# Patient Record
Sex: FEMALE | Race: WHITE | NOT HISPANIC OR LATINO | Employment: FULL TIME | URBAN - METROPOLITAN AREA
[De-identification: names, ages, dates, MRNs, and addresses within clinical notes are randomized per-mention and may not be internally consistent; named-entity substitution may affect disease eponyms.]

---

## 2017-05-11 ENCOUNTER — HISTORICAL (OUTPATIENT)
Dept: INTERNAL MEDICINE | Facility: CLINIC | Age: 52
End: 2017-05-11

## 2017-05-11 LAB
BUN SERPL-MCNC: 19 MG/DL (ref 7–25)
CALCIUM SERPL-MCNC: 9.2 MG/DL (ref 8.4–10.3)
CHLORIDE SERPL-SCNC: 102 MMOL/L (ref 96–110)
CHOLEST SERPL-MCNC: 99 MG/DL
CHOLEST/HDLC SERPL: 3.2 {RATIO} (ref 0–4.4)
CO2 SERPL-SCNC: 26 MMOL/L (ref 24–32)
CREAT SERPL-MCNC: 0.47 MG/DL (ref 0.7–1.1)
EST. AVERAGE GLUCOSE BLD GHB EST-MCNC: 140 MG/DL
FERRITIN SERPL-MCNC: 219.2 NG/ML (ref 10–150)
GLUCOSE SERPL-MCNC: 132 MG/DL (ref 65–99)
HBA1C MFR BLD: 6.5 % (ref 4.7–5.6)
HDLC SERPL-MCNC: 31 MG/DL
LDLC SERPL CALC-MCNC: 35 MG/DL (ref 0–130)
POTASSIUM SERPL-SCNC: 4.5 MMOL/L (ref 3.6–5.2)
SODIUM SERPL-SCNC: 137 MMOL/L (ref 135–146)
TRIGL SERPL-MCNC: 166 MG/DL
VLDLC SERPL CALC-MCNC: 33 MG/DL

## 2017-08-02 ENCOUNTER — HISTORICAL (OUTPATIENT)
Dept: FAMILY MEDICINE | Facility: CLINIC | Age: 52
End: 2017-08-02

## 2018-01-15 ENCOUNTER — HISTORICAL (OUTPATIENT)
Dept: ADMINISTRATIVE | Facility: HOSPITAL | Age: 53
End: 2018-01-15

## 2018-01-15 LAB
EST. AVERAGE GLUCOSE BLD GHB EST-MCNC: 226 MG/DL
HBA1C MFR BLD: 9.5 % (ref 4.2–6.3)
HCV AB SERPL QL IA: NONREACTIVE
HIV 1+2 AB+HIV1 P24 AG SERPL QL IA: NONREACTIVE

## 2018-01-18 LAB
COLOR STL: NORMAL
CONSISTENCY STL: NORMAL
HEMOCCULT SP1 STL QL: NEGATIVE

## 2018-01-19 LAB
COLOR STL: NORMAL
CONSISTENCY STL: NORMAL
HEMOCCULT SP2 STL QL: NEGATIVE

## 2018-01-21 ENCOUNTER — HISTORICAL (OUTPATIENT)
Dept: INTERNAL MEDICINE | Facility: CLINIC | Age: 53
End: 2018-01-21

## 2018-01-21 LAB
COLOR STL: NORMAL
CONSISTENCY STL: NORMAL

## 2018-08-13 ENCOUNTER — HISTORICAL (OUTPATIENT)
Dept: WOUND CARE | Facility: HOSPITAL | Age: 53
End: 2018-08-13

## 2018-08-21 ENCOUNTER — HISTORICAL (OUTPATIENT)
Dept: ADMINISTRATIVE | Facility: HOSPITAL | Age: 53
End: 2018-08-21

## 2018-08-21 LAB — CK SERPL-CCNC: 122 UNIT/L (ref 26–192)

## 2018-11-15 ENCOUNTER — HISTORICAL (OUTPATIENT)
Dept: ADMINISTRATIVE | Facility: HOSPITAL | Age: 53
End: 2018-11-15

## 2018-11-15 LAB
ABS NEUT (OLG): 10.27 X10(3)/MCL (ref 2.1–9.2)
ALBUMIN SERPL-MCNC: 3.5 GM/DL (ref 3.4–5)
ALBUMIN/GLOB SERPL: 1 RATIO (ref 1–2)
ALP SERPL-CCNC: 77 UNIT/L (ref 45–117)
ALT SERPL-CCNC: 20 UNIT/L (ref 12–78)
AST SERPL-CCNC: 8 UNIT/L (ref 15–37)
BASOPHILS # BLD AUTO: 0.06 X10(3)/MCL
BASOPHILS NFR BLD AUTO: 0 %
BILIRUB SERPL-MCNC: 0.3 MG/DL (ref 0.2–1)
BILIRUBIN DIRECT+TOT PNL SERPL-MCNC: <0.1 MG/DL
BILIRUBIN DIRECT+TOT PNL SERPL-MCNC: ABNORMAL MG/DL
BUN SERPL-MCNC: 16 MG/DL (ref 7–18)
CALCIUM SERPL-MCNC: 8.4 MG/DL (ref 8.5–10.1)
CHLORIDE SERPL-SCNC: 101 MMOL/L (ref 98–107)
CO2 SERPL-SCNC: 27 MMOL/L (ref 21–32)
CREAT SERPL-MCNC: 0.4 MG/DL (ref 0.6–1.3)
CREAT UR-MCNC: 110 MG/DL
DEPRECATED CALCIDIOL+CALCIFEROL SERPL-MC: 15.36 NG/ML (ref 30–80)
EOSINOPHIL # BLD AUTO: 0.44 10*3/UL
EOSINOPHIL NFR BLD AUTO: 3 %
ERYTHROCYTE [DISTWIDTH] IN BLOOD BY AUTOMATED COUNT: 12.3 % (ref 11.5–14.5)
EST. AVERAGE GLUCOSE BLD GHB EST-MCNC: 189 MG/DL
GLOBULIN SER-MCNC: 3.3 GM/ML (ref 2.3–3.5)
GLUCOSE SERPL-MCNC: 144 MG/DL (ref 74–106)
HBA1C MFR BLD: 8.2 % (ref 4.2–6.3)
HCT VFR BLD AUTO: 36.3 % (ref 35–46)
HGB BLD-MCNC: 11.8 GM/DL (ref 12–16)
IMM GRANULOCYTES # BLD AUTO: 0.06 10*3/UL
IMM GRANULOCYTES NFR BLD AUTO: 0 %
LYMPHOCYTES # BLD AUTO: 2.95 X10(3)/MCL
LYMPHOCYTES NFR BLD AUTO: 20 % (ref 13–40)
MCH RBC QN AUTO: 29 PG (ref 26–34)
MCHC RBC AUTO-ENTMCNC: 32.5 GM/DL (ref 31–37)
MCV RBC AUTO: 89.2 FL (ref 80–100)
MICROALBUMIN UR-MCNC: 56.8 MG/L (ref 0–19)
MICROALBUMIN/CREAT RATIO PNL UR: 51.6 MCG/MG CR (ref 0–29)
MONOCYTES # BLD AUTO: 0.86 X10(3)/MCL
MONOCYTES NFR BLD AUTO: 6 % (ref 4–12)
NEUTROPHILS # BLD AUTO: 10.27 X10(3)/MCL
NEUTROPHILS NFR BLD AUTO: 70 X10(3)/MCL
PLATELET # BLD AUTO: 278 X10(3)/MCL (ref 130–400)
PMV BLD AUTO: 11.1 FL (ref 7.4–10.4)
POTASSIUM SERPL-SCNC: 3.9 MMOL/L (ref 3.5–5.1)
PROT SERPL-MCNC: 6.8 GM/DL (ref 6.4–8.2)
RBC # BLD AUTO: 4.07 X10(6)/MCL (ref 4–5.2)
SODIUM SERPL-SCNC: 139 MMOL/L (ref 136–145)
TSH SERPL-ACNC: 1.69 MIU/L (ref 0.36–3.74)
WBC # SPEC AUTO: 14.6 X10(3)/MCL (ref 4.5–11)

## 2019-07-22 ENCOUNTER — HISTORICAL (OUTPATIENT)
Dept: ADMINISTRATIVE | Facility: HOSPITAL | Age: 54
End: 2019-07-22

## 2019-07-22 LAB
ABS NEUT (OLG): 9.04 X10(3)/MCL (ref 2.1–9.2)
ALBUMIN SERPL-MCNC: 3.4 GM/DL (ref 3.4–5)
ALBUMIN/GLOB SERPL: 0.9 RATIO (ref 1.1–2)
ALP SERPL-CCNC: 95 UNIT/L (ref 45–117)
ALT SERPL-CCNC: 48 UNIT/L (ref 12–78)
AST SERPL-CCNC: 32 UNIT/L (ref 15–37)
BASOPHILS # BLD AUTO: 0.07 X10(3)/MCL
BASOPHILS NFR BLD AUTO: 0 %
BILIRUB SERPL-MCNC: 0.5 MG/DL (ref 0.2–1)
BILIRUBIN DIRECT+TOT PNL SERPL-MCNC: <0.1 MG/DL
BILIRUBIN DIRECT+TOT PNL SERPL-MCNC: >0.4 MG/DL
BUN SERPL-MCNC: 13 MG/DL (ref 7–18)
CALCIUM SERPL-MCNC: 8.7 MG/DL (ref 8.5–10.1)
CHLORIDE SERPL-SCNC: 96 MMOL/L (ref 98–107)
CHOLEST SERPL-MCNC: 391 MG/DL
CHOLEST/HDLC SERPL: ABNORMAL {RATIO} (ref 0–4.4)
CO2 SERPL-SCNC: 23 MMOL/L (ref 21–32)
CREAT SERPL-MCNC: 0.7 MG/DL (ref 0.6–1.3)
CREAT UR-MCNC: 52 MG/DL
DEPRECATED CALCIDIOL+CALCIFEROL SERPL-MC: 8.34 NG/ML (ref 30–80)
EOSINOPHIL # BLD AUTO: 0.82 X10(3)/MCL
EOSINOPHIL NFR BLD AUTO: 6 %
ERYTHROCYTE [DISTWIDTH] IN BLOOD BY AUTOMATED COUNT: 12.5 % (ref 11.5–14.5)
EST. AVERAGE GLUCOSE BLD GHB EST-MCNC: 309 MG/DL
GLOBULIN SER-MCNC: 3.6 GM/ML (ref 2.3–3.5)
GLUCOSE SERPL-MCNC: 400 MG/DL (ref 74–106)
HBA1C MFR BLD: 12.4 % (ref 4.2–6.3)
HCT VFR BLD AUTO: 43.9 % (ref 35–46)
HDLC SERPL-MCNC: ABNORMAL MG/DL
HGB BLD-MCNC: 14.3 GM/DL (ref 12–16)
IMM GRANULOCYTES # BLD AUTO: 0.07 10*3/UL
IMM GRANULOCYTES NFR BLD AUTO: 0 %
IRON SATN MFR SERPL: 39.5 % (ref 15–50)
IRON SERPL-MCNC: 70 MCG/DL (ref 50–170)
LDLC SERPL CALC-MCNC: ABNORMAL MG/DL (ref 0–130)
LYMPHOCYTES # BLD AUTO: 3.65 X10(3)/MCL
LYMPHOCYTES NFR BLD AUTO: 26 % (ref 13–40)
MCH RBC QN AUTO: 28.8 PG (ref 26–34)
MCHC RBC AUTO-ENTMCNC: 32.6 GM/DL (ref 31–37)
MCV RBC AUTO: 88.3 FL (ref 80–100)
MICROALBUMIN UR-MCNC: 47 MG/L (ref 0–19)
MICROALBUMIN/CREAT RATIO PNL UR: 90.4 MCG/MG CR (ref 0–29)
MONOCYTES # BLD AUTO: 0.69 X10(3)/MCL
MONOCYTES NFR BLD AUTO: 5 % (ref 0–24)
NEUTROPHILS # BLD AUTO: 9.04 X10(3)/MCL
NEUTROPHILS NFR BLD AUTO: 63 X10(3)/MCL
PLATELET # BLD AUTO: 307 X10(3)/MCL (ref 130–400)
PMV BLD AUTO: 11.2 FL (ref 7.4–10.4)
POTASSIUM SERPL-SCNC: 4.4 MMOL/L (ref 3.5–5.1)
PROT SERPL-MCNC: 7 GM/DL (ref 6.4–8.2)
RBC # BLD AUTO: 4.97 X10(6)/MCL (ref 4–5.2)
SODIUM SERPL-SCNC: 130 MMOL/L (ref 136–145)
TIBC SERPL-MCNC: 177 MCG/DL (ref 250–450)
TRANSFERRIN SERPL-MCNC: 227 MG/DL (ref 200–360)
TRIGL SERPL-MCNC: 2880 MG/DL
VLDLC SERPL CALC-MCNC: ABNORMAL MG/DL
WBC # SPEC AUTO: 14.3 X10(3)/MCL (ref 4.5–11)

## 2019-09-03 ENCOUNTER — HISTORICAL (OUTPATIENT)
Dept: RADIOLOGY | Facility: HOSPITAL | Age: 54
End: 2019-09-03

## 2019-12-10 ENCOUNTER — HISTORICAL (OUTPATIENT)
Dept: ADMINISTRATIVE | Facility: HOSPITAL | Age: 54
End: 2019-12-10

## 2019-12-10 LAB
ALBUMIN SERPL-MCNC: 3.6 GM/DL (ref 3.4–5)
ALBUMIN/GLOB SERPL: 0.9 RATIO (ref 1.1–2)
ALP SERPL-CCNC: 104 UNIT/L (ref 45–117)
ALT SERPL-CCNC: 21 UNIT/L (ref 12–78)
AST SERPL-CCNC: 12 UNIT/L (ref 15–37)
BILIRUB SERPL-MCNC: 0.4 MG/DL (ref 0.2–1)
BILIRUBIN DIRECT+TOT PNL SERPL-MCNC: <0.1 MG/DL (ref 0–0.2)
BILIRUBIN DIRECT+TOT PNL SERPL-MCNC: ABNORMAL MG/DL
BUN SERPL-MCNC: 21 MG/DL (ref 7–18)
CALCIUM SERPL-MCNC: 9.2 MG/DL (ref 8.5–10.1)
CHLORIDE SERPL-SCNC: 101 MMOL/L (ref 98–107)
CHOLEST SERPL-MCNC: 202 MG/DL
CHOLEST/HDLC SERPL: 6.1 {RATIO} (ref 0–4.4)
CO2 SERPL-SCNC: 27 MMOL/L (ref 21–32)
CREAT SERPL-MCNC: 0.7 MG/DL (ref 0.6–1.3)
EST. AVERAGE GLUCOSE BLD GHB EST-MCNC: 286 MG/DL
GLOBULIN SER-MCNC: 4.1 GM/ML (ref 2.3–3.5)
GLUCOSE SERPL-MCNC: 243 MG/DL (ref 74–106)
HBA1C MFR BLD: 11.6 % (ref 4.2–6.3)
HDLC SERPL-MCNC: 33 MG/DL (ref 40–59)
LDLC SERPL CALC-MCNC: ABNORMAL MG/DL
POTASSIUM SERPL-SCNC: 4.9 MMOL/L (ref 3.5–5.1)
PROT SERPL-MCNC: 7.7 GM/DL (ref 6.4–8.2)
SODIUM SERPL-SCNC: 136 MMOL/L (ref 136–145)
TRIGL SERPL-MCNC: 617 MG/DL
VLDLC SERPL CALC-MCNC: ABNORMAL MG/DL

## 2020-01-06 ENCOUNTER — HISTORICAL (OUTPATIENT)
Dept: INTERNAL MEDICINE | Facility: CLINIC | Age: 55
End: 2020-01-06

## 2020-11-04 ENCOUNTER — HISTORICAL (OUTPATIENT)
Dept: LAB | Facility: HOSPITAL | Age: 55
End: 2020-11-04

## 2020-11-04 LAB
ALBUMIN SERPL-MCNC: 3.9 GM/DL (ref 3.5–5)
ALBUMIN/GLOB SERPL: 1.1 RATIO (ref 1.1–2)
ALP SERPL-CCNC: 98 UNIT/L (ref 40–150)
ALT SERPL-CCNC: 21 UNIT/L (ref 0–55)
AST SERPL-CCNC: 12 UNIT/L (ref 5–34)
BILIRUB SERPL-MCNC: 0.4 MG/DL
BILIRUBIN DIRECT+TOT PNL SERPL-MCNC: 0.1 MG/DL (ref 0–0.5)
BILIRUBIN DIRECT+TOT PNL SERPL-MCNC: 0.3 MG/DL (ref 0–0.8)
BUN SERPL-MCNC: 18 MG/DL (ref 9.8–20.1)
CALCIUM SERPL-MCNC: 9.1 MG/DL (ref 8.4–10.2)
CHLORIDE SERPL-SCNC: 100 MMOL/L (ref 98–107)
CO2 SERPL-SCNC: 20 MMOL/L (ref 22–29)
CREAT SERPL-MCNC: 0.72 MG/DL (ref 0.55–1.02)
DEPRECATED CALCIDIOL+CALCIFEROL SERPL-MC: 15.2 NG/ML (ref 30–80)
EST. AVERAGE GLUCOSE BLD GHB EST-MCNC: 306.3 MG/DL
GLOBULIN SER-MCNC: 3.6 GM/DL (ref 2.4–3.5)
GLUCOSE SERPL-MCNC: 365 MG/DL (ref 74–100)
HBA1C MFR BLD: 12.3 %
POTASSIUM SERPL-SCNC: 4 MMOL/L (ref 3.5–5.1)
PROT SERPL-MCNC: 7.5 GM/DL (ref 6.4–8.3)
SODIUM SERPL-SCNC: 134 MMOL/L (ref 136–145)
TSH SERPL-ACNC: 1.22 UIU/ML (ref 0.35–4.94)

## 2020-11-12 ENCOUNTER — HISTORICAL (OUTPATIENT)
Dept: INTERNAL MEDICINE | Facility: CLINIC | Age: 55
End: 2020-11-12

## 2020-11-12 LAB
ABS NEUT (OLG): 8.73 X10(3)/MCL (ref 2.1–9.2)
ALBUMIN SERPL-MCNC: 3.7 GM/DL (ref 3.5–5)
ALBUMIN/GLOB SERPL: 1.2 RATIO (ref 1.1–2)
ALP SERPL-CCNC: 91 UNIT/L (ref 40–150)
ALT SERPL-CCNC: 19 UNIT/L (ref 0–55)
APPEARANCE, UA: CLEAR
AST SERPL-CCNC: 11 UNIT/L (ref 5–34)
BACTERIA #/AREA URNS AUTO: ABNORMAL /HPF
BASOPHILS # BLD AUTO: 0.1 X10(3)/MCL (ref 0–0.2)
BASOPHILS NFR BLD AUTO: 0 %
BILIRUB SERPL-MCNC: 0.6 MG/DL
BILIRUB UR QL STRIP: NEGATIVE
BILIRUBIN DIRECT+TOT PNL SERPL-MCNC: 0.2 MG/DL (ref 0–0.5)
BILIRUBIN DIRECT+TOT PNL SERPL-MCNC: 0.4 MG/DL (ref 0–0.8)
BUN SERPL-MCNC: 20 MG/DL (ref 9.8–20.1)
CALCIUM SERPL-MCNC: 9 MG/DL (ref 8.4–10.2)
CHLORIDE SERPL-SCNC: 97 MMOL/L (ref 98–107)
CO2 SERPL-SCNC: 23 MMOL/L (ref 22–29)
COLOR UR: ABNORMAL
CREAT SERPL-MCNC: 1.1 MG/DL (ref 0.55–1.02)
EOSINOPHIL # BLD AUTO: 0.6 X10(3)/MCL (ref 0–0.9)
EOSINOPHIL NFR BLD AUTO: 4 %
ERYTHROCYTE [DISTWIDTH] IN BLOOD BY AUTOMATED COUNT: 12.7 % (ref 11.5–14.5)
GLOBULIN SER-MCNC: 3.1 GM/DL (ref 2.4–3.5)
GLUCOSE (UA): >1000 MG/DL
GLUCOSE SERPL-MCNC: 454 MG/DL (ref 74–100)
HCT VFR BLD AUTO: 39.5 % (ref 35–46)
HGB BLD-MCNC: 13.2 GM/DL (ref 12–16)
HGB UR QL STRIP: NEGATIVE
HYALINE CASTS #/AREA URNS LPF: ABNORMAL /LPF
IMM GRANULOCYTES # BLD AUTO: 0.1 10*3/UL
IMM GRANULOCYTES NFR BLD AUTO: 1 %
KETONES UR QL STRIP: ABNORMAL
LEUKOCYTE ESTERASE UR QL STRIP: 25 LEU/UL
LYMPHOCYTES # BLD AUTO: 3.9 X10(3)/MCL (ref 0.6–4.6)
LYMPHOCYTES NFR BLD AUTO: 28 %
MCH RBC QN AUTO: 28.7 PG (ref 26–34)
MCHC RBC AUTO-ENTMCNC: 33.4 GM/DL (ref 31–37)
MCV RBC AUTO: 85.9 FL (ref 80–100)
MONOCYTES # BLD AUTO: 0.8 X10(3)/MCL (ref 0.1–1.3)
MONOCYTES NFR BLD AUTO: 6 %
NEUTROPHILS # BLD AUTO: 8.73 X10(3)/MCL (ref 2.1–9.2)
NEUTROPHILS NFR BLD AUTO: 62 %
NITRITE UR QL STRIP: NEGATIVE
PH UR STRIP: 5.5 [PH] (ref 4.5–8)
PLATELET # BLD AUTO: 252 X10(3)/MCL (ref 130–400)
PMV BLD AUTO: 11.3 FL (ref 7.4–10.4)
POTASSIUM SERPL-SCNC: 4.3 MMOL/L (ref 3.5–5.1)
PROT SERPL-MCNC: 6.8 GM/DL (ref 6.4–8.3)
PROT UR QL STRIP: 20 MG/DL
RBC # BLD AUTO: 4.6 X10(6)/MCL (ref 4–5.2)
RBC #/AREA URNS AUTO: ABNORMAL /HPF
SODIUM SERPL-SCNC: 131 MMOL/L (ref 136–145)
SP GR UR STRIP: 1.03 (ref 1–1.03)
SQUAMOUS #/AREA URNS LPF: >100 /LPF
UROBILINOGEN UR STRIP-ACNC: NORMAL
WBC # SPEC AUTO: 14.1 X10(3)/MCL (ref 4.5–11)
WBC #/AREA URNS AUTO: ABNORMAL /HPF

## 2020-11-14 LAB — FINAL CULTURE: NORMAL

## 2020-12-11 ENCOUNTER — HISTORICAL (OUTPATIENT)
Dept: RADIOLOGY | Facility: HOSPITAL | Age: 55
End: 2020-12-11

## 2021-04-22 LAB
HUMAN PAPILLOMAVIRUS (HPV): NORMAL
PAP RECOMMENDATION EXT: NORMAL
PAP SMEAR: NORMAL

## 2021-05-27 ENCOUNTER — HISTORICAL (OUTPATIENT)
Dept: ADMINISTRATIVE | Facility: HOSPITAL | Age: 56
End: 2021-05-27

## 2021-05-27 LAB
ABS NEUT (OLG): 8.91 X10(3)/MCL (ref 2.1–9.2)
ALBUMIN SERPL-MCNC: 3.9 GM/DL (ref 3.5–5)
ALBUMIN/GLOB SERPL: 1 RATIO (ref 1.1–2)
ALP SERPL-CCNC: 129 UNIT/L (ref 40–150)
ALT SERPL-CCNC: 15 UNIT/L (ref 0–55)
APPEARANCE, UA: CLEAR
AST SERPL-CCNC: 17 UNIT/L (ref 5–34)
BACTERIA #/AREA URNS AUTO: ABNORMAL /HPF
BASOPHILS # BLD AUTO: 0.1 X10(3)/MCL (ref 0–0.2)
BASOPHILS NFR BLD AUTO: 1 %
BILIRUB SERPL-MCNC: 0.5 MG/DL
BILIRUB UR QL STRIP: NEGATIVE
BILIRUBIN DIRECT+TOT PNL SERPL-MCNC: <0.1 MG/DL (ref 0–0.5)
BILIRUBIN DIRECT+TOT PNL SERPL-MCNC: >0.4 MG/DL (ref 0–0.8)
BUN SERPL-MCNC: 14 MG/DL (ref 9.8–20.1)
CALCIUM SERPL-MCNC: 10.3 MG/DL (ref 8.4–10.2)
CHLORIDE SERPL-SCNC: 96 MMOL/L (ref 98–107)
CHOLEST SERPL-MCNC: 376 MG/DL
CHOLEST/HDLC SERPL: ABNORMAL {RATIO} (ref 0–5)
CO2 SERPL-SCNC: 23 MMOL/L (ref 22–29)
COLOR UR: YELLOW
CREAT SERPL-MCNC: 0.73 MG/DL (ref 0.55–1.02)
CREAT UR-MCNC: 146.8 MG/DL (ref 45–106)
CREAT UR-MCNC: 146.8 MG/DL (ref 45–106)
DEPRECATED CALCIDIOL+CALCIFEROL SERPL-MC: 20.9 NG/ML (ref 30–80)
EOSINOPHIL # BLD AUTO: 0.5 X10(3)/MCL (ref 0–0.9)
EOSINOPHIL NFR BLD AUTO: 4 %
ERYTHROCYTE [DISTWIDTH] IN BLOOD BY AUTOMATED COUNT: 12.2 % (ref 11.5–14.5)
EST. AVERAGE GLUCOSE BLD GHB EST-MCNC: 309.2 MG/DL
GLOBULIN SER-MCNC: 3.9 GM/DL (ref 2.4–3.5)
GLUCOSE (UA): >1000 MG/DL
GLUCOSE SERPL-MCNC: 294 MG/DL (ref 74–100)
HBA1C MFR BLD: 12.4 %
HCT VFR BLD AUTO: 43.9 % (ref 35–46)
HDLC SERPL-MCNC: ABNORMAL MG/DL (ref 35–60)
HGB BLD-MCNC: 14.6 GM/DL (ref 12–16)
HGB UR QL STRIP: NEGATIVE
HYALINE CASTS #/AREA URNS LPF: ABNORMAL /LPF
IMM GRANULOCYTES # BLD AUTO: 0.06 10*3/UL
IMM GRANULOCYTES NFR BLD AUTO: 0 %
KETONES UR QL STRIP: ABNORMAL
LDLC SERPL CALC-MCNC: ABNORMAL MG/DL (ref 50–140)
LEUKOCYTE ESTERASE UR QL STRIP: 25 LEU/UL
LYMPHOCYTES # BLD AUTO: 3.8 X10(3)/MCL (ref 0.6–4.6)
LYMPHOCYTES NFR BLD AUTO: 27 %
MCH RBC QN AUTO: 29 PG (ref 26–34)
MCHC RBC AUTO-ENTMCNC: 33.3 GM/DL (ref 31–37)
MCV RBC AUTO: 87.1 FL (ref 80–100)
MICROALBUMIN UR-MCNC: 231 MG/L
MICROALBUMIN/CREAT RATIO PNL UR: 157.4 MG/GM CR (ref 0–30)
MONOCYTES # BLD AUTO: 0.7 X10(3)/MCL (ref 0.1–1.3)
MONOCYTES NFR BLD AUTO: 5 %
NEUTROPHILS # BLD AUTO: 8.91 X10(3)/MCL (ref 2.1–9.2)
NEUTROPHILS NFR BLD AUTO: 63 %
NITRITE UR QL STRIP: NEGATIVE
NRBC BLD AUTO-RTO: 0 % (ref 0–0.2)
PH UR STRIP: 5.5 [PH] (ref 4.5–8)
PLATELET # BLD AUTO: 275 X10(3)/MCL (ref 130–400)
PMV BLD AUTO: 11.4 FL (ref 7.4–10.4)
POTASSIUM SERPL-SCNC: 4.9 MMOL/L (ref 3.5–5.1)
PROT SERPL-MCNC: 7.8 GM/DL (ref 6.4–8.3)
PROT UR QL STRIP: 50 MG/DL
PROT UR STRIP-MCNC: 55.7 MG/DL
PROT/CREAT UR-RTO: 379.4 MG/GM CR
RBC # BLD AUTO: 5.04 X10(6)/MCL (ref 4–5.2)
RBC #/AREA URNS AUTO: ABNORMAL /HPF
SODIUM SERPL-SCNC: 136 MMOL/L (ref 136–145)
SP GR UR STRIP: 1.03 (ref 1–1.03)
SQUAMOUS #/AREA URNS LPF: >100 /LPF
TRIGL SERPL-MCNC: 2171 MG/DL (ref 37–140)
TSH SERPL-ACNC: 1.21 UIU/ML (ref 0.35–4.94)
UROBILINOGEN UR STRIP-ACNC: NORMAL
VLDLC SERPL CALC-MCNC: 434 MG/DL
WBC # SPEC AUTO: 14.1 X10(3)/MCL (ref 4.5–11)
WBC #/AREA URNS AUTO: ABNORMAL /HPF

## 2021-05-29 LAB — FINAL CULTURE: NORMAL

## 2021-06-22 ENCOUNTER — HISTORICAL (OUTPATIENT)
Dept: RADIOLOGY | Facility: HOSPITAL | Age: 56
End: 2021-06-22

## 2021-10-05 ENCOUNTER — HISTORICAL (OUTPATIENT)
Dept: ADMINISTRATIVE | Facility: HOSPITAL | Age: 56
End: 2021-10-05

## 2021-10-05 LAB
BUN SERPL-MCNC: 14.3 MG/DL (ref 9.8–20.1)
CALCIUM SERPL-MCNC: 9.7 MG/DL (ref 8.4–10.2)
CHLORIDE SERPL-SCNC: 98 MMOL/L (ref 98–107)
CO2 SERPL-SCNC: 22 MMOL/L (ref 22–29)
CREAT SERPL-MCNC: 0.71 MG/DL (ref 0.55–1.02)
CREAT UR-MCNC: 181.2 MG/DL (ref 45–106)
CREAT/UREA NIT SERPL: 20
DEPRECATED CALCIDIOL+CALCIFEROL SERPL-MC: 18.6 NG/ML (ref 30–80)
EST. AVERAGE GLUCOSE BLD GHB EST-MCNC: 274.7 MG/DL
GLUCOSE SERPL-MCNC: 320 MG/DL (ref 74–100)
HBA1C MFR BLD: 11.2 %
MICROALBUMIN UR-MCNC: 475.4 MG/L
MICROALBUMIN/CREAT RATIO PNL UR: 262.4 MG/GM CR (ref 0–30)
POTASSIUM SERPL-SCNC: 4.3 MMOL/L (ref 3.5–5.1)
SODIUM SERPL-SCNC: 135 MMOL/L (ref 136–145)
TSH SERPL-ACNC: 0.95 UIU/ML (ref 0.35–4.94)
VIT B12 SERPL-MCNC: 477 PG/ML (ref 213–816)

## 2022-04-07 ENCOUNTER — HISTORICAL (OUTPATIENT)
Dept: ADMINISTRATIVE | Facility: HOSPITAL | Age: 57
End: 2022-04-07
Payer: MEDICAID

## 2022-04-13 ENCOUNTER — HISTORICAL (OUTPATIENT)
Dept: ADMINISTRATIVE | Facility: HOSPITAL | Age: 57
End: 2022-04-13

## 2022-04-24 VITALS
SYSTOLIC BLOOD PRESSURE: 164 MMHG | DIASTOLIC BLOOD PRESSURE: 91 MMHG | WEIGHT: 218.25 LBS | OXYGEN SATURATION: 96 % | BODY MASS INDEX: 38.67 KG/M2 | HEIGHT: 63 IN

## 2022-04-30 NOTE — PROGRESS NOTES
MNT OUTPATIENT EDUCATION   Nutrition Diagnosis  Problem:   Food & Nutrition Related Knowledge Deficit       Related to:  Medical Diagnosis  As Evidenced by:  Limited prior knowledge / health professional referral    Nutrition Prescription / Intervention  MNT Education Completed on:    Diabetes:  Instructed patient on carbohydrate containing food groups (starches, fruits, milk); portion sizes / measuring food; reading food labels; low fat meat selections; choosing healthier fats; increasing activity; sick day management; low fat tips on dining out; good eating habits; cooking healthier meals.       Appropriate meal plan provided: 1411-8846 calories     Level of Understanding:   good  Expected Compliance:   good  Appropriate Handouts Given: (age specific / literacy): yes  Barriers to Learning: none    Nutrition Prescription:  Diet order prescribed per MD / RD    Goal:  Patient will adhere to prescribed MNT meal plan as instructed by RD    Monitoring / Evaluation    RKANNAN will monitor: DMITRY

## 2022-05-08 ENCOUNTER — HOSPITAL ENCOUNTER (EMERGENCY)
Facility: HOSPITAL | Age: 57
Discharge: HOME | End: 2022-05-08
Attending: STUDENT IN AN ORGANIZED HEALTH CARE EDUCATION/TRAINING PROGRAM

## 2022-05-08 VITALS
OXYGEN SATURATION: 97 % | HEART RATE: 102 BPM | DIASTOLIC BLOOD PRESSURE: 79 MMHG | SYSTOLIC BLOOD PRESSURE: 140 MMHG | HEIGHT: 63 IN | RESPIRATION RATE: 18 BRPM | WEIGHT: 224.63 LBS | TEMPERATURE: 98 F | BODY MASS INDEX: 39.8 KG/M2

## 2022-05-08 DIAGNOSIS — L50.9 URTICARIA: Primary | ICD-10-CM

## 2022-05-08 PROCEDURE — 96372 THER/PROPH/DIAG INJ SC/IM: CPT | Performed by: NURSE PRACTITIONER

## 2022-05-08 PROCEDURE — 63600175 PHARM REV CODE 636 W HCPCS: Performed by: NURSE PRACTITIONER

## 2022-05-08 PROCEDURE — 99284 EMERGENCY DEPT VISIT MOD MDM: CPT

## 2022-05-08 PROCEDURE — 25000003 PHARM REV CODE 250: Performed by: NURSE PRACTITIONER

## 2022-05-08 RX ORDER — LISINOPRIL 20 MG/1
20 TABLET ORAL DAILY
COMMUNITY
Start: 2022-01-21 | End: 2022-08-29 | Stop reason: SDUPTHER

## 2022-05-08 RX ORDER — METHYLPREDNISOLONE SOD SUCC 125 MG
125 VIAL (EA) INJECTION
Status: COMPLETED | OUTPATIENT
Start: 2022-05-08 | End: 2022-05-08

## 2022-05-08 RX ORDER — FAMOTIDINE 20 MG/1
20 TABLET, FILM COATED ORAL
Status: COMPLETED | OUTPATIENT
Start: 2022-05-08 | End: 2022-05-08

## 2022-05-08 RX ORDER — METHYLPREDNISOLONE 4 MG/1
TABLET ORAL
Qty: 21 EACH | Refills: 0 | Status: SHIPPED | OUTPATIENT
Start: 2022-05-08 | End: 2022-07-29

## 2022-05-08 RX ORDER — GLIPIZIDE 10 MG/1
10 TABLET ORAL 2 TIMES DAILY
COMMUNITY
Start: 2022-01-21 | End: 2022-05-31

## 2022-05-08 RX ORDER — GABAPENTIN 300 MG/1
300 CAPSULE ORAL 3 TIMES DAILY
COMMUNITY
Start: 2022-04-25 | End: 2022-08-29 | Stop reason: SDUPTHER

## 2022-05-08 RX ORDER — ROPINIROLE 0.5 MG/1
0.5 TABLET, FILM COATED ORAL 3 TIMES DAILY
COMMUNITY
Start: 2022-01-21 | End: 2022-08-29 | Stop reason: SDUPTHER

## 2022-05-08 RX ORDER — FAMOTIDINE 20 MG/1
20 TABLET, FILM COATED ORAL DAILY
Qty: 7 TABLET | Refills: 0 | Status: SHIPPED | OUTPATIENT
Start: 2022-05-08 | End: 2022-08-29 | Stop reason: SDUPTHER

## 2022-05-08 RX ADMIN — FAMOTIDINE 20 MG: 20 TABLET, FILM COATED ORAL at 04:05

## 2022-05-08 RX ADMIN — METHYLPREDNISOLONE SODIUM SUCCINATE 125 MG: 125 INJECTION, POWDER, FOR SOLUTION INTRAMUSCULAR; INTRAVENOUS at 04:05

## 2022-05-08 NOTE — DISCHARGE INSTRUCTIONS
Follow up with primary care physician in next 5-7 days for evaluation.   Increase oral hydration.  Continue OTC Benadryl up to three times daily for allergy symptoms.  Return to ED immediately for worsening swelling, SOB, or inability to swallow.

## 2022-05-08 NOTE — ED PROVIDER NOTES
Encounter Date: 5/8/2022       History     Chief Complaint   Patient presents with    Urticaria     Urticaria to trunk and extremities with mild shortness of breath; no wheezing. Ate crawfish last night. Ate lunch around 13:30. Symptom onset at 14:00. Denies stings/bites. No known allergies. Took Benadryl prior to arrival.     Pt is a 56 yr female who presents to the [facility] ED reporting a rash to chest and arms which began this Am. Reports eating crawfish last night for the first time since she was a child. Admits to having an allergic reaction at that time as well. Denies swelling to lips, tongue, or throat. Denies chest pain, SOB, weakness, dizziness, fever, abdominal pain, or loss of bowel or bladder control. Pt took a dose of Benadryl approx 1 hour ago and says her symptoms have improved since taking medication.        Review of patient's allergies indicates:  No Known Allergies  Past Medical History:   Diagnosis Date    Diabetes mellitus     Hypertension      No past surgical history on file.  No family history on file.     Review of Systems   Constitutional: Negative for chills and fever.   HENT: Negative for facial swelling and trouble swallowing.    Respiratory: Negative for chest tightness and shortness of breath.    Cardiovascular: Negative for chest pain and palpitations.   Gastrointestinal: Negative for abdominal pain and nausea.   Genitourinary: Negative for dysuria and urgency.   Musculoskeletal: Negative for back pain and myalgias.   Skin: Positive for rash.   Neurological: Negative for weakness.   Hematological: Does not bruise/bleed easily.   All other systems reviewed and are negative.      Physical Exam     Initial Vitals [05/08/22 1617]   BP Pulse Resp Temp SpO2   117/70 (!) 111 16 97.5 °F (36.4 °C) 97 %      MAP       --         Physical Exam    Nursing note and vitals reviewed.  Constitutional: She appears well-developed.   HENT:   Head: Normocephalic and atraumatic.   Eyes: Pupils are equal,  round, and reactive to light.   Neck: Neck supple.   Normal range of motion.  Cardiovascular: Normal rate and regular rhythm.   Pulmonary/Chest: Breath sounds normal.   Abdominal: Abdomen is soft. Bowel sounds are normal. There is no rebound, no guarding, no tenderness at McBurney's point and negative Hoskins's sign. negative psoas sign  Musculoskeletal:         General: Normal range of motion.      Cervical back: Normal range of motion and neck supple.     Neurological: She is alert and oriented to person, place, and time. She has normal strength and normal reflexes.   Skin: Skin is warm and dry. Capillary refill takes less than 2 seconds. Rash noted. Rash is urticarial.        Urticarial rash to anterior chest, bilateral upper arms, and Lt forearm. No erythema or purulent drainage noted. Nontender to touch.   Psychiatric: She has a normal mood and affect. Her speech is normal and behavior is normal. Judgment and thought content normal.         ED Course   Procedures  Labs Reviewed - No data to display       Imaging Results    None          Medications   methylPREDNISolone sodium succinate injection 125 mg (125 mg Intramuscular Given 5/8/22 1643)   famotidine tablet 20 mg (20 mg Oral Given 5/8/22 1642)     Medical Decision Making:   Differential Diagnosis:   Allergic reaction  ED Management:  17:35 On re-examination, pt's urticaria has greatly improved. Pt continues to deny swelling to lips, tongue, or throat. Pt will follow up with her PCP in the next 3-4 days as discussed in the ED. I stressed the need for her to return to the [facility] ED immediately for worsening swelling, SOB, or onset of swelling to lips/throat. Pt verbalized agreement and understanding of instructions. Denies further needs at this time.                       Clinical Impression:   Final diagnoses:  [L50.9] Urticaria (Primary)          ED Disposition Condition    Discharge Stable        ED Prescriptions     Medication Sig Dispense Start Date End  Date Auth. Provider    famotidine (PEPCID) 20 MG tablet Take 1 tablet (20 mg total) by mouth once daily. for 7 days 7 tablet 5/8/2022 5/15/2022 Tan Oglesby Jr., JANE    methylPREDNISolone (MEDROL DOSEPACK) 4 mg tablet use as directed 21 each 5/8/2022  JANE Vazquez Jr.        Follow-up Information     Follow up With Specialties Details Why Contact Info    Jaycob Pineda MD Internal Medicine Call in 1 week  [address]  [place] [id number] [phone]      [facility] Emergency Medicine In 3 days As needed, If symptoms worsen [address] [facility] [address] [phone]           JANE Vazquez Jr.  05/08/22 3340

## 2022-05-11 ENCOUNTER — HOSPITAL ENCOUNTER (OUTPATIENT)
Dept: RADIOLOGY | Facility: HOSPITAL | Age: 57
Discharge: HOME | End: 2022-05-11
Attending: STUDENT IN AN ORGANIZED HEALTH CARE EDUCATION/TRAINING PROGRAM
Payer: MEDICAID

## 2022-05-11 ENCOUNTER — OFFICE VISIT (OUTPATIENT)
Dept: ORTHOPEDICS | Facility: CLINIC | Age: 57
End: 2022-05-11
Payer: COMMERCIAL

## 2022-05-11 VITALS
SYSTOLIC BLOOD PRESSURE: 150 MMHG | DIASTOLIC BLOOD PRESSURE: 83 MMHG | WEIGHT: 222.88 LBS | HEIGHT: 64 IN | BODY MASS INDEX: 38.05 KG/M2

## 2022-05-11 DIAGNOSIS — M25.511 RIGHT SHOULDER PAIN, UNSPECIFIED CHRONICITY: Primary | ICD-10-CM

## 2022-05-11 DIAGNOSIS — M25.511 RIGHT SHOULDER PAIN, UNSPECIFIED CHRONICITY: ICD-10-CM

## 2022-05-11 PROCEDURE — 99213 OFFICE O/P EST LOW 20 MIN: CPT | Mod: PBBFAC

## 2022-05-11 PROCEDURE — 73030 X-RAY EXAM OF SHOULDER: CPT | Mod: TC,RT

## 2022-05-11 PROCEDURE — 99204 OFFICE O/P NEW MOD 45 MIN: CPT | Mod: S$PBB,,, | Performed by: ORTHOPAEDIC SURGERY

## 2022-05-11 PROCEDURE — 99204 PR OFFICE/OUTPT VISIT, NEW, LEVL IV, 45-59 MIN: ICD-10-PCS | Mod: S$PBB,,, | Performed by: ORTHOPAEDIC SURGERY

## 2022-05-11 RX ORDER — GABAPENTIN 300 MG/1
300 CAPSULE ORAL
COMMUNITY
Start: 2022-01-21 | End: 2022-05-21

## 2022-05-11 RX ORDER — LISINOPRIL 20 MG/1
20 TABLET ORAL
COMMUNITY
Start: 2022-01-21 | End: 2022-08-29 | Stop reason: SDUPTHER

## 2022-05-11 RX ORDER — GLIPIZIDE 10 MG/1
10 TABLET ORAL
COMMUNITY
Start: 2022-01-21 | End: 2022-05-31

## 2022-05-11 RX ORDER — METFORMIN HYDROCHLORIDE 1000 MG/1
1000 TABLET ORAL
COMMUNITY
Start: 2021-10-05 | End: 2022-05-31

## 2022-05-11 RX ORDER — ROPINIROLE 0.5 MG/1
0.5 TABLET, FILM COATED ORAL
COMMUNITY
Start: 2022-01-21 | End: 2022-08-29 | Stop reason: SDUPTHER

## 2022-05-11 NOTE — PATIENT INSTRUCTIONS
Keep in sling. No shoulder range of motion but ok to move elbow, wrist and fingers.   Look for physical therapy location.   For pain: can alternate  - tylenol 1,000 mg three times a day as needed  - Ibuprofen 600 mg four times a day as needed  - Volteren gel as needed (follow directions on bottle)    Vitamins:  - Multi-vitamin for calcium  - Vitamin D around 5,000 units daily

## 2022-05-11 NOTE — PROGRESS NOTES
[facility] Orthopaedic Surgery Clinic Note    In brief, Suki Anton is a 56 y.o. female Right-hand-dominant new patient here for an injury to her right shoulder.  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  This injury happened at work and his workmen's comp.  About 4 weeks ago she fell at work had a right proximal humerus fracture she has been in a  Sling since the injury.  She has not done any range of motion of her shoulder.  Has been compliant with nonweightbearing.  She feels like the pain is improving however she does get numbness and tingling in her hand from being in the sling.  She takes her arm out of the sling to straighten her elbow this result her symptoms.      PMH:   Past Medical History:   Diagnosis Date    Diabetes mellitus     Hypertension        PSH: History reviewed. No pertinent surgical history.    SH:   Social History     Socioeconomic History    Marital status: Single   Tobacco Use    Smoking status: Never Smoker    Smokeless tobacco: Never Used       FH: No family history on file.    Allergies: Review of patient's allergies indicates:  No Known Allergies    ROS:  Constitutional- no fever, fatigue, weakness  Eye- no vision loss, eye redness, drainage, or pain  ENMT- no sore throat, ear pain, sinus pain/congestion, nasal congestion/drainage  Respiratory- no cough, wheezing, or shortness of breath  CV- no chest pain, no palpitations, no edema  GI- no N/V/D; no abdominal pain    Physical Exam:  Vitals:    05/11/22 1045   BP: (!) 150/83       General: NAD  Cardio: RRR by peripheral pulse  Pulm: Normal WOB on room air, symmetric chest rise  Abd: Soft, NT/ND    MSK:    Right upper extremity   deferred range of motion of the shoulder due to known injury no ecchymosis or bruising on the arm minimal tenderness to palpation at this point  Full elbow range of motion  Full wrist and finger range of motion   Sensation to light touch intact axillary median radial and ulnar nerve distributions  Motor intact axillary  median radial and ulnar nerve distributions   2+ radial pulse    Imaging:    x-ray right shoulder:  X-rays include AP oblique axillary views of right proximal humerus.  Patient has nondisplaced  Proximal humerus fracture at the greater tuberosity as well as the neck.  Interval callus formation able to be seen for injury films.    Assessment:    56-year-old right-hand-dominant female right proximal humerus fracture nondisplaced.  Worker's comp    -Continue sling for 2-3 more weeks.  Follow-up in 2-3 weeks for repeat x-rays and exam at that time.  Would like to start physical therapy to begin working on gentle range of motion of the time.  It demonstrated shoulder pendulum exercises she can now.  Also okay for our finger range of motion.  Given prescription for physical therapy that she can go ahead and set this up to start her in 3 weeks after she sees us at next visit. No weight to right arm.     For pain: can alternate  - tylenol 1,000 mg three times a day as needed  - Ibuprofen 600 mg four times a day as needed  - Volteren gel as needed (follow directions on bottle)    Vitamins:  - Multi-vitamin for calcium  - Vitamin D around 5,000 units daily    Corinne E Cloud, MD  [facility]  5/11/2022 12:06 PM

## 2022-05-13 NOTE — PROGRESS NOTES
ATTENDING ADDENDUM    Suki Anton  was evaluated at the time of the encounter with Dr Chaudhry PGY5.  HPI, PE and treatment plan was reviewed. Treatment plan was reasonable and necessary. Imaging was reviewed at the time of visit.

## 2022-05-31 ENCOUNTER — OFFICE VISIT (OUTPATIENT)
Dept: ENDOCRINOLOGY | Facility: CLINIC | Age: 57
End: 2022-05-31

## 2022-05-31 ENCOUNTER — PATIENT OUTREACH (OUTPATIENT)
Dept: DIABETES | Facility: CLINIC | Age: 57
End: 2022-05-31

## 2022-05-31 VITALS — HEART RATE: 85 BPM | SYSTOLIC BLOOD PRESSURE: 174 MMHG | DIASTOLIC BLOOD PRESSURE: 95 MMHG | TEMPERATURE: 98 F

## 2022-05-31 DIAGNOSIS — E11.9 TYPE 2 DIABETES MELLITUS WITHOUT COMPLICATION, UNSPECIFIED WHETHER LONG TERM INSULIN USE: ICD-10-CM

## 2022-05-31 DIAGNOSIS — G62.9 NEUROPATHY: ICD-10-CM

## 2022-05-31 DIAGNOSIS — N28.9 NEPHROPATHY: ICD-10-CM

## 2022-05-31 DIAGNOSIS — E11.65 UNCONTROLLED TYPE 2 DIABETES MELLITUS WITH HYPERGLYCEMIA: Primary | ICD-10-CM

## 2022-05-31 PROBLEM — E78.5 HYPERLIPIDEMIA: Status: ACTIVE | Noted: 2022-05-31

## 2022-05-31 PROBLEM — E66.9 OBESITY: Status: ACTIVE | Noted: 2022-05-31

## 2022-05-31 LAB — HBA1C MFR BLD: 11.8 %

## 2022-05-31 PROCEDURE — 99214 OFFICE O/P EST MOD 30 MIN: CPT | Mod: S$PBB,,, | Performed by: NURSE PRACTITIONER

## 2022-05-31 PROCEDURE — 99214 PR OFFICE/OUTPT VISIT, EST, LEVL IV, 30-39 MIN: ICD-10-PCS | Mod: S$PBB,,, | Performed by: NURSE PRACTITIONER

## 2022-05-31 PROCEDURE — 83036 HEMOGLOBIN GLYCOSYLATED A1C: CPT | Mod: PBBFAC | Performed by: NURSE PRACTITIONER

## 2022-05-31 PROCEDURE — 99214 OFFICE O/P EST MOD 30 MIN: CPT | Mod: PBBFAC | Performed by: NURSE PRACTITIONER

## 2022-05-31 RX ORDER — INSULIN DETEMIR 100 [IU]/ML
30 INJECTION, SOLUTION SUBCUTANEOUS NIGHTLY
Qty: 15 ML | Refills: 5 | Status: SHIPPED | OUTPATIENT
Start: 2022-05-31 | End: 2022-08-29 | Stop reason: SDUPTHER

## 2022-05-31 RX ORDER — OMEPRAZOLE 20 MG/1
40 CAPSULE, DELAYED RELEASE ORAL DAILY
COMMUNITY
Start: 2022-01-21 | End: 2022-08-29 | Stop reason: SDUPTHER

## 2022-05-31 RX ORDER — PEN NEEDLE, DIABETIC 30 GX3/16"
NEEDLE, DISPOSABLE MISCELLANEOUS
Qty: 90 EACH | Refills: 3 | Status: SHIPPED | OUTPATIENT
Start: 2022-05-31 | End: 2022-08-29 | Stop reason: SDUPTHER

## 2022-05-31 RX ORDER — GLIPIZIDE 10 MG/1
10 TABLET ORAL 2 TIMES DAILY WITH MEALS
Qty: 60 TABLET | Refills: 5 | Status: SHIPPED | OUTPATIENT
Start: 2022-05-31 | End: 2022-08-29 | Stop reason: SDUPTHER

## 2022-05-31 RX ORDER — INSULIN PUMP SYRINGE, 3 ML
EACH MISCELLANEOUS
Qty: 1 EACH | Refills: 0 | Status: SHIPPED | OUTPATIENT
Start: 2022-05-31 | End: 2023-01-19 | Stop reason: SDUPTHER

## 2022-05-31 NOTE — PROGRESS NOTES
- briefly met with pt in endo clinic today to answer questions re: nutrition, discouraged sugary beverages, pt reports that she recently switched from regular soda to fruit juice, explained that fruit juice is still high in sugar/carbohydrates, encouraged instead flavored water packets as well as plenty of water, reviewed appropriate fruit portions, encouraged plenty of nonstarchy vegetables, discussed yogurt & appropriate portions, gave healthy snack ideas  - Plate method handouts as well as handouts on nonstarchy vegetables & fruit/appropriate fruit portions were provided by endocrinology NP  - no plans for follow up at this time, provided pt with contact information & encouraged to call with questions prn

## 2022-05-31 NOTE — PROGRESS NOTES
Subjective:       Patient ID: Suki Anton is a 56 y.o. female.    Chief Complaint: Diabetes (Type 2 follow up)      Previous Endocrine Clinic Notes     10/05/2021 Endocrine Clinic Note: 55-year-old female scheduled today as new patient referral endocrine clinic for uncontrolled type 2 diabetes.  History of uncontrolled type 2 diabetes with nephropathy, neuropathy, hyperlipidemia, hypertension and vitamin D deficiency.  Uncontrolled type 2 diabetes current A1c 12.4 previous 12.3 and 12.6.  Patient does not check CBGs and denies any symptoms of hypoglycemia.  Patient has an erratic refill pattern on medications and does not feel consistently patient states this is due to cost she is unable to afford her medications and does not take regularly.  Patient states she previously had Medicaid and lost her insurance and is now free care and has difficulty affording her medications.  She states with her Metformin she only takes 1000 mg in the morning every few days average of 3-4 times per week and reports that she still has diarrhea with her Metformin.  She states with her glipizide she only takes when she takes her Metformin but only takes once a day.  Patient had not filled Victoza since May due to cost costing over $50 at the [facility] pharmacy.  And she has not been filling her Levemir due to cost. Nephropathy elevated urine micro 5/27/2021 patient is currently on ACE, has not been taking her ACE discussed compliance..  Neuropathy patient is currently on gabapentin and had diabetic shoes prescribed by PCP but patient was unable to obtain due to losing insurance..  Hyperlipidemia Total 376, HDL not valid due to triglycerides > 1700, triglycerides 2171, LDL unable to calculate.  Vitamin D deficiency Vitamin D level 20.9 On vitamin D3 1000 IUs/day increase to 2000 IUs/day. (1)    01/21/2022 Endocrine Clinic Note: 55-year-old female scheduled today as endocrine clinic follow-up.  History of uncontrolled type 2 diabetes with  nephropathy, neuropathy, hyperlipidemia, hypertension and vitamin D deficiency.  Uncontrolled type 2 diabetes current A1c 13.3 Previous A1c 12.4, 12.3 and 12.6. On patient's previous visit patient was only taking her medications occasionally patient's medications was restarted and patient was to report to DM education for follow-up with a log.  Patient declined to diabetes education visits and has not been seen in diabetes education.  Patient returns today [facility] pharmacy called patient had not filled her metformin, glipizide or Levemir since 08/06/2021 no diabetes medications have been filled since this date.  Patient states she lost her Medicaid and free care and is unable to afford her medications.  Discussed with patient that she is prescribed medications and on the $4 list at [facility] or [facility] and that there are no other cheaper medications.  Patient also reports that she is drinking High C constantly at work she works at [employer] discussed with patient stopping drinking soda and either going to water or diet sodas.    Current Endocrine Clinic Note 05/31/2022     55-year-old female scheduled today for endocrine clinic follow-up.  History of uncontrolled type 2 diabetes with nephropathy, neuropathy, hyperlipidemia hypertension.  Uncontrolled type 2 diabetes current A1C 11.8  Previous A1C 13.3, 12.4, 12.3.  Patient has not filled any diabetes medications only medication previously filled with glipizide in January of 2022 (90 day) prescription patient has not filled metformin or Lantus and almost 1 year.  Previously patient was unable to afford she was given medications on 4.00 dollar list at [facility] [employer] she is applying for Medicaid but returns today and has not been taking any medications. Pt has not been checking CBG's she cannot afford strips. Pt states Glipizide she has been on and had 26 pills left but was filled in January.  Patient states currently she is on [employer] comp panel received  report her salary and was unable to get Medicaid.  When discussing patient that A1c has not improved instructed patient that she needs to take glipizide twice a day she reports she stop soda but instructed patient she will need a 2nd possible 3rd medication to improve diabetes.  Patient states she does not want metformin and would rather start Levemir again.  Instructed patient Levemir was sent to her pharmacy at [facility] for 11.00 patient is to start at 10 units and increase 5 units every 5 days until she is at 30 units.  Patient is to call [facility] to reestablish with PCP and PCP will resume care.     Review of Systems   Constitutional: Negative for activity change, appetite change and fatigue.   HENT: Negative for dental problem, hearing loss, tinnitus, trouble swallowing and goiter.    Eyes: Negative for photophobia, pain and visual disturbance.   Respiratory: Negative for cough, chest tightness and wheezing.    Cardiovascular: Negative for chest pain, palpitations and leg swelling.   Gastrointestinal: Negative for abdominal pain, constipation, diarrhea, nausea and reflux.   Endocrine: Negative for cold intolerance, heat intolerance, polydipsia and polyphagia.   Genitourinary: Negative for difficulty urinating, flank pain, hematuria, hot flashes, menstrual irregularity, menstrual problem, nocturia and urgency.   Musculoskeletal: Negative for back pain, gait problem, joint swelling, leg pain and joint deformity.   Integumentary:  Negative for color change, pallor, rash and breast discharge.   Allergic/Immunologic: Negative for environmental allergies, food allergies and immunocompromised state.   Neurological: Negative for tremors, seizures, headaches, disturbances in coordination, memory loss and coordination difficulties.   Psychiatric/Behavioral: Negative for agitation, behavioral problems and sleep disturbance. The patient is not nervous/anxious.          Objective:      Physical  Exam  Constitutional:       General: She is not in acute distress.     Appearance: Normal appearance. She is not ill-appearing.   HENT:      Head: Normocephalic and atraumatic.      Right Ear: External ear normal.      Left Ear: External ear normal.      Nose: Nose normal. No congestion or rhinorrhea.      Mouth/Throat:      Mouth: Mucous membranes are moist.      Pharynx: Oropharynx is clear. No oropharyngeal exudate.   Eyes:      General:         Right eye: No discharge.         Left eye: No discharge.      Conjunctiva/sclera: Conjunctivae normal.      Pupils: Pupils are equal, round, and reactive to light.   Neck:      Thyroid: No thyroid mass, thyromegaly or thyroid tenderness.   Cardiovascular:      Rate and Rhythm: Normal rate and regular rhythm.      Pulses: Normal pulses.      Heart sounds: Normal heart sounds. No murmur heard.  Pulmonary:      Effort: Pulmonary effort is normal. No respiratory distress.      Breath sounds: Normal breath sounds.   Abdominal:      General: Abdomen is flat. Bowel sounds are normal. There is no distension.      Palpations: Abdomen is soft.      Tenderness: There is no abdominal tenderness.   Musculoskeletal:         General: No swelling or tenderness. Normal range of motion.      Cervical back: Normal range of motion and neck supple. No tenderness.      Right lower leg: No edema.      Left lower leg: No edema.   Feet:      Right foot:      Skin integrity: Skin integrity normal.      Left foot:      Skin integrity: Skin integrity normal.   Lymphadenopathy:      Cervical: No cervical adenopathy.   Skin:     General: Skin is warm and dry.      Coloration: Skin is not jaundiced or pale.   Neurological:      General: No focal deficit present.      Mental Status: She is alert and oriented to person, place, and time. Mental status is at baseline.      Coordination: Coordination normal.      Gait: Gait normal.   Psychiatric:         Mood and Affect: Mood normal.         Behavior:  "Behavior normal.         Thought Content: Thought content normal.         Assessment:       Problem List Items Addressed This Visit        Endocrine    Diabetes mellitus    Relevant Medications    glipiZIDE (GLUCOTROL) 10 MG tablet    insulin detemir U-100 (LEVEMIR FLEXTOUCH U-100 INSULN) 100 unit/mL (3 mL) InPn pen      Other Visit Diagnoses     Uncontrolled type 2 diabetes mellitus with hyperglycemia    -  Primary    Relevant Medications    blood sugar diagnostic Strp    blood-glucose meter (TRUE METRIX GLUCOSE METER) kit    glipiZIDE (GLUCOTROL) 10 MG tablet    insulin detemir U-100 (LEVEMIR FLEXTOUCH U-100 INSULN) 100 unit/mL (3 mL) InPn pen    pen needle, diabetic 32 gauge x 5/32" Ndle    Other Relevant Orders    POCT HEMOGLOBIN A1C    Nephropathy        Neuropathy              Plan:       Uncontrolled type 2 diabetes mellitus with hyperglycemia  Current A1C 11.8, 13.3 previous A1C 12.4, 12.3 and 12.6  Urine Micro Creatine/ratio: 05/27/2021 Elevated  Medications: Metformin 1000mg BID (not filled since 08/06/2021), Glipizide 10mg (not filled since Jan 2022), Levemir 50 units not taking (not taking, not filled since 08/06/2021) Changes: Pt meds sent [facility] 4.00 and [facility] 11.00 insulin pt unable to afford changes glipizide 10 mg twice a day instructed patient on compliance, Levemir sent to [facility] pharmacy patient to start 10 units and increase 5 units every 5 days until at 30 units.  Eye Exam: Fundus 06/27/2021  Home Glucometer Use: None  Last Hypoglycemic episode: None  Follow ADA diet, avoid soda, simple sweets, and limit rice, breads and starches  Maintain healthy weight, exercise 4-5 times a week for 30 minutes  Diet and medication compliance discussed on visit  PCP to resume care   -     POCT HEMOGLOBIN A1C  -     blood sugar diagnostic Strp; Test strips to check CBG's  Dispense: 50 each; Refill: 11  -     blood-glucose meter (TRUE METRIX GLUCOSE METER) kit; Use as instructed  Dispense: 1 each; Refill: 0  -     " "glipiZIDE (GLUCOTROL) 10 MG tablet; Take 1 tablet (10 mg total) by mouth 2 (two) times daily with meals.  Dispense: 60 tablet; Refill: 5  -     insulin detemir U-100 (LEVEMIR FLEXTOUCH U-100 INSULN) 100 unit/mL (3 mL) InPn pen; Inject 30 Units into the skin every evening.  Dispense: 15 mL; Refill: 5  -     pen needle, diabetic 32 gauge x 5/32" Ndle; Use BD fany needle one times a day injections  Dispense: 90 each; Refill: 3    Nephropathy  Urine Micro Creatine/ratio: 05/27/2021 Elevated  prescribed low dose ACE     Neuropathy  Continue Gabapentin               "

## 2022-06-01 ENCOUNTER — OFFICE VISIT (OUTPATIENT)
Dept: ORTHOPEDICS | Facility: CLINIC | Age: 57
End: 2022-06-01

## 2022-06-01 ENCOUNTER — HOSPITAL ENCOUNTER (OUTPATIENT)
Dept: RADIOLOGY | Facility: HOSPITAL | Age: 57
Discharge: HOME | End: 2022-06-01
Attending: ORTHOPAEDIC SURGERY

## 2022-06-01 VITALS — HEIGHT: 63 IN | BODY MASS INDEX: 40.93 KG/M2 | WEIGHT: 231 LBS

## 2022-06-01 DIAGNOSIS — M25.511 ACUTE PAIN OF RIGHT SHOULDER: ICD-10-CM

## 2022-06-01 DIAGNOSIS — S42.201D CLOSED FRACTURE OF PROXIMAL END OF RIGHT HUMERUS WITH ROUTINE HEALING, UNSPECIFIED FRACTURE MORPHOLOGY, SUBSEQUENT ENCOUNTER: ICD-10-CM

## 2022-06-01 DIAGNOSIS — M25.511 ACUTE PAIN OF RIGHT SHOULDER: Primary | ICD-10-CM

## 2022-06-01 PROCEDURE — 73030 X-RAY EXAM OF SHOULDER: CPT | Mod: TC,RT

## 2022-06-01 PROCEDURE — 99214 PR OFFICE/OUTPT VISIT, EST, LEVL IV, 30-39 MIN: ICD-10-PCS | Mod: S$PBB,,, | Performed by: ORTHOPAEDIC SURGERY

## 2022-06-01 PROCEDURE — 99213 OFFICE O/P EST LOW 20 MIN: CPT | Mod: PBBFAC

## 2022-06-01 PROCEDURE — 99214 OFFICE O/P EST MOD 30 MIN: CPT | Mod: S$PBB,,, | Performed by: ORTHOPAEDIC SURGERY

## 2022-06-01 NOTE — PROGRESS NOTES
[facility]  Established Patient Office Visit  06/01/2022       Patient ID: Suki Anton  YOB: 1965  MRN: [id number]    Diagnosis:    The primary encounter diagnosis was Acute pain of right shoulder. A diagnosis of Closed fracture of proximal end of right humerus with routine healing, unspecified fracture morphology, subsequent encounter was also pertinent to this visit.     Procedure:     No surgery found on No surgery found      Chief Complaint: Pain of the Right Shoulder and Follow-up      Suki Anton is a 56 y.o. female who presents for follow up treatment of the above mentioned diagnosis. The patient's last visit with me was on 5/11/2022.  Patient is now approximately 6 weeks status post right proximal humeral fracture.  She has been compliant with sling wear.  She has been working on gentle range of motion exercises at home.  Reports her pain has significantly improved since initial presentation.      Past Medical History:    Past Medical History:   Diagnosis Date    Diabetes mellitus     Hypertension      History reviewed. No pertinent surgical history.  History reviewed. No pertinent family history.  Social History     Socioeconomic History    Marital status: Single   Tobacco Use    Smoking status: Never Smoker    Smokeless tobacco: Never Used   Substance and Sexual Activity    Alcohol use: Never    Drug use: Never     Medication List with Changes/Refills   Current Medications    BLOOD SUGAR DIAGNOSTIC STRP    Test strips to check CBG's    BLOOD-GLUCOSE METER (TRUE METRIX GLUCOSE METER) KIT    Use as instructed    FAMOTIDINE (PEPCID) 20 MG TABLET    Take 1 tablet (20 mg total) by mouth once daily. for 7 days    GABAPENTIN (NEURONTIN) 300 MG CAPSULE    Take 300 mg by mouth 3 (three) times daily.    GLIPIZIDE (GLUCOTROL) 10 MG TABLET    Take 1 tablet (10 mg total) by mouth 2 (two) times daily with meals.    INSULIN DETEMIR U-100 (LEVEMIR FLEXTOUCH U-100  "INSULN) 100 UNIT/ML (3 ML) INPN PEN    Inject 30 Units into the skin every evening.    LISINOPRIL (PRINIVIL,ZESTRIL) 20 MG TABLET    Take 20 mg by mouth once daily.    LISINOPRIL (PRINIVIL,ZESTRIL) 20 MG TABLET    Take 20 mg by mouth.    METHYLPREDNISOLONE (MEDROL DOSEPACK) 4 MG TABLET    use as directed    OMEPRAZOLE (PRILOSEC) 20 MG CAPSULE    Take 40 mg by mouth once daily.    PEN NEEDLE, DIABETIC 32 GAUGE X 5/32" NDLE    Use BD fany needle one times a day injections    ROPINIROLE (REQUIP) 0.5 MG TABLET    Take 0.5 mg by mouth 3 (three) times daily.    ROPINIROLE (REQUIP) 0.5 MG TABLET    Take 0.5 mg by mouth.     Review of patient's allergies indicates:  No Known Allergies    ROS:    Body mass index is 40.92 kg/m².  GENERAL: Well appearing, appropriate for stated age, no acute distress.  CARDIOVASCULAR: Pulses regular by peripheral palpation.  PULMONARY: Respirations are even and non-labored.  NEURO: Awake, alert, and oriented x 3.  PSYCH: Mood & affect are appropriate.  HEENT: Head is normocephalic and atraumatic.    Physical Exam:    Right arm.  Right arm:  The patient is neurovascularly intact distally in the right hand.  Range of motion deferred secondary to pain.  Minimal tenderness to palpation at the fracture site.     Imaging:    Relevant imaging results reviewed and interpreted by me, discussed with the patient and / or family today.  Stable alignment proximally or fracture    Assessment and Plan:    Suki Anton is a 56 y.o. female seen in the office today for The primary encounter diagnosis was Acute pain of right shoulder. A diagnosis of Closed fracture of proximal end of right humerus with routine healing, unspecified fracture morphology, subsequent encounter was also pertinent to this visit..  Overall the patient is recovering as expected following her right proximal humeral fracture.  She may return to light duty at work with limited weight-bearing of the right upper extremity (less than 5 lb) as " well as wear her sling for comfort.  I will see her back in 4-6 weeks for repeat clinical examination and imaging.  Hopefully will clear her to advance weight-bearing at that time.  In the meantime she will get started on physical therapy to maintain range of motion of the shoulder.      Orders Placed This Encounter    X-ray Shoulder 2 or More Views Right

## 2022-06-01 NOTE — LETTER
June 1, 2022      [facility]  [address] [address]  Phone: [phone]       Patient: Suki Anton   YOB: 1965  Date of Visit: 06/01/2022    To Whom It May Concern:    Mars Anton  was at [facility] on 06/01/2022. The patient may return to work/school on 06/06/2022 with restrictions.Patient is not to lift more then 5 pounds with right wrist for 4-6 weeks. If you have any questions or concerns, or if I can be of further assistance, please do not hesitate to contact me.    Sincerely,    DR Jose Angel Irby MD

## 2022-07-06 ENCOUNTER — HOSPITAL ENCOUNTER (OUTPATIENT)
Dept: RADIOLOGY | Facility: HOSPITAL | Age: 57
Discharge: HOME | End: 2022-07-06
Attending: ORTHOPAEDIC SURGERY

## 2022-07-06 ENCOUNTER — OFFICE VISIT (OUTPATIENT)
Dept: ORTHOPEDICS | Facility: CLINIC | Age: 57
End: 2022-07-06

## 2022-07-06 VITALS — HEIGHT: 63 IN | BODY MASS INDEX: 40.75 KG/M2 | WEIGHT: 230 LBS

## 2022-07-06 DIAGNOSIS — M25.511 RIGHT SHOULDER PAIN, UNSPECIFIED CHRONICITY: ICD-10-CM

## 2022-07-06 DIAGNOSIS — S42.201D CLOSED FRACTURE OF PROXIMAL END OF RIGHT HUMERUS WITH ROUTINE HEALING, UNSPECIFIED FRACTURE MORPHOLOGY, SUBSEQUENT ENCOUNTER: Primary | ICD-10-CM

## 2022-07-06 PROCEDURE — 99214 OFFICE O/P EST MOD 30 MIN: CPT | Mod: PBBFAC

## 2022-07-06 PROCEDURE — 99213 OFFICE O/P EST LOW 20 MIN: CPT | Mod: S$PBB,,, | Performed by: ORTHOPAEDIC SURGERY

## 2022-07-06 PROCEDURE — 73030 X-RAY EXAM OF SHOULDER: CPT | Mod: TC,RT

## 2022-07-06 PROCEDURE — 99213 PR OFFICE/OUTPT VISIT, EST, LEVL III, 20-29 MIN: ICD-10-PCS | Mod: S$PBB,,, | Performed by: ORTHOPAEDIC SURGERY

## 2022-07-06 NOTE — LETTER
July 6, 2022    Suki Anton  [address] [place] [address]         [facility]  [address] [address]  Phone: [phone] July 6, 2022     Patient: Suki Anton   YOB: 1965   Date of Visit: 7/6/2022       To Whom It May Concern:    It is my medical opinion that Suki Anton is released to work with no restrictions and  work as tolerated.     If you have any questions or concerns, please don't hesitate to call.    Sincerely,        Liam Dawson RN

## 2022-07-06 NOTE — PROGRESS NOTES
ATTENDING ADDENDUM    Suki Anton  was evaluated at the time of the encounter with Dr Dominguez PGY5.  HPI, PE and treatment plan was reviewed. Treatment plan was reasonable and necessary. Imaging was reviewed at the time of visit.

## 2022-07-06 NOTE — PROGRESS NOTES
[facility]  Established Patient Office Visit  07/06/2022       Patient ID: Suki Anton  YOB: 1965  MRN: [id number]    Diagnosis: R proximal humerus fracture, Injury date 4/13/2022.    The primary encounter diagnosis was Closed fracture of proximal end of right humerus with routine healing, unspecified fracture morphology, subsequent encounter. A diagnosis of Right shoulder pain, unspecified chronicity was also pertinent to this visit.     Procedure:     No surgery found on No surgery found      Chief Complaint: Follow-up of the Right Shoulder      Suki Anton is a 56 y.o. female who presents for follow up treatment of the above mentioned diagnosis. The patient's last visit with me was on 6/1/2022.  At that time she said her pain was improving greatly and she was doing her ROM exercises.  At last visit we progressed her to to PWB (<5 lbs) and return to light duty at work (works at a [employer]).  She has done this and is doing fine.  We also ordered her PT, though she says they haven't called her yet.  She just emailed them our prescription over the long weekend.  Has been doing her own exercises and pendulums.    Today she says she still has some stiffness in the shoulder but most of her pain is gone.  She takes Ibuprofen PM and tylenol extra strength as needed.  Also takes gabapentin since before her injury.  Denies numbness or tingling in the hand.    Nonsmoker, diabetic.      Past Medical History:    Past Medical History:   Diagnosis Date    Diabetes mellitus     Hypertension      History reviewed. No pertinent surgical history.  History reviewed. No pertinent family history.  Social History     Socioeconomic History    Marital status: Single   Tobacco Use    Smoking status: Never Smoker    Smokeless tobacco: Never Used   Substance and Sexual Activity    Alcohol use: Never    Drug use: Never     Medication List with Changes/Refills   Current Medications    BLOOD  "SUGAR DIAGNOSTIC STRP    Test strips to check CBG's    BLOOD-GLUCOSE METER (TRUE METRIX GLUCOSE METER) KIT    Use as instructed    FAMOTIDINE (PEPCID) 20 MG TABLET    Take 1 tablet (20 mg total) by mouth once daily. for 7 days    GABAPENTIN (NEURONTIN) 300 MG CAPSULE    Take 300 mg by mouth 3 (three) times daily.    GLIPIZIDE (GLUCOTROL) 10 MG TABLET    Take 1 tablet (10 mg total) by mouth 2 (two) times daily with meals.    INSULIN DETEMIR U-100 (LEVEMIR FLEXTOUCH U-100 INSULN) 100 UNIT/ML (3 ML) INPN PEN    Inject 30 Units into the skin every evening.    LISINOPRIL (PRINIVIL,ZESTRIL) 20 MG TABLET    Take 20 mg by mouth once daily.    LISINOPRIL (PRINIVIL,ZESTRIL) 20 MG TABLET    Take 20 mg by mouth.    METHYLPREDNISOLONE (MEDROL DOSEPACK) 4 MG TABLET    use as directed    OMEPRAZOLE (PRILOSEC) 20 MG CAPSULE    Take 40 mg by mouth once daily.    PEN NEEDLE, DIABETIC 32 GAUGE X 5/32" NDLE    Use BD fany needle one times a day injections    ROPINIROLE (REQUIP) 0.5 MG TABLET    Take 0.5 mg by mouth 3 (three) times daily.    ROPINIROLE (REQUIP) 0.5 MG TABLET    Take 0.5 mg by mouth.     Review of patient's allergies indicates:  No Known Allergies    ROS:    Body mass index is 40.74 kg/m².  GENERAL: Well appearing, appropriate for stated age, no acute distress.  CARDIOVASCULAR: Pulses regular by peripheral palpation.  PULMONARY: Respirations are even and non-labored.  NEURO: Awake, alert, and oriented x 3.  PSYCH: Mood & affect are appropriate.  HEENT: Head is normocephalic and atraumatic.    Physical Exam:  Right upper extremity  Abd 140 active, 160 passive without pain   active, 160 passive without pain  No ecchymosis or bruising on the arm minimal tenderness to palpation at this point  Full elbow range of motion  Full wrist and finger range of motion   Sensation to light touch intact axillary median radial and ulnar nerve distributions  Motor intact axillary median radial and ulnar nerve distributions   2+ " radial pulse    Imaging:  Relevant imaging results reviewed and interpreted by me, discussed with the patient and / or family today. Demonstrates stable appearance of proximal humerus fracture without displacement and with further callus    Assessment and Plan:    Suki Anton is a 56 y.o. female seen in the office today for The primary encounter diagnosis was Closed fracture of proximal end of right humerus with routine healing, unspecified fracture morphology, subsequent encounter. A diagnosis of Right shoulder pain, unspecified chronicity was also pertinent to this visit..  12 weeks out from R proximal humerus fx (4/13/2022), x-rays demonstrate appropriate alignment and healing, doing well.    - Can return to work with no restrictions, lifting as tolerated  - Let us know if PT still hasn't called you in the next week or so  - Continue OTC pain control, cautioned regular ibuprofen use with food or milk  - Continue calcium and vitamin d    Follow up PRN      Orders Placed This Encounter    X-ray Shoulder 2 or More Views Right      Suzy Dominguez MD, PGY-4  [facility]

## 2022-07-06 NOTE — PATIENT INSTRUCTIONS
- Can return to work with no restrictions, lifting as tolerated  - Let us know if PT still hasn't called you in the next week or so  - Continue OTC pain control, cautioned regular ibuprofen use with food or milk  - Continue calcium and vitamin d    Follow up PRN

## 2022-07-26 ENCOUNTER — OFFICE VISIT (OUTPATIENT)
Dept: URGENT CARE | Facility: CLINIC | Age: 57
End: 2022-07-26

## 2022-07-26 ENCOUNTER — TELEPHONE (OUTPATIENT)
Dept: URGENT CARE | Facility: CLINIC | Age: 57
End: 2022-07-26

## 2022-07-26 VITALS
HEART RATE: 88 BPM | WEIGHT: 230.38 LBS | BODY MASS INDEX: 40.82 KG/M2 | OXYGEN SATURATION: 97 % | RESPIRATION RATE: 18 BRPM | HEIGHT: 63 IN | SYSTOLIC BLOOD PRESSURE: 144 MMHG | DIASTOLIC BLOOD PRESSURE: 81 MMHG | TEMPERATURE: 98 F

## 2022-07-26 DIAGNOSIS — U07.1 COVID: ICD-10-CM

## 2022-07-26 DIAGNOSIS — Z11.52 ENCOUNTER FOR SCREENING FOR COVID-19: Primary | ICD-10-CM

## 2022-07-26 DIAGNOSIS — U07.1 COVID-19 VIRUS DETECTED: ICD-10-CM

## 2022-07-26 DIAGNOSIS — L08.9 SKIN INFECTION: ICD-10-CM

## 2022-07-26 LAB
CTP QC/QA: YES
SARS-COV-2 RDRP RESP QL NAA+PROBE: POSITIVE

## 2022-07-26 PROCEDURE — U0002 COVID-19 LAB TEST NON-CDC: HCPCS | Mod: PBBFAC

## 2022-07-26 PROCEDURE — 99213 PR OFFICE/OUTPT VISIT, EST, LEVL III, 20-29 MIN: ICD-10-PCS | Mod: S$PBB,,,

## 2022-07-26 PROCEDURE — 99215 OFFICE O/P EST HI 40 MIN: CPT | Mod: PBBFAC

## 2022-07-26 PROCEDURE — 99213 OFFICE O/P EST LOW 20 MIN: CPT | Mod: S$PBB,,,

## 2022-07-26 RX ORDER — FLUTICASONE PROPIONATE 50 MCG
1 SPRAY, SUSPENSION (ML) NASAL DAILY
Qty: 9.9 ML | Refills: 0 | Status: SHIPPED | OUTPATIENT
Start: 2022-07-26 | End: 2022-08-29 | Stop reason: SDUPTHER

## 2022-07-26 RX ORDER — CETIRIZINE HYDROCHLORIDE 10 MG/1
10 TABLET ORAL DAILY
Qty: 30 TABLET | Refills: 0 | Status: SHIPPED | OUTPATIENT
Start: 2022-07-26 | End: 2022-08-29 | Stop reason: SDUPTHER

## 2022-07-26 RX ORDER — CEPHALEXIN 500 MG/1
500 CAPSULE ORAL EVERY 6 HOURS
Qty: 40 CAPSULE | Refills: 0 | Status: SHIPPED | OUTPATIENT
Start: 2022-07-26 | End: 2022-07-29

## 2022-07-26 RX ORDER — GUAIFENESIN/DEXTROMETHORPHAN 100-10MG/5
5 SYRUP ORAL EVERY 6 HOURS PRN
Qty: 118 ML | Refills: 0 | Status: SHIPPED | OUTPATIENT
Start: 2022-07-26 | End: 2022-08-05

## 2022-07-26 NOTE — LETTER
July 26, 2022      [facility]  [address] [facility] [address]  Phone: [phone]       Patient: Suki Anton   YOB: 1965  Date of Visit: 07/26/2022    To Whom It May Concern:    Mars Anton  was at [facility] on 07/26/2022. The patient may return to work/school on 7/31/22, additional 5 days of proper masking is advised. If you have any questions or concerns, or if I can be of further assistance, please do not hesitate to contact me.    Sincerely,    ANIBAL Le NP

## 2022-07-26 NOTE — PROGRESS NOTES
"Subjective:       Patient ID: Suki Anton is a 56 y.o. female.    Vitals:  height is 5' 3" (1.6 m) and weight is 104.5 kg (230 lb 6.1 oz). Her temperature is 98.2 °F (36.8 °C). Her blood pressure is 144/81 (abnormal) and her pulse is 88. Her respiration is 18 and oxygen saturation is 97%.     Chief Complaint: Hand Pain (Redness, edema and drainage around nail to 2nd digit x 2 days. States attempted to drain with a clean needle Sunday night.) and COVID exposure (Congestion, sinus drip x 2 days.)    56 year old patient states started feeling bad yesterday. Headache, congestion, sinus drip. States coworker tested positive for COVID. Denies SOB.   Pt also has redness and swelling around R index finger. States draining past purulence, but no current drainage. Denies fever, denies trauma.      Constitution: Positive for fatigue.   HENT: Positive for congestion and sore throat.    Neck: neck negative.   Cardiovascular: Negative.    Eyes: Negative.    Respiratory: Negative for cough and shortness of breath.    Gastrointestinal: Negative.    Endocrine: negative.   Genitourinary: Negative.    Musculoskeletal: Positive for pain.   Skin: Positive for color change and erythema.        Redness around fingernail     Allergic/Immunologic: Negative.    Neurological: Negative.    Hematologic/Lymphatic: Negative.    Psychiatric/Behavioral: Negative.        Objective:      Physical Exam   Constitutional: obesity  HENT:   Ears:   Right Ear: Tympanic membrane and ear canal normal.   Left Ear: Tympanic membrane and ear canal normal.   Nose: Nose normal.   Mouth/Throat: Uvula is midline. Mucous membranes are moist. Oropharynx is clear.   Cardiovascular: Normal rate, regular rhythm and normal pulses.   Pulmonary/Chest: Effort normal.   Abdominal: Normal appearance. Soft.   Musculoskeletal: Normal range of motion.         General: Normal range of motion.      Right hand: Right index finger: Exhibits swelling and tenderness.        " Hands:    Neurological: She is alert.   Skin: Skin is warm. erythema         Results for orders placed or performed in visit on 07/26/22   POCT COVID-19 Rapid Screening   Result Value Ref Range    POC Rapid COVID Positive (A) Negative     Acceptable Yes        Assessment:       1. Encounter for screening for COVID-19    2. COVID    3. Skin infection          Plan:         Encounter for screening for COVID-19  -     POCT COVID-19 Rapid Screening  -     cetirizine (ZYRTEC) 10 MG tablet; Take 1 tablet (10 mg total) by mouth once daily.  Dispense: 30 tablet; Refill: 0  -     fluticasone propionate (FLONASE) 50 mcg/actuation nasal spray; 1 spray (50 mcg total) by Each Nostril route once daily.  Dispense: 9.9 mL; Refill: 0    COVID  -     nirmatrelvir-ritonavir 300 mg (150 mg x 2)-100 mg copackaged tablets (EUA); Take 3 tablets by mouth 2 (two) times daily for 5 days. Each dose contains 2 nirmatrelvir (pink tablets) and 1 ritonavir (white tablet). Take all 3 tablets together  Dispense: 30 tablet; Refill: 0    Skin infection    Other orders  -     cephALEXin (KEFLEX) 500 MG capsule; Take 1 capsule (500 mg total) by mouth every 6 (six) hours. for 10 days  Dispense: 40 capsule; Refill: 0         Please see provided patient education for guidance.  I recommend a 7 day quarantine from day of symptom onset. The CDC permits 5 days. If you choose to quarantine for 5 days, wear a mask when around other people and in public through day 8.    Go to the ER if you experience chest pain with SOB, SOBOE, high fevers 103+, excessive vomiting/diarrhea, or general distress.

## 2022-07-29 ENCOUNTER — OFFICE VISIT (OUTPATIENT)
Dept: URGENT CARE | Facility: CLINIC | Age: 57
End: 2022-07-29

## 2022-07-29 VITALS
TEMPERATURE: 99 F | BODY MASS INDEX: 38.79 KG/M2 | WEIGHT: 218.94 LBS | SYSTOLIC BLOOD PRESSURE: 139 MMHG | HEIGHT: 63 IN | OXYGEN SATURATION: 98 % | RESPIRATION RATE: 20 BRPM | HEART RATE: 99 BPM | DIASTOLIC BLOOD PRESSURE: 84 MMHG

## 2022-07-29 DIAGNOSIS — M54.30 SCIATICA, UNSPECIFIED LATERALITY: Primary | ICD-10-CM

## 2022-07-29 PROCEDURE — 99215 OFFICE O/P EST HI 40 MIN: CPT | Mod: PBBFAC | Performed by: NURSE PRACTITIONER

## 2022-07-29 PROCEDURE — 99213 PR OFFICE/OUTPT VISIT, EST, LEVL III, 20-29 MIN: ICD-10-PCS | Mod: S$PBB,,, | Performed by: NURSE PRACTITIONER

## 2022-07-29 PROCEDURE — 99213 OFFICE O/P EST LOW 20 MIN: CPT | Mod: S$PBB,,, | Performed by: NURSE PRACTITIONER

## 2022-07-29 RX ORDER — CEPHALEXIN 500 MG/1
500 CAPSULE ORAL EVERY 6 HOURS
Qty: 40 CAPSULE | Refills: 0 | Status: SHIPPED | OUTPATIENT
Start: 2022-07-29 | End: 2022-08-08

## 2022-07-29 RX ORDER — KETOROLAC TROMETHAMINE 10 MG/1
10 TABLET, FILM COATED ORAL EVERY 6 HOURS
Qty: 20 TABLET | Refills: 0 | Status: SHIPPED | OUTPATIENT
Start: 2022-07-29 | End: 2022-08-03

## 2022-07-29 RX ORDER — METHOCARBAMOL 500 MG/1
1000 TABLET, FILM COATED ORAL 4 TIMES DAILY
Qty: 80 TABLET | Refills: 0 | Status: SHIPPED | OUTPATIENT
Start: 2022-07-29 | End: 2022-08-08

## 2022-07-29 NOTE — PROGRESS NOTES
"Subjective:       Patient ID: Suki Anton is a 56 y.o. female.    Vitals:  height is 5' 2.99" (1.6 m) and weight is 99.3 kg (218 lb 14.7 oz). Her temperature is 99.1 °F (37.3 °C). Her blood pressure is 139/84 and her pulse is 99. Her respiration is 20 and oxygen saturation is 98%.     Chief Complaint: Hip Pain (X sun   rt side  ( COVID +)) and Knee Pain (X sun)    HPI this patient is here with c/o sciatic nerve pain. Took ibuprofen at 11 or 11:30 AM today. Did not start cephalexin or paxlovid due to no money. Instructed her to not take Paxlovid at all due to symptom on set last Saturday and COVID+ Monday - it is now too late for the medication. She is seeking pain relief today. States she is supposed to go back to work on Sunday.  ROS    Objective:      Physical Exam   Constitutional: She is oriented to person, place, and time.  Non-toxic appearance. She appears ill. No distress. obesity  Pulmonary/Chest: Effort normal.   Musculoskeletal:         General: Tenderness present.      Comments: Walks with a cane.   Neurological: She is alert, oriented to person, place, and time and at baseline. Gait abnormal.   Skin: Skin is not diaphoretic.   Psychiatric: Her behavior is normal. Mood normal.   Vitals reviewed.        Assessment:       1. Sciatica, unspecified laterality          Plan:         Sciatica, unspecified laterality    Other orders  -     cephALEXin (KEFLEX) 500 MG capsule; Take 1 capsule (500 mg total) by mouth every 6 (six) hours. for 10 days  Dispense: 40 capsule; Refill: 0  -     ketorolac (TORADOL) 10 mg tablet; Take 1 tablet (10 mg total) by mouth every 6 (six) hours. With food and plenty of water. Do not take with ibuprofen or naproxen. for 5 days  Dispense: 20 tablet; Refill: 0  -     methocarbamoL (ROBAXIN) 500 MG Tab; Take 2 tablets (1,000 mg total) by mouth 4 (four) times daily. May cause drowsiness; Do not take before driving, working, drinking alcohol. for 10 days  Dispense: 80 tablet; Refill: " 0                 Please read the attached information about NSAIDs.    We could not give you a Toradol injection in clinic today because you had Ibuprofen at 11am.   We could not give you a steroid injection because you are COVID+ and Diabetic.     Please use the prescriptions that were sent for you.     If you choose to come get a Toradol shot this weekend, for EIGHT HOURS prior to your shot you cannot take:  Ibuprofen  Naproxen  Advil  Aleve  Motrin  Ketorolac

## 2022-07-29 NOTE — PATIENT INSTRUCTIONS
Please read the attached information about NSAIDs.    We could not give you a Toradol injection in clinic today because you had Ibuprofen at 11am.   We could not give you a steroid injection because you are COVID+ and Diabetic.     Please use the prescriptions that were sent for you.     If you choose to come get a Toradol shot this weekend, for EIGHT HOURS prior to your shot you cannot take:  Ibuprofen  Naproxen  Advil  Aleve  Motrin  Ketorolac

## 2022-08-13 ENCOUNTER — OFFICE VISIT (OUTPATIENT)
Dept: URGENT CARE | Facility: CLINIC | Age: 57
End: 2022-08-13

## 2022-08-13 VITALS
DIASTOLIC BLOOD PRESSURE: 71 MMHG | RESPIRATION RATE: 16 BRPM | OXYGEN SATURATION: 99 % | TEMPERATURE: 99 F | HEIGHT: 62 IN | BODY MASS INDEX: 40.33 KG/M2 | WEIGHT: 219.13 LBS | SYSTOLIC BLOOD PRESSURE: 147 MMHG | HEART RATE: 89 BPM

## 2022-08-13 DIAGNOSIS — M25.561 ACUTE PAIN OF RIGHT KNEE: Primary | ICD-10-CM

## 2022-08-13 PROCEDURE — 99214 PR OFFICE/OUTPT VISIT, EST, LEVL IV, 30-39 MIN: ICD-10-PCS | Mod: S$PBB,,, | Performed by: FAMILY MEDICINE

## 2022-08-13 PROCEDURE — 63600175 PHARM REV CODE 636 W HCPCS: Performed by: FAMILY MEDICINE

## 2022-08-13 PROCEDURE — 99215 OFFICE O/P EST HI 40 MIN: CPT | Mod: PBBFAC | Performed by: FAMILY MEDICINE

## 2022-08-13 PROCEDURE — 99214 OFFICE O/P EST MOD 30 MIN: CPT | Mod: S$PBB,,, | Performed by: FAMILY MEDICINE

## 2022-08-13 RX ORDER — METHOCARBAMOL 500 MG/1
500 TABLET, FILM COATED ORAL 4 TIMES DAILY
Qty: 40 TABLET | Refills: 0 | Status: SHIPPED | OUTPATIENT
Start: 2022-08-13 | End: 2022-08-23

## 2022-08-13 RX ORDER — MELOXICAM 15 MG/1
15 TABLET ORAL DAILY
Qty: 30 TABLET | Refills: 1 | Status: SHIPPED | OUTPATIENT
Start: 2022-08-13 | End: 2022-08-29 | Stop reason: SDUPTHER

## 2022-08-13 RX ORDER — DICLOFENAC SODIUM 10 MG/G
2 GEL TOPICAL 4 TIMES DAILY
Qty: 100 G | Refills: 2 | Status: SHIPPED | OUTPATIENT
Start: 2022-08-13 | End: 2022-08-29 | Stop reason: SDUPTHER

## 2022-08-13 RX ORDER — KETOROLAC TROMETHAMINE 30 MG/ML
60 INJECTION, SOLUTION INTRAMUSCULAR; INTRAVENOUS
Status: COMPLETED | OUTPATIENT
Start: 2022-08-13 | End: 2022-08-13

## 2022-08-13 RX ADMIN — KETOROLAC TROMETHAMINE 60 MG: 60 INJECTION, SOLUTION INTRAMUSCULAR at 01:08

## 2022-08-13 NOTE — LETTER
August 13, 2022      [facility]  [address] [address]  Phone: [phone]       Patient: Suki Anton   YOB: 1965  Date of Visit: 08/13/2022    To Whom It May Concern:    Mars Anton  was at [facility] on 08/13/2022. The patient may return to work/school on 8/14/22. If you have any questions or concerns, or if I can be of further assistance, please do not hesitate to contact me.    Sincerely,    KATINA Hanson MD

## 2022-08-13 NOTE — PROGRESS NOTES
"Subjective:       Patient ID: Suki Anton is a 56 y.o. female.    Chief Complaint: Rt leg pain x 2 weeks.      HPI   55yo female with right knee pain for 2 weeks.  Pt fell and symptoms began.  Pain is inna medial with TTP.  Weakness secondary to pain.  Has tried OTC meds with little improvement.   Review of Systems   Musculoskeletal:        As above   Neurological:        As above         Objective:       Vital Signs  Temp: 98.6 °F (37 °C)  Pulse: 89  Resp: 16  SpO2: 99 %  BP: (!) 147/71  Pain Score:   8  Pain Loc: Knee  Height and Weight  Height: 5' 2" (157.5 cm)  Weight: 99.4 kg (219 lb 1.6 oz)  BSA (Calculated - sq m): 2.08 sq meters  BMI (Calculated): 40.1  Weight in (lb) to have BMI = 25: 136.4]  Physical Exam  Vitals reviewed.   Constitutional:       Appearance: Normal appearance.   HENT:      Head: Normocephalic and atraumatic.   Eyes:      Extraocular Movements: Extraocular movements intact.      Conjunctiva/sclera: Conjunctivae normal.   Cardiovascular:      Rate and Rhythm: Normal rate and regular rhythm.      Heart sounds: Normal heart sounds.   Pulmonary:      Breath sounds: Normal breath sounds.   Musculoskeletal:      Comments: Anteromedial joint line TTP.  Pain on active and passive motion.    Skin:     General: Skin is warm and dry.   Neurological:      General: No focal deficit present.      Mental Status: She is alert and oriented to person, place, and time.   Psychiatric:         Mood and Affect: Mood and affect normal.         Speech: Speech normal.         Behavior: Behavior normal. Behavior is cooperative.         Thought Content: Thought content does not include homicidal or suicidal ideation.         Assessment:       Problem List Items Addressed This Visit    None     Visit Diagnoses     Acute pain of right knee    -  Primary    Relevant Medications    meloxicam (MOBIC) 15 MG tablet    methocarbamoL (ROBAXIN) 500 MG Tab    ketorolac injection 60 mg (Start on 8/13/2022  1:00 PM)    " diclofenac sodium (VOLTAREN) 1 % Gel          Plan:           Encouraged rest and hot/cold compresses  ER precautions  FU with PCP

## 2022-08-29 ENCOUNTER — OFFICE VISIT (OUTPATIENT)
Dept: INTERNAL MEDICINE | Facility: CLINIC | Age: 57
End: 2022-08-29

## 2022-08-29 VITALS
RESPIRATION RATE: 18 BRPM | TEMPERATURE: 98 F | DIASTOLIC BLOOD PRESSURE: 86 MMHG | BODY MASS INDEX: 40.78 KG/M2 | HEIGHT: 62 IN | SYSTOLIC BLOOD PRESSURE: 138 MMHG | HEART RATE: 84 BPM | WEIGHT: 221.63 LBS

## 2022-08-29 DIAGNOSIS — E66.9 DIABETES MELLITUS TYPE 2 IN OBESE: Primary | ICD-10-CM

## 2022-08-29 DIAGNOSIS — Z00.00 HEALTHCARE MAINTENANCE: ICD-10-CM

## 2022-08-29 DIAGNOSIS — Z11.52 ENCOUNTER FOR SCREENING FOR COVID-19: ICD-10-CM

## 2022-08-29 DIAGNOSIS — G25.81 RESTLESS LEG SYNDROME: ICD-10-CM

## 2022-08-29 DIAGNOSIS — K21.9 GASTROESOPHAGEAL REFLUX DISEASE, UNSPECIFIED WHETHER ESOPHAGITIS PRESENT: ICD-10-CM

## 2022-08-29 DIAGNOSIS — I10 HYPERTENSION, UNSPECIFIED TYPE: ICD-10-CM

## 2022-08-29 DIAGNOSIS — E11.69 DIABETES MELLITUS TYPE 2 IN OBESE: Primary | ICD-10-CM

## 2022-08-29 DIAGNOSIS — M25.561 ACUTE PAIN OF RIGHT KNEE: ICD-10-CM

## 2022-08-29 PROCEDURE — 99215 OFFICE O/P EST HI 40 MIN: CPT | Mod: PBBFAC

## 2022-08-29 RX ORDER — ATORVASTATIN CALCIUM 20 MG/1
20 TABLET, FILM COATED ORAL DAILY
Qty: 90 TABLET | Refills: 3 | Status: SHIPPED | OUTPATIENT
Start: 2022-08-29 | End: 2023-01-19 | Stop reason: SDUPTHER

## 2022-08-29 RX ORDER — OMEPRAZOLE 20 MG/1
40 CAPSULE, DELAYED RELEASE ORAL DAILY
Qty: 60 CAPSULE | Refills: 2 | Status: SHIPPED | OUTPATIENT
Start: 2022-08-29 | End: 2023-01-19 | Stop reason: SDUPTHER

## 2022-08-29 RX ORDER — CETIRIZINE HYDROCHLORIDE 10 MG/1
10 TABLET ORAL DAILY
Qty: 90 TABLET | Refills: 0 | Status: SHIPPED | OUTPATIENT
Start: 2022-08-29 | End: 2023-01-19 | Stop reason: SDUPTHER

## 2022-08-29 RX ORDER — PEN NEEDLE, DIABETIC 30 GX3/16"
NEEDLE, DISPOSABLE MISCELLANEOUS
Qty: 90 EACH | Refills: 3 | Status: SHIPPED | OUTPATIENT
Start: 2022-08-29 | End: 2023-01-19 | Stop reason: SDUPTHER

## 2022-08-29 RX ORDER — FLUTICASONE PROPIONATE 50 MCG
1 SPRAY, SUSPENSION (ML) NASAL DAILY
Qty: 9.9 ML | Refills: 0 | Status: SHIPPED | OUTPATIENT
Start: 2022-08-29 | End: 2023-01-19

## 2022-08-29 RX ORDER — LISINOPRIL 20 MG/1
20 TABLET ORAL DAILY
Qty: 90 TABLET | Refills: 0 | Status: SHIPPED | OUTPATIENT
Start: 2022-08-29 | End: 2022-12-01 | Stop reason: SDUPTHER

## 2022-08-29 RX ORDER — DICLOFENAC SODIUM 10 MG/G
2 GEL TOPICAL 4 TIMES DAILY
Qty: 100 G | Refills: 2 | Status: SHIPPED | OUTPATIENT
Start: 2022-08-29 | End: 2023-01-19 | Stop reason: SDUPTHER

## 2022-08-29 RX ORDER — GABAPENTIN 300 MG/1
300 CAPSULE ORAL 3 TIMES DAILY
Qty: 90 CAPSULE | Refills: 2 | Status: SHIPPED | OUTPATIENT
Start: 2022-08-29 | End: 2022-12-01 | Stop reason: SDUPTHER

## 2022-08-29 RX ORDER — ROPINIROLE 0.5 MG/1
0.5 TABLET, FILM COATED ORAL 3 TIMES DAILY
Qty: 90 TABLET | Refills: 2 | Status: SHIPPED | OUTPATIENT
Start: 2022-08-29 | End: 2022-12-01 | Stop reason: SDUPTHER

## 2022-08-29 RX ORDER — MELOXICAM 15 MG/1
15 TABLET ORAL DAILY
Qty: 30 TABLET | Refills: 1 | Status: SHIPPED | OUTPATIENT
Start: 2022-08-29 | End: 2023-01-19 | Stop reason: SDUPTHER

## 2022-08-29 RX ORDER — FAMOTIDINE 20 MG/1
20 TABLET, FILM COATED ORAL DAILY
Qty: 7 TABLET | Refills: 0 | Status: SHIPPED | OUTPATIENT
Start: 2022-08-29 | End: 2023-01-19

## 2022-08-29 RX ORDER — INSULIN DETEMIR 100 [IU]/ML
30 INJECTION, SOLUTION SUBCUTANEOUS NIGHTLY
Qty: 15 ML | Refills: 5 | Status: SHIPPED | OUTPATIENT
Start: 2022-08-29 | End: 2023-01-19 | Stop reason: SDUPTHER

## 2022-08-29 RX ORDER — GLIPIZIDE 10 MG/1
10 TABLET ORAL 2 TIMES DAILY WITH MEALS
Qty: 60 TABLET | Refills: 5 | Status: SHIPPED | OUTPATIENT
Start: 2022-08-29 | End: 2023-01-19 | Stop reason: SDUPTHER

## 2022-08-29 NOTE — PROGRESS NOTES
"  [facility] INTERNAL MEDICINE  OUTPATIENT OFFICE VISIT NOTE    SUBJECTIVE:      Chief Complaint: Establish Care (Establish Care with new PCP. Patient states that she has been having sciatica pain on right side of body, 8/10 pain at the moment. Patient states she went to urgent care about a week ago and received a shot that did not help her. Patient also has complaints of not being able to sleep since having COVID last month along with restless leg worsening.States she gets about 3hrs of sleep a night.  )       HPI: Suki Anton is a 56 y.o. yo female w/ PMH of  has a past medical history of Diabetes mellitus, Diabetes mellitus, type 2, GERD (gastroesophageal reflux disease), and Hypertension., who presents for  sciatica and medication refill.    Patient complains of a 3 week history of right sciatica. Patient was seen in urgent care 2 weeks ago for the same complaint. Patient was given 60 mg toradol which did not alleviate pain. Patient reported 15 mg mobic qd did "take the edge off" of her pain. Patient denies urinary and fecal incontinence. Additionally, patient reports being out of all her medications. Patient is self pay so it was difficult for her to obtain her medications. Patient reports she is getting raise and she would try her best to get the medications. Patient denies fevers, chills, chest pain, shortness of breath, abdominal pain, nausea, vomiting, diarrhea.    Past Medical History:   has a past medical history of Diabetes mellitus, Diabetes mellitus, type 2, GERD (gastroesophageal reflux disease), and Hypertension.     Past Surgical History:   has no past surgical history on file.     Family History:  family history includes Cancer in her father; Cataracts in her mother.     Social History:   reports that she has never smoked. She has never used smokeless tobacco. She reports that she does not drink alcohol and does not use drugs.     Allergies:  has No Known Allergies.     Home Medications:  Prior to " Admission medications    Medication Sig Start Date End Date Taking? Authorizing Provider   diclofenac sodium (VOLTAREN) 1 % Gel Apply 2 g topically 4 (four) times daily. 8/13/22 8/29/22 Yes Rene Hanson MD   lisinopriL (PRINIVIL,ZESTRIL) 20 MG tablet Take 20 mg by mouth once daily. 1/21/22 8/29/22 Yes Historical Provider   atorvastatin (LIPITOR) 20 MG tablet Take 1 tablet (20 mg total) by mouth once daily. 8/29/22 8/29/23  Dick Gallagher, DO   blood sugar diagnostic Strp Test strips to check CBG's 8/29/22   Dick Gallagher DO   blood-glucose meter (TRUE METRIX GLUCOSE METER) kit Use as instructed  Patient not taking: Reported on 8/29/2022 5/31/22 5/31/23  Batsheva Ballesteros NP   cetirizine (ZYRTEC) 10 MG tablet Take 1 tablet (10 mg total) by mouth once daily. 8/29/22 11/27/22  Dick Gallagher, DO   diclofenac sodium (VOLTAREN) 1 % Gel Apply 2 g topically 4 (four) times daily. 8/29/22   Dick Gallagher, DO   famotidine (PEPCID) 20 MG tablet Take 1 tablet (20 mg total) by mouth once daily. for 7 days 8/29/22 9/5/22  Dick Gallagher, DO   fluticasone propionate (FLONASE) 50 mcg/actuation nasal spray 1 spray (50 mcg total) by Each Nostril route once daily. 8/29/22   Dick Gallagher, DO   gabapentin (NEURONTIN) 300 MG capsule Take 1 capsule (300 mg total) by mouth 3 (three) times daily. 8/29/22 11/27/22  Dick Gallagher, DO   glipiZIDE (GLUCOTROL) 10 MG tablet Take 1 tablet (10 mg total) by mouth 2 (two) times daily with meals. 8/29/22   Dick Gallagher, DO   insulin detemir U-100 (LEVEMIR FLEXTOUCH U-100 INSULN) 100 unit/mL (3 mL) InPn pen Inject 30 Units into the skin every evening. 8/29/22 8/29/23  Dick Gallagher, DO   lisinopriL (PRINIVIL,ZESTRIL) 20 MG tablet Take 1 tablet (20 mg total) by mouth once daily. 8/29/22 11/27/22  Dick Gallagher DO   meloxicam (MOBIC) 15 MG tablet Take 1 tablet (15 mg total) by mouth once daily. 8/29/22   Dick Gallagher DO   omeprazole (PRILOSEC) 20 MG capsule Take 2 capsules (40 mg total) by mouth once daily. 8/29/22  "11/27/22  Dick Gallagher DO   pen needle, diabetic 32 gauge x 5/32" Ndle Use BD fany needle one times a day injections 8/29/22   Dick Gallagher DO   rOPINIRole (REQUIP) 0.5 MG tablet Take 1 tablet (0.5 mg total) by mouth 3 (three) times daily. 8/29/22 11/27/22  Dick Gallagher DO   blood sugar diagnostic Strp Test strips to check CBG's  Patient not taking: Reported on 8/29/2022 5/31/22 8/29/22  Batsheva Ballesteros NP   cetirizine (ZYRTEC) 10 MG tablet Take 1 tablet (10 mg total) by mouth once daily. 7/26/22 8/29/22  Ashley Le NP   famotidine (PEPCID) 20 MG tablet Take 1 tablet (20 mg total) by mouth once daily. for 7 days 5/8/22 8/29/22  Tan Oglesby Jr., FNP   fluticasone propionate (FLONASE) 50 mcg/actuation nasal spray 1 spray (50 mcg total) by Each Nostril route once daily.  Patient not taking: Reported on 8/29/2022 7/26/22 8/29/22  Ashley Le NP   gabapentin (NEURONTIN) 300 MG capsule Take 300 mg by mouth 3 (three) times daily. 4/25/22 8/29/22  Historical Provider   glipiZIDE (GLUCOTROL) 10 MG tablet Take 1 tablet (10 mg total) by mouth 2 (two) times daily with meals.  Patient not taking: No sig reported 5/31/22 8/29/22  Batsheva Ballesteros NP   insulin detemir U-100 (LEVEMIR FLEXTOUCH U-100 INSULN) 100 unit/mL (3 mL) InPn pen Inject 30 Units into the skin every evening.  Patient not taking: Reported on 8/29/2022 5/31/22 8/29/22  Batsheva Ballesteros NP   lisinopriL (PRINIVIL,ZESTRIL) 20 MG tablet Take 20 mg by mouth. 1/21/22 8/29/22  Historical Provider   meloxicam (MOBIC) 15 MG tablet Take 1 tablet (15 mg total) by mouth once daily.  Patient not taking: Reported on 8/29/2022 8/13/22 8/29/22  Rene Hanson MD   omeprazole (PRILOSEC) 20 MG capsule Take 40 mg by mouth once daily. 1/21/22 8/29/22  Historical Provider   pen needle, diabetic 32 gauge x 5/32" Ndle Use BD fany needle one times a day injections  Patient not taking: Reported on 8/29/2022 5/31/22 8/29/22  Batsheva Ballesteros NP   rOPINIRole (REQUIP) " "0.5 MG tablet Take 0.5 mg by mouth 3 (three) times daily. 1/21/22 8/29/22  Historical Provider   rOPINIRole (REQUIP) 0.5 MG tablet Take 0.5 mg by mouth. 1/21/22 8/29/22  Historical Provider       ROS:  Review of Systems   All other systems reviewed and are negative.        OBJECTIVE:     Vital signs:   /86 (BP Location: Left arm, Patient Position: Sitting, BP Method: Medium (Manual))   Pulse 84   Temp 98.2 °F (36.8 °C) (Oral)   Resp 18   Ht 5' 2" (1.575 m)   Wt 100.5 kg (221 lb 9.6 oz)   BMI 40.53 kg/m²      Physical Examination:  /86 (BP Location: Left arm, Patient Position: Sitting, BP Method: Medium (Manual))   Pulse 84   Temp 98.2 °F (36.8 °C) (Oral)   Resp 18   Ht 5' 2" (1.575 m)   Wt 100.5 kg (221 lb 9.6 oz)   BMI 40.53 kg/m²   General appearance: alert, appears stated age, and cooperative  Back: symmetric, no curvature. ROM normal. No CVA tenderness., tender to palpation in right lumbar area  Lungs: clear to auscultation bilaterally  Heart: regular rate and rhythm, S1, S2 normal, no murmur, click, rub or gallop  Extremities: extremities normal, atraumatic, no cyanosis or edema, no ulcers, gangrene or trophic changes, and fungal nail on left great toe  Skin: Skin color, texture, turgor normal. No rashes or lesions  Diabetic foot exam showed diffuse neuropathy on plantar aspect of bilateral feet. No ulcers present, pedal pulses strong.     Labs:  CMP:   Lab Results   Component Value Date    GLUCOSE 320 (H) 10/05/2021    CALCIUM 9.7 10/05/2021    ALBUMIN 3.9 05/27/2021     (L) 10/05/2021    K 4.3 10/05/2021    CO2 22 10/05/2021    BUN 14.3 10/05/2021    CREATININE 0.71 10/05/2021    ALKPHOS 129 05/27/2021    ALT 15 05/27/2021    AST 17 05/27/2021    BILITOT 0.5 05/27/2021      CBC:   Lab Results   Component Value Date    WBC 20.9 (H) 07/02/2020    HGB 14.6 05/27/2021    HCT 43.9 05/27/2021    MCV 87.1 05/27/2021    RDW 12.7 07/02/2020         ASSESSMENT & PLAN:     Sciatica, Right " sided  -Continue meloxicam 15 mg qd  -Recommended patient to lose weight, stretch, and yoga to improve her sciatica pain  -Patient declined lumbar Xray due to being self pay  -Cautioned continuous usage of NSAIDs given PMHx Diabetes    Diabetes mellitus, Type 2  -Last A1C in 5/31/22 was 11.8, repeat A1C today  -Patient has been out of diabetes medications, expecting higher A1C  -Diabetic foot exam performed today, eye exam ordered  -Referred to podiatry for diabetic shoes  -Refilled glipizide, detemir  -Consider GLP-1 following labs today  -Refilled gabapentin for diabetic neuropathy    Hypertension  -Patient's initial blood pressure was 158/91, recheck was 138/86.  -Refilled Lisinopril 20 mg qd    Hyperlipidemia  -5/27/21 showed  and TGL 2171  -Repeat labs today  -Started on Atorvastatin 20mg, will adjust once labs come back    GERD  -No reported issues today  -Continue Omeprazole and Pepcid    Restless Leg Syndrome  -Has been out of ropinirole, unable to sleep well  -Refilled ropinirole 0.5 mg take every evening    Healthcare Maintenance  -Referred to OBGYN for cervical cancer screening  -Mammogram order placed  -FIT test ordered    Immunizations  -Will receive Pneumococcal vaccine at [facility]  -TDAP received 2017  -Shingrix vaccine received 11/12/2020    Return to clinic in 3 month(s).    Dick Gallagher DO  [facility] Internal Medicine, PGY-1

## 2022-08-30 NOTE — PROGRESS NOTES
Attending Physician Addendum  Management and plan discussed with resident at time of clinic visit. Care was reasonable and  necessary. Routine follow up.  Recommend to check labs, kidney function.  Needs better control for diabetes.  Would benefit from GLP1 due to hx of obesity as well.

## 2022-09-23 ENCOUNTER — TELEPHONE (OUTPATIENT)
Dept: INTERNAL MEDICINE | Facility: CLINIC | Age: 57
End: 2022-09-23

## 2022-09-23 ENCOUNTER — OFFICE VISIT (OUTPATIENT)
Dept: URGENT CARE | Facility: CLINIC | Age: 57
End: 2022-09-23

## 2022-09-23 VITALS
WEIGHT: 221.69 LBS | HEIGHT: 63 IN | BODY MASS INDEX: 39.28 KG/M2 | DIASTOLIC BLOOD PRESSURE: 101 MMHG | OXYGEN SATURATION: 97 % | SYSTOLIC BLOOD PRESSURE: 180 MMHG | RESPIRATION RATE: 18 BRPM | HEART RATE: 104 BPM | TEMPERATURE: 98 F

## 2022-09-23 DIAGNOSIS — M79.622 AXILLARY PAIN, LEFT: ICD-10-CM

## 2022-09-23 DIAGNOSIS — R21 RASH: Primary | ICD-10-CM

## 2022-09-23 DIAGNOSIS — Z12.31 ENCOUNTER FOR MAMMOGRAM TO ESTABLISH BASELINE MAMMOGRAM: Primary | ICD-10-CM

## 2022-09-23 PROCEDURE — 99213 OFFICE O/P EST LOW 20 MIN: CPT | Mod: S$PBB,,,

## 2022-09-23 PROCEDURE — 99213 PR OFFICE/OUTPT VISIT, EST, LEVL III, 20-29 MIN: ICD-10-PCS | Mod: S$PBB,,,

## 2022-09-23 PROCEDURE — 99215 OFFICE O/P EST HI 40 MIN: CPT | Mod: PBBFAC

## 2022-09-23 RX ORDER — CLOTRIMAZOLE 1 %
CREAM (GRAM) TOPICAL 2 TIMES DAILY
Qty: 24 G | Refills: 0 | Status: SHIPPED | OUTPATIENT
Start: 2022-09-23 | End: 2023-01-19 | Stop reason: SDUPTHER

## 2022-09-23 RX ORDER — TRAMADOL HYDROCHLORIDE 50 MG/1
50 TABLET ORAL EVERY 6 HOURS
Qty: 12 TABLET | Refills: 0 | Status: SHIPPED | OUTPATIENT
Start: 2022-09-23 | End: 2022-09-26

## 2022-09-23 RX ORDER — CEPHALEXIN 500 MG/1
500 CAPSULE ORAL EVERY 6 HOURS
Qty: 28 CAPSULE | Refills: 0 | Status: SHIPPED | OUTPATIENT
Start: 2022-09-23 | End: 2022-09-30

## 2022-09-23 NOTE — LETTER
September 23, 2022      [facility]  [facility] [facility] [address]  Phone: [phone]       Patient: Suki Anton   YOB: 1965  Date of Visit: 09/23/2022    To Whom It May Concern:    Mars Anton  was at [facility] on 09/23/2022. The patient may return to work/school on 9/25/22. If you have any questions or concerns, or if I can be of further assistance, please do not hesitate to contact me.    Sincerely,    ANIBAL Le NP

## 2022-09-23 NOTE — PROGRESS NOTES
"Subjective:       Patient ID: Suki Anton is a 56 y.o. female.    Vitals:  height is 5' 2.6" (1.59 m) and weight is 100.6 kg (221 lb 11.2 oz). Her temperature is 98.4 °F (36.9 °C). Her blood pressure is 180/101 (abnormal) and her pulse is 104. Her respiration is 18 and oxygen saturation is 97%.     Chief Complaint: Rash (Rash, Lt axillary~ 1 month. Raw, redness, drainage. Hydrocortisone cream, worsening.)    Pt states rash under L axilla for the last month, states started as small area of redness that has since worsened. States putting antiseptic and hydrocortisone cream but worsening. Denies any systematic symptoms.    Rash      Constitution: Negative.   HENT: Negative.     Neck: neck negative.   Cardiovascular: Negative.    Eyes: Negative.    Respiratory: Negative.     Gastrointestinal: Negative.    Genitourinary: Negative.    Musculoskeletal: Negative.    Skin:  Positive for rash and erythema.   Allergic/Immunologic: Negative.    Neurological: Negative.      Objective:      Physical Exam   Constitutional: She is oriented to person, place, and time. normal  HENT:   Head: Normocephalic.   Nose: Nose normal.   Mouth/Throat: Uvula is midline. Mucous membranes are moist. Oropharynx is clear.   Eyes: Pupils are equal, round, and reactive to light.   Neck: Neck supple.   Cardiovascular: Normal rate and normal pulses.   Pulmonary/Chest: Effort normal.   Abdominal: Normal appearance.   Musculoskeletal: Normal range of motion.         General: Normal range of motion.        Arms:    Neurological: She is alert and oriented to person, place, and time.   Skin: Skin is warm and rash. erythema   Vitals reviewed.      Assessment:       1. Rash    2. Axillary pain, left            Plan:         Rash  -     clotrimazole (LOTRIMIN) 1 % cream; Apply topically 2 (two) times daily. for 14 days  Dispense: 24 g; Refill: 0  -     cephALEXin (KEFLEX) 500 MG capsule; Take 1 capsule (500 mg total) by mouth every 6 (six) hours. for 7 days  " Dispense: 28 capsule; Refill: 0    Axillary pain, left  -     traMADoL (ULTRAM) 50 mg tablet; Take 1 tablet (50 mg total) by mouth every 6 (six) hours. for 3 days  Dispense: 12 tablet; Refill: 0    See patient education for further guidance.  Do not scratch your hands with your fingernails, they can become infected.  Start Rx meds today.  RTC for any worsening.  Advised no hydrogen peroxide, alcohol, or other items on the rash.

## 2022-09-23 NOTE — TELEPHONE ENCOUNTER
Received a message regarding incorrect diagnosis code for screening mammogram. New order placed with correct diagnosis code.    ----- Message from Carmita Baxter LPN sent at 9/23/2022  3:12 PM CDT -----  Regarding: FW: Mammogram    ----- Message -----  From: Lala Cohn  Sent: 9/23/2022  11:33 AM CDT  To: , #  Subject: Mammogram                                        09/23/22 We are unable to use the diagnosis code of E11.69 abd E66.9 for a mammogram screening. Please send an order with a diagnosis code pertaining to a mammogram screening for this test. A more common diagnosis of Z12.31 can be used.    Thank you,

## 2022-10-06 ENCOUNTER — OFFICE VISIT (OUTPATIENT)
Dept: URGENT CARE | Facility: CLINIC | Age: 57
End: 2022-10-06

## 2022-10-06 VITALS
HEART RATE: 94 BPM | SYSTOLIC BLOOD PRESSURE: 136 MMHG | RESPIRATION RATE: 20 BRPM | HEIGHT: 63 IN | BODY MASS INDEX: 39.72 KG/M2 | DIASTOLIC BLOOD PRESSURE: 76 MMHG | WEIGHT: 224.19 LBS | TEMPERATURE: 99 F | OXYGEN SATURATION: 99 %

## 2022-10-06 DIAGNOSIS — L30.4 INTERTRIGO: Primary | ICD-10-CM

## 2022-10-06 PROCEDURE — 99213 OFFICE O/P EST LOW 20 MIN: CPT | Mod: S$PBB,,, | Performed by: NURSE PRACTITIONER

## 2022-10-06 PROCEDURE — 99213 PR OFFICE/OUTPT VISIT, EST, LEVL III, 20-29 MIN: ICD-10-PCS | Mod: S$PBB,,, | Performed by: NURSE PRACTITIONER

## 2022-10-06 PROCEDURE — 99215 OFFICE O/P EST HI 40 MIN: CPT | Mod: PBBFAC | Performed by: NURSE PRACTITIONER

## 2022-10-06 RX ORDER — FLUCONAZOLE 150 MG/1
150 TABLET ORAL
Qty: 4 TABLET | Refills: 0 | Status: SHIPPED | OUTPATIENT
Start: 2022-10-06 | End: 2022-10-28

## 2022-10-06 RX ORDER — NYSTATIN 100000 U/G
OINTMENT TOPICAL 3 TIMES DAILY
Qty: 30 G | Refills: 1 | Status: SHIPPED | OUTPATIENT
Start: 2022-10-06 | End: 2023-01-19

## 2022-10-06 NOTE — PATIENT INSTRUCTIONS
Bring change of underwear/shirt (preferable athletic material that jania moisture) to work and put dry underwear/shirt on during lunch break.  Change out of sweaty clothes as soon as you are home.     Nystatin powder can be used as well (Rx).  Start oral medication today.  RTC this weekend if worse/no better.    Tylenol/Motrin for pain.    Dove bar soap and warm water for bathing. No deodorant. No shaving.

## 2022-10-06 NOTE — PROGRESS NOTES
"Subjective:       Patient ID: Suki Anton is a 56 y.o. female.    Vitals:  height is 5' 2.6" (1.59 m) and weight is 101.7 kg (224 lb 3.2 oz). Her oral temperature is 98.6 °F (37 °C). Her blood pressure is 136/76 and her pulse is 94. Her respiration is 20 and oxygen saturation is 99%.     Chief Complaint: Medication Refill (Pt sts seen last week for rash to left arm. Was given prescription for clotrimazole 1% cream, told to come back get refill if rash was still there.)    HPI left underarm intertrigo. Has been using topical clotrimazole x 2 weeks. States no improvement. States rash is itchy/irritating. Has not been shaving or using deodorant. Diabetic. Gets hot/overheated, sometimes sweats at work.       Skin:  Positive for erythema.     Objective:      Physical Exam   Constitutional: She is oriented to person, place, and time.  Non-toxic appearance. She does not appear ill. No distress. obesity  Pulmonary/Chest: Effort normal.   Abdominal: Normal appearance.   Neurological: She is alert and oriented to person, place, and time.   Skin: Skin is rash. erythema        Psychiatric: Her behavior is normal. Mood, judgment and thought content normal.   Vitals reviewed.      Assessment:       1. Intertrigo            Plan:         Intertrigo    Other orders  -     fluconazole (DIFLUCAN) 150 MG Tab; Take 1 tablet (150 mg total) by mouth every 7 days. for 4 doses  Dispense: 4 tablet; Refill: 0  -     nystatin (MYCOSTATIN) ointment; Apply topically 3 (three) times daily. for 14 days  Dispense: 30 g; Refill: 1           Bring change of underwear/shirt (preferable athletic material that jania moisture) to work and put dry underwear/shirt on during lunch break.  Change out of sweaty clothes as soon as you are home.     Nystatin powder can be used as well (Rx).  Start oral medication today.  RTC this weekend if worse/no better.    Tylenol/Motrin for pain.    Dove bar soap and warm water for bathing. No deodorant. No shaving.      "

## 2022-12-01 DIAGNOSIS — I10 HYPERTENSION, UNSPECIFIED TYPE: ICD-10-CM

## 2022-12-01 DIAGNOSIS — E11.69 DIABETES MELLITUS TYPE 2 IN OBESE: ICD-10-CM

## 2022-12-01 DIAGNOSIS — E66.9 DIABETES MELLITUS TYPE 2 IN OBESE: ICD-10-CM

## 2022-12-01 DIAGNOSIS — G25.81 RESTLESS LEG SYNDROME: ICD-10-CM

## 2022-12-01 RX ORDER — GABAPENTIN 300 MG/1
300 CAPSULE ORAL 3 TIMES DAILY
Qty: 90 CAPSULE | Refills: 2 | Status: SHIPPED | OUTPATIENT
Start: 2022-12-01 | End: 2023-01-19 | Stop reason: SDUPTHER

## 2022-12-01 RX ORDER — LISINOPRIL 20 MG/1
20 TABLET ORAL DAILY
Qty: 90 TABLET | Refills: 0 | Status: SHIPPED | OUTPATIENT
Start: 2022-12-01 | End: 2023-01-19 | Stop reason: SDUPTHER

## 2022-12-01 RX ORDER — ROPINIROLE 0.5 MG/1
0.5 TABLET, FILM COATED ORAL 3 TIMES DAILY
Qty: 90 TABLET | Refills: 2 | Status: SHIPPED | OUTPATIENT
Start: 2022-12-01 | End: 2023-01-19 | Stop reason: SDUPTHER

## 2022-12-11 ENCOUNTER — OFFICE VISIT (OUTPATIENT)
Dept: URGENT CARE | Facility: CLINIC | Age: 57
End: 2022-12-11

## 2022-12-11 VITALS
BODY MASS INDEX: 38.75 KG/M2 | RESPIRATION RATE: 20 BRPM | WEIGHT: 218.69 LBS | TEMPERATURE: 98 F | DIASTOLIC BLOOD PRESSURE: 94 MMHG | SYSTOLIC BLOOD PRESSURE: 161 MMHG | HEIGHT: 63 IN | OXYGEN SATURATION: 96 % | HEART RATE: 95 BPM

## 2022-12-11 DIAGNOSIS — M54.30 BACK PAIN WITH SCIATICA: Primary | ICD-10-CM

## 2022-12-11 DIAGNOSIS — M54.9 BACK PAIN WITH SCIATICA: Primary | ICD-10-CM

## 2022-12-11 PROCEDURE — 99213 OFFICE O/P EST LOW 20 MIN: CPT | Mod: S$PBB,,, | Performed by: NURSE PRACTITIONER

## 2022-12-11 PROCEDURE — 99215 OFFICE O/P EST HI 40 MIN: CPT | Mod: PBBFAC | Performed by: NURSE PRACTITIONER

## 2022-12-11 PROCEDURE — 99213 PR OFFICE/OUTPT VISIT, EST, LEVL III, 20-29 MIN: ICD-10-PCS | Mod: S$PBB,,, | Performed by: NURSE PRACTITIONER

## 2022-12-11 RX ORDER — KETOROLAC TROMETHAMINE 30 MG/ML
30 INJECTION, SOLUTION INTRAMUSCULAR; INTRAVENOUS
Status: COMPLETED | OUTPATIENT
Start: 2022-12-11 | End: 2022-12-11

## 2022-12-11 RX ORDER — TIZANIDINE 4 MG/1
4 TABLET ORAL EVERY 6 HOURS PRN
Qty: 20 TABLET | Refills: 0 | Status: SHIPPED | OUTPATIENT
Start: 2022-12-11 | End: 2022-12-21

## 2022-12-11 RX ORDER — DICLOFENAC SODIUM 75 MG/1
75 TABLET, DELAYED RELEASE ORAL 2 TIMES DAILY
Qty: 60 TABLET | Refills: 0 | Status: SHIPPED | OUTPATIENT
Start: 2022-12-11 | End: 2023-01-10

## 2022-12-11 RX ORDER — METHYLPREDNISOLONE 4 MG/1
TABLET ORAL
Qty: 21 TABLET | Refills: 0 | Status: SHIPPED | OUTPATIENT
Start: 2022-12-11 | End: 2022-12-17

## 2022-12-11 RX ADMIN — KETOROLAC TROMETHAMINE 30 MG: 30 INJECTION, SOLUTION INTRAMUSCULAR; INTRAVENOUS at 03:12

## 2022-12-11 NOTE — PROGRESS NOTES
"Subjective:       Patient ID: Suki Anton is a 56 y.o. female.    Vitals:  height is 5' 2.6" (1.59 m) and weight is 99.2 kg (218 lb 11.2 oz). Her oral temperature is 98.1 °F (36.7 °C). Her blood pressure is 161/94 (abnormal) and her pulse is 95. Her respiration is 20 and oxygen saturation is 96%.     Chief Complaint: Leg Pain (Right leg x 1 week. )    Pt is a 56-year-old female, here today for right low back pain that goes down right leg x1 week.  Denies any fall or trauma.  States she does have a history of sciatica, is on gabapentin and previously had taken physical therapy for it.  Patient states she tried the physical therapy exercises without relief.  Rates pain 8/10, taking Tylenol at home.  States she is diabetic, states she checked her sugar few days ago and it was in the 100s, states she does not check her blood sugars every day but does have supplies at home to check them.  Review of chart shows last A1c was 11.8.      Cardiovascular: Negative.    Respiratory: Negative.     Musculoskeletal:  Positive for back pain.     Objective:      Physical Exam   Constitutional: She is oriented to person, place, and time. She appears well-developed.   HENT:   Head: Normocephalic.   Eyes: Conjunctivae and EOM are normal. Pupils are equal, round, and reactive to light.   Neck: Neck supple.   Cardiovascular: Normal rate, regular rhythm and normal heart sounds.   Pulmonary/Chest: Effort normal and breath sounds normal.   Musculoskeletal: Normal range of motion.         General: Normal range of motion.        Arms:    Neurological: She is alert and oriented to person, place, and time.   Skin: Skin is warm and dry.   Psychiatric: Her behavior is normal.   Vitals reviewed.      Assessment:       1. Back pain with sciatica                 No results found.   Plan:           Toradol 30mg IM in office.  Medication as ordered, do not combine NSAIDs.  Discussed with pt, check CBGs twice daily, if any CBG > 250 then stop steroid " pack. If any CBG >350 then go to ER. Hot or cold therapy.  Active range of motion exercises. F/u with pcp. ER precautions.     Back pain with sciatica  -     ketorolac injection 30 mg  -     diclofenac (VOLTAREN) 75 MG EC tablet; Take 1 tablet (75 mg total) by mouth 2 (two) times daily.  Dispense: 60 tablet; Refill: 0  -     tiZANidine (ZANAFLEX) 4 MG tablet; Take 1 tablet (4 mg total) by mouth every 6 (six) hours as needed (pain).  Dispense: 20 tablet; Refill: 0  -     methylPREDNISolone (MEDROL DOSEPACK) 4 mg tablet; Take 6 tablets (24 mg total) by mouth once daily for 1 day, THEN 5 tablets (20 mg total) once daily for 1 day, THEN 4 tablets (16 mg total) once daily for 1 day, THEN 3 tablets (12 mg total) once daily for 1 day, THEN 2 tablets (8 mg total) once daily for 1 day, THEN 1 tablet (4 mg total) once daily for 1 day. use as directed.  Dispense: 21 tablet; Refill: 0

## 2022-12-11 NOTE — LETTER
December 11, 2022      [facility]  [address] [facility] [address]  Phone: [phone]       Patient: Suki Anton   YOB: 1965  Date of Visit: 12/11/2022    To Whom It May Concern:    Mars Anton  was at [facility] on 12/11/2022. The patient may return to work/school on 12/13/2022 with no restrictions. If you have any questions or concerns, or if I can be of further assistance, please do not hesitate to contact me.    Sincerely,    JANE Becerra

## 2023-01-19 ENCOUNTER — CLINICAL SUPPORT (OUTPATIENT)
Dept: INTERNAL MEDICINE | Facility: CLINIC | Age: 58
End: 2023-01-19

## 2023-01-19 ENCOUNTER — OFFICE VISIT (OUTPATIENT)
Dept: INTERNAL MEDICINE | Facility: CLINIC | Age: 58
End: 2023-01-19

## 2023-01-19 VITALS
RESPIRATION RATE: 16 BRPM | HEIGHT: 63 IN | SYSTOLIC BLOOD PRESSURE: 160 MMHG | OXYGEN SATURATION: 98 % | TEMPERATURE: 98 F | HEART RATE: 94 BPM | BODY MASS INDEX: 38.21 KG/M2 | WEIGHT: 215.63 LBS | DIASTOLIC BLOOD PRESSURE: 103 MMHG

## 2023-01-19 DIAGNOSIS — K21.9 GASTROESOPHAGEAL REFLUX DISEASE, UNSPECIFIED WHETHER ESOPHAGITIS PRESENT: ICD-10-CM

## 2023-01-19 DIAGNOSIS — M25.561 ACUTE PAIN OF RIGHT KNEE: ICD-10-CM

## 2023-01-19 DIAGNOSIS — Z79.4 TYPE 2 DIABETES MELLITUS WITHOUT COMPLICATION, WITH LONG-TERM CURRENT USE OF INSULIN: Primary | ICD-10-CM

## 2023-01-19 DIAGNOSIS — E11.9 TYPE 2 DIABETES MELLITUS WITHOUT COMPLICATION, WITH LONG-TERM CURRENT USE OF INSULIN: Primary | ICD-10-CM

## 2023-01-19 DIAGNOSIS — E11.69 DIABETES MELLITUS TYPE 2 IN OBESE: ICD-10-CM

## 2023-01-19 DIAGNOSIS — I10 HYPERTENSION, UNSPECIFIED TYPE: ICD-10-CM

## 2023-01-19 DIAGNOSIS — Z11.52 ENCOUNTER FOR SCREENING FOR COVID-19: ICD-10-CM

## 2023-01-19 DIAGNOSIS — G25.81 RESTLESS LEG SYNDROME: ICD-10-CM

## 2023-01-19 DIAGNOSIS — E66.9 DIABETES MELLITUS TYPE 2 IN OBESE: ICD-10-CM

## 2023-01-19 DIAGNOSIS — R21 RASH: ICD-10-CM

## 2023-01-19 DIAGNOSIS — Z00.00 HEALTHCARE MAINTENANCE: ICD-10-CM

## 2023-01-19 DIAGNOSIS — E11.65 UNCONTROLLED TYPE 2 DIABETES MELLITUS WITH HYPERGLYCEMIA: ICD-10-CM

## 2023-01-19 DIAGNOSIS — Z12.11 COLON CANCER SCREENING: ICD-10-CM

## 2023-01-19 LAB — HBA1C MFR BLD: 14.3 %

## 2023-01-19 PROCEDURE — 92228 IMG RTA DETC/MNTR DS PHY/QHP: CPT | Mod: PBBFAC

## 2023-01-19 PROCEDURE — 90686 IIV4 VACC NO PRSV 0.5 ML IM: CPT | Mod: PBBFAC

## 2023-01-19 PROCEDURE — 90472 IMMUNIZATION ADMIN EACH ADD: CPT | Mod: PBBFAC

## 2023-01-19 PROCEDURE — 83036 HEMOGLOBIN GLYCOSYLATED A1C: CPT | Mod: PBBFAC

## 2023-01-19 PROCEDURE — 99214 OFFICE O/P EST MOD 30 MIN: CPT | Mod: PBBFAC

## 2023-01-19 PROCEDURE — 90677 PCV20 VACCINE IM: CPT | Mod: PBBFAC

## 2023-01-19 RX ORDER — MELOXICAM 15 MG/1
15 TABLET ORAL DAILY
Qty: 30 TABLET | Refills: 1 | Status: SHIPPED | OUTPATIENT
Start: 2023-01-19 | End: 2023-04-12

## 2023-01-19 RX ORDER — GLIPIZIDE 10 MG/1
10 TABLET ORAL 2 TIMES DAILY WITH MEALS
Qty: 60 TABLET | Refills: 5 | Status: SHIPPED | OUTPATIENT
Start: 2023-01-19 | End: 2023-04-12 | Stop reason: SDUPTHER

## 2023-01-19 RX ORDER — GABAPENTIN 300 MG/1
300 CAPSULE ORAL 3 TIMES DAILY
Qty: 90 CAPSULE | Refills: 2 | Status: SHIPPED | OUTPATIENT
Start: 2023-01-19 | End: 2023-04-12 | Stop reason: SDUPTHER

## 2023-01-19 RX ORDER — CETIRIZINE HYDROCHLORIDE 10 MG/1
10 TABLET ORAL DAILY
Qty: 90 TABLET | Refills: 0 | Status: SHIPPED | OUTPATIENT
Start: 2023-01-19 | End: 2023-04-12 | Stop reason: SDUPTHER

## 2023-01-19 RX ORDER — LISINOPRIL 20 MG/1
20 TABLET ORAL DAILY
Qty: 90 TABLET | Refills: 0 | Status: SHIPPED | OUTPATIENT
Start: 2023-01-19 | End: 2023-04-12 | Stop reason: SDUPTHER

## 2023-01-19 RX ORDER — INSULIN DETEMIR 100 [IU]/ML
30 INJECTION, SOLUTION SUBCUTANEOUS NIGHTLY
Qty: 15 ML | Refills: 5 | Status: SHIPPED | OUTPATIENT
Start: 2023-01-19 | End: 2023-04-12

## 2023-01-19 RX ORDER — INSULIN PUMP SYRINGE, 3 ML
1 EACH MISCELLANEOUS 2 TIMES DAILY
Qty: 1 EACH | Refills: 0 | Status: SHIPPED | OUTPATIENT
Start: 2023-01-19

## 2023-01-19 RX ORDER — INSULIN PUMP SYRINGE, 3 ML
1 EACH MISCELLANEOUS 2 TIMES DAILY
COMMUNITY
End: 2023-01-19 | Stop reason: SDUPTHER

## 2023-01-19 RX ORDER — CLOTRIMAZOLE 1 %
CREAM (GRAM) TOPICAL 2 TIMES DAILY
Qty: 24 G | Refills: 0 | Status: SHIPPED | OUTPATIENT
Start: 2023-01-19 | End: 2023-04-12 | Stop reason: ALTCHOICE

## 2023-01-19 RX ORDER — INSULIN PUMP SYRINGE, 3 ML
EACH MISCELLANEOUS
Qty: 1 EACH | Refills: 0 | Status: SHIPPED | OUTPATIENT
Start: 2023-01-19 | End: 2024-01-19

## 2023-01-19 RX ORDER — ROPINIROLE 0.5 MG/1
0.5 TABLET, FILM COATED ORAL 3 TIMES DAILY
Qty: 90 TABLET | Refills: 2 | Status: SHIPPED | OUTPATIENT
Start: 2023-01-19 | End: 2023-04-12 | Stop reason: SDUPTHER

## 2023-01-19 RX ORDER — ATORVASTATIN CALCIUM 20 MG/1
20 TABLET, FILM COATED ORAL DAILY
Qty: 90 TABLET | Refills: 3 | Status: SHIPPED | OUTPATIENT
Start: 2023-01-19 | End: 2023-04-12

## 2023-01-19 RX ORDER — OMEPRAZOLE 20 MG/1
40 CAPSULE, DELAYED RELEASE ORAL DAILY
Qty: 60 CAPSULE | Refills: 2 | Status: SHIPPED | OUTPATIENT
Start: 2023-01-19 | End: 2023-04-12 | Stop reason: SDUPTHER

## 2023-01-19 RX ORDER — PEN NEEDLE, DIABETIC 30 GX3/16"
NEEDLE, DISPOSABLE MISCELLANEOUS
Qty: 90 EACH | Refills: 3 | Status: SHIPPED | OUTPATIENT
Start: 2023-01-19 | End: 2023-04-12

## 2023-01-19 RX ORDER — DICLOFENAC SODIUM 10 MG/G
2 GEL TOPICAL 4 TIMES DAILY
Qty: 100 G | Refills: 2 | Status: SHIPPED | OUTPATIENT
Start: 2023-01-19 | End: 2023-06-08 | Stop reason: SDUPTHER

## 2023-01-19 NOTE — PROGRESS NOTES
Suki Anton is a 57 y.o. female here for a diabetic eye screening with non-dilated fundus photos per Dr. Dick Gallagher.    Patient cooperative?: Yes  Small pupils?: No  Last eye exam: unknown    For exam results, see Encounter Report.

## 2023-01-20 NOTE — PROGRESS NOTES
[facility] INTERNAL MEDICINE  OUTPATIENT OFFICE VISIT NOTE    SUBJECTIVE:      Chief Complaint: Follow-up and Diabetes       HPI: Suki Anton is a 57 y.o. yo female w/ PMH of  has a past medical history of Diabetes mellitus, Diabetes mellitus, type 2, GERD (gastroesophageal reflux disease), and Hypertension., who presents for medication refills    Patient's only complaint today was her right knee sometimes gives when she moves a little quickly. It used to occur previously when she was a teenager and played tennis, and that it resolved, however it has returned for the past 3-4 months. Only quick movements cause her to feel unstable. Patient has been out of medications due to inability to pay. Additionally, she has been unable to get any labs done. Case management consulted to become a part of [facility] so she can receive free medications and labs from [facility], and once improved, can give proper care and control her diabetes, blood pressure, and lipids.     Denies fevers chills, chest pain, shortness of breath, abdominal pain, nausea, vomiting, diarrhea.     Past Medical History:   has a past medical history of Diabetes mellitus, Diabetes mellitus, type 2, GERD (gastroesophageal reflux disease), and Hypertension.     Past Surgical History:   has no past surgical history on file.     Family History:  family history includes Cancer in her father; Cataracts in her mother.     Social History:   reports that she has never smoked. She has never used smokeless tobacco. She reports that she does not drink alcohol and does not use drugs.     Allergies:  has No Known Allergies.     Home Medications:  Prior to Admission medications    Medication Sig Start Date End Date Taking? Authorizing Provider   atorvastatin (LIPITOR) 20 MG tablet Take 1 tablet (20 mg total) by mouth once daily. 8/29/22 1/19/23 Yes Dick Gallagher, DO   blood sugar diagnostic Strp Test strips to check CBG's 8/29/22 1/19/23 Yes Dick Gallagher, DO   blood-glucose meter  "(TRUE METRIX GLUCOSE METER) kit Use as instructed 5/31/22 1/19/23 Yes Batsheva Ballesteros, NP   diclofenac sodium (VOLTAREN) 1 % Gel Apply 2 g topically 4 (four) times daily. 8/29/22 1/19/23 Yes Dick Gallagher DO   gabapentin (NEURONTIN) 300 MG capsule Take 1 capsule (300 mg total) by mouth 3 (three) times daily. 12/1/22 1/19/23 Yes Dick Gallagher DO   glipiZIDE (GLUCOTROL) 10 MG tablet Take 1 tablet (10 mg total) by mouth 2 (two) times daily with meals. 8/29/22 1/19/23 Yes Dick Gallagher DO   insulin detemir U-100 (LEVEMIR FLEXTOUCH U-100 INSULN) 100 unit/mL (3 mL) InPn pen Inject 30 Units into the skin every evening. 8/29/22 1/19/23 Yes Dick Gallagher DO   lisinopriL (PRINIVIL,ZESTRIL) 20 MG tablet Take 1 tablet (20 mg total) by mouth once daily. 12/1/22 1/19/23 Yes Dick Gallagher DO   meloxicam (MOBIC) 15 MG tablet Take 1 tablet (15 mg total) by mouth once daily. 8/29/22 1/19/23 Yes Dick Gallagher DO   pen needle, diabetic 32 gauge x 5/32" Ndle Use BD fany needle one times a day injections 8/29/22 1/19/23 Yes Dick Gallagher DO   rOPINIRole (REQUIP) 0.5 MG tablet Take 1 tablet (0.5 mg total) by mouth 3 (three) times daily. 12/1/22 1/19/23 Yes Dick Gallagher DO   atorvastatin (LIPITOR) 20 MG tablet Take 1 tablet (20 mg total) by mouth once daily. 1/19/23 1/19/24  Dick Gallagher DO   blood sugar diagnostic Strp Test strips to check CBG's 1/19/23   Dick Gallgaher DO   blood-glucose meter (TRUE METRIX GLUCOSE METER) kit Use as instructed 1/19/23 1/19/24  Dick Gallagher DO   blood-glucose meter kit 1 each by Other route 2 (two) times daily. Use as instructed 1/19/23   Dick Gallagher, DO   cetirizine (ZYRTEC) 10 MG tablet Take 1 tablet (10 mg total) by mouth once daily. 1/19/23 4/19/23  Dick Gallagher, DO   clotrimazole (LOTRIMIN) 1 % cream Apply topically 2 (two) times daily. for 14 days 1/19/23 2/2/23  Dick Gallagher, DO   diclofenac sodium (VOLTAREN) 1 % Gel Apply 2 g topically 4 (four) times daily. 1/19/23   Dick Gallagher, DO   gabapentin (NEURONTIN) " "300 MG capsule Take 1 capsule (300 mg total) by mouth 3 (three) times daily. 1/19/23 4/19/23  Dick Gallagher DO   glipiZIDE (GLUCOTROL) 10 MG tablet Take 1 tablet (10 mg total) by mouth 2 (two) times daily with meals. 1/19/23   Dick Gallagher DO   insulin detemir U-100 (LEVEMIR FLEXTOUCH U-100 INSULN) 100 unit/mL (3 mL) InPn pen Inject 30 Units into the skin every evening. 1/19/23 1/19/24  Dick Gallagher DO   lisinopriL (PRINIVIL,ZESTRIL) 20 MG tablet Take 1 tablet (20 mg total) by mouth once daily. 1/19/23 4/19/23  Dick Gallagher DO   meloxicam (MOBIC) 15 MG tablet Take 1 tablet (15 mg total) by mouth once daily. 1/19/23   Dick Gallagher DO   omeprazole (PRILOSEC) 20 MG capsule Take 2 capsules (40 mg total) by mouth once daily. 1/19/23 4/19/23  Dick Gallagher DO   pen needle, diabetic 32 gauge x 5/32" Ndle Use BD fany needle one times a day injections 1/19/23   Dick Gallagher DO   rOPINIRole (REQUIP) 0.5 MG tablet Take 1 tablet (0.5 mg total) by mouth 3 (three) times daily. 1/19/23 4/19/23  Dick Gallagher DO   blood-glucose meter kit 1 each by Other route 2 (two) times daily. Use as instructed  1/19/23  Historical Provider   cetirizine (ZYRTEC) 10 MG tablet Take 1 tablet (10 mg total) by mouth once daily. 8/29/22 1/19/23  Dick Gallagher DO   clotrimazole (LOTRIMIN) 1 % cream Apply topically 2 (two) times daily. for 14 days 9/23/22 1/19/23  Ashley Le NP   famotidine (PEPCID) 20 MG tablet Take 1 tablet (20 mg total) by mouth once daily. for 7 days 8/29/22 1/19/23  Dick Gallagher DO   fluticasone propionate (FLONASE) 50 mcg/actuation nasal spray 1 spray (50 mcg total) by Each Nostril route once daily.  Patient not taking: Reported on 1/19/2023 8/29/22 1/19/23  Dick Gallagher DO   nystatin (MYCOSTATIN) ointment Apply topically 3 (three) times daily. for 14 days 10/6/22 1/19/23  JANE Henry   omeprazole (PRILOSEC) 20 MG capsule Take 2 capsules (40 mg total) by mouth once daily. 8/29/22 1/19/23  Dick Gallagher DO " "      ROS:  Review of Systems   All other systems reviewed and are negative.        OBJECTIVE:     Vital signs:   BP (!) 160/103 (BP Location: Right arm, Patient Position: Sitting, BP Method: Large (Automatic))   Pulse 94   Temp 97.8 °F (36.6 °C) (Oral)   Resp 16   Ht 5' 2.6" (1.59 m)   Wt 97.8 kg (215 lb 9.6 oz)   SpO2 98%   BMI 38.68 kg/m²      Physical Examination:  General: Patient resting comfortably in bed, in no acute distress   Eye: PERRLA, EOMI, clear conjunctiva, eyelids normal  HENT: Head-normocephalic and atraumatic  Neck: full range of motion, no thyromegaly or lymphadenopathy, trachea midline, supple, no palpable thyroid nodules  Respiratory: clear to auscultation bilaterally without wheezes, rales, rhonchi  Cardiovascular: regular rate and rhythm without murmurs.  No gallops or rubs no JVD.  Capillary refill within normal limits.  Gastrointestinal: soft, non-tender, non-distended with normal bowel sounds, without masses to palpation  Genitourinary: no CVA tenderness to palpation  Musculoskeletal: full range of motion of all extremities/spine without limitation or discomfort. No laxity in right knee when compared to left. No crepitus noted.  Integumentary: no rashes or skin lesions present  Neurologic: no signs of peripheral neurological deficit, motor/sensory function intact  Psychiatric:  alert and oriented, cognitive function intact, cooperative with exam, good eye contact, judgement and insight intact, mood and affect full range.     Labs:  CMP:   Lab Results   Component Value Date    GLUCOSE 320 (H) 10/05/2021    CALCIUM 9.7 10/05/2021    ALBUMIN 3.9 05/27/2021     (L) 10/05/2021    K 4.3 10/05/2021    CO2 22 10/05/2021    BUN 14.3 10/05/2021    CREATININE 0.71 10/05/2021    ALKPHOS 129 05/27/2021    ALT 15 05/27/2021    AST 17 05/27/2021    BILITOT 0.5 05/27/2021          ASSESSMENT & PLAN:       Diabetes mellitus, Type 2  -Last A1C in 5/31/22 was 11.8, repeat A1C not drawn last " visit, draw today  -Patient has been out of diabetes medications, expecting higher A1C  -Diabetic foot exam performed last visit, eye exam will be performed today  -re-Referred to podiatry for diabetic shoes  -Refilled all diabetic supplies and medications  -Consider GLP-1 following labs today  -Current regimen is detemir 30U nightly, glipizide 10 mg qd   -Advised patient to keep CBG log and bring in to clinic, and will adjust regimen accordingly.   -Refilled gabapentin for diabetic neuropathy     Hypertension  -Patient's initial blood pressure was 160/103, has been out of medications  -Refilled Lisinopril 20 mg qd, will re-evaluate on next visit and titrate medications as necessary     Hyperlipidemia  -5/27/21 showed  and TGL 2171  -Repeat labs ordered today  -Started on Atorvastatin 20mg, will adjust once labs come back     GERD  -No reported issues today  -Continue Omeprazole and Pepcid     Restless Leg Syndrome  -Ropinirole has been helpful  -Refilled ropinirole 0.5 mg take every evening    Self Pay  -Patient is now a part of [facility] program at [facility], can get all medications and tests done for free    Sciatica, Right sided- Improved, no complaints today  -Continue meloxicam 15 mg qd  -Recommended patient to lose weight, stretch, and yoga to improve her sciatica pain  -Patient declined lumbar Xray due to being self pay  -Cautioned continuous usage of NSAIDs given PMHx Diabetes     Health Maintenance  Vaccinations  Immunization History   Administered Date(s) Administered    Influenza - Quadrivalent 12/10/2019, 11/12/2020    Influenza - Quadrivalent - PF (6-35 months) 01/15/2018, 11/15/2018    Influenza - Quadrivalent - PF *Preferred* (6 months and older) 01/19/2023    Pneumococcal Conjugate - 20 Valent 01/19/2023    Pneumococcal Polysaccharide - 23 Valent 02/23/2017    Tdap 02/23/2017    Zoster Recombinant 11/12/2020, 06/19/2021       -Flu: Done today 01/19/2023    -Pneumonia: Done today 01/19/2023      -Shingles: done on 6/19/2021, two doses   -Tdap: done on 2/23/2017   -COVID:  address next visit    Screening   -Pap Smear:  Currently scheduled for 8/24/23, however will try to get appt pushed up     -Mammogram: Ordered, will try to get scheduled soon   -Osteoporosis: will address at next visit    -Lung Ca. Screening: will address at next visit   -Colon Ca. Screening:  attempting  to get FIT scheduled   -Hep C, HIV: will address at next visit  Social   -EtOH:  reports no history of alcohol use.   -Tobacco:  reports that she has never smoked. She has never used smokeless tobacco.   -Drugs:  reports no history of drug use.    -Depression:   Depression: Low Risk     Last PHQ Score: 0        Return to clinic in 2 month(s).    Dick Gallagher,   [facility] Internal Medicine, PGY-1

## 2023-01-23 ENCOUNTER — PATIENT OUTREACH (OUTPATIENT)
Dept: ADMINISTRATIVE | Facility: OTHER | Age: 58
End: 2023-01-23

## 2023-01-23 ENCOUNTER — TELEPHONE (OUTPATIENT)
Dept: INTERNAL MEDICINE | Facility: CLINIC | Age: 58
End: 2023-01-23

## 2023-01-23 DIAGNOSIS — Z79.4 TYPE 2 DIABETES MELLITUS WITHOUT COMPLICATION, WITH LONG-TERM CURRENT USE OF INSULIN: Primary | ICD-10-CM

## 2023-01-23 DIAGNOSIS — E11.9 TYPE 2 DIABETES MELLITUS WITHOUT COMPLICATION, WITH LONG-TERM CURRENT USE OF INSULIN: Primary | ICD-10-CM

## 2023-01-23 RX ORDER — LANCING DEVICE
1 EACH MISCELLANEOUS 2 TIMES DAILY
Qty: 200 EACH | Refills: 5 | Status: SHIPPED | OUTPATIENT
Start: 2023-01-23 | End: 2023-12-21 | Stop reason: SDUPTHER

## 2023-01-23 NOTE — TELEPHONE ENCOUNTER
Patient did not receive her lancets for her True Matrix Glucose meter-please send Rx to [facility] pharmacy. Thanks.

## 2023-01-23 NOTE — PROGRESS NOTES
CHW - Initial Contact    This Community Health Worker updated the Social Determinant of Health questionnaire with patient via telephone today.      Pt identified barriers of most importance are: Pt stated she lives in a trailer and struggles to pay LOT rent. Also she has a food insecurity but     applied for food stamps last year 2022 and was denied because her income outweighed her expenses and she wasn't able to put her medical     expenses on the application due to her age (57) DCFS requirement is (59). Pt stated she is interested in local food pantries and food samuel CHW     encouraged Pt to apply again for food stamps.  Also, due to her low income she is interested in utility assistance and [facility] and would like a financial assistance program application.        Referrals to community agencies completed with patient/caregiver consent outside of [facility] include: YES (SEE BELOW)    Referrals were put through [facility] - Yes: [facility], [facility] [employer] [facility], AND [facility]    Support and Services: CHW mailed [facility] Financial Assistance Pt has past due bills with [facility]     Other information discussed the patient needs / wants help with: None    Follow up required: Yes    Follow-up Outreach - Due: 1/27/2023

## 2023-01-31 ENCOUNTER — PATIENT OUTREACH (OUTPATIENT)
Dept: ADMINISTRATIVE | Facility: OTHER | Age: 58
End: 2023-01-31

## 2023-01-31 NOTE — PROGRESS NOTES
CHW - Follow Up    This Community Health Worker completed a follow up visit with patient via telephone today.    Pt/Caregiver reported: Pt stated she hasn't got a chance to contact the resources CHW provided   CHW will call back 2/3/23    Community Health Worker provided:None    Follow up required: Yes    Follow-up Outreach - Due: 2/2/2023

## 2023-02-07 ENCOUNTER — PATIENT OUTREACH (OUTPATIENT)
Dept: ADMINISTRATIVE | Facility: OTHER | Age: 58
End: 2023-02-07

## 2023-02-07 NOTE — PROGRESS NOTES
CHW - Outreach Attempt    Community Health Worker left a voicemail message for 1st attempt to contact patient regarding: community resources     Community Health Worker to attempt to contact patient on: 2/7/23

## 2023-02-10 NOTE — PROGRESS NOTES
"CHW - Follow Up    This Community Health Worker completed a follow up visit with patient via telephone today.    Pt/Caregiver reported: Pt stated she was denied for [employer] [facility] financial assistance program and Medicaid   Due to making "100" over the threshold for income requirements   Pt is interested in Food samuel/pantrys because she doesn't know any in her area    Community Health Worker provided: Chw will provide food samuel on Monday  Follow up required: yes  Follow-up Outreach - Due: 2/13/2023     "

## 2023-02-14 ENCOUNTER — PATIENT OUTREACH (OUTPATIENT)
Dept: ADMINISTRATIVE | Facility: OTHER | Age: 58
End: 2023-02-14

## 2023-02-14 NOTE — PROGRESS NOTES
CHW - Outreach Attempt    Community Health Worker left a voicemail message for 1st attempt to contact patient regarding:to give patient two food bank resources    [facility]  [address] [address]  Phone: [phone]    2. [address] [address]  Phone: [phone]        Community Health Worker to attempt to contact patient on: 2/14/23

## 2023-02-16 ENCOUNTER — OFFICE VISIT (OUTPATIENT)
Dept: INTERNAL MEDICINE | Facility: CLINIC | Age: 58
End: 2023-02-16

## 2023-02-16 VITALS
HEIGHT: 62 IN | OXYGEN SATURATION: 98 % | TEMPERATURE: 98 F | WEIGHT: 224 LBS | RESPIRATION RATE: 16 BRPM | HEART RATE: 88 BPM | BODY MASS INDEX: 41.22 KG/M2 | DIASTOLIC BLOOD PRESSURE: 81 MMHG | SYSTOLIC BLOOD PRESSURE: 130 MMHG

## 2023-02-16 DIAGNOSIS — Z79.4 TYPE 2 DIABETES MELLITUS WITHOUT COMPLICATION, WITH LONG-TERM CURRENT USE OF INSULIN: Primary | ICD-10-CM

## 2023-02-16 DIAGNOSIS — E11.9 TYPE 2 DIABETES MELLITUS WITHOUT COMPLICATION, WITH LONG-TERM CURRENT USE OF INSULIN: Primary | ICD-10-CM

## 2023-02-16 PROCEDURE — 99215 OFFICE O/P EST HI 40 MIN: CPT | Mod: PBBFAC

## 2023-02-16 RX ORDER — SYRING-NEEDL,DISP,INSUL,0.3 ML 30 GX5/16"
SYRINGE, EMPTY DISPOSABLE MISCELLANEOUS
COMMUNITY
Start: 2023-01-20

## 2023-02-16 RX ORDER — METFORMIN HYDROCHLORIDE 500 MG/1
500 TABLET ORAL
Qty: 90 TABLET | Refills: 0 | Status: SHIPPED | OUTPATIENT
Start: 2023-02-16 | End: 2023-04-12 | Stop reason: SINTOL

## 2023-02-16 RX ORDER — LANCETS 33 GAUGE
EACH MISCELLANEOUS
COMMUNITY
Start: 2023-01-23 | End: 2023-06-08 | Stop reason: SDUPTHER

## 2023-02-16 NOTE — PROGRESS NOTES
[facility] INTERNAL MEDICINE  OUTPATIENT OFFICE VISIT NOTE    SUBJECTIVE:      Chief Complaint: No chief complaint on file.       HPI: Suki Anton is a 57 y.o. yo female w/ PMH of  has a past medical history of Diabetes mellitus, Diabetes mellitus, type 2, GERD (gastroesophageal reflux disease), and Hypertension., who presents for medication refills    Feeling well, was supposed to bring sugar logs, however, did not bring them. Glucometer was not working. She has been following her regimen of detemir 30U nightly. Reported to be feeling better.    Denies fevers chills, chest pain, shortness of breath, abdominal pain, nausea, vomiting, diarrhea.     Past Medical History:   has a past medical history of Diabetes mellitus, Diabetes mellitus, type 2, GERD (gastroesophageal reflux disease), and Hypertension.     Past Surgical History:   has no past surgical history on file.     Family History:  family history includes Cancer in her father; Cataracts in her mother.     Social History:   reports that she has never smoked. She has never used smokeless tobacco. She reports that she does not drink alcohol and does not use drugs.     Allergies:  has No Known Allergies.     Home Medications:  Prior to Admission medications    Medication Sig Start Date End Date Taking? Authorizing Provider   atorvastatin (LIPITOR) 20 MG tablet Take 1 tablet (20 mg total) by mouth once daily. 8/29/22 1/19/23 Yes Dick Gallagher, DO   blood sugar diagnostic Strp Test strips to check CBG's 8/29/22 1/19/23 Yes Dick Gallagher, DO   blood-glucose meter (TRUE METRIX GLUCOSE METER) kit Use as instructed 5/31/22 1/19/23 Yes Batsheva Ballesteros, SERENA   diclofenac sodium (VOLTAREN) 1 % Gel Apply 2 g topically 4 (four) times daily. 8/29/22 1/19/23 Yes Dick Gallagher, DO   gabapentin (NEURONTIN) 300 MG capsule Take 1 capsule (300 mg total) by mouth 3 (three) times daily. 12/1/22 1/19/23 Yes Dick Gallagher, DO   glipiZIDE (GLUCOTROL) 10 MG tablet Take 1 tablet (10 mg total) by  "mouth 2 (two) times daily with meals. 8/29/22 1/19/23 Yes Dick Gallagher DO   insulin detemir U-100 (LEVEMIR FLEXTOUCH U-100 INSULN) 100 unit/mL (3 mL) InPn pen Inject 30 Units into the skin every evening. 8/29/22 1/19/23 Yes Dick Gallagher DO   lisinopriL (PRINIVIL,ZESTRIL) 20 MG tablet Take 1 tablet (20 mg total) by mouth once daily. 12/1/22 1/19/23 Yes Dick Gallagher DO   meloxicam (MOBIC) 15 MG tablet Take 1 tablet (15 mg total) by mouth once daily. 8/29/22 1/19/23 Yes Dick Gallagher DO   pen needle, diabetic 32 gauge x 5/32" Ndle Use BD fany needle one times a day injections 8/29/22 1/19/23 Yes Dick Gallagher DO   rOPINIRole (REQUIP) 0.5 MG tablet Take 1 tablet (0.5 mg total) by mouth 3 (three) times daily. 12/1/22 1/19/23 Yes Dick Gallagher DO   atorvastatin (LIPITOR) 20 MG tablet Take 1 tablet (20 mg total) by mouth once daily. 1/19/23 1/19/24  Dick Gallagher DO   blood sugar diagnostic Strp Test strips to check CBG's 1/19/23   Dick Gallagher DO   blood-glucose meter (TRUE METRIX GLUCOSE METER) kit Use as instructed 1/19/23 1/19/24  Dick Gallagher DO   blood-glucose meter kit 1 each by Other route 2 (two) times daily. Use as instructed 1/19/23   Dick Gallagher DO   cetirizine (ZYRTEC) 10 MG tablet Take 1 tablet (10 mg total) by mouth once daily. 1/19/23 4/19/23  Dick Gallagher DO   clotrimazole (LOTRIMIN) 1 % cream Apply topically 2 (two) times daily. for 14 days 1/19/23 2/2/23  Dick Gallagher DO   diclofenac sodium (VOLTAREN) 1 % Gel Apply 2 g topically 4 (four) times daily. 1/19/23   Dick Gallagher DO   gabapentin (NEURONTIN) 300 MG capsule Take 1 capsule (300 mg total) by mouth 3 (three) times daily. 1/19/23 4/19/23  Dick Gallagher DO   glipiZIDE (GLUCOTROL) 10 MG tablet Take 1 tablet (10 mg total) by mouth 2 (two) times daily with meals. 1/19/23   Dick Gallagher DO   insulin detemir U-100 (LEVEMIR FLEXTOUCH U-100 INSULN) 100 unit/mL (3 mL) InPn pen Inject 30 Units into the skin every evening. 1/19/23 1/19/24  Dick Gallagher DO " "  lisinopriL (PRINIVIL,ZESTRIL) 20 MG tablet Take 1 tablet (20 mg total) by mouth once daily. 1/19/23 4/19/23  Dick Gallagher DO   meloxicam (MOBIC) 15 MG tablet Take 1 tablet (15 mg total) by mouth once daily. 1/19/23   Dick Gallagher DO   omeprazole (PRILOSEC) 20 MG capsule Take 2 capsules (40 mg total) by mouth once daily. 1/19/23 4/19/23  Dick Gallagher DO   pen needle, diabetic 32 gauge x 5/32" Ndle Use BD fany needle one times a day injections 1/19/23   Dick Gallagher DO   rOPINIRole (REQUIP) 0.5 MG tablet Take 1 tablet (0.5 mg total) by mouth 3 (three) times daily. 1/19/23 4/19/23  Dick Gallagher DO   blood-glucose meter kit 1 each by Other route 2 (two) times daily. Use as instructed  1/19/23  Historical Provider   cetirizine (ZYRTEC) 10 MG tablet Take 1 tablet (10 mg total) by mouth once daily. 8/29/22 1/19/23  Dick Gallagher DO   clotrimazole (LOTRIMIN) 1 % cream Apply topically 2 (two) times daily. for 14 days 9/23/22 1/19/23  Ashley Le, NP   famotidine (PEPCID) 20 MG tablet Take 1 tablet (20 mg total) by mouth once daily. for 7 days 8/29/22 1/19/23  Dick Gallagher DO   fluticasone propionate (FLONASE) 50 mcg/actuation nasal spray 1 spray (50 mcg total) by Each Nostril route once daily.  Patient not taking: Reported on 1/19/2023 8/29/22 1/19/23  Dick Gallagher DO   nystatin (MYCOSTATIN) ointment Apply topically 3 (three) times daily. for 14 days 10/6/22 1/19/23  Devi Alegre, JAYDEP   omeprazole (PRILOSEC) 20 MG capsule Take 2 capsules (40 mg total) by mouth once daily. 8/29/22 1/19/23  Dick Gallagher DO       ROS:  Review of Systems   All other systems reviewed and are negative.        OBJECTIVE:     Vital signs:   /81 (BP Location: Left arm, Patient Position: Sitting, BP Method: X-Large (Automatic))   Pulse 88   Temp 98.1 °F (36.7 °C) (Oral)   Resp 16   Ht 5' 2" (1.575 m)   Wt 101.6 kg (224 lb)   SpO2 98%   BMI 40.97 kg/m²      Physical Examination:  General: Patient resting comfortably in chair, in " no acute distress   Eye: PERRLA, EOMI, clear conjunctiva, eyelids normal  HENT: Head-normocephalic and atraumatic  Neck: full range of motion, no thyromegaly or lymphadenopathy, trachea midline, supple, no palpable thyroid nodules  Respiratory: clear to auscultation bilaterally without wheezes, rales, rhonchi  Cardiovascular: regular rate and rhythm without murmurs.  No gallops or rubs no JVD.  Capillary refill within normal limits.  Gastrointestinal: soft, non-tender, non-distended with normal bowel sounds, without masses to palpation  Genitourinary: no CVA tenderness to palpation  Musculoskeletal: full range of motion of all extremities/spine without limitation or discomfort. No laxity in right knee when compared to left. No crepitus noted.  Integumentary: no rashes or skin lesions present  Neurologic: no signs of peripheral neurological deficit, motor/sensory function intact  Psychiatric:  alert and oriented, cognitive function intact, cooperative with exam, good eye contact, judgement and insight intact, mood and affect full range.     Labs:  CMP:   Lab Results   Component Value Date    GLUCOSE 320 (H) 10/05/2021    CALCIUM 9.7 10/05/2021    ALBUMIN 3.9 05/27/2021     (L) 10/05/2021    K 4.3 10/05/2021    CO2 22 10/05/2021    BUN 14.3 10/05/2021    CREATININE 0.71 10/05/2021    ALKPHOS 129 05/27/2021    ALT 15 05/27/2021    AST 17 05/27/2021    BILITOT 0.5 05/27/2021          ASSESSMENT & PLAN:       Diabetes mellitus, Type 2  -1/19/23 A1C 14.3, had been out of all medications for a year  -Diabetic foot exam performed last visit, Eye exam last visit showed mild diabetic eye disease  -re-Referred to podiatry for diabetic shoes  -Lost glucometer, received a new one today  -Current regimen is detemir 30U nightly, glipizide 10 mg qd   -Restart metformin 500 mg, assess for tolerance  -Consider GLP-1 following labs today  -Advised patient to keep CBG log and bring in to clinic, and will adjust regimen  accordingly. Will follow up in Monday postwards clinic slot      Self Pay  -Patient met with case management, does not qualify for FreeCare    Health Maintenance  Vaccinations  Immunization History   Administered Date(s) Administered    Influenza - Quadrivalent 12/10/2019, 11/12/2020    Influenza - Quadrivalent - PF (6-35 months) 01/15/2018, 11/15/2018    Influenza - Quadrivalent - PF *Preferred* (6 months and older) 01/19/2023    Pneumococcal Conjugate - 20 Valent 01/19/2023    Pneumococcal Polysaccharide - 23 Valent 02/23/2017    Tdap 02/23/2017    Zoster Recombinant 11/12/2020, 06/19/2021       -Flu: Done today 02/16/2023    -Pneumonia: Done today 02/16/2023     -Shingles: done on 6/19/2021, two doses   -Tdap: done on 2/23/2017   -COVID:  address next visit    Screening   -Pap Smear:  Currently scheduled for 8/24/23, however will try to get appt pushed up     -Mammogram: Ordered, will try to get scheduled soon   -Osteoporosis: will address at next visit    -Lung Ca. Screening: will address at next visit   -Colon Ca. Screening:  attempting  to get FIT scheduled, last screening 12/3/2020   -Hep C, HIV: will address at next visit  Social   -EtOH:  reports no history of alcohol use.   -Tobacco:  reports that she has never smoked. She has never used smokeless tobacco.   -Drugs:  reports no history of drug use.    -Depression:   Depression: Low Risk     Last PHQ Score: 0        Return to clinic in 2 month(s).    Dick Gallagher DO  [facility] Internal Medicine, PGY-1

## 2023-02-16 NOTE — PROGRESS NOTES
CHW - Outreach Attempt    Community Health Worker left a voicemail message for 2nd attempt to contact patient regarding: to give patient two food bank resources     [facility]  [address] [address]  Phone: [phone]     2. [address] [address]  Phone: [phone]     Community Health Worker to attempt to contact patient on:

## 2023-02-23 NOTE — PROGRESS NOTES
CHW - Outreach Attempt    Community Health Worker left a voicemail message for 3rd attempt to contact patient regarding: to give patient two food bank resources     [facility]  [address] [address]  Phone: [phone]     2. [address] [address]  Phone: [phone]    Community Health Worker to attempt to contact patient on: 2/23/23

## 2023-03-31 ENCOUNTER — DOCUMENTATION ONLY (OUTPATIENT)
Dept: ADMINISTRATIVE | Facility: HOSPITAL | Age: 58
End: 2023-03-31

## 2023-04-12 ENCOUNTER — OFFICE VISIT (OUTPATIENT)
Dept: INTERNAL MEDICINE | Facility: CLINIC | Age: 58
End: 2023-04-12

## 2023-04-12 VITALS
TEMPERATURE: 98 F | BODY MASS INDEX: 41.99 KG/M2 | DIASTOLIC BLOOD PRESSURE: 77 MMHG | WEIGHT: 228.19 LBS | SYSTOLIC BLOOD PRESSURE: 144 MMHG | HEIGHT: 62 IN | HEART RATE: 82 BPM

## 2023-04-12 DIAGNOSIS — E11.9 TYPE 2 DIABETES MELLITUS WITHOUT COMPLICATION, WITH LONG-TERM CURRENT USE OF INSULIN: Primary | ICD-10-CM

## 2023-04-12 DIAGNOSIS — Z11.52 ENCOUNTER FOR SCREENING FOR COVID-19: ICD-10-CM

## 2023-04-12 DIAGNOSIS — G25.81 RESTLESS LEG SYNDROME: ICD-10-CM

## 2023-04-12 DIAGNOSIS — I10 HYPERTENSION, UNSPECIFIED TYPE: ICD-10-CM

## 2023-04-12 DIAGNOSIS — Z79.4 TYPE 2 DIABETES MELLITUS WITHOUT COMPLICATION, WITH LONG-TERM CURRENT USE OF INSULIN: Primary | ICD-10-CM

## 2023-04-12 DIAGNOSIS — K21.9 GASTROESOPHAGEAL REFLUX DISEASE, UNSPECIFIED WHETHER ESOPHAGITIS PRESENT: ICD-10-CM

## 2023-04-12 DIAGNOSIS — E11.69 DIABETES MELLITUS TYPE 2 IN OBESE: ICD-10-CM

## 2023-04-12 DIAGNOSIS — M25.472 SWELLING OF LEFT ANKLE JOINT: ICD-10-CM

## 2023-04-12 DIAGNOSIS — E66.9 DIABETES MELLITUS TYPE 2 IN OBESE: ICD-10-CM

## 2023-04-12 LAB — HBA1C MFR BLD: 8.4 %

## 2023-04-12 PROCEDURE — 99214 OFFICE O/P EST MOD 30 MIN: CPT | Mod: PBBFAC

## 2023-04-12 PROCEDURE — 83036 HEMOGLOBIN GLYCOSYLATED A1C: CPT | Mod: PBBFAC

## 2023-04-12 RX ORDER — SEMAGLUTIDE 1.34 MG/ML
0.25 INJECTION, SOLUTION SUBCUTANEOUS
Qty: 0.75 ML | Refills: 2 | Status: SHIPPED | OUTPATIENT
Start: 2023-04-12 | End: 2023-06-08

## 2023-04-12 RX ORDER — LISINOPRIL 20 MG/1
20 TABLET ORAL DAILY
Qty: 90 TABLET | Refills: 0 | Status: SHIPPED | OUTPATIENT
Start: 2023-04-12 | End: 2023-06-08 | Stop reason: SDUPTHER

## 2023-04-12 RX ORDER — ATORVASTATIN CALCIUM 20 MG/1
40 TABLET, FILM COATED ORAL DAILY
Qty: 180 TABLET | Refills: 0 | Status: SHIPPED | OUTPATIENT
Start: 2023-04-12 | End: 2023-06-08

## 2023-04-12 RX ORDER — INSULIN DETEMIR 100 [IU]/ML
40 INJECTION, SOLUTION SUBCUTANEOUS NIGHTLY
Qty: 12 ML | Refills: 11 | Status: SHIPPED | OUTPATIENT
Start: 2023-04-12 | End: 2023-06-08 | Stop reason: SDUPTHER

## 2023-04-12 RX ORDER — INSULIN DETEMIR 100 [IU]/ML
40 INJECTION, SOLUTION SUBCUTANEOUS NIGHTLY
Qty: 12 ML | Refills: 11 | Status: SHIPPED | OUTPATIENT
Start: 2023-04-12 | End: 2023-04-12 | Stop reason: SDUPTHER

## 2023-04-12 RX ORDER — GLIPIZIDE 10 MG/1
10 TABLET ORAL 2 TIMES DAILY WITH MEALS
Qty: 60 TABLET | Refills: 5 | Status: SHIPPED | OUTPATIENT
Start: 2023-04-12 | End: 2023-06-08 | Stop reason: SDUPTHER

## 2023-04-12 RX ORDER — CETIRIZINE HYDROCHLORIDE 10 MG/1
10 TABLET ORAL DAILY
Qty: 90 TABLET | Refills: 0 | Status: ON HOLD | OUTPATIENT
Start: 2023-04-12 | End: 2023-06-15 | Stop reason: HOSPADM

## 2023-04-12 RX ORDER — IBUPROFEN 800 MG/1
800 TABLET ORAL EVERY 6 HOURS PRN
Qty: 60 TABLET | Refills: 0 | Status: SHIPPED | OUTPATIENT
Start: 2023-04-12 | End: 2024-03-11

## 2023-04-12 RX ORDER — GABAPENTIN 300 MG/1
300 CAPSULE ORAL 3 TIMES DAILY
Qty: 90 CAPSULE | Refills: 2 | Status: SHIPPED | OUTPATIENT
Start: 2023-04-12 | End: 2023-06-08 | Stop reason: SDUPTHER

## 2023-04-12 RX ORDER — ROPINIROLE 0.5 MG/1
0.5 TABLET, FILM COATED ORAL 3 TIMES DAILY
Qty: 90 TABLET | Refills: 2 | Status: SHIPPED | OUTPATIENT
Start: 2023-04-12 | End: 2023-06-08 | Stop reason: SDUPTHER

## 2023-04-12 RX ORDER — OMEPRAZOLE 20 MG/1
40 CAPSULE, DELAYED RELEASE ORAL DAILY
Qty: 60 CAPSULE | Refills: 2 | Status: SHIPPED | OUTPATIENT
Start: 2023-04-12 | End: 2023-06-08 | Stop reason: SDUPTHER

## 2023-04-12 NOTE — PROGRESS NOTES
"  [facility] INTERNAL MEDICINE  OUTPATIENT OFFICE VISIT NOTE    SUBJECTIVE:      Chief Complaint: Follow-up (Swelling in left leg , using otc Ibuprofen , takes gabapentin sometimes "extra sometimes" ran out  , has sinus drainage yellow-madhu , taking coricidin and nyquil  denies fever )       HPI: Suki Anton is a 57 y.o. yo female w/ PMH of  has a past medical history of Diabetes mellitus, Diabetes mellitus, type 2, GERD (gastroesophageal reflux disease), and Hypertension., who presents for medication refills    Only complaint today is her left ankle, continues to be swollen. Works at [employer], is on her feet 8-10 hours/day. Unable to get off her feet. No pain with walking, feels uncomfortable. No redness. Also returning for further management of her diabetes, last started on levemir 30U nightly and glipizide 10 mg bid. Was not able to afford medications last visit, A1C was 14.3. Did not bring blood sugar logs. Unable to get labs performed due to monetary restraint, however, is aware proper care cannot be provided unless labs are drawn. Patient verbalized understanding.    Denies fevers chills, chest pain, shortness of breath, abdominal pain, nausea, vomiting, diarrhea. Does report recent sinus drainage due to pollen season, denies any fevers/cough.     Past Medical History:   has a past medical history of Diabetes mellitus, Diabetes mellitus, type 2, GERD (gastroesophageal reflux disease), and Hypertension.     Past Surgical History:   has no past surgical history on file.     Family History:  family history includes Cancer in her father; Cataracts in her mother.     Social History:   reports that she has never smoked. She has never used smokeless tobacco. She reports that she does not drink alcohol and does not use drugs.     Allergies:  has No Known Allergies.     Home Medications:  Prior to Admission medications    Medication Sig Start Date End Date Taking? Authorizing Provider   atorvastatin (LIPITOR) 20 MG " "tablet Take 1 tablet (20 mg total) by mouth once daily. 8/29/22 1/19/23 Yes Dick Gallagher DO   blood sugar diagnostic Strp Test strips to check CBG's 8/29/22 1/19/23 Yes Dick Gallagher DO   blood-glucose meter (TRUE METRIX GLUCOSE METER) kit Use as instructed 5/31/22 1/19/23 Yes Batsheva Ballesteros, NP   diclofenac sodium (VOLTAREN) 1 % Gel Apply 2 g topically 4 (four) times daily. 8/29/22 1/19/23 Yes Dick Gallagher DO   gabapentin (NEURONTIN) 300 MG capsule Take 1 capsule (300 mg total) by mouth 3 (three) times daily. 12/1/22 1/19/23 Yes Dick Gallagher DO   glipiZIDE (GLUCOTROL) 10 MG tablet Take 1 tablet (10 mg total) by mouth 2 (two) times daily with meals. 8/29/22 1/19/23 Yes Dick Gallagher DO   insulin detemir U-100 (LEVEMIR FLEXTOUCH U-100 INSULN) 100 unit/mL (3 mL) InPn pen Inject 30 Units into the skin every evening. 8/29/22 1/19/23 Yes Dick Gallagher DO   lisinopriL (PRINIVIL,ZESTRIL) 20 MG tablet Take 1 tablet (20 mg total) by mouth once daily. 12/1/22 1/19/23 Yes Dick Gallagher DO   meloxicam (MOBIC) 15 MG tablet Take 1 tablet (15 mg total) by mouth once daily. 8/29/22 1/19/23 Yes Dick Gallagher DO   pen needle, diabetic 32 gauge x 5/32" Ndle Use BD fany needle one times a day injections 8/29/22 1/19/23 Yes Dick Gallagher DO   rOPINIRole (REQUIP) 0.5 MG tablet Take 1 tablet (0.5 mg total) by mouth 3 (three) times daily. 12/1/22 1/19/23 Yes Dick Gallagher DO   atorvastatin (LIPITOR) 20 MG tablet Take 1 tablet (20 mg total) by mouth once daily. 1/19/23 1/19/24  Dick Gallagher DO   blood sugar diagnostic Strp Test strips to check CBG's 1/19/23   Dick Gallagher, DO   blood-glucose meter (TRUE METRIX GLUCOSE METER) kit Use as instructed 1/19/23 1/19/24  Dick Gallagher, DO   blood-glucose meter kit 1 each by Other route 2 (two) times daily. Use as instructed 1/19/23   Dick Gallagher, DO   cetirizine (ZYRTEC) 10 MG tablet Take 1 tablet (10 mg total) by mouth once daily. 1/19/23 4/19/23  Dick Gallagher, DO   clotrimazole (LOTRIMIN) 1 % cream Apply " "topically 2 (two) times daily. for 14 days 1/19/23 2/2/23  Dick Gallagher DO   diclofenac sodium (VOLTAREN) 1 % Gel Apply 2 g topically 4 (four) times daily. 1/19/23   Dick Gallagher DO   gabapentin (NEURONTIN) 300 MG capsule Take 1 capsule (300 mg total) by mouth 3 (three) times daily. 1/19/23 4/19/23  Dick Gallagher DO   glipiZIDE (GLUCOTROL) 10 MG tablet Take 1 tablet (10 mg total) by mouth 2 (two) times daily with meals. 1/19/23   Dick Gallagher DO   insulin detemir U-100 (LEVEMIR FLEXTOUCH U-100 INSULN) 100 unit/mL (3 mL) InPn pen Inject 30 Units into the skin every evening. 1/19/23 1/19/24  Dick Gallagher DO   lisinopriL (PRINIVIL,ZESTRIL) 20 MG tablet Take 1 tablet (20 mg total) by mouth once daily. 1/19/23 4/19/23  Dick Gallagher DO   meloxicam (MOBIC) 15 MG tablet Take 1 tablet (15 mg total) by mouth once daily. 1/19/23   Dick Gallagher DO   omeprazole (PRILOSEC) 20 MG capsule Take 2 capsules (40 mg total) by mouth once daily. 1/19/23 4/19/23  Dick Gallagher DO   pen needle, diabetic 32 gauge x 5/32" Ndle Use BD fany needle one times a day injections 1/19/23   Dick Gallagher DO   rOPINIRole (REQUIP) 0.5 MG tablet Take 1 tablet (0.5 mg total) by mouth 3 (three) times daily. 1/19/23 4/19/23  Dick Gallagher DO   blood-glucose meter kit 1 each by Other route 2 (two) times daily. Use as instructed  1/19/23  Historical Provider   cetirizine (ZYRTEC) 10 MG tablet Take 1 tablet (10 mg total) by mouth once daily. 8/29/22 1/19/23  Dick Gallagher DO   clotrimazole (LOTRIMIN) 1 % cream Apply topically 2 (two) times daily. for 14 days 9/23/22 1/19/23  Ashley Le NP   famotidine (PEPCID) 20 MG tablet Take 1 tablet (20 mg total) by mouth once daily. for 7 days 8/29/22 1/19/23  Dick Gallagher DO   fluticasone propionate (FLONASE) 50 mcg/actuation nasal spray 1 spray (50 mcg total) by Each Nostril route once daily.  Patient not taking: Reported on 1/19/2023 8/29/22 1/19/23  Dick Gallagher,    nystatin (MYCOSTATIN) ointment Apply topically " "3 (three) times daily. for 14 days 10/6/22 1/19/23  Devi TOMAS Codey, JANE   omeprazole (PRILOSEC) 20 MG capsule Take 2 capsules (40 mg total) by mouth once daily. 8/29/22 1/19/23  Dick Gallagher DO       ROS:  Review of Systems   All other systems reviewed and are negative.        OBJECTIVE:     Vital signs:   BP (!) 144/77 (BP Location: Left arm, Patient Position: Sitting, BP Method: X-Large (Automatic))   Pulse 82   Temp 98.2 °F (36.8 °C)   Ht 5' 2" (1.575 m)   Wt 103.5 kg (228 lb 3.2 oz)   BMI 41.74 kg/m²      Physical Examination:  General: Patient resting comfortably in chair, in no acute distress   Eye: PERRLA, EOMI, clear conjunctiva, eyelids normal  HENT: Head-normocephalic and atraumatic  Neck: full range of motion, no thyromegaly or lymphadenopathy, trachea midline, supple, no palpable thyroid nodules  Respiratory: clear to auscultation bilaterally without wheezes, rales, rhonchi  Cardiovascular: regular rate and rhythm without murmurs.  No gallops or rubs no JVD.  Capillary refill within normal limits.  Gastrointestinal: soft, non-tender, non-distended with normal bowel sounds, without masses to palpation  Genitourinary: no CVA tenderness to palpation  Musculoskeletal: full range of motion of all extremities/spine without limitation or discomfort. Left ankle has swelling posterior to the lateral and medial malleoli, no pain with palpation, no pain with movement. No clicks appreciated.  Integumentary: no rashes or skin lesions present  Neurologic: no signs of peripheral neurological deficit, motor/sensory function intact  Psychiatric:  alert and oriented, cognitive function intact, cooperative with exam, good eye contact, judgement and insight intact, mood and affect full range.     Labs:  CMP:   Lab Results   Component Value Date    GLUCOSE 320 (H) 10/05/2021    CALCIUM 9.7 10/05/2021    ALBUMIN 3.9 05/27/2021     (L) 10/05/2021    K 4.3 10/05/2021    CO2 22 10/05/2021    BUN 14.3 10/05/2021    " CREATININE 0.71 10/05/2021    ALKPHOS 129 05/27/2021    ALT 15 05/27/2021    AST 17 05/27/2021    BILITOT 0.5 05/27/2021          ASSESSMENT & PLAN:     Left ankle swelling  -Previously had trauma 2 years ago, xray did not show any fractures/broken bones  -Continues to be swollen today, no pain with walking, but feels uncomfortable  -Physical exam did not reveal any snapping/clicking, no pain with palpation or manipulation  -Will obtain repeat left xray, concern for venous issue, will need further work up    Diabetes mellitus, Type 2  -1/19/23 A1C 14.3, had been out of all medications for a year  -4/12/23 A1C 8.4, currently on Levemir 30-35 nightly  -Diabetic foot exam performed previous visit, Eye exam previous visit showed mild diabetic eye disease  -re-Referred to podiatry for diabetic shoes  -Unable to tolerate metformin due to constant diarrhea, will discontinue  -Consider GLP-1 after next labs, labs from previous visit still not drawn  -Will continue levemir 35-40U nightly, glipizide 10 bid  -Will add on ozempic 0.25 if patient can afford     Self Pay  -Patient met with case management, does not qualify for FreeCare or Medicaid    Hypertension  -Patient's initial blood pressure was 163/85, repeat was 144/77  -Refilled Lisinopril 20 mg qd, will likely uptitrate next visit     Hyperlipidemia  -5/27/21 showed  and TGL 2171  -Repeat labs ordered today  -Has not obtained labs since last ordered. Emphasized importance of getting her labs done to provide proper level of care  -Increased dosage of atorvastatin to 40 mg. Patient will get labs done tomorrow AM.     GERD  -No reported issues today  -Continue Omeprazole and Pepcid     Restless Leg Syndrome  -Ropinirole has been helpful, however ran out before visit today  -Refilled ropinirole 0.5 mg take every evening     Sciatica, Right sided- Improved, no complaints today  -Continue meloxicam 15 mg qd  -Recommended patient to lose weight, stretch, and yoga to  improve her sciatica pain  -Patient declined lumbar Xray due to being self pay  -Cautioned continuous usage of NSAIDs given PMHx Diabetes    Health Maintenance  Vaccinations  Immunization History   Administered Date(s) Administered    Influenza - Quadrivalent 12/10/2019, 11/12/2020    Influenza - Quadrivalent - PF (6-35 months) 01/15/2018, 11/15/2018    Influenza - Quadrivalent - PF *Preferred* (6 months and older) 01/19/2023    Pneumococcal Conjugate - 20 Valent 01/19/2023    Pneumococcal Polysaccharide - 23 Valent 02/23/2017    Tdap 02/23/2017    Zoster Recombinant 11/12/2020, 06/19/2021       -Flu: Done 1/19/2023   -Pneumonia: Done 1/19/2023   -Shingles: done on 6/19/2021, two doses   -Tdap: done on 2/23/2017   -COVID:  address next visit    Screening   -Pap Smear:  Currently scheduled for 8/24/23, however will try to get appt pushed up     -Mammogram: Ordered, unable to complete due to monetary issues   -Osteoporosis: will address at next visit    -Lung Ca. Screening: will address at next visit   -Colon Ca. Screening:  attempting  to get FIT scheduled, last screening 12/3/2020   -Hep C, HIV: will address at next visit  Social   -EtOH:  reports no history of alcohol use.   -Tobacco:  reports that she has never smoked. She has never used smokeless tobacco.   -Drugs:  reports no history of drug use.    -Depression:   Depression: Low Risk     Last PHQ Score: 0        Return to clinic in 2 month(s).    Dick Gallagher DO  [facility] Internal Medicine, PGY-1

## 2023-04-13 ENCOUNTER — LAB VISIT (OUTPATIENT)
Dept: LAB | Facility: HOSPITAL | Age: 58
End: 2023-04-13

## 2023-04-13 DIAGNOSIS — Z00.00 HEALTHCARE MAINTENANCE: ICD-10-CM

## 2023-04-13 DIAGNOSIS — E11.9 TYPE 2 DIABETES MELLITUS WITHOUT COMPLICATION, WITH LONG-TERM CURRENT USE OF INSULIN: ICD-10-CM

## 2023-04-13 DIAGNOSIS — E11.69 DIABETES MELLITUS TYPE 2 IN OBESE: ICD-10-CM

## 2023-04-13 DIAGNOSIS — E66.9 DIABETES MELLITUS TYPE 2 IN OBESE: ICD-10-CM

## 2023-04-13 DIAGNOSIS — Z79.4 TYPE 2 DIABETES MELLITUS WITHOUT COMPLICATION, WITH LONG-TERM CURRENT USE OF INSULIN: ICD-10-CM

## 2023-04-13 LAB
ALBUMIN SERPL-MCNC: 3.5 G/DL (ref 3.5–5)
ALBUMIN/GLOB SERPL: 1.1 RATIO (ref 1.1–2)
ALP SERPL-CCNC: 100 UNIT/L (ref 40–150)
ALT SERPL-CCNC: 16 UNIT/L (ref 0–55)
AST SERPL-CCNC: 13 UNIT/L (ref 5–34)
BASOPHILS # BLD AUTO: 0.06 X10(3)/MCL (ref 0–0.2)
BASOPHILS NFR BLD AUTO: 0.4 %
BILIRUBIN DIRECT+TOT PNL SERPL-MCNC: 0.5 MG/DL
BUN SERPL-MCNC: 20.2 MG/DL (ref 9.8–20.1)
CALCIUM SERPL-MCNC: 9.5 MG/DL (ref 8.4–10.2)
CHLORIDE SERPL-SCNC: 99 MMOL/L (ref 98–107)
CHOLEST SERPL-MCNC: 271 MG/DL
CHOLEST/HDLC SERPL: 9 {RATIO} (ref 0–5)
CO2 SERPL-SCNC: 22 MMOL/L (ref 22–29)
CREAT SERPL-MCNC: 0.7 MG/DL (ref 0.55–1.02)
EOSINOPHIL # BLD AUTO: 0.48 X10(3)/MCL (ref 0–0.9)
EOSINOPHIL NFR BLD AUTO: 3.5 %
ERYTHROCYTE [DISTWIDTH] IN BLOOD BY AUTOMATED COUNT: 11.7 % (ref 11.5–17)
EST. AVERAGE GLUCOSE BLD GHB EST-MCNC: 280.5 MG/DL
GFR SERPLBLD CREATININE-BSD FMLA CKD-EPI: >60 MLS/MIN/1.73/M2
GLOBULIN SER-MCNC: 3.3 GM/DL (ref 2.4–3.5)
GLUCOSE SERPL-MCNC: 341 MG/DL (ref 74–100)
HBA1C MFR BLD: 11.4 %
HCT VFR BLD AUTO: 39.5 % (ref 37–47)
HDLC SERPL-MCNC: 30 MG/DL (ref 35–60)
HGB BLD-MCNC: 13.7 G/DL (ref 12–16)
IMM GRANULOCYTES # BLD AUTO: 0.09 X10(3)/MCL (ref 0–0.04)
IMM GRANULOCYTES NFR BLD AUTO: 0.7 %
LDLC SERPL CALC-MCNC: 74 MG/DL (ref 50–140)
LYMPHOCYTES # BLD AUTO: 3.93 X10(3)/MCL (ref 0.6–4.6)
LYMPHOCYTES NFR BLD AUTO: 28.7 %
MCH RBC QN AUTO: 28.5 PG (ref 27–31)
MCHC RBC AUTO-ENTMCNC: 34.7 G/DL (ref 33–36)
MCV RBC AUTO: 82.1 FL (ref 80–94)
MONOCYTES # BLD AUTO: 0.63 X10(3)/MCL (ref 0.1–1.3)
MONOCYTES NFR BLD AUTO: 4.6 %
NEUTROPHILS # BLD AUTO: 8.51 X10(3)/MCL (ref 2.1–9.2)
NEUTROPHILS NFR BLD AUTO: 62.1 %
NRBC BLD AUTO-RTO: 0 %
PLATELET # BLD AUTO: 313 X10(3)/MCL (ref 130–400)
PMV BLD AUTO: 11.1 FL (ref 7.4–10.4)
POTASSIUM SERPL-SCNC: 3.9 MMOL/L (ref 3.5–5.1)
PROT SERPL-MCNC: 6.8 GM/DL (ref 6.4–8.3)
RBC # BLD AUTO: 4.81 X10(6)/MCL (ref 4.2–5.4)
SODIUM SERPL-SCNC: 132 MMOL/L (ref 136–145)
TRIGL SERPL-MCNC: 836 MG/DL (ref 37–140)
TSH SERPL-ACNC: 1.12 UIU/ML (ref 0.35–4.94)
VLDLC SERPL CALC-MCNC: 167 MG/DL
WBC # SPEC AUTO: 13.7 X10(3)/MCL (ref 4.5–11.5)

## 2023-04-13 PROCEDURE — 85025 COMPLETE CBC W/AUTO DIFF WBC: CPT

## 2023-04-13 PROCEDURE — 83036 HEMOGLOBIN GLYCOSYLATED A1C: CPT

## 2023-04-13 PROCEDURE — 84443 ASSAY THYROID STIM HORMONE: CPT

## 2023-04-13 PROCEDURE — 80053 COMPREHEN METABOLIC PANEL: CPT

## 2023-04-13 PROCEDURE — 36415 COLL VENOUS BLD VENIPUNCTURE: CPT

## 2023-04-13 PROCEDURE — 80061 LIPID PANEL: CPT

## 2023-05-10 ENCOUNTER — TELEPHONE (OUTPATIENT)
Dept: INTERNAL MEDICINE | Facility: CLINIC | Age: 58
End: 2023-05-10

## 2023-06-08 ENCOUNTER — OFFICE VISIT (OUTPATIENT)
Dept: INTERNAL MEDICINE | Facility: CLINIC | Age: 58
End: 2023-06-08

## 2023-06-08 ENCOUNTER — TELEPHONE (OUTPATIENT)
Dept: INTERNAL MEDICINE | Facility: CLINIC | Age: 58
End: 2023-06-08

## 2023-06-08 ENCOUNTER — HOSPITAL ENCOUNTER (OUTPATIENT)
Dept: RADIOLOGY | Facility: HOSPITAL | Age: 58
Discharge: HOME | End: 2023-06-08

## 2023-06-08 VITALS
HEART RATE: 80 BPM | TEMPERATURE: 98 F | DIASTOLIC BLOOD PRESSURE: 82 MMHG | OXYGEN SATURATION: 97 % | WEIGHT: 227.19 LBS | SYSTOLIC BLOOD PRESSURE: 138 MMHG | RESPIRATION RATE: 20 BRPM | BODY MASS INDEX: 41.81 KG/M2 | HEIGHT: 62 IN

## 2023-06-08 DIAGNOSIS — E66.9 DIABETES MELLITUS TYPE 2 IN OBESE: ICD-10-CM

## 2023-06-08 DIAGNOSIS — G25.81 RESTLESS LEG SYNDROME: ICD-10-CM

## 2023-06-08 DIAGNOSIS — Z12.11 COLON CANCER SCREENING: ICD-10-CM

## 2023-06-08 DIAGNOSIS — E11.69 DIABETES MELLITUS TYPE 2 IN OBESE: ICD-10-CM

## 2023-06-08 DIAGNOSIS — K21.9 GASTROESOPHAGEAL REFLUX DISEASE, UNSPECIFIED WHETHER ESOPHAGITIS PRESENT: ICD-10-CM

## 2023-06-08 DIAGNOSIS — R93.89 ABNORMAL X-RAY: Primary | ICD-10-CM

## 2023-06-08 DIAGNOSIS — M25.561 ACUTE PAIN OF RIGHT KNEE: ICD-10-CM

## 2023-06-08 DIAGNOSIS — I10 HYPERTENSION, UNSPECIFIED TYPE: ICD-10-CM

## 2023-06-08 DIAGNOSIS — M25.472 SWELLING OF LEFT ANKLE JOINT: ICD-10-CM

## 2023-06-08 DIAGNOSIS — M95.9 BONE DEFORMITY: ICD-10-CM

## 2023-06-08 PROCEDURE — 99215 OFFICE O/P EST HI 40 MIN: CPT | Mod: PBBFAC

## 2023-06-08 PROCEDURE — 73610 X-RAY EXAM OF ANKLE: CPT | Mod: TC,LT

## 2023-06-08 RX ORDER — TRAMADOL HYDROCHLORIDE 50 MG/1
50 TABLET ORAL EVERY 6 HOURS PRN
Qty: 45 TABLET | Refills: 0 | Status: ON HOLD | OUTPATIENT
Start: 2023-06-08 | End: 2023-06-15 | Stop reason: HOSPADM

## 2023-06-08 RX ORDER — BLOOD-GLUCOSE METER
EACH MISCELLANEOUS
COMMUNITY
Start: 2023-02-16

## 2023-06-08 RX ORDER — ATORVASTATIN CALCIUM 20 MG/1
40 TABLET, FILM COATED ORAL DAILY
Qty: 180 TABLET | Refills: 0 | Status: CANCELLED | OUTPATIENT
Start: 2023-06-08 | End: 2023-09-06

## 2023-06-08 RX ORDER — DICLOFENAC SODIUM 10 MG/G
2 GEL TOPICAL 4 TIMES DAILY
Qty: 100 G | Refills: 2 | Status: SHIPPED | OUTPATIENT
Start: 2023-06-08

## 2023-06-08 RX ORDER — GLIPIZIDE 10 MG/1
10 TABLET ORAL 2 TIMES DAILY WITH MEALS
Qty: 60 TABLET | Refills: 5 | Status: SHIPPED | OUTPATIENT
Start: 2023-06-08 | End: 2024-03-11 | Stop reason: SDUPTHER

## 2023-06-08 RX ORDER — GABAPENTIN 300 MG/1
300 CAPSULE ORAL 3 TIMES DAILY
Qty: 90 CAPSULE | Refills: 2 | Status: ON HOLD | OUTPATIENT
Start: 2023-06-08 | End: 2023-06-15 | Stop reason: SDUPTHER

## 2023-06-08 RX ORDER — INSULIN DETEMIR 100 [IU]/ML
40 INJECTION, SOLUTION SUBCUTANEOUS NIGHTLY
Qty: 12 ML | Refills: 11 | Status: ON HOLD | OUTPATIENT
Start: 2023-06-08 | End: 2023-06-15 | Stop reason: SDUPTHER

## 2023-06-08 RX ORDER — ROPINIROLE 0.5 MG/1
0.5 TABLET, FILM COATED ORAL 3 TIMES DAILY
Qty: 90 TABLET | Refills: 2 | Status: SHIPPED | OUTPATIENT
Start: 2023-06-08 | End: 2023-10-03 | Stop reason: SDUPTHER

## 2023-06-08 RX ORDER — LANCETS 33 GAUGE
EACH MISCELLANEOUS
Qty: 100 EACH | Refills: 3 | Status: SHIPPED | OUTPATIENT
Start: 2023-06-08

## 2023-06-08 RX ORDER — LISINOPRIL 20 MG/1
20 TABLET ORAL DAILY
Qty: 90 TABLET | Refills: 0 | Status: ON HOLD | OUTPATIENT
Start: 2023-06-08 | End: 2023-06-15 | Stop reason: SDUPTHER

## 2023-06-08 RX ORDER — OMEPRAZOLE 20 MG/1
40 CAPSULE, DELAYED RELEASE ORAL DAILY
Qty: 60 CAPSULE | Refills: 2 | Status: SHIPPED | OUTPATIENT
Start: 2023-06-08 | End: 2024-03-11 | Stop reason: SDUPTHER

## 2023-06-08 RX ORDER — ATORVASTATIN CALCIUM 40 MG/1
40 TABLET, FILM COATED ORAL DAILY
Qty: 90 TABLET | Refills: 0 | Status: SHIPPED | OUTPATIENT
Start: 2023-06-08 | End: 2024-03-11 | Stop reason: SDUPTHER

## 2023-06-08 NOTE — PROGRESS NOTES
[facility] INTERNAL MEDICINE  OUTPATIENT OFFICE VISIT NOTE    SUBJECTIVE:      Chief Complaint: No chief complaint on file.       HPI: Suki Anton is a 57 y.o. yo female w/ PMH of  has a past medical history of Diabetes mellitus, Diabetes mellitus, type 2, GERD (gastroesophageal reflux disease), and Hypertension., who presents for medication refills    Continues to have 8/10 left heel pain since last visit.  Unable to get x-ray performed until today.  No read available at this time, however appears to have some posterior displacement of her left calcaneus, with no preserved joint space between the calcaneus and talus bone.  Also a gap of space between the calcaneus and navicular bone.  Reports becoming depressed recently and has not been properly taking her diabetic medications.  Has been out of her Levemir for the past 2.5 weeks.  Also having trouble with falling asleep, secondary to lack of sleep hygiene and pain in her foot.  Mammogram still pending.  Unable to get labs/imaging performed due to monetary restraint, however, is aware proper care cannot be provided unless labs are drawn. Patient verbalized understanding.    Denies fevers chills, chest pain, shortness of breath, abdominal pain, nausea, vomiting, diarrhea. Does report recent sinus drainage due to pollen season, denies any fevers/cough.     Past Medical History:   has a past medical history of Diabetes mellitus, Diabetes mellitus, type 2, GERD (gastroesophageal reflux disease), and Hypertension.     Past Surgical History:   has no past surgical history on file.     Family History:  family history includes Cancer in her father; Cataracts in her mother.     Social History:   reports that she has never smoked. She has never used smokeless tobacco. She reports that she does not drink alcohol and does not use drugs.     Allergies:  has No Known Allergies.     Home Medications:  Prior to Admission medications    Medication Sig Start Date End Date Taking?  "Authorizing Provider   atorvastatin (LIPITOR) 20 MG tablet Take 1 tablet (20 mg total) by mouth once daily. 8/29/22 1/19/23 Yes Dick Gallagher, DO   blood sugar diagnostic Strp Test strips to check CBG's 8/29/22 1/19/23 Yes Dick Gallagher DO   blood-glucose meter (TRUE METRIX GLUCOSE METER) kit Use as instructed 5/31/22 1/19/23 Yes Batsheva Ballesteros, NP   diclofenac sodium (VOLTAREN) 1 % Gel Apply 2 g topically 4 (four) times daily. 8/29/22 1/19/23 Yes Dick Gallagher DO   gabapentin (NEURONTIN) 300 MG capsule Take 1 capsule (300 mg total) by mouth 3 (three) times daily. 12/1/22 1/19/23 Yes Dick Gallagher DO   glipiZIDE (GLUCOTROL) 10 MG tablet Take 1 tablet (10 mg total) by mouth 2 (two) times daily with meals. 8/29/22 1/19/23 Yes Dick Gallagher DO   insulin detemir U-100 (LEVEMIR FLEXTOUCH U-100 INSULN) 100 unit/mL (3 mL) InPn pen Inject 30 Units into the skin every evening. 8/29/22 1/19/23 Yes Dick Gallagher DO   lisinopriL (PRINIVIL,ZESTRIL) 20 MG tablet Take 1 tablet (20 mg total) by mouth once daily. 12/1/22 1/19/23 Yes Dick Gallagher DO   meloxicam (MOBIC) 15 MG tablet Take 1 tablet (15 mg total) by mouth once daily. 8/29/22 1/19/23 Yes Dick Gallagher DO   pen needle, diabetic 32 gauge x 5/32" Ndle Use BD fany needle one times a day injections 8/29/22 1/19/23 Yes Dick Gallagher DO   rOPINIRole (REQUIP) 0.5 MG tablet Take 1 tablet (0.5 mg total) by mouth 3 (three) times daily. 12/1/22 1/19/23 Yes Dick Gallagher DO   atorvastatin (LIPITOR) 20 MG tablet Take 1 tablet (20 mg total) by mouth once daily. 1/19/23 1/19/24  Dick Yeh, DO   blood sugar diagnostic Strp Test strips to check CBG's 1/19/23   Dick aGllagher DO   blood-glucose meter (TRUE METRIX GLUCOSE METER) kit Use as instructed 1/19/23 1/19/24  Dick Gallagher DO   blood-glucose meter kit 1 each by Other route 2 (two) times daily. Use as instructed 1/19/23   Dick Gallagher,    cetirizine (ZYRTEC) 10 MG tablet Take 1 tablet (10 mg total) by mouth once daily. 1/19/23 4/19/23  Dick" "Aileen,    clotrimazole (LOTRIMIN) 1 % cream Apply topically 2 (two) times daily. for 14 days 1/19/23 2/2/23  Dick Gallagher DO   diclofenac sodium (VOLTAREN) 1 % Gel Apply 2 g topically 4 (four) times daily. 1/19/23   Dick Gallagher DO   gabapentin (NEURONTIN) 300 MG capsule Take 1 capsule (300 mg total) by mouth 3 (three) times daily. 1/19/23 4/19/23  Dick Gallagher DO   glipiZIDE (GLUCOTROL) 10 MG tablet Take 1 tablet (10 mg total) by mouth 2 (two) times daily with meals. 1/19/23   Dick Gallagher DO   insulin detemir U-100 (LEVEMIR FLEXTOUCH U-100 INSULN) 100 unit/mL (3 mL) InPn pen Inject 30 Units into the skin every evening. 1/19/23 1/19/24  Dick Gallagher DO   lisinopriL (PRINIVIL,ZESTRIL) 20 MG tablet Take 1 tablet (20 mg total) by mouth once daily. 1/19/23 4/19/23  Dick Gallagher DO   meloxicam (MOBIC) 15 MG tablet Take 1 tablet (15 mg total) by mouth once daily. 1/19/23   Dick Gallagher DO   omeprazole (PRILOSEC) 20 MG capsule Take 2 capsules (40 mg total) by mouth once daily. 1/19/23 4/19/23  Dick Gallagher DO   pen needle, diabetic 32 gauge x 5/32" Ndle Use BD fany needle one times a day injections 1/19/23   Dick Gallagher DO   rOPINIRole (REQUIP) 0.5 MG tablet Take 1 tablet (0.5 mg total) by mouth 3 (three) times daily. 1/19/23 4/19/23  Dick Gallagher DO   blood-glucose meter kit 1 each by Other route 2 (two) times daily. Use as instructed  1/19/23  Historical Provider   cetirizine (ZYRTEC) 10 MG tablet Take 1 tablet (10 mg total) by mouth once daily. 8/29/22 1/19/23  Dick Gallagher DO   clotrimazole (LOTRIMIN) 1 % cream Apply topically 2 (two) times daily. for 14 days 9/23/22 1/19/23  Ashley Le NP   famotidine (PEPCID) 20 MG tablet Take 1 tablet (20 mg total) by mouth once daily. for 7 days 8/29/22 1/19/23  Dick Gallagher DO   fluticasone propionate (FLONASE) 50 mcg/actuation nasal spray 1 spray (50 mcg total) by Each Nostril route once daily.  Patient not taking: Reported on 1/19/2023 8/29/22 1/19/23  Dick Gallagher DO "   nystatin (MYCOSTATIN) ointment Apply topically 3 (three) times daily. for 14 days 10/6/22 1/19/23  JANE Henry   omeprazole (PRILOSEC) 20 MG capsule Take 2 capsules (40 mg total) by mouth once daily. 8/29/22 1/19/23  Dick Gallagher DO       ROS:  Review of Systems   All other systems reviewed and are negative.        OBJECTIVE:     Vital signs:   There were no vitals taken for this visit.     Physical Examination:  General: Patient resting comfortably in chair, in no acute distress   Eye: PERRLA, EOMI, clear conjunctiva, eyelids normal  HENT: Head-normocephalic and atraumatic  Neck: full range of motion, no thyromegaly or lymphadenopathy, trachea midline, supple, no palpable thyroid nodules  Respiratory: clear to auscultation bilaterally without wheezes, rales, rhonchi  Cardiovascular: regular rate and rhythm without murmurs.  No gallops or rubs no JVD.  Capillary refill within normal limits.  Gastrointestinal: soft, non-tender, non-distended with normal bowel sounds, without masses to palpation  Genitourinary: no CVA tenderness to palpation  Musculoskeletal: full range of motion of all extremities/spine without limitation or discomfort. Left ankle has nonpitting circumferential edema, very limited dorsiflexion and plantarflexion. No clicks appreciated. Pulses palpable  Integumentary: no rashes or skin lesions present  Neurologic: no signs of peripheral neurological deficit, motor/sensory function intact  Psychiatric:  alert and oriented, cognitive function intact, cooperative with exam, good eye contact, judgement and insight intact, mood and affect full range.     Labs:  CMP:   Lab Results   Component Value Date    GLUCOSE 341 (H) 04/13/2023    CALCIUM 9.5 04/13/2023    ALBUMIN 3.5 04/13/2023     (L) 04/13/2023    K 3.9 04/13/2023    CO2 22 04/13/2023    BUN 20.2 (H) 04/13/2023    CREATININE 0.70 04/13/2023    ALKPHOS 100 04/13/2023    ALT 16 04/13/2023    AST 13 04/13/2023    BILITOT 0.5 04/13/2023           ASSESSMENT & PLAN:     Left ankle swelling, with very abnormal xray, severe arthritis with some calcaneal displacement  -Previously had trauma 2 years ago, xray did not show any fractures/broken bones  -Continues to be swollen today, painful with standing, unable to stand for long periods of time.  Pain regimen currently not covering her pain, Voltaren gel and ibuprofen 800 mg q.6 hours p.r.n.  -x-ray performed today revealed gross abnormality, particularly in the calcaneus.  Pending official read from Radiology  -will try to manage pain with tramadol 50 mg q.6 hours, given 45 tablets  -urgent referral placed to [facility], will obtain MRI if recommended by Radiology.    Diabetes mellitus, Type 2  -4/12/23 A1C 8.4, currently on Levemir 30-35 nightly  -Diabetic foot exam performed previous visit, Eye exam previous visit showed mild diabetic eye disease  -re-Referred to podiatry for diabetic shoes  -Unable to tolerate metformin due to constant diarrhea, will discontinue  -Consider GLP-1 after next labs, labs from previous visit still not drawn  -Will continue levemir 35-40U nightly, glipizide 10 bid, unable to determine if current regimen is working since she has been unable take medications, will continue current regimen and re-evaluate regimen once she is able to take her medications consistently  -unable to afford Ozempic     Self Pay  -Patient met with case management, does not qualify for FreeCare or Medicaid  -unfortunately[facility], only able to afford some medications    Hypertension  -Patient's blood pressure is well controlled today, 138/82  -Refilled Lisinopril 20 mg qd     Hyperlipidemia  -04/13/2023 cholesterol 271, LDL 74,   -elevated triglycerides likely secondary to uncontrolled diabetes, will need better control of diabetes  -up titrate atorvastatin to 40 mg, will recheck again in 3 months     GERD  -No reported issues today  -Continue  Omeprazole and Pepcid     Restless Leg Syndrome  -Ropinirole has been helpful, however ran out before visit today  -Refilled ropinirole 0.5 mg take every evening     Sciatica, Right sided- Improved, no complaints today  -Continue meloxicam 15 mg qd  -Recommended patient to lose weight, stretch, and yoga to improve her sciatica pain  -Patient declined lumbar Xray due to being self pay  -Cautioned continuous usage of NSAIDs given PMHx Diabetes    Health Maintenance  Vaccinations  Immunization History   Administered Date(s) Administered    Influenza - Quadrivalent 12/10/2019, 11/12/2020    Influenza - Quadrivalent - PF (6-35 months) 01/15/2018, 11/15/2018    Influenza - Quadrivalent - PF *Preferred* (6 months and older) 01/19/2023    Pneumococcal Conjugate - 20 Valent 01/19/2023    Pneumococcal Polysaccharide - 23 Valent 02/23/2017    Tdap 02/23/2017    Zoster Recombinant 11/12/2020, 06/19/2021       -Flu: Done 1/19/2023   -Pneumonia: Done 1/19/2023   -Shingles: done on 6/19/2021, two doses   -Tdap: done on 2/23/2017   -COVID:  address next visit    Screening   -Pap Smear:  Currently scheduled for 8/24/23     -Mammogram: Ordered, unable to complete due to monetary issues   -Osteoporosis: will address at next visit    -Lung Ca. Screening: will address at next visit   -Colon Ca. Screening:  attempting  to get FIT scheduled, last screening 12/3/2020   -Hep C, HIV: will address at next visit  Social   -EtOH:  reports no history of alcohol use.   -Tobacco:  reports that she has never smoked. She has never used smokeless tobacco.   -Drugs:  reports no history of drug use.    -Depression:   Depression: Low Risk     Last PHQ Score: 0        Return to clinic in 2 month(s).    Dick Gallagher,   [facility], PGY-1

## 2023-06-08 NOTE — TELEPHONE ENCOUNTER
Xray results for left heel revealed chronic dislocation of talocalcaneal junction, with destructive changes noted along the superior posterior calcaneus, with suspicion for osteomyelitis. Advised patient to go to [facility] ED in the morning when MRI services are available. Patient currently denies any fevers/chills, chest pain/palpitations, shortness of breath, n/v/d.

## 2023-06-08 NOTE — LETTER
June 8, 2023    Suki Anton  [address] [place] [address]         [facility]  [facility] [address]  Phone: [phone] June 8, 2023     Patient: Suki Anton   YOB: 1965   Date of Visit: 6/8/2023       To Whom It May Concern:    It is my medical opinion that Suki Anton may return to light duty immediately with the following restrictions: Must remain off her feet for at like 15 minutes per hour due to severe heel injury . Not allowed to carry more than 10 lbs of items at one time.    If you have any questions or concerns, please don't hesitate to call.    Sincerely,        Dick Gallagher, DO

## 2023-06-09 ENCOUNTER — HOSPITAL ENCOUNTER (EMERGENCY)
Facility: HOSPITAL | Age: 58
Discharge: LEFT BEFORE DISCHARGE | End: 2023-06-09
Attending: EMERGENCY MEDICINE

## 2023-06-09 VITALS
HEIGHT: 63 IN | DIASTOLIC BLOOD PRESSURE: 88 MMHG | HEART RATE: 76 BPM | BODY MASS INDEX: 40.22 KG/M2 | TEMPERATURE: 97 F | RESPIRATION RATE: 16 BRPM | OXYGEN SATURATION: 97 % | WEIGHT: 227 LBS | SYSTOLIC BLOOD PRESSURE: 167 MMHG

## 2023-06-09 DIAGNOSIS — M19.90 INFLAMMATORY ARTHROPATHY: Primary | ICD-10-CM

## 2023-06-09 LAB
ALBUMIN SERPL-MCNC: 3.6 G/DL (ref 3.5–5)
ALBUMIN/GLOB SERPL: 1.1 RATIO (ref 1.1–2)
ALP SERPL-CCNC: 109 UNIT/L (ref 40–150)
ALT SERPL-CCNC: 14 UNIT/L (ref 0–55)
AST SERPL-CCNC: 9 UNIT/L (ref 5–34)
BASOPHILS # BLD AUTO: 0.08 X10(3)/MCL
BASOPHILS NFR BLD AUTO: 0.6 %
BILIRUBIN DIRECT+TOT PNL SERPL-MCNC: 0.4 MG/DL
BUN SERPL-MCNC: 17 MG/DL (ref 9.8–20.1)
CALCIUM SERPL-MCNC: 9.1 MG/DL (ref 8.4–10.2)
CHLORIDE SERPL-SCNC: 103 MMOL/L (ref 98–107)
CO2 SERPL-SCNC: 25 MMOL/L (ref 22–29)
CREAT SERPL-MCNC: 0.66 MG/DL (ref 0.55–1.02)
CRP SERPL-MCNC: 7 MG/L
EOSINOPHIL # BLD AUTO: 0.87 X10(3)/MCL (ref 0–0.9)
EOSINOPHIL NFR BLD AUTO: 6.8 %
ERYTHROCYTE [DISTWIDTH] IN BLOOD BY AUTOMATED COUNT: 12.2 % (ref 11.5–17)
GFR SERPLBLD CREATININE-BSD FMLA CKD-EPI: >60 MLS/MIN/1.73/M2
GLOBULIN SER-MCNC: 3.2 GM/DL (ref 2.4–3.5)
GLUCOSE SERPL-MCNC: 327 MG/DL (ref 74–100)
HCT VFR BLD AUTO: 38.3 % (ref 37–47)
HGB BLD-MCNC: 12.8 G/DL (ref 12–16)
IMM GRANULOCYTES # BLD AUTO: 0.07 X10(3)/MCL (ref 0–0.04)
IMM GRANULOCYTES NFR BLD AUTO: 0.5 %
LYMPHOCYTES # BLD AUTO: 4.41 X10(3)/MCL (ref 0.6–4.6)
LYMPHOCYTES NFR BLD AUTO: 34.4 %
MCH RBC QN AUTO: 28.3 PG (ref 27–31)
MCHC RBC AUTO-ENTMCNC: 33.4 G/DL (ref 33–36)
MCV RBC AUTO: 84.7 FL (ref 80–94)
MONOCYTES # BLD AUTO: 0.7 X10(3)/MCL (ref 0.1–1.3)
MONOCYTES NFR BLD AUTO: 5.5 %
NEUTROPHILS # BLD AUTO: 6.69 X10(3)/MCL (ref 2.1–9.2)
NEUTROPHILS NFR BLD AUTO: 52.2 %
NRBC BLD AUTO-RTO: 0 %
PLATELET # BLD AUTO: 287 X10(3)/MCL (ref 130–400)
PMV BLD AUTO: 11.1 FL (ref 7.4–10.4)
POTASSIUM SERPL-SCNC: 3.7 MMOL/L (ref 3.5–5.1)
PROT SERPL-MCNC: 6.8 GM/DL (ref 6.4–8.3)
RBC # BLD AUTO: 4.52 X10(6)/MCL (ref 4.2–5.4)
SODIUM SERPL-SCNC: 138 MMOL/L (ref 136–145)
WBC # SPEC AUTO: 12.82 X10(3)/MCL (ref 4.5–11.5)

## 2023-06-09 PROCEDURE — 99284 EMERGENCY DEPT VISIT MOD MDM: CPT | Mod: 25

## 2023-06-09 PROCEDURE — 86140 C-REACTIVE PROTEIN: CPT | Performed by: EMERGENCY MEDICINE

## 2023-06-09 PROCEDURE — 85025 COMPLETE CBC W/AUTO DIFF WBC: CPT | Performed by: EMERGENCY MEDICINE

## 2023-06-09 PROCEDURE — 25000003 PHARM REV CODE 250: Performed by: EMERGENCY MEDICINE

## 2023-06-09 PROCEDURE — 80053 COMPREHEN METABOLIC PANEL: CPT | Performed by: EMERGENCY MEDICINE

## 2023-06-09 PROCEDURE — 87040 BLOOD CULTURE FOR BACTERIA: CPT | Performed by: EMERGENCY MEDICINE

## 2023-06-09 RX ORDER — ONDANSETRON 4 MG/1
4 TABLET, ORALLY DISINTEGRATING ORAL
Status: COMPLETED | OUTPATIENT
Start: 2023-06-09 | End: 2023-06-09

## 2023-06-09 RX ADMIN — ONDANSETRON 4 MG: 4 TABLET, ORALLY DISINTEGRATING ORAL at 08:06

## 2023-06-09 NOTE — ED PROVIDER NOTES
Encounter Date: 6/9/2023       History     Chief Complaint   Patient presents with    Foot Pain     PT SEEN IN IM CLINIC YESTERDAY AND INSTRUCTED TO RETURN TO ER THIS AM FOR LT FOOT PAIN AND SWELLING FOR MONTHS.  STATES HERE FOR MRI, RO OSTEOMYELITIS.      Lefte heel pain for 5 months; seen in clinic yesterday and had XR raising concern for osteomyelitis; patient walks with cane, denies any systemic symptoms. Is endorsing DM, well controlled though with neuropathic symptoms; non toxic appearing here. Patient has been complaining of the left ankle pain since an out patient visit on 12 April 2023, though per her own report the symptoms appear to have been ongoing for longer      Foot Injury   Incident location: patient relates worsened symptoms in setting of long hours standing at work. There was no injury mechanism. The incident occurred several weeks ago. The pain is present in the left heel. The quality of the pain is described as aching. The pain is at a severity of 3/10. Pain course: waxing and waning. The symptoms are aggravated by bearing weight. She has tried nothing for the symptoms.   Review of patient's allergies indicates:  No Known Allergies  Past Medical History:   Diagnosis Date    Diabetes mellitus     Diabetes mellitus, type 2     GERD (gastroesophageal reflux disease)     Hypertension      History reviewed. No pertinent surgical history.  Family History   Problem Relation Age of Onset    Cataracts Mother     Cancer Father      Social History     Tobacco Use    Smoking status: Never    Smokeless tobacco: Never   Substance Use Topics    Alcohol use: Never    Drug use: Never     Review of Systems   Musculoskeletal:         Mild swelling left ankle; no erythema, no cutaneous discontinuity ;   All other systems reviewed and are negative.    Physical Exam     Initial Vitals [06/09/23 0812]   BP Pulse Resp Temp SpO2   (!) 167/88 76 16 96.6 °F (35.9 °C) 97 %      MAP       --         Physical Exam    Nursing  note and vitals reviewed.  Constitutional: She appears well-developed and well-nourished. She is not diaphoretic. No distress.   HENT:   Head: Normocephalic and atraumatic.   Right Ear: External ear normal.   Left Ear: External ear normal.   Eyes: EOM are normal. Pupils are equal, round, and reactive to light. Right eye exhibits no discharge. Left eye exhibits no discharge.   Neck: Neck supple. No thyromegaly present. No tracheal deviation present. No JVD present.   Normal range of motion.  Cardiovascular:  Normal rate, regular rhythm, normal heart sounds and intact distal pulses.     Exam reveals no gallop and no friction rub.       No murmur heard.  Pulmonary/Chest: Breath sounds normal. No stridor. No respiratory distress. She has no wheezes. She has no rhonchi. She has no rales.   Abdominal: Abdomen is soft. Bowel sounds are normal. She exhibits no distension. There is no abdominal tenderness. There is no rebound.   Musculoskeletal:         General: Normal range of motion.      Cervical back: Normal range of motion and neck supple.      Comments: Mild swelling to left ankle; no erythema, no induration, no tenderness to range of motion ;     Neurological: She is alert and oriented to person, place, and time. She has normal strength. No cranial nerve deficit or sensory deficit. GCS score is 15. GCS eye subscore is 4. GCS verbal subscore is 5. GCS motor subscore is 6.   Skin: Skin is warm and dry. Capillary refill takes less than 2 seconds. No rash and no abscess noted. No erythema. No pallor.   Psychiatric: She has a normal mood and affect. Her behavior is normal. Judgment and thought content normal.       ED Course   Procedures  Labs Reviewed   COMPREHENSIVE METABOLIC PANEL - Abnormal; Notable for the following components:       Result Value    Glucose Level 327 (*)     All other components within normal limits   C-REACTIVE PROTEIN - Abnormal; Notable for the following components:    C-Reactive Protein 7.00 (*)      All other components within normal limits   CBC WITH DIFFERENTIAL - Abnormal; Notable for the following components:    WBC 12.82 (*)     MPV 11.1 (*)     IG# 0.07 (*)     All other components within normal limits   BLOOD CULTURE [facility]   BLOOD CULTURE [facility]   CBC W/ AUTO DIFFERENTIAL    Narrative:     The following orders were created for panel order CBC Auto Differential.  Procedure                               Abnormality         Status                     ---------                               -----------         ------                     CBC with Differential[[id number]]        Abnormal            Final result                 Please view results for these tests on the individual orders.   EXTRA TUBES    Narrative:     The following orders were created for panel order EXTRA TUBES.  Procedure                               Abnormality         Status                     ---------                               -----------         ------                     Light Blue Top Hold[[id number]]                              In process                 Gold Top Hold[[id number]]                                    In process                 Pink Top Hold[[id number]]                                    In process                   Please view results for these tests on the individual orders.   LIGHT BLUE TOP HOLD   GOLD TOP HOLD   PINK TOP HOLD          Imaging Results              MRI Foot (Hindfoot) Left Without Contrast (Final result)  Result time 06/09/23 10:56:26      Final result by Rayshawn Cunningham MD (06/09/23 10:56:26)                   Impression:      Marrow and soft tissue changes within the hindfoot.  This could relate to some degree of chronic osteomyelitis, although appearance is atypical.  Favor neuropathic arthropathy or other inflammatory arthropathy based on overall appearance.      Electronically signed by: Rayshawn Cunningham  Date:    06/09/2023  Time:    10:56               Narrative:    EXAMINATION:  MRI FOOT  (HINDFOOT) LEFT WITHOUT CONTRAST    CLINICAL HISTORY:  plain film found suggestive of osteo, mri recommended;    TECHNIQUE:  Multisequence multiplanar MR imaging of the left hindfoot without IV contrast.    COMPARISON:  Radiographs 06/08/2023    FINDINGS:  Abnormal marrow signal in the hindfoot, predominantly involving the talus and calcaneus, but also the distal tibia/fibula, cuboid and navicular.  Mild inflammatory changes in the adjacent soft tissues with small subtalar effusion.                                    X-Rays:   Independently Interpreted Readings:   Other Readings:  MRI demonstrates probable chronic osteo  Medications   ondansetron disintegrating tablet 4 mg (4 mg Oral Given 6/9/23 0852)     Medical Decision Making:   History:   Old Medical Records: I decided to obtain old medical records.  Old Records Summarized: other records.       <> Summary of Records: Previous records demonstrate patient complaining of this symptoms since visit in early April. Yesterday XR seems to have identified chronic dislocation but also raised concern for possible osteomyelitis, given destructive bony process seen on imaging.  Initial Assessment:   Patient with chronically swollen left ankle and recent XR concerning for possible osteomyelitis  Differential Diagnosis:   Reactive arthropathy, traumatic arthropathy, degenerative arthritis, chronic osteomyelitis, among others ...  Independently Interpreted Test(s):   I have ordered and independently interpreted X-rays - see prior notes.  Clinical Tests:   Lab Tests: Ordered and Reviewed  Radiological Study: Ordered and Reviewed  ED Management:  Stable in the ED. The MRI is obtained as per request of medicine team. Medicine subsequently consulted and recommending admission . Patient is unwilling to accept in patient course. She will fu in medicine clinic and will return here if condition worsening prior to fu.  Other:   I have discussed this case with another health care  provider.       <> Summary of the Discussion: Medicine team is aware of case, plans admission, but patient unwilling. [facility] is counseled [facility]           ED Course as of 06/09/23 1246   Fri Jun 09, 2023   0910 White count mildly elevated (though in sequence with recent and distant comparison values ) ; [CT]   0930 Chemistries generally reassuring (hyperglycemia is in sequence with several previous values - there is no anion gap today ) ; [CT]   0930 CRP modestly elevated ; [CT]      ED Course User Index  [CT] Darnell Parish MD                 Clinical Impression:   Final diagnoses:  [M19.90] Inflammatory arthropathy (Primary)        ED Disposition Condition    AMA Stable                Darnell Parish MD  06/09/23 1246

## 2023-06-09 NOTE — CONSULTS
[facility] Consult Note     Date of Admit: 6/9/2023  Current Hospital Day: 1     Reason for Consult:      Left ankle swelling    Subjective:     History of Present Illness:  Suki Anton is a 57 y.o. female who  has a past medical history of Diabetes mellitus, Diabetes mellitus, type 2, GERD (gastroesophageal reflux disease), and Hypertension.. Internal Medicine is being consulted for left ankle swelling.    Patient states that she began noticing left ankle swelling around 2 months ago.  She states that during this time her work hours had been increased as well as trying on ill-fitting shoes and noticed that the swelling in her left leg would worsen after a long day on her feet and would be alleviated when she raises her legs.  There was no noted fever, chills, weight loss, erythema on her left ankle.  Of note she was having difficulty obtaining diabetic supplies due to financial constraints.  She was seen by PCP at that time who had ordered a ankle x-ray however she did not undergo imaging until day prior.  X-ray findings at the time showed concern for osteomyelitis thus MRI was obtained after being sent to the ED with a read of possible chronic osteomyelitis though was atypical for this.  More pointing towards inflammatory/neuropathic arthropathy.       Review of Systems:    Review of Systems   Constitutional:  Negative for chills, fever, malaise/fatigue and weight loss.   HENT:  Negative for congestion, hearing loss and sinus pain.    Eyes:  Negative for blurred vision.   Respiratory:  Negative for cough and shortness of breath.    Cardiovascular:  Positive for leg swelling. Negative for chest pain.   Gastrointestinal:  Negative for abdominal pain, heartburn and nausea.   Genitourinary:  Negative for dysuria and urgency.   Musculoskeletal:  Positive for joint pain and myalgias.   Skin:  Negative for rash.   Neurological:  Positive for tingling and sensory change. Negative for dizziness and focal  weakness.   Endo/Heme/Allergies:  Does not bruise/bleed easily.   Psychiatric/Behavioral:  Negative for depression.        Past Medical History: See above    Past Surgical History:  History reviewed. No pertinent surgical history.    Allergies:  Review of patient's allergies indicates:  No Known Allergies    Home Medications:  Prior to Admission medications    Medication Sig Start Date End Date Taking? Authorizing Provider   atorvastatin (LIPITOR) 40 MG tablet Take 1 tablet (40 mg total) by mouth once daily. 6/8/23 9/6/23  Dick Gallagher DO   blood sugar diagnostic Strp Test strips to check CBG's 6/8/23   Dick Gallagher DO   blood-glucose meter (TRUE METRIX GLUCOSE METER) kit Use as instructed 1/19/23 1/19/24  Dick Gallagher DO   blood-glucose meter kit 1 each by Other route 2 (two) times daily. Use as instructed 1/19/23   Dick Gallagher DO   cetirizine (ZYRTEC) 10 MG tablet Take 1 tablet (10 mg total) by mouth once daily.  Patient not taking: Reported on 6/8/2023 4/12/23 7/11/23  Dick Gallagher DO   diclofenac sodium (VOLTAREN) 1 % Gel Apply 2 g topically 4 (four) times daily. 6/8/23   Dick Gallagher, DO   EASY TOUCH TWIST LANCETS 33 gauge Misc USE TO CHECK BLOOD SUAGR LEVELS TWO TIMES A DAY 6/8/23   Dick Gallagher, DO   gabapentin (NEURONTIN) 300 MG capsule Take 1 capsule (300 mg total) by mouth 3 (three) times daily. 6/8/23 9/6/23  Dick Gallagher DO   glipiZIDE (GLUCOTROL) 10 MG tablet Take 1 tablet (10 mg total) by mouth 2 (two) times daily with meals. 6/8/23   Dick Gallagher, DO   ibuprofen (ADVIL,MOTRIN) 800 MG tablet Take 1 tablet (800 mg total) by mouth every 6 (six) hours as needed for Pain. 4/12/23   Dick Gallagher, DO   insulin detemir U-100, Levemir, (LEVEMIR FLEXTOUCH U100 INSULIN) 100 unit/mL (3 mL) InPn pen Inject 40 Units into the skin every evening. 6/8/23 6/7/24  Dick Gallagher DO   lancing device Misc 1 each by Misc.(Non-Drug; Combo Route) route 2 (two) times a day. 1/23/23 1/23/24  Dick Gallagher,    lisinopriL  "(PRINIVIL,ZESTRIL) 20 MG tablet Take 1 tablet (20 mg total) by mouth once daily. 6/8/23 9/6/23  Dick Gallagher DO   omeprazole (PRILOSEC) 20 MG capsule Take 2 capsules (40 mg total) by mouth once daily. 6/8/23 9/6/23  Dick Gallagher DO   rOPINIRole (REQUIP) 0.5 MG tablet Take 1 tablet (0.5 mg total) by mouth 3 (three) times daily. 6/8/23 9/6/23  Dick Gallagher DO   traMADoL (ULTRAM) 50 mg tablet Take 1 tablet (50 mg total) by mouth every 6 (six) hours as needed for Pain. 6/8/23 7/8/23  Dick Gallagher DO   TRUE METRIX GLUCOSE METER Misc use as directed 2/16/23   Historical Provider   TRUEPLUS INSULIN 0.5 mL 31 gauge x 5/16" Syrg USE 1 daily 1/20/23   Historical Provider       Family History:  Family History   Problem Relation Age of Onset    Cataracts Mother     Cancer Father        Social History:  Social History     Tobacco Use    Smoking status: Never    Smokeless tobacco: Never   Substance Use Topics    Alcohol use: Never    Drug use: Never          Objective:   Vitals  Vitals  BP: (!) 167/88  Temp: 96.6 °F (35.9 °C)  Temp Source: Tympanic  Pulse: 76  Resp: 16  SpO2: 97 %  Height: 5' 3" (160 cm)  Weight: 103 kg (227 lb)    Physical Examination:  General: Awake, alert, & oriented to person, place & time.  Obese.  No acute distress  Psychiatric: Mood and affect normal  HEENT: Normocephalic, atraumatic. Face symmetric.  Cardiovascular: Regular rate & rhythm. Normal S1 & S2 w/out murmurs, rubs or gallops.  No JVD appreciated.  2+ pulses throughout.  Pulmonary: Bilateral symmetric chest rise. Non-labored, no wheezes, rhonchi or crackles are appreciated.  Abdominal:  Soft, nontender, nondistended. Bowel sounds present  Extremities: No clubbing, cyanosis or edema.  Left lower extremity swelling with limited range of motion, tenderness at the heel as well as on dorsiflexion, no observed erythema or overlying cellulitis  Skin:  Exposed skin is warm & dry.  Neuro:   Patient moves all extremities equally. Sensation intact " bilateraly.    Laboratory:  CBC:   Lab Results   Component Value Date    WBC 12.82 (H) 06/09/2023    HGB 12.8 06/09/2023    HCT 38.3 06/09/2023     06/09/2023    MCV 84.7 06/09/2023    RDW 12.2 06/09/2023     BMP:   Lab Results   Component Value Date     06/09/2023    K 3.7 06/09/2023    CHLORIDE 103 06/09/2023    CO2 25 06/09/2023    BUN 17.0 06/09/2023    CREATININE 0.66 06/09/2023    GLUCOSE 327 (H) 06/09/2023    CALCIUM 9.1 06/09/2023     LFTs:   Lab Results   Component Value Date    ALBUMIN 3.6 06/09/2023    BILITOT 0.4 06/09/2023    BILIDIR <0.1 05/27/2021    IBILI >0.40 05/27/2021    AST 9 06/09/2023    ALKPHOS 109 06/09/2023    ALT 14 06/09/2023     Coags:   Lab Results   Component Value Date    INR 1.13 06/28/2020    PROTIME 14.2 06/28/2020    PTT 30.4 06/28/2020     FLP:   Lab Results   Component Value Date    CHOL 271 (H) 04/13/2023    HDL 30 (L) 04/13/2023    LDL 74.00 04/13/2023    TRIG 836 (H) 04/13/2023     DM:   Lab Results   Component Value Date    HGBA1C 11.4 (H) 04/13/2023    HGBA1C 11.2 (H) 10/05/2021    HGBA1C 12.4 (H) 05/27/2021    CREATININE 0.66 06/09/2023     Thyroid:   Lab Results   Component Value Date    TSH 1.124 04/13/2023     Anemia:   Lab Results   Component Value Date    IRON 70 07/22/2019    FERRITIN 596.6 (H) 07/02/2020    TRANS 227.0 07/22/2019    TIBC 177 (L) 07/22/2019    VWVFSSNR58 477 10/05/2021     Cardiac:   Lab Results   Component Value Date    TROPONINI <0.015 06/28/2020    CPK 70 06/28/2020    CPKMB <1.0 06/28/2020     Urinalysis:   Lab Results   Component Value Date    COLORU YELLOW 05/27/2021    NITRITE Negative 05/27/2021    KETONESU Trace (A) 05/27/2021    UROBILINOGEN Normal 05/27/2021    WBCUA 11-25 (A) 05/27/2021       Trended Cardiac Data:  No results for input(s): TROPONINI, CPK, CPKMB, BNP in the last 168 hours.        Radiology:  X-Ray Ankle Complete Left    Result Date: 6/8/2023  Findings concerning for osteomyelitis in the calcaneus with nonacute  appearing dislocation of the talocalcaneal junction Soft tissue swelling in the ankle Electronically signed by: Zunilda Valdes Date:    06/08/2023 Time:    18:18    MRI Foot (Hindfoot) Left Without Contrast    Result Date: 6/9/2023  Marrow and soft tissue changes within the hindfoot.  This could relate to some degree of chronic osteomyelitis, although appearance is atypical.  Favor neuropathic arthropathy or other inflammatory arthropathy based on overall appearance. 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 chronic osteomyelitis  Uncontrolled type 2 diabetes mellitus secondary to financial constraints    - left lower extremity, ankle swelling for 2 months with no associated fevers, chills  -does not meet SIRS criteria, white count seems to be chronically elevated  -given patient's uncontrolled diabetes can not rule out osteomyelitis with above imaging, and plan to admit for IV antibiotic treatment  -some other considerations include inflammatory versus neuropathic arthropathy  -patient states that she can not miss work and although we explained that it is in her best interest to receive treatment here she still decides that she will come back when she is able to set up leave from work  -explained that possibility of infection may progress to a more serious picture with sepsis, further joint destruction, fevers, endangering her life and patient understands this possibility  -we will set her up with an acute care appointment on Monday in the afternoon      Mani Hardy MD  [facility] PGY-1

## 2023-06-12 ENCOUNTER — TELEPHONE (OUTPATIENT)
Dept: INTERNAL MEDICINE | Facility: CLINIC | Age: 58
End: 2023-06-12

## 2023-06-12 ENCOUNTER — HOSPITAL ENCOUNTER (INPATIENT)
Facility: HOSPITAL | Age: 58
LOS: 2 days | Discharge: HOME | DRG: 074 | End: 2023-06-15
Attending: INTERNAL MEDICINE | Admitting: STUDENT IN AN ORGANIZED HEALTH CARE EDUCATION/TRAINING PROGRAM

## 2023-06-12 DIAGNOSIS — I10 HYPERTENSION, UNSPECIFIED TYPE: ICD-10-CM

## 2023-06-12 DIAGNOSIS — R07.9 CHEST PAIN: ICD-10-CM

## 2023-06-12 DIAGNOSIS — M79.672 CHRONIC HEEL PAIN, LEFT: ICD-10-CM

## 2023-06-12 DIAGNOSIS — M86.60 CHRONIC OSTEOMYELITIS: ICD-10-CM

## 2023-06-12 DIAGNOSIS — M25.473 ANKLE SWELLING, UNSPECIFIED LATERALITY: Primary | ICD-10-CM

## 2023-06-12 DIAGNOSIS — E66.9 DIABETES MELLITUS TYPE 2 IN OBESE: ICD-10-CM

## 2023-06-12 DIAGNOSIS — E11.69 DIABETES MELLITUS TYPE 2 IN OBESE: ICD-10-CM

## 2023-06-12 DIAGNOSIS — G89.29 CHRONIC HEEL PAIN, LEFT: ICD-10-CM

## 2023-06-12 LAB
ANION GAP SERPL CALC-SCNC: 11 MEQ/L
BASOPHILS # BLD AUTO: 0.08 X10(3)/MCL
BASOPHILS NFR BLD AUTO: 0.6 %
BUN SERPL-MCNC: 15.1 MG/DL (ref 9.8–20.1)
CALCIUM SERPL-MCNC: 8.9 MG/DL (ref 8.4–10.2)
CHLORIDE SERPL-SCNC: 103 MMOL/L (ref 98–107)
CO2 SERPL-SCNC: 24 MMOL/L (ref 22–29)
CREAT SERPL-MCNC: 0.78 MG/DL (ref 0.55–1.02)
CREAT/UREA NIT SERPL: 19
EOSINOPHIL # BLD AUTO: 0.68 X10(3)/MCL (ref 0–0.9)
EOSINOPHIL NFR BLD AUTO: 4.8 %
ERYTHROCYTE [DISTWIDTH] IN BLOOD BY AUTOMATED COUNT: 12.4 % (ref 11.5–17)
GFR SERPLBLD CREATININE-BSD FMLA CKD-EPI: >60 MLS/MIN/1.73/M2
GLUCOSE SERPL-MCNC: 344 MG/DL (ref 74–100)
HCT VFR BLD AUTO: 35.2 % (ref 37–47)
HGB BLD-MCNC: 12.3 G/DL (ref 12–16)
IMM GRANULOCYTES # BLD AUTO: 0.05 X10(3)/MCL (ref 0–0.04)
IMM GRANULOCYTES NFR BLD AUTO: 0.4 %
LYMPHOCYTES # BLD AUTO: 3.71 X10(3)/MCL (ref 0.6–4.6)
LYMPHOCYTES NFR BLD AUTO: 26.4 %
MCH RBC QN AUTO: 29.2 PG (ref 27–31)
MCHC RBC AUTO-ENTMCNC: 34.9 G/DL (ref 33–36)
MCV RBC AUTO: 83.6 FL (ref 80–94)
MONOCYTES # BLD AUTO: 0.7 X10(3)/MCL (ref 0.1–1.3)
MONOCYTES NFR BLD AUTO: 5 %
NEUTROPHILS # BLD AUTO: 8.83 X10(3)/MCL (ref 2.1–9.2)
NEUTROPHILS NFR BLD AUTO: 62.8 %
NRBC BLD AUTO-RTO: 0 %
PLATELET # BLD AUTO: 270 X10(3)/MCL (ref 130–400)
PMV BLD AUTO: 11.2 FL (ref 7.4–10.4)
POCT GLUCOSE: 256 MG/DL (ref 70–110)
POTASSIUM SERPL-SCNC: 4 MMOL/L (ref 3.5–5.1)
RBC # BLD AUTO: 4.21 X10(6)/MCL (ref 4.2–5.4)
SODIUM SERPL-SCNC: 138 MMOL/L (ref 136–145)
WBC # SPEC AUTO: 14.05 X10(3)/MCL (ref 4.5–11.5)

## 2023-06-12 PROCEDURE — 63600175 PHARM REV CODE 636 W HCPCS: Performed by: STUDENT IN AN ORGANIZED HEALTH CARE EDUCATION/TRAINING PROGRAM

## 2023-06-12 PROCEDURE — 96367 TX/PROPH/DG ADDL SEQ IV INF: CPT

## 2023-06-12 PROCEDURE — 96375 TX/PRO/DX INJ NEW DRUG ADDON: CPT

## 2023-06-12 PROCEDURE — 25000003 PHARM REV CODE 250: Performed by: PHYSICIAN ASSISTANT

## 2023-06-12 PROCEDURE — 96376 TX/PRO/DX INJ SAME DRUG ADON: CPT

## 2023-06-12 PROCEDURE — 63600175 PHARM REV CODE 636 W HCPCS: Performed by: PHYSICIAN ASSISTANT

## 2023-06-12 PROCEDURE — 96374 THER/PROPH/DIAG INJ IV PUSH: CPT

## 2023-06-12 PROCEDURE — 96365 THER/PROPH/DIAG IV INF INIT: CPT

## 2023-06-12 PROCEDURE — 96366 THER/PROPH/DIAG IV INF ADDON: CPT

## 2023-06-12 PROCEDURE — 63600175 PHARM REV CODE 636 W HCPCS

## 2023-06-12 PROCEDURE — 25000003 PHARM REV CODE 250

## 2023-06-12 PROCEDURE — G0378 HOSPITAL OBSERVATION PER HR: HCPCS

## 2023-06-12 PROCEDURE — 80048 BASIC METABOLIC PNL TOTAL CA: CPT | Performed by: PHYSICIAN ASSISTANT

## 2023-06-12 PROCEDURE — 96372 THER/PROPH/DIAG INJ SC/IM: CPT

## 2023-06-12 PROCEDURE — 85025 COMPLETE CBC W/AUTO DIFF WBC: CPT | Performed by: PHYSICIAN ASSISTANT

## 2023-06-12 PROCEDURE — 25000003 PHARM REV CODE 250: Performed by: STUDENT IN AN ORGANIZED HEALTH CARE EDUCATION/TRAINING PROGRAM

## 2023-06-12 PROCEDURE — 99285 EMERGENCY DEPT VISIT HI MDM: CPT | Mod: 25

## 2023-06-12 RX ORDER — HYDRALAZINE HYDROCHLORIDE 20 MG/ML
10 INJECTION INTRAMUSCULAR; INTRAVENOUS EVERY 6 HOURS PRN
Status: DISCONTINUED | OUTPATIENT
Start: 2023-06-12 | End: 2023-06-15 | Stop reason: HOSPADM

## 2023-06-12 RX ORDER — IBUPROFEN 200 MG
24 TABLET ORAL
Status: DISCONTINUED | OUTPATIENT
Start: 2023-06-12 | End: 2023-06-15 | Stop reason: HOSPADM

## 2023-06-12 RX ORDER — ATORVASTATIN CALCIUM 40 MG/1
40 TABLET, FILM COATED ORAL DAILY
Status: DISCONTINUED | OUTPATIENT
Start: 2023-06-13 | End: 2023-06-15 | Stop reason: HOSPADM

## 2023-06-12 RX ORDER — ENOXAPARIN SODIUM 100 MG/ML
40 INJECTION SUBCUTANEOUS EVERY 12 HOURS
Status: DISCONTINUED | OUTPATIENT
Start: 2023-06-12 | End: 2023-06-15 | Stop reason: HOSPADM

## 2023-06-12 RX ORDER — IBUPROFEN 200 MG
16 TABLET ORAL
Status: DISCONTINUED | OUTPATIENT
Start: 2023-06-12 | End: 2023-06-15 | Stop reason: HOSPADM

## 2023-06-12 RX ORDER — NALOXONE HCL 0.4 MG/ML
0.02 VIAL (ML) INJECTION
Status: DISCONTINUED | OUTPATIENT
Start: 2023-06-12 | End: 2023-06-15 | Stop reason: HOSPADM

## 2023-06-12 RX ORDER — PANTOPRAZOLE SODIUM 40 MG/1
40 TABLET, DELAYED RELEASE ORAL DAILY
Status: DISCONTINUED | OUTPATIENT
Start: 2023-06-13 | End: 2023-06-15 | Stop reason: HOSPADM

## 2023-06-12 RX ORDER — DEXTROSE MONOHYDRATE 100 MG/ML
25 INJECTION, SOLUTION INTRAVENOUS
Status: DISCONTINUED | OUTPATIENT
Start: 2023-06-12 | End: 2023-06-15 | Stop reason: HOSPADM

## 2023-06-12 RX ORDER — INSULIN ASPART 100 [IU]/ML
1-10 INJECTION, SOLUTION INTRAVENOUS; SUBCUTANEOUS
Status: DISCONTINUED | OUTPATIENT
Start: 2023-06-12 | End: 2023-06-15 | Stop reason: HOSPADM

## 2023-06-12 RX ORDER — HYDROCODONE BITARTRATE AND ACETAMINOPHEN 5; 325 MG/1; MG/1
1 TABLET ORAL
Status: COMPLETED | OUTPATIENT
Start: 2023-06-12 | End: 2023-06-12

## 2023-06-12 RX ORDER — SODIUM CHLORIDE 0.9 % (FLUSH) 0.9 %
10 SYRINGE (ML) INJECTION EVERY 12 HOURS PRN
Status: DISCONTINUED | OUTPATIENT
Start: 2023-06-12 | End: 2023-06-15 | Stop reason: HOSPADM

## 2023-06-12 RX ORDER — ONDANSETRON 2 MG/ML
4 INJECTION INTRAMUSCULAR; INTRAVENOUS EVERY 8 HOURS PRN
Status: DISCONTINUED | OUTPATIENT
Start: 2023-06-12 | End: 2023-06-15 | Stop reason: HOSPADM

## 2023-06-12 RX ORDER — LISINOPRIL 10 MG/1
20 TABLET ORAL DAILY
Status: DISCONTINUED | OUTPATIENT
Start: 2023-06-13 | End: 2023-06-15 | Stop reason: HOSPADM

## 2023-06-12 RX ORDER — LABETALOL HCL 20 MG/4 ML
10 SYRINGE (ML) INTRAVENOUS EVERY 4 HOURS PRN
Status: DISCONTINUED | OUTPATIENT
Start: 2023-06-12 | End: 2023-06-15 | Stop reason: HOSPADM

## 2023-06-12 RX ORDER — ONDANSETRON 2 MG/ML
4 INJECTION INTRAMUSCULAR; INTRAVENOUS
Status: COMPLETED | OUTPATIENT
Start: 2023-06-12 | End: 2023-06-12

## 2023-06-12 RX ORDER — GABAPENTIN 300 MG/1
300 CAPSULE ORAL 3 TIMES DAILY
Status: DISCONTINUED | OUTPATIENT
Start: 2023-06-12 | End: 2023-06-15 | Stop reason: HOSPADM

## 2023-06-12 RX ORDER — GLUCAGON 1 MG
1 KIT INJECTION
Status: DISCONTINUED | OUTPATIENT
Start: 2023-06-12 | End: 2023-06-15 | Stop reason: HOSPADM

## 2023-06-12 RX ORDER — ROPINIROLE 0.25 MG/1
0.5 TABLET, FILM COATED ORAL 3 TIMES DAILY
Status: DISCONTINUED | OUTPATIENT
Start: 2023-06-12 | End: 2023-06-15 | Stop reason: HOSPADM

## 2023-06-12 RX ORDER — INSULIN ASPART 100 [IU]/ML
0-5 INJECTION, SOLUTION INTRAVENOUS; SUBCUTANEOUS
Status: DISCONTINUED | OUTPATIENT
Start: 2023-06-12 | End: 2023-06-12

## 2023-06-12 RX ORDER — HYDROCODONE BITARTRATE AND ACETAMINOPHEN 5; 325 MG/1; MG/1
1 TABLET ORAL EVERY 6 HOURS PRN
Status: DISCONTINUED | OUTPATIENT
Start: 2023-06-12 | End: 2023-06-15 | Stop reason: HOSPADM

## 2023-06-12 RX ORDER — DEXTROSE MONOHYDRATE 100 MG/ML
12.5 INJECTION, SOLUTION INTRAVENOUS
Status: DISCONTINUED | OUTPATIENT
Start: 2023-06-12 | End: 2023-06-15 | Stop reason: HOSPADM

## 2023-06-12 RX ADMIN — ONDANSETRON 4 MG: 2 INJECTION INTRAMUSCULAR; INTRAVENOUS at 08:06

## 2023-06-12 RX ADMIN — ONDANSETRON 4 MG: 2 INJECTION INTRAMUSCULAR; INTRAVENOUS at 02:06

## 2023-06-12 RX ADMIN — INSULIN ASPART 3 UNITS: 100 INJECTION, SOLUTION INTRAVENOUS; SUBCUTANEOUS at 05:06

## 2023-06-12 RX ADMIN — HYDROCODONE BITARTRATE AND ACETAMINOPHEN 1 TABLET: 5; 325 TABLET ORAL at 08:06

## 2023-06-12 RX ADMIN — INSULIN DETEMIR 35 UNITS: 100 INJECTION, SOLUTION SUBCUTANEOUS at 09:06

## 2023-06-12 RX ADMIN — PIPERACILLIN AND TAZOBACTAM 4.5 G: 4; .5 INJECTION, POWDER, LYOPHILIZED, FOR SOLUTION INTRAVENOUS; PARENTERAL at 04:06

## 2023-06-12 RX ADMIN — ENOXAPARIN SODIUM 40 MG: 40 INJECTION SUBCUTANEOUS at 08:06

## 2023-06-12 RX ADMIN — ROPINIROLE 0.5 MG: 0.25 TABLET, FILM COATED ORAL at 08:06

## 2023-06-12 RX ADMIN — VANCOMYCIN HYDROCHLORIDE 2000 MG: 1 INJECTION, POWDER, LYOPHILIZED, FOR SOLUTION INTRAVENOUS at 04:06

## 2023-06-12 RX ADMIN — GABAPENTIN 300 MG: 300 CAPSULE ORAL at 08:06

## 2023-06-12 RX ADMIN — HYDROCODONE BITARTRATE AND ACETAMINOPHEN 1 TABLET: 5; 325 TABLET ORAL at 02:06

## 2023-06-12 NOTE — TELEPHONE ENCOUNTER
"Pt is requesting a prescription for nausea med. Pt states that "Tramadol is causing nausea". Pt is also asking for " a increase in dosage of Gabapentin".    Please advise.  "

## 2023-06-12 NOTE — ED PROVIDER NOTES
Encounter Date: 6/12/2023       History     Chief Complaint   Patient presents with    Foot Injury     Left foot pain; PCP sent here for possible IV abx and admission     Patient reports to the ER for possible admit for osteomyelitis; pt was seen a few days ago for possible osteomyelitis per xray that was performed for ankle pain./swelling; at the time the patient refused admit due to concern with work and having to get some affairs in order    The history is provided by the patient.   Foot Injury   The incident occurred several weeks ago. The pain is present in the left ankle. The quality of the pain is described as aching and throbbing. The pain is at a severity of 5/10. Pertinent negatives include no numbness, no loss of motion and no loss of sensation. She reports no foreign bodies present. The symptoms are aggravated by palpation.   Review of patient's allergies indicates:  No Known Allergies  Past Medical History:   Diagnosis Date    Diabetes mellitus     Diabetes mellitus, type 2     GERD (gastroesophageal reflux disease)     Hypertension      History reviewed. No pertinent surgical history.  Family History   Problem Relation Age of Onset    Cataracts Mother     Cancer Father      Social History     Tobacco Use    Smoking status: Never    Smokeless tobacco: Never   Substance Use Topics    Alcohol use: Never    Drug use: Never     Review of Systems   Constitutional:  Negative for fever.   HENT:  Negative for sore throat.    Eyes: Negative.    Respiratory:  Negative for shortness of breath.    Cardiovascular:  Negative for chest pain.   Gastrointestinal:  Negative for nausea.   Genitourinary:  Negative for dysuria.   Musculoskeletal:  Negative for back pain.   Skin:  Negative for rash.   Neurological:  Negative for weakness and numbness.   Hematological:  Does not bruise/bleed easily.   Psychiatric/Behavioral: Negative.       Physical Exam     Initial Vitals [06/12/23 1304]   BP Pulse Resp Temp SpO2   (!)  178/91 100 16 98.6 °F (37 °C) 97 %      MAP       --         Physical Exam    Vitals reviewed.  Constitutional: She appears well-developed and well-nourished.   HENT:   Head: Normocephalic and atraumatic.   Eyes: Conjunctivae and EOM are normal. Pupils are equal, round, and reactive to light.   Neck: Neck supple.   Normal range of motion.  Cardiovascular:  Normal rate, regular rhythm, normal heart sounds and intact distal pulses.           Pulmonary/Chest: Breath sounds normal. No respiratory distress. She has no wheezes. She exhibits no tenderness.   Abdominal: Abdomen is soft. Bowel sounds are normal. There is no abdominal tenderness.   Musculoskeletal:      Cervical back: Normal range of motion and neck supple.      Right ankle: Normal. Normal pulse.      Left ankle: Swelling present. Tenderness present. Decreased range of motion. Normal pulse.        Legs:      Neurological: She is alert and oriented to person, place, and time. She displays normal reflexes. No cranial nerve deficit or sensory deficit.   Skin: Skin is warm and dry.   Psychiatric: She has a normal mood and affect. Her behavior is normal. Judgment and thought content normal.               ED Course   Procedures  Labs Reviewed   CBC WITH DIFFERENTIAL - Abnormal; Notable for the following components:       Result Value    WBC 14.05 (*)     Hct 35.2 (*)     MPV 11.2 (*)     IG# 0.05 (*)     All other components within normal limits   CBC W/ AUTO DIFFERENTIAL    Narrative:     The following orders were created for panel order CBC auto differential.  Procedure                               Abnormality         Status                     ---------                               -----------         ------                     CBC with Differential[[id number]]        Abnormal            Final result                 Please view results for these tests on the individual orders.   BASIC METABOLIC PANEL          Imaging Results    None          Medications - No  data to display  Medical Decision Making:   Differential Diagnosis:   Fracture, septic joint, cellulitis, abscess, gout, RA, OA among others  Clinical Tests:   Lab Tests: Ordered  ED Management:      Other:   I have discussed this case with another health care provider.       <> Summary of the Discussion: 2:15 PM Consult: I discussed the case with Dr. Hardy ([facility]). Agrees with current management.   Recommends will see in ED            Attending Attestation:     Physician Attestation Statement for NP/PA:   I have directed and reviewed the workup performed by the PA/NP.  I performed the substantive portion of the medical decision making.     Other NP/PA Attestation Additions:    History of Present Illness: Presents for admission due to osteomyelitis of her left ankle/foot. Was evaluated last week but at that time refused admission.   Physical Exam: Mild swelling of left ankle, some erythema among toes; N-V intact   Medical Decision Making: Imaging consistent with chronic osteomyelitis, will consult for inpatient management                        Clinical Impression:   Final diagnoses:  [M25.473] Ankle swelling, unspecified laterality (Primary)  [M86.60] Chronic osteomyelitis        ED Disposition Condition    Admit Stable                Leland Foley MD  06/12/23 9491

## 2023-06-12 NOTE — H&P
[facility] Medicine Wards History & Physical Note     Date of Admit: 6/12/2023    Chief Complaint     Left foot pain, abnormal X-ray    Subjective:      History of Present Illness:  Suki Anton is a 57 y.o. female who with a history of poorly controlled T2DM A1C 11.4, GERD, HTN,  who presented to [facility] ED on 6/12/2023 with a primary complaint of Left foot swelling, and abnormal Xray reading.     Patient was evaluated in the ED last Friday for abnormal X-ray reading of concern for chronic osteomyelitis. MRI was performed which revealed marrow and soft tissue changes within hindfoot, could be chronic osteomyelitis but was atypical. Neuropathic arthropathy/inflammatory arthropathy is favored. Patient was to be admitted for IV antibiotics last Friday, but left AMA due to work/getting house affairs in order. Now returns today for admission for IV antibiotics. Foot pain has been present for the past 5 months, initially attributed to poorly fitting shoes and standing on feet all day. Edema slowly worsened over the next 3 months before next visit in April. Denies any trauma, but does report worsening pain and edema. Increasing difficulty with weight bearing. Denies fevers/chills, no history of reactive joint disease.     In the ED, vitals Afebrile 98.6, Pulse 100, /91, 97% on RA. CBC revealed leukocytosis of 14.05, otherwise normal. CMP significant for hyperglycemia of 344. Blood cultures drawn from 6/9 are no growth at 72 hours.       Past Medical History:  Past Medical History:   Diagnosis Date    Diabetes mellitus     Diabetes mellitus, type 2     GERD (gastroesophageal reflux disease)     Hypertension        Past Surgical History:  History reviewed. No pertinent surgical history.    Family History:  Family History   Problem Relation Age of Onset    Cataracts Mother     Cancer Father        Social History:  Social History     Tobacco Use    Smoking status: Never    Smokeless tobacco: Never   Substance Use Topics    Alcohol  use: Never    Drug use: Never       Allergies:  Review of patient's allergies indicates:  No Known Allergies    Home Medications:  Prior to Admission medications    Medication Sig Start Date End Date Taking? Authorizing Provider   atorvastatin (LIPITOR) 40 MG tablet Take 1 tablet (40 mg total) by mouth once daily. 6/8/23 9/6/23 Yes Dick Gallagher DO   gabapentin (NEURONTIN) 300 MG capsule Take 1 capsule (300 mg total) by mouth 3 (three) times daily. 6/8/23 9/6/23 Yes Dick Gallagher DO   glipiZIDE (GLUCOTROL) 10 MG tablet Take 1 tablet (10 mg total) by mouth 2 (two) times daily with meals. 6/8/23  Yes Dick Gallagher DO   insulin detemir U-100, Levemir, (LEVEMIR FLEXTOUCH U100 INSULIN) 100 unit/mL (3 mL) InPn pen Inject 40 Units into the skin every evening. 6/8/23 6/7/24 Yes Dick Gallagher DO   lisinopriL (PRINIVIL,ZESTRIL) 20 MG tablet Take 1 tablet (20 mg total) by mouth once daily. 6/8/23 9/6/23 Yes Dick Gallagher DO   omeprazole (PRILOSEC) 20 MG capsule Take 2 capsules (40 mg total) by mouth once daily. 6/8/23 9/6/23 Yes Dick Gallagher DO   rOPINIRole (REQUIP) 0.5 MG tablet Take 1 tablet (0.5 mg total) by mouth 3 (three) times daily. 6/8/23 9/6/23 Yes Dick Gallagher DO   blood sugar diagnostic Strp Test strips to check CBG's 6/8/23   Dick Gallagher DO   blood-glucose meter (TRUE METRIX GLUCOSE METER) kit Use as instructed 1/19/23 1/19/24  Dick Gallagher DO   blood-glucose meter kit 1 each by Other route 2 (two) times daily. Use as instructed 1/19/23   Dick Gallagher DO   cetirizine (ZYRTEC) 10 MG tablet Take 1 tablet (10 mg total) by mouth once daily.  Patient not taking: Reported on 6/8/2023 4/12/23 7/11/23  Dick Gallagher DO   diclofenac sodium (VOLTAREN) 1 % Gel Apply 2 g topically 4 (four) times daily. 6/8/23   Dick Gallagher, DO   EASY TOUCH TWIST LANCETS 33 gauge Misc USE TO CHECK BLOOD SUAGR LEVELS TWO TIMES A DAY 6/8/23   Dick Gallagher, DO   ibuprofen (ADVIL,MOTRIN) 800 MG tablet Take 1 tablet (800 mg total) by mouth every 6 (six)  "hours as needed for Pain. 4/12/23   Dick Gallagher DO   lancing device Misc 1 each by Misc.(Non-Drug; Combo Route) route 2 (two) times a day. 1/23/23 1/23/24  Dick Gallagher DO   traMADoL (ULTRAM) 50 mg tablet Take 1 tablet (50 mg total) by mouth every 6 (six) hours as needed for Pain. 6/8/23 7/8/23  Dick Gallagher DO   TRUE METRIX GLUCOSE METER Misc use as directed 2/16/23   Historical Provider   TRUEPLUS INSULIN 0.5 mL 31 gauge x 5/16" Syrg USE 1 daily 1/20/23   Historical Provider         Review of Systems:  Gen: No fever, chills, or weight changes  Eye: No blindness or vision changes  Heart: No chest pain, palpitations, or diaphoresis  Lungs: No shortness of breath, cough, or wheezing  GI: No abdominal pain, nausea, vomiting, or diarrhea  : No hematuria, No dysuria  Musk: Significant left heel swelling, difficult to bear weight  Integumentary: No rash or itching  Neuro: Normal speech, no focal weakness or headache       Objective:   Last 24 Hour Vital Signs:  Blood pressure (!) 186/93, pulse 78, temperature 98.4 °F (36.9 °C), temperature source Oral, resp. rate 16, height 5' 3" (1.6 m), weight 105 kg (231 lb 7.7 oz), SpO2 96 %.  Body mass index is 41.01 kg/m².    Physical Examination:    General: NAD, tolerating pain  Eye: PERRL, EOMI  HENT: Normocephalic, atraumatic, moist mucous membranes  Neck: Supple, nontender  Respiratory: CTAB  Cardiovascular: RRR, no murmurs, hearts, or gallops  Gastrointestinal: soft, nontender  Musculoskeletal: Left ankle swollen, non pitting edema. Limited dorsiflexion/plantar flexion. Extremely tender to palpation. No crepitus noted. Pulses PT and DP 2+.  Integumentary: warm, dry  Neurologic: grossly intact  Psychiatric: Appropriate mood and affect    Laboratory:  Most Recent Data:  Admission on 06/12/2023   Component Date Value    Sodium Level 06/12/2023 138     Potassium Level 06/12/2023 4.0     Chloride 06/12/2023 103     Carbon Dioxide 06/12/2023 24     Glucose Level 06/12/2023 344 " (H)     Blood Urea Nitrogen 06/12/2023 15.1     Creatinine 06/12/2023 0.78     BUN/Creatinine Ratio 06/12/2023 19     Calcium Level Total 06/12/2023 8.9     Anion Gap 06/12/2023 11.0     eGFR 06/12/2023 >60     WBC 06/12/2023 14.05 (H)     RBC 06/12/2023 4.21     Hgb 06/12/2023 12.3     Hct 06/12/2023 35.2 (L)     MCV 06/12/2023 83.6     MCH 06/12/2023 29.2     MCHC 06/12/2023 34.9     RDW 06/12/2023 12.4     Platelet 06/12/2023 270     MPV 06/12/2023 11.2 (H)     Neut % 06/12/2023 62.8     Lymph % 06/12/2023 26.4     Mono % 06/12/2023 5.0     Eos % 06/12/2023 4.8     Basophil % 06/12/2023 0.6     Lymph # 06/12/2023 3.71     Neut # 06/12/2023 8.83     Mono # 06/12/2023 0.70     Eos # 06/12/2023 0.68     Baso # 06/12/2023 0.08     IG# 06/12/2023 0.05 (H)     IG% 06/12/2023 0.4     NRBC% 06/12/2023 0.0     POCT Glucose 06/12/2023 256 (H)    Admission on 06/09/2023, Discharged on 06/09/2023   Component Date Value    Sodium Level 06/09/2023 138     Potassium Level 06/09/2023 3.7     Chloride 06/09/2023 103     Carbon Dioxide 06/09/2023 25     Glucose Level 06/09/2023 327 (H)     Blood Urea Nitrogen 06/09/2023 17.0     Creatinine 06/09/2023 0.66     Calcium Level Total 06/09/2023 9.1     Protein Total 06/09/2023 6.8     Albumin Level 06/09/2023 3.6     Globulin 06/09/2023 3.2     Albumin/Globulin Ratio 06/09/2023 1.1     Bilirubin Total 06/09/2023 0.4     Alkaline Phosphatase 06/09/2023 109     Alanine Aminotransferase 06/09/2023 14     Aspartate Aminotransfera* 06/09/2023 9     eGFR 06/09/2023 >60     C-Reactive Protein 06/09/2023 7.00 (H)     CULTURE, BLOOD ([facility]) 06/09/2023 No Growth At 72 Hours     CULTURE, BLOOD ([facility]) 06/09/2023 No Growth At 72 Hours     WBC 06/09/2023 12.82 (H)     RBC 06/09/2023 4.52     Hgb 06/09/2023 12.8     Hct 06/09/2023 38.3     MCV 06/09/2023 84.7     MCH 06/09/2023 28.3     MCHC 06/09/2023 33.4     RDW 06/09/2023 12.2     Platelet 06/09/2023 287     MPV 06/09/2023 11.1 (H)     Neut %  06/09/2023 52.2     Lymph % 06/09/2023 34.4     Mono % 06/09/2023 5.5     Eos % 06/09/2023 6.8     Basophil % 06/09/2023 0.6     Lymph # 06/09/2023 4.41     Neut # 06/09/2023 6.69     Mono # 06/09/2023 0.70     Eos # 06/09/2023 0.87     Baso # 06/09/2023 0.08     IG# 06/09/2023 0.07 (H)     IG% 06/09/2023 0.5     NRBC% 06/09/2023 0.0        Radiology:  Imaging Results    None            Assessment & Plan:     1) Arthropathy, chronic osteomyelitis vs neuropathic arthropathy  SIRS 2/4 on admission  -4-5 month hx of foot pain, with increased swelling, increasing difficulty with bearing weight.   -X-ray concerning for chronic osteomyelitis, MRI revealing neuropathic/inflammatory arthropathy  -SIRS 2/4, tachycardic 100, leukocytosis 14, increased from 12.82, no fevers and chills  -Pending MRSA PCR  -Will treat as chronic osteomyelitis, with vancomycin and zosyn    2) Diabetes mellitus, type 2, 4/13/23 A1C 11.4  -Currently on 35 U detemir, with moderate sliding scale insulin  -A1C improved with having medications, unable to afford medications at times  -Will monitor daily glucose levels    3) Hypertension  -Lisinopril 20 mg at home   -Does not measure blood pressure at home  -Continue home lisinopril  -PRN labetalol and hydralazine ordered    Antibiotics: Vancomycin  Diet: diabetic, cardiac   DVT Prophylaxis: lovenox 40 mg BID  GI Prophylaxis: Pantoprazole  Fluids: None      Disposition: Admitted to internal medicine for management of left heel arthropathy, concern for osteomyelitis.      Dick Gallagher DO  [facility] Internal Medicine HO-1

## 2023-06-12 NOTE — PROGRESS NOTES
Pharmacokinetic Initial Assessment: IV Vancomycin    Assessment/Plan:    Initiate intravenous vancomycin with loading dose of 2000 mg once followed by a maintenance dose of vancomycin 1250mg IV every 12 hours  Desired empiric serum trough concentration is 15 to 20 mcg/mL  Draw vancomycin trough level 60 min prior to third dose on 6/13 at approximately 1500  Pharmacy will continue to follow and monitor vancomycin.      Please contact pharmacy at extension [phone] with any questions regarding this assessment.     Thank you for the consult,   Noemiraymundo Disla       Patient brief summary:  Suki Anton is a 57 y.o. female initiated on antimicrobial therapy with IV Vancomycin for treatment of suspected bone/joint infection    Drug Allergies:   Review of patient's allergies indicates:  No Known Allergies    Actual Body Weight:   105kg    Renal Function:   Estimated Creatinine Clearance: 94.4 mL/min (based on SCr of 0.78 mg/dL).,     Dialysis Method (if applicable):  N/A    CBC (last 72 hours):  Recent Labs   Lab Result Units 06/12/23  1344   WBC x10(3)/mcL 14.05*   Hgb g/dL 12.3   Hct % 35.2*   Platelet x10(3)/mcL 270   Mono % % 5.0   Eos % % 4.8   Basophil % % 0.6       Metabolic Panel (last 72 hours):  Recent Labs   Lab Result Units 06/12/23  1344   Sodium Level mmol/L 138   Potassium Level mmol/L 4.0   Chloride mmol/L 103   Carbon Dioxide mmol/L 24   Glucose Level mg/dL 344*   Blood Urea Nitrogen mg/dL 15.1   Creatinine mg/dL 0.78       Drug levels (last 3 results):  No results for input(s): VANCOMYCINRA, VANCORANDOM, VANCOMYCINPE, VANCOPEAK, VANCOMYCINTR, VANCOTROUGH in the last 72 hours.    Microbiologic Results:  Microbiology Results (last 7 days)       ** No results found for the last 168 hours. **

## 2023-06-12 NOTE — Clinical Note
Diagnosis: Ankle swelling, unspecified laterality [[id number]]   Future Attending Provider: MARISABEL JAY [[id number]]   Admitting Provider:: MARISABEL JAY [[id number]]

## 2023-06-13 LAB
ABS NEUT CALC (OHS): 7.52 X10(3)/MCL (ref 2.1–9.2)
ALBUMIN SERPL-MCNC: 3.1 G/DL (ref 3.5–5)
ALBUMIN/GLOB SERPL: 1 RATIO (ref 1.1–2)
ALP SERPL-CCNC: 101 UNIT/L (ref 40–150)
ALT SERPL-CCNC: 11 UNIT/L (ref 0–55)
AST SERPL-CCNC: 9 UNIT/L (ref 5–34)
BILIRUBIN DIRECT+TOT PNL SERPL-MCNC: 0.3 MG/DL
BUN SERPL-MCNC: 16.1 MG/DL (ref 9.8–20.1)
CALCIUM SERPL-MCNC: 8.7 MG/DL (ref 8.4–10.2)
CHLORIDE SERPL-SCNC: 102 MMOL/L (ref 98–107)
CO2 SERPL-SCNC: 25 MMOL/L (ref 22–29)
CREAT SERPL-MCNC: 0.71 MG/DL (ref 0.55–1.02)
EOSINOPHIL NFR BLD MANUAL: 0.87 X10(3)/MCL (ref 0–0.9)
EOSINOPHIL NFR BLD MANUAL: 6 % (ref 0–8)
ERYTHROCYTE [DISTWIDTH] IN BLOOD BY AUTOMATED COUNT: 12.4 % (ref 11.5–17)
GFR SERPLBLD CREATININE-BSD FMLA CKD-EPI: >60 MLS/MIN/1.73/M2
GLOBULIN SER-MCNC: 3 GM/DL (ref 2.4–3.5)
GLUCOSE SERPL-MCNC: 213 MG/DL (ref 74–100)
HCT VFR BLD AUTO: 33.6 % (ref 37–47)
HGB BLD-MCNC: 11.5 G/DL (ref 12–16)
LYMPHOCYTES NFR BLD MANUAL: 35 % (ref 13–40)
LYMPHOCYTES NFR BLD MANUAL: 5.06 X10(3)/MCL
MCH RBC QN AUTO: 29.4 PG (ref 27–31)
MCHC RBC AUTO-ENTMCNC: 34.2 G/DL (ref 33–36)
MCV RBC AUTO: 85.9 FL (ref 80–94)
MONOCYTES NFR BLD MANUAL: 1.01 X10(3)/MCL (ref 0.1–1.3)
MONOCYTES NFR BLD MANUAL: 7 % (ref 2–11)
MRSA PCR SCRN (OHS): NOT DETECTED
NEUTROPHILS NFR BLD MANUAL: 52 % (ref 47–80)
NRBC BLD AUTO-RTO: 0 %
PLATELET # BLD AUTO: 249 X10(3)/MCL (ref 130–400)
PLATELET # BLD EST: ADEQUATE 10*3/UL
PMV BLD AUTO: 11.6 FL (ref 7.4–10.4)
POCT GLUCOSE: 218 MG/DL (ref 70–110)
POCT GLUCOSE: 236 MG/DL (ref 70–110)
POCT GLUCOSE: 245 MG/DL (ref 70–110)
POCT GLUCOSE: 256 MG/DL (ref 70–110)
POTASSIUM SERPL-SCNC: 3.6 MMOL/L (ref 3.5–5.1)
PROT SERPL-MCNC: 6.1 GM/DL (ref 6.4–8.3)
RBC # BLD AUTO: 3.91 X10(6)/MCL (ref 4.2–5.4)
RBC MORPH BLD: NORMAL
SODIUM SERPL-SCNC: 137 MMOL/L (ref 136–145)
VANCOMYCIN TROUGH SERPL-MCNC: 7.2 UG/ML (ref 15–20)
WBC # SPEC AUTO: 14.47 X10(3)/MCL (ref 4.5–11.5)

## 2023-06-13 PROCEDURE — 96366 THER/PROPH/DIAG IV INF ADDON: CPT

## 2023-06-13 PROCEDURE — A4216 STERILE WATER/SALINE, 10 ML: HCPCS

## 2023-06-13 PROCEDURE — 25000242 PHARM REV CODE 250 ALT 637 W/ HCPCS

## 2023-06-13 PROCEDURE — 85027 COMPLETE CBC AUTOMATED: CPT

## 2023-06-13 PROCEDURE — 25000003 PHARM REV CODE 250: Performed by: STUDENT IN AN ORGANIZED HEALTH CARE EDUCATION/TRAINING PROGRAM

## 2023-06-13 PROCEDURE — 63600175 PHARM REV CODE 636 W HCPCS

## 2023-06-13 PROCEDURE — 87641 MR-STAPH DNA AMP PROBE: CPT

## 2023-06-13 PROCEDURE — 97161 PT EVAL LOW COMPLEX 20 MIN: CPT

## 2023-06-13 PROCEDURE — C1751 CATH, INF, PER/CENT/MIDLINE: HCPCS

## 2023-06-13 PROCEDURE — 96372 THER/PROPH/DIAG INJ SC/IM: CPT

## 2023-06-13 PROCEDURE — 80053 COMPREHEN METABOLIC PANEL: CPT

## 2023-06-13 PROCEDURE — 25000003 PHARM REV CODE 250

## 2023-06-13 PROCEDURE — 11000001 HC ACUTE MED/SURG PRIVATE ROOM

## 2023-06-13 PROCEDURE — 63600175 PHARM REV CODE 636 W HCPCS: Performed by: STUDENT IN AN ORGANIZED HEALTH CARE EDUCATION/TRAINING PROGRAM

## 2023-06-13 PROCEDURE — 80202 ASSAY OF VANCOMYCIN: CPT | Performed by: STUDENT IN AN ORGANIZED HEALTH CARE EDUCATION/TRAINING PROGRAM

## 2023-06-13 PROCEDURE — 36569 INSJ PICC 5 YR+ W/O IMAGING: CPT

## 2023-06-13 RX ORDER — SODIUM CHLORIDE 0.9 % (FLUSH) 0.9 %
10 SYRINGE (ML) INJECTION
Status: DISCONTINUED | OUTPATIENT
Start: 2023-06-13 | End: 2023-06-15 | Stop reason: HOSPADM

## 2023-06-13 RX ORDER — SODIUM CHLORIDE 0.9 % (FLUSH) 0.9 %
10 SYRINGE (ML) INJECTION EVERY 6 HOURS
Status: DISCONTINUED | OUTPATIENT
Start: 2023-06-13 | End: 2023-06-15 | Stop reason: HOSPADM

## 2023-06-13 RX ORDER — TALC
6 POWDER (GRAM) TOPICAL NIGHTLY PRN
Status: DISCONTINUED | OUTPATIENT
Start: 2023-06-13 | End: 2023-06-15 | Stop reason: HOSPADM

## 2023-06-13 RX ORDER — MUPIROCIN 20 MG/G
OINTMENT TOPICAL 2 TIMES DAILY
Status: DISCONTINUED | OUTPATIENT
Start: 2023-06-13 | End: 2023-06-15 | Stop reason: HOSPADM

## 2023-06-13 RX ORDER — FLUTICASONE PROPIONATE 50 MCG
2 SPRAY, SUSPENSION (ML) NASAL DAILY
Status: DISCONTINUED | OUTPATIENT
Start: 2023-06-13 | End: 2023-06-15 | Stop reason: HOSPADM

## 2023-06-13 RX ORDER — INSULIN ASPART 100 [IU]/ML
4 INJECTION, SOLUTION INTRAVENOUS; SUBCUTANEOUS
Status: DISCONTINUED | OUTPATIENT
Start: 2023-06-13 | End: 2023-06-14

## 2023-06-13 RX ORDER — NIFEDIPINE 30 MG/1
30 TABLET, EXTENDED RELEASE ORAL DAILY
Status: DISCONTINUED | OUTPATIENT
Start: 2023-06-13 | End: 2023-06-15 | Stop reason: HOSPADM

## 2023-06-13 RX ADMIN — PIPERACILLIN AND TAZOBACTAM 4.5 G: 4; .5 INJECTION, POWDER, LYOPHILIZED, FOR SOLUTION INTRAVENOUS; PARENTERAL at 04:06

## 2023-06-13 RX ADMIN — ATORVASTATIN CALCIUM 40 MG: 40 TABLET, FILM COATED ORAL at 08:06

## 2023-06-13 RX ADMIN — FLUTICASONE PROPIONATE 100 MCG: 50 SPRAY, METERED NASAL at 02:06

## 2023-06-13 RX ADMIN — PIPERACILLIN AND TAZOBACTAM 4.5 G: 4; .5 INJECTION, POWDER, LYOPHILIZED, FOR SOLUTION INTRAVENOUS; PARENTERAL at 12:06

## 2023-06-13 RX ADMIN — INSULIN DETEMIR 20 UNITS: 100 INJECTION, SOLUTION SUBCUTANEOUS at 08:06

## 2023-06-13 RX ADMIN — ROPINIROLE 0.5 MG: 0.25 TABLET, FILM COATED ORAL at 08:06

## 2023-06-13 RX ADMIN — GABAPENTIN 300 MG: 300 CAPSULE ORAL at 02:06

## 2023-06-13 RX ADMIN — VANCOMYCIN HYDROCHLORIDE 1250 MG: 1 INJECTION, POWDER, LYOPHILIZED, FOR SOLUTION INTRAVENOUS at 05:06

## 2023-06-13 RX ADMIN — INSULIN ASPART 4 UNITS: 100 INJECTION, SOLUTION INTRAVENOUS; SUBCUTANEOUS at 04:06

## 2023-06-13 RX ADMIN — Medication 6 MG: at 09:06

## 2023-06-13 RX ADMIN — VANCOMYCIN HYDROCHLORIDE 1500 MG: 1 INJECTION, POWDER, LYOPHILIZED, FOR SOLUTION INTRAVENOUS at 04:06

## 2023-06-13 RX ADMIN — INSULIN ASPART 4 UNITS: 100 INJECTION, SOLUTION INTRAVENOUS; SUBCUTANEOUS at 12:06

## 2023-06-13 RX ADMIN — GABAPENTIN 300 MG: 300 CAPSULE ORAL at 08:06

## 2023-06-13 RX ADMIN — NIFEDIPINE 30 MG: 30 TABLET, FILM COATED, EXTENDED RELEASE ORAL at 02:06

## 2023-06-13 RX ADMIN — MUPIROCIN: 20 OINTMENT TOPICAL at 08:06

## 2023-06-13 RX ADMIN — HYDRALAZINE HYDROCHLORIDE 10 MG: 20 INJECTION INTRAMUSCULAR; INTRAVENOUS at 08:06

## 2023-06-13 RX ADMIN — PANTOPRAZOLE SODIUM 40 MG: 40 TABLET, DELAYED RELEASE ORAL at 08:06

## 2023-06-13 RX ADMIN — ONDANSETRON 4 MG: 2 INJECTION INTRAMUSCULAR; INTRAVENOUS at 08:06

## 2023-06-13 RX ADMIN — LISINOPRIL 20 MG: 10 TABLET ORAL at 08:06

## 2023-06-13 RX ADMIN — ENOXAPARIN SODIUM 40 MG: 40 INJECTION SUBCUTANEOUS at 08:06

## 2023-06-13 RX ADMIN — HYDROCODONE BITARTRATE AND ACETAMINOPHEN 1 TABLET: 5; 325 TABLET ORAL at 08:06

## 2023-06-13 RX ADMIN — PIPERACILLIN AND TAZOBACTAM 4.5 G: 4; .5 INJECTION, POWDER, LYOPHILIZED, FOR SOLUTION INTRAVENOUS; PARENTERAL at 08:06

## 2023-06-13 RX ADMIN — INSULIN ASPART 4 UNITS: 100 INJECTION, SOLUTION INTRAVENOUS; SUBCUTANEOUS at 08:06

## 2023-06-13 RX ADMIN — INSULIN ASPART 3 UNITS: 100 INJECTION, SOLUTION INTRAVENOUS; SUBCUTANEOUS at 08:06

## 2023-06-13 RX ADMIN — SODIUM CHLORIDE, PRESERVATIVE FREE 10 ML: 5 INJECTION INTRAVENOUS at 06:06

## 2023-06-13 RX ADMIN — HYDRALAZINE HYDROCHLORIDE 10 MG: 20 INJECTION INTRAMUSCULAR; INTRAVENOUS at 12:06

## 2023-06-13 RX ADMIN — INSULIN DETEMIR 20 UNITS: 100 INJECTION, SOLUTION SUBCUTANEOUS at 12:06

## 2023-06-13 RX ADMIN — ROPINIROLE 0.5 MG: 0.25 TABLET, FILM COATED ORAL at 02:06

## 2023-06-13 NOTE — PROGRESS NOTES
[facility] Medicine Wards   Progress Note     Resident Team: [facility] Medicine List 2  Attending Physician: Monico Miranda MD  Resident: Laurence Enamorado  Intern: Antony Gallagher Mathew   Hospital Length of Stay: 0 days    Subjective:      Brief HPI:  Suki Anton is a 57 y.o. female who with a history of poorly controlled T2DM A1C 11.4, GERD, HTN,  who presented to [facility] ED on 6/12/2023 with a primary complaint of Left foot swelling, and abnormal Xray reading.      Patient was evaluated in the ED last Friday for abnormal X-ray reading of concern for chronic osteomyelitis. MRI was performed which revealed marrow and soft tissue changes within hindfoot, could be chronic osteomyelitis but was atypical. Neuropathic arthropathy/inflammatory arthropathy is favored. Patient was to be admitted for IV antibiotics last Friday, but left AMA due to work/getting house affairs in order. Now returns today for admission for IV antibiotics. Foot pain has been present for the past 5 months, initially attributed to poorly fitting shoes and standing on feet all day. Edema slowly worsened over the next 3 months before next visit in April. Denies any trauma, but does report worsening pain and edema. Increasing difficulty with weight bearing. Denies fevers/chills, no history of reactive joint disease.      In the ED, vitals Afebrile 98.6, Pulse 100, /91, 97% on RA. CBC revealed leukocytosis of 14.05, otherwise normal. CMP significant for hyperglycemia of 344. Blood cultures drawn from 6/9 are no growth at 72 hours.        Interval History: NAEON. No complaints and is doing well. Tolerating antibiotic treatment. Will be evaluated by PT. Afebrile, still above goal for hyperglycemia.       Review of Systems:  Pertinent items are noted in HPI.     Objective:     Vital Signs (Most Recent):  Temp: 98.7 °F (37.1 °C) (06/13/23 1108)  Pulse: 79 (06/13/23 1108)  Resp: 18 (06/13/23 0823)  BP: (!) 173/96 (06/13/23 1108)  SpO2: 95 % (06/13/23 1108) Vital Signs  (24h Range):  Temp:  [98.2 °F (36.8 °C)-98.7 °F (37.1 °C)] 98.7 °F (37.1 °C)  Pulse:  [] 79  Resp:  [16-18] 18  SpO2:  [93 %-98 %] 95 %  BP: (152-186)/(80-96) 173/96       Physical Examination:  Lungs: clear to auscultation bilaterally  Heart: regular rate and rhythm, S1, S2 normal, no murmur, click, rub or gallop  Abdomen: soft, non-tender; bowel sounds normal; no masses,  no organomegaly  Extremities: edematous left ankle, tender to palpation, no erythema. Noted. Pulses present.   Pulses: 2+ and symmetric  Skin: Skin color, texture, turgor normal. No rashes or lesions    Laboratory:  Most Recent Data:  CBC:   Recent Labs   Lab 06/12/23  1344 06/13/23  0318   WBC 14.05* 14.47*   HGB 12.3 11.5*   HCT 35.2* 33.6*    249     CMP:   Recent Labs   Lab 06/13/23  0318   CALCIUM 8.7   ALBUMIN 3.1*      K 3.6   CO2 25   BUN 16.1   CREATININE 0.71   ALKPHOS 101   ALT 11   AST 9   BILITOT 0.3         Microbiology Data Reviewed: yes  Pertinent Findings:  6/9/2023 Blood cultures x 2 negative @ 96 hours    Other Results:      Radiology:  Imaging Results    None         Current Medications:     Infusions:       Scheduled:   atorvastatin  40 mg Oral Daily    enoxparin  40 mg Subcutaneous Q12H    gabapentin  300 mg Oral TID    insulin aspart U-100  4 Units Subcutaneous TIDWM    insulin detemir U-100  20 Units Subcutaneous BID    lisinopriL  20 mg Oral Daily    pantoprazole  40 mg Oral Daily    piperacillin-tazobactam (Zosyn) IV (PEDS and ADULTS) (extended infusion is not appropriate)  4.5 g Intravenous Q8H    rOPINIRole  0.5 mg Oral TID    sodium chloride 0.9%  10 mL Intravenous Q6H    vancomycin (VANCOCIN) IVPB  1,250 mg Intravenous Q12H        PRN:  dextrose 10 % in water (D10W), dextrose 10 % in water (D10W), glucagon (human recombinant), glucose, glucose, hydrALAZINE, HYDROcodone-acetaminophen, insulin aspart U-100, labetalol, naloxone, ondansetron, sodium chloride 0.9%, Flushing PICC Protocol **AND** sodium  chloride 0.9% **AND** sodium chloride 0.9%, Pharmacy to dose Vancomycin consult **AND** vancomycin - pharmacy to dose    Antibiotics and Day Number of Therapy:  Vancomycin, dosing per pharmacy, 6/12/2023-present  Zosyn, 4.5g q8h, 6/12/2023-present    No intake or output data in the 24 hours ending 06/13/23 1134    Lines/Drains/Airways       Peripherally Inserted Central Catheter Line  Duration             PICC Double Lumen 06/13/23 0742 left basilic <1 day              Peripheral Intravenous Line  Duration                  Peripheral IV - Single Lumen 06/12/23 1412 20 G Left Antecubital <1 day                      Assessment & Plan:     1) Arthropathy, chronic osteomyelitis vs neuropathic arthropathy  SIRS 2/4 on admission  -4-5 month hx of foot pain, with increased swelling, increasing difficulty with bearing weight.   -X-ray concerning for chronic osteomyelitis, MRI revealing neuropathic/inflammatory arthropathy  -SIRS 2/4, tachycardic 100, leukocytosis 14, increased from 12.82, no fevers and chills  -Pending MRSA PCR  -WBC 14k, CRP 7, lab work not completely consistent with osteomyelitis.  -Currently treating as chronic osteomyelitis, continue vancomycin and zosyn  -Consulted General surgery and IR for assistance in ruling out osteomyelitis, appreciate assistance.     2) Diabetes mellitus, type 2, 4/13/23 A1C 11.4  -Currently on 35 U detemir, with moderate sliding scale insulin  -A1C improved with having medications, unable to afford medications at times  -Glucose still 200s after 35U detemir, will switch to detemir 20 U BID and add on aspart 4U TIDWM.   -Will monitor daily glucose levels     3) Hypertension  -Lisinopril 20 mg at home   -Does not measure blood pressure at home  -Continue home lisinopril  -PRN labetalol and hydralazine ordered      CODE STATUS: Full Code  Access: PICC and Peripheral  Antibiotics: Vancomycin, Zosyn  Diet: Cardiac, diabetic  DVT Prophylaxis: Lovenox 40 mg BID  GI Prophylaxis:  Protonix  Fluids: None    Disposition: day 0 of admission for concern for osteomyelitis. Continuing IV antibiotics, consulted general surgery and IR to help rule out osteomyelitis. Dispo pending course.      Dick Gallagher DO  [facility], PGY-1

## 2023-06-13 NOTE — CONSULTS
"[facility]   Consult Note  General Surgery    Patient Name: Suki Anton  YOB: 1965  Date of Admission: 6/12/2023  Date: 06/13/2023 4:32 PM  Reason for Consult: rule out left foot osteomyelitis   HD#0    PRESENTING HISTORY     Chief Complaint/Reason for Admission: left foot pain     History of Present Illness:  58yo F w/ hx of poorly controlled T2DM (A1C 11.4, and blood glucose in 250s at home), GERD, HTN, who is currently admitted for left foot pain and rule out osteomyelitis. Has been having left lower leg / foot swelling with left heel pain for a few months. Denies any history of trauma to the leg or any wounds. Was wearing shoes a few months ago that "felt like I was walking on the side of my foot", but never developed wounds or other issues and has since stopped wearing those shoes. Denies any personal or family hx of any connective tissue disorders, bone disorders, or other medical problems aside from HTN and DM. Wraps left foot and leg with Ace wraps for compression during the day and wears "high top" shoes to help with ankle support. Does not take any blood thinners. Workup shows leukocytosis of 14 on Vancomycin and zosyn. XR and MRI with radiology reads showing likely inflammatory arthropathy of the talocalcaneal junction with separation of the two bones which is atypical for osteomyelitis. Surgery consulted to evaluate for left foot osteomyelitis.      Review of Systems:  12 point ROS negative except as stated in HPI    PAST HISTORY:   Past medical history:  Past Medical History:   Diagnosis Date    Diabetes mellitus     Diabetes mellitus, type 2     GERD (gastroesophageal reflux disease)     Hypertension        Past surgical history:  History reviewed. No pertinent surgical history.    Family history:  Family History   Problem Relation Age of Onset    Cataracts Mother     Cancer Father        Social history:  Social History     Socioeconomic History    Marital status:  "    Number of children: 0   Occupational History    Occupation: Manager     Comment: [employer]   Tobacco Use    Smoking status: Never    Smokeless tobacco: Never   Substance and Sexual Activity    Alcohol use: Never    Drug use: Never    Sexual activity: Yes     Partners: Male     Birth control/protection: None     Social Determinants of Health     Financial Resource Strain: High Risk    Difficulty of Paying Living Expenses: Very hard   Food Insecurity: Food Insecurity Present    Worried About Running Out of Food in the Last Year: Often true    Ran Out of Food in the Last Year: Often true   Transportation Needs: No Transportation Needs    Lack of Transportation (Medical): No    Lack of Transportation (Non-Medical): No   Physical Activity: Insufficiently Active    Days of Exercise per Week: 3 days    Minutes of Exercise per Session: 10 min   Stress: Stress Concern Present    Feeling of Stress : Rather much   Social Connections: Moderately Integrated    Frequency of Communication with Friends and Family: More than three times a week    Frequency of Social Gatherings with Friends and Family: More than three times a week    Attends Voodoo Services: More than 4 times per year    Active Member of Clubs or Organizations: Yes    Attends Club or Organization Meetings: Never    Marital Status: Never    Housing Stability: High Risk    Unable to Pay for Housing in the Last Year: Yes    Number of Places Lived in the Last Year: 1    Unstable Housing in the Last Year: No     Social History     Tobacco Use   Smoking Status Never   Smokeless Tobacco Never         MEDICATIONS & ALLERGIES:   Allergies: Review of patient's allergies indicates:  No Known Allergies    No current facility-administered medications on file prior to encounter.     Current Outpatient Medications on File Prior to Encounter   Medication Sig    atorvastatin (LIPITOR) 40 MG tablet Take 1 tablet (40 mg total) by mouth once daily.    gabapentin  "(NEURONTIN) 300 MG capsule Take 1 capsule (300 mg total) by mouth 3 (three) times daily.    glipiZIDE (GLUCOTROL) 10 MG tablet Take 1 tablet (10 mg total) by mouth 2 (two) times daily with meals.    insulin detemir U-100, Levemir, (LEVEMIR FLEXTOUCH U100 INSULIN) 100 unit/mL (3 mL) InPn pen Inject 40 Units into the skin every evening.    lisinopriL (PRINIVIL,ZESTRIL) 20 MG tablet Take 1 tablet (20 mg total) by mouth once daily.    omeprazole (PRILOSEC) 20 MG capsule Take 2 capsules (40 mg total) by mouth once daily.    rOPINIRole (REQUIP) 0.5 MG tablet Take 1 tablet (0.5 mg total) by mouth 3 (three) times daily.    blood sugar diagnostic Strp Test strips to check CBG's    blood-glucose meter (TRUE METRIX GLUCOSE METER) kit Use as instructed    blood-glucose meter kit 1 each by Other route 2 (two) times daily. Use as instructed    cetirizine (ZYRTEC) 10 MG tablet Take 1 tablet (10 mg total) by mouth once daily. (Patient not taking: Reported on 6/8/2023)    diclofenac sodium (VOLTAREN) 1 % Gel Apply 2 g topically 4 (four) times daily.    EASY TOUCH TWIST LANCETS 33 gauge Misc USE TO CHECK BLOOD SUAGR LEVELS TWO TIMES A DAY    ibuprofen (ADVIL,MOTRIN) 800 MG tablet Take 1 tablet (800 mg total) by mouth every 6 (six) hours as needed for Pain.    lancing device Misc 1 each by Misc.(Non-Drug; Combo Route) route 2 (two) times a day.    traMADoL (ULTRAM) 50 mg tablet Take 1 tablet (50 mg total) by mouth every 6 (six) hours as needed for Pain.    TRUE METRIX GLUCOSE METER Misc use as directed    TRUEPLUS INSULIN 0.5 mL 31 gauge x 5/16" Syrg USE 1 daily       Scheduled Meds:   atorvastatin  40 mg Oral Daily    enoxparin  40 mg Subcutaneous Q12H    fluticasone propionate  2 spray Each Nostril Daily    gabapentin  300 mg Oral TID    insulin aspart U-100  4 Units Subcutaneous TIDWM    insulin detemir U-100  20 Units Subcutaneous BID    lisinopriL  20 mg Oral Daily    mupirocin   Nasal BID    NIFEdipine  30 mg Oral Daily    " pantoprazole  40 mg Oral Daily    piperacillin-tazobactam (Zosyn) IV (PEDS and ADULTS) (extended infusion is not appropriate)  4.5 g Intravenous Q8H    rOPINIRole  0.5 mg Oral TID    sodium chloride 0.9%  10 mL Intravenous Q6H    vancomycin (VANCOCIN) IVPB  1,500 mg Intravenous Q12H       Continuous Infusions:    PRN Meds:dextrose 10 % in water (D10W), dextrose 10 % in water (D10W), glucagon (human recombinant), glucose, glucose, hydrALAZINE, HYDROcodone-acetaminophen, insulin aspart U-100, labetalol, naloxone, ondansetron, sodium chloride 0.9%, Flushing PICC Protocol **AND** sodium chloride 0.9% **AND** sodium chloride 0.9%, Pharmacy to dose Vancomycin consult **AND** vancomycin - pharmacy to dose    OBJECTIVE:     Vital Signs:  Temp:  [98.2 °F (36.8 °C)-98.7 °F (37.1 °C)] 98.5 °F (36.9 °C)  Pulse:  [70-88] 84  Resp:  [16-18] 18  SpO2:  [93 %-98 %] 97 %  BP: (152-176)/(80-96) 156/85  Body mass index is 41.2 kg/m².     No intake/output data recorded.  No intake/output data recorded.    Physical Exam:  NAD, resting comfortably in chair   Breathing comfortably on room air   Abdomen soft, NT, ND   Left foot/lower leg with significant pitting edema but no evidence of chronic venous stasis or wounds. No discoloration of bilateral lower extremities (see images in media tab). Strong doppler signals in BL DP/PT (unable to palpate due to edema)   Alert and oriented x3     Laboratory:  Recent Labs     06/12/23  1344 06/13/23  0318   WBC 14.05* 14.47*   HGB 12.3 11.5*   HCT 35.2* 33.6*    249     Recent Labs     06/12/23  1344 06/13/23  0318    137   K 4.0 3.6   CO2 24 25   BUN 15.1 16.1   CREATININE 0.78 0.71   CALCIUM 8.9 8.7   ALBUMIN  --  3.1*   BILITOT  --  0.3   AST  --  9   ALKPHOS  --  101   ALT  --  11     Troponin:  No results for input(s): TROPONINI in the last 72 hours.  CBC:  Recent Labs     06/12/23  1344 06/13/23  0318   WBC 14.05* 14.47*   RBC 4.21 3.91*   HGB 12.3 11.5*   HCT 35.2* 33.6*     249   MCV 83.6 85.9   MCH 29.2 29.4   MCHC 34.9 34.2     CMP:  Recent Labs     06/13/23  0318   CALCIUM 8.7   ALBUMIN 3.1*      K 3.6   CO2 25   BUN 16.1   CREATININE 0.71   ALKPHOS 101   ALT 11   AST 9   BILITOT 0.3     Lactic Acid:  No results for input(s): LACTATE in the last 72 hours.  Etoh:  No results for input(s): ALCOHOLMEDIC in the last 72 hours.  Drug Screen:  No results for input(s): PCDSCOMETHA, COCAINEMETAB, OPIATESCREEN, BARBITURATES, AMPHETAMINES, MARIJUANATHC, PCDSOPHENCYN, CREATRANDUR, TOXINFO in the last 72 hours.    ABG:  No results for input(s): PH, PCO2, PO2, HCO3, BE, POCSATURATED in the last 72 hours.    Diagnostic Results:  X-Ray Chest 1 View for Line/Tube Placement    Result Date: 6/13/2023  Left PICC tip overlies the distal SVC. Electronically signed by: Tan Chaney Date:    06/13/2023 Time:    08:39    X-Ray Chest 1 View for Line/Tube Placement   Final Result      Left PICC tip overlies the distal SVC.         Electronically signed by: Tan Chaney   Date:    06/13/2023   Time:    08:39          ASSESSMENT & PLAN:   58yo F w/ uncontrolled T2DM who presents with left foot pain and swelling with imaging showing inflammatory arthropathy but small concern for osteomyelitis     Plan:  - Without evidence of any wounds or history of wounds on the left foot, osteomyelitis is very unlikely. Imaging per nephrology supports inflammatory arthropathy.   - no acute surgical intervention at this time   - recommend podiatry or orthopedic follow up / evaluation     Susi Sierra MD   [facility] General Surgery PGY 3  6/13/2023  4:32 PM

## 2023-06-13 NOTE — PROGRESS NOTES
Pharmacokinetic Assessment Follow Up: IV Vancomycin    Vancomycin serum concentration assessment(s):    The random level was drawn correctly and can be used to guide therapy at this time. The measurement is below the desired definitive target range of 15 to 20 mcg/mL.    Vancomycin Regimen Plan:    Change regimen to Vancomycin 1500 mg IV every 12 hours with next serum trough concentration measured at 1500 prior to 3rd dose on 6/14/2023    Drug levels (last 3 results):  Recent Labs   Lab Result Units 06/13/23  1433   Vancomycin Trough ug/ml 7.2*       Pharmacy will continue to follow and monitor vancomycin.    Please contact pharmacy at extension [phone] for questions regarding this assessment.    Thank you for the consult,   Shanell Kern       Patient brief summary:  Suki Anton is a 57 y.o. female initiated on antimicrobial therapy with IV Vancomycin for treatment of bone/joint infection    The patient's current regimen is 1500 mg q12h    Drug Allergies:   Review of patient's allergies indicates:  No Known Allergies    Actual Body Weight:   105.5 kg    Renal Function:   Estimated Creatinine Clearance: 104 mL/min (based on SCr of 0.71 mg/dL).,     Dialysis Method (if applicable):  N/A    CBC (last 72 hours):  Recent Labs   Lab Result Units 06/12/23  1344 06/13/23  0318   WBC x10(3)/mcL 14.05* 14.47*   Hgb g/dL 12.3 11.5*   Hct % 35.2* 33.6*   Platelet x10(3)/mcL 270 249   Mono % % 5.0  --    Monocyte Man %  --  7   Eos % % 4.8  --    Eos Man %  --  6   Basophil % % 0.6  --        Metabolic Panel (last 72 hours):  Recent Labs   Lab Result Units 06/12/23  1344 06/13/23  0318   Sodium Level mmol/L 138 137   Potassium Level mmol/L 4.0 3.6   Chloride mmol/L 103 102   Carbon Dioxide mmol/L 24 25   Glucose Level mg/dL 344* 213*   Blood Urea Nitrogen mg/dL 15.1 16.1   Creatinine mg/dL 0.78 0.71   Albumin Level g/dL  --  3.1*   Bilirubin Total mg/dL  --  0.3   Alkaline Phosphatase unit/L  --  101   Aspartate Aminotransferase unit/L   --  9   Alanine Aminotransferase unit/L  --  11       Vancomycin Administrations:  vancomycin given in the last 96 hours                     vancomycin (VANCOCIN) 1,250 mg in sodium chloride 0.9% 250 mL IVPB (mg) 1,250 mg New Bag 06/13/23 0504    vancomycin (VANCOCIN) 2,000 mg in sodium chloride 0.9% 500 mL IVPB (mg) 2,000 mg New Bag 06/12/23 1606                    Microbiologic Results:  Microbiology Results (last 7 days)       ** No results found for the last 168 hours. **           calm

## 2023-06-13 NOTE — PT/OT/SLP EVAL
Physical Therapy Evaluation & Discharge Note    Patient Name:  Suki Anton   MRN:  [id number]    Recommendations     Discharge Recommendations:  home   Discharge Equipment Recommendations:     Barriers to discharge: none    Assessment     Suki Anton is a 57 y.o. female admitted with a medical diagnosis of:   Patient Active Problem List   Diagnosis    Diabetes mellitus    Hyperlipidemia    Obesity    Severe acute respiratory syndrome coronavirus 2 (SARS-CoV-2) vaccination not indicated    Closed fracture of proximal end of right humerus with routine healing    .      Patient was seen by therapy for evaluation only. Patient's current level of function is at baseline (PLOF). Patient does not require further acute PT services.     Recent Surgery: * No surgery found *      Plan     Patient discharged from acute PT Services on 06/13/23.    Based on current functional levels observed, patient does not require further acute PT services.  Re-consult PT Services if patient's status changes and therapy services warranted.    Subjective     Communicated with patient's nurse Iker prior to session.    Patient agreeable to participate in evaluation.     Chief Complaint: none  Patient/Family Comments/goals: to go home  Pain/Comfort:       Patients cultural, spiritual, Sabianist conflicts given the current situation: no    Social History  Living Environment: Patient lives with their spouse in a single level home, with no steps, with tub-shower combo.  Functional Level: Prior to admission patient ambulated without assistive device and was independent in ADL's.  Equipment at Home :cane, straight  Assistance Upon Discharge: spouse.    Objective     Patient found supine in bed and with HOB elevated with peripheral IV  upon PT entry to room.    General Precautions: Standard,     Orthopedic Precautions:N/A   Braces: N/A  Respiratory Status: room air        Exams:  Orientation: Patient is oriented to Person, Place, Time,  Situation  Commands: Patient follows commands consistently  Fine Motor Coordination:     -     Intact: LLE heel shin, RLE heel shin, and Rapid alternating ankle DF/PF  RLE ROM: WNL  RLE Strength: WNL  LLE ROM: WNL  LLE Strength: WNL    Functional Mobility:    Bed Mobility:  Supine to Sit: independence    Transfers:  Sit to Stand: independence with no assistive device    Gait:  Patient ambulated 130ft with no assistive device and independence.  Patient demonstrates steady gait, symmetrical step length, and upright posture.    Other Mobility:  not assessed    Balance:  Sit  Static: NORMAL: No deviations seen in posture held statically  Dynamic: NORMAL: No deviations seen in posture held dynamically  Stand  Static: NORMAL: No deviations seen in posture held statically  Dynamic: NORMAL: No deviations seen in posture held dynamically    Patient left supine in bed and with HOB elevated with all lines intact, call button in reach, and RN notified.    Education     White board updated regarding patient's safest level of mobility with staff assistance.    Goals - No STG's or LTG's established secondary to patient was seen for evaluation and discharge     Multidisciplinary Problems       Physical Therapy Goals       Not on file                    History     Past Medical History:   Diagnosis Date    Diabetes mellitus     Diabetes mellitus, type 2     GERD (gastroesophageal reflux disease)     Hypertension      History reviewed. No pertinent surgical history.  Time Tracking     PT Received On: 06/13/23  PT Start Time: 0838     PT Stop Time: 0848  PT Total Time (min): 10 min     Billable Minutes: Evaluation , low complexity    06/13/2023

## 2023-06-13 NOTE — PROCEDURES
"Suki Anton is a 57 y.o. female patient.    Temp: 98.6 °F (37 °C) (06/13/23 0334)  Pulse: 70 (06/13/23 0334)  Resp: 16 (06/12/23 2058)  BP: (!) 162/93 (06/13/23 0334)  SpO2: 95 % (06/13/23 0334)  Weight: 105 kg (231 lb 7.7 oz) (06/12/23 1304)  Height: 5' 3" (160 cm) (06/12/23 1304)    PICC  Date/Time: 6/13/2023 7:42 AM  Performed by: Susan Ko RN  Consent Done: Yes  Time out: Immediately prior to procedure a time out was called to verify the correct patient, procedure, equipment, support staff and site/side marked as required  Indications: vascular access  Anesthesia: local infiltration  Local anesthetic: lidocaine 1% without epinephrine  Anesthetic Total (mL): 5  Preparation: skin prepped with ChloraPrep  Skin prep agent dried: skin prep agent completely dried prior to procedure  Sterile barriers: all five maximum sterile barriers used - cap, mask, sterile gown, sterile gloves, and large sterile sheet  Hand hygiene: hand hygiene performed prior to central venous catheter insertion  Location details: left basilic  Catheter type: double lumen  Catheter size: 5 Fr  Catheter Length: 43cm    Ultrasound guidance: yes  Vessel Caliber: medium and patent, compressibility normal  Needle advanced into vessel with real time Ultrasound guidance.  Guidewire confirmed in vessel.  Sterile sheath used.  Number of attempts: 1  Post-procedure: blood return through all ports, sterile dressing applied and chlorhexidine patch    Assessment: placement verified by x-ray  Complications: none  Comments: Arm circumference 33 cm. Vanc/zosyn long term        Name Susan Ko RN  6/13/2023    "

## 2023-06-13 NOTE — PROGRESS NOTES
"Inpatient Nutrition Evaluation    Admit Date: 2023   Total duration of encounter: 1 day    Nutrition Recommendation/Prescription     Diabetic, Low Na diet, no dairy per pt's request  Monitor Weights Weekly     Nutrition Assessment     Chart Review    Reason Seen: continuous nutrition monitoring    Malnutrition Screening Tool Results   Have you recently lost weight without trying?: No  Have you been eating poorly because of a decreased appetite?: No   MST Score: 0     Diagnosis:  Arthropathy, Chronic osteomyelitis vs neuropathic arthropathy, poorly controlled Dm, HTN    Relevant Medical History: poorly controlled DM, GERD, HTN    Nutrition-Related Medications: Atorvastatin, Insulin, Lisinopril, Protonix, Zosyn, Vanc    Nutrition-Related Labs:  23 -- Hgb A1C 11.4 H   23 -- Glu 213 H, K 3.6, BUN 16.1, Cr 0.71    Diet Order: Diet diabetic Cardiac, No dairy products  Oral Supplement Order: none  Appetite/Oral Intake: good/% of meals  Factors Affecting Nutritional Intake: none identified  Food/Yarsani/Cultural Preferences:  no dairy  Food Allergies: none reported    Skin Integrity: intact  Wound(s):   skin intact, left foot swelling    Comments    23 -- Pt reports good appetite, denies difficulty eating; no indication of wt loss per EMR wt hx, pt confirmed; LBM , no n/v; Glu/Hgb A1C (H) - h/o DM, diabetic diet education provided to pt    Anthropometrics    Height: 5' 3" (160 cm)    Last Weight: 105.5 kg (232 lb 9.4 oz) (23 1125) Weight Method: Bed Scale  BMI (Calculated): 41.2  BMI Classification: obese grade III (BMI >/=40)     Ideal Body Weight (IBW), Female: 115 lb     % Ideal Body Weight, Female (lb): 201.3 %                    Usual Body Weight (UBW), k kg  % Usual Body Weight: 105.72  % Weight Change From Usual Weight: 5.5 %  Usual Weight Provided By: EMR weight history    Wt Readings from Last 5 Encounters:   23 105.5 kg (232 lb 9.4 oz)   23 103 kg (227 lb) "   06/08/23 103.1 kg (227 lb 3.2 oz)   04/12/23 103.5 kg (228 lb 3.2 oz)   02/16/23 101.6 kg (224 lb)     Weight Change(s) Since Admission:  Admit Weight: 105 kg (231 lb 7.7 oz) (06/12/23 1304)      Patient Education    Education Provided: diabetic diet  Teaching Method: printed materials  Comprehension: verbalizes understanding  Barriers to Learning: desire/motivation and difficulty concentrating  Expected Compliance: fair  Comments: All questions were answered and dietitian's contact information was provided.     Monitoring & Evaluation     Dietitian will monitor food and beverage intake, weight change, and glucose/endocrine profile.  Nutrition Risk/Follow-Up: low (follow-up in 5-7 days)  Patients assigned 'low nutrition risk' status do not qualify for a full nutritional assessment but will be monitored and re-evaluated in a 5-7 day time period. Please consult if re-evaluation needed sooner.

## 2023-06-14 LAB
ALBUMIN SERPL-MCNC: 2.9 G/DL (ref 3.5–5)
ALBUMIN/GLOB SERPL: 1 RATIO (ref 1.1–2)
ALP SERPL-CCNC: 96 UNIT/L (ref 40–150)
ALT SERPL-CCNC: 10 UNIT/L (ref 0–55)
APPEARANCE UR: CLEAR
AST SERPL-CCNC: 8 UNIT/L (ref 5–34)
B-OH-BUTYR SERPL-MCNC: 0.11 MMOL/L
BACTERIA #/AREA URNS AUTO: ABNORMAL /HPF
BACTERIA BLD CULT: NORMAL
BACTERIA BLD CULT: NORMAL
BASOPHILS # BLD AUTO: 0.08 X10(3)/MCL
BASOPHILS NFR BLD AUTO: 0.5 %
BILIRUB UR QL STRIP.AUTO: NEGATIVE MG/DL
BILIRUBIN DIRECT+TOT PNL SERPL-MCNC: 0.3 MG/DL
BUN SERPL-MCNC: 14 MG/DL (ref 9.8–20.1)
CALCIUM SERPL-MCNC: 8.4 MG/DL (ref 8.4–10.2)
CHLORIDE SERPL-SCNC: 101 MMOL/L (ref 98–107)
CO2 SERPL-SCNC: 21 MMOL/L (ref 22–29)
COLOR UR: ABNORMAL
CREAT SERPL-MCNC: 0.69 MG/DL (ref 0.55–1.02)
EOSINOPHIL # BLD AUTO: 0.77 X10(3)/MCL (ref 0–0.9)
EOSINOPHIL NFR BLD AUTO: 4.9 %
ERYTHROCYTE [DISTWIDTH] IN BLOOD BY AUTOMATED COUNT: 12.2 % (ref 11.5–17)
GFR SERPLBLD CREATININE-BSD FMLA CKD-EPI: >60 MLS/MIN/1.73/M2
GLOBULIN SER-MCNC: 2.9 GM/DL (ref 2.4–3.5)
GLUCOSE SERPL-MCNC: 266 MG/DL (ref 74–100)
GLUCOSE UR QL STRIP.AUTO: ABNORMAL MG/DL
HCT VFR BLD AUTO: 34.5 % (ref 37–47)
HGB BLD-MCNC: 11.6 G/DL (ref 12–16)
HYALINE CASTS #/AREA URNS LPF: ABNORMAL /LPF
IMM GRANULOCYTES # BLD AUTO: 0.09 X10(3)/MCL (ref 0–0.04)
IMM GRANULOCYTES NFR BLD AUTO: 0.6 %
KETONES UR QL STRIP.AUTO: NEGATIVE MG/DL
LACTATE SERPL-SCNC: 2.8 MMOL/L (ref 0.5–2.2)
LACTATE SERPL-SCNC: 3 MMOL/L (ref 0.5–2.2)
LACTATE SERPL-SCNC: 3 MMOL/L (ref 0.5–2.2)
LEUKOCYTE ESTERASE UR QL STRIP.AUTO: NEGATIVE UNIT/L
LYMPHOCYTES # BLD AUTO: 4.25 X10(3)/MCL (ref 0.6–4.6)
LYMPHOCYTES NFR BLD AUTO: 27.2 %
MCH RBC QN AUTO: 28.9 PG (ref 27–31)
MCHC RBC AUTO-ENTMCNC: 33.6 G/DL (ref 33–36)
MCV RBC AUTO: 85.8 FL (ref 80–94)
MONOCYTES # BLD AUTO: 0.85 X10(3)/MCL (ref 0.1–1.3)
MONOCYTES NFR BLD AUTO: 5.4 %
MUCOUS THREADS URNS QL MICRO: ABNORMAL /LPF
NEUTROPHILS # BLD AUTO: 9.61 X10(3)/MCL (ref 2.1–9.2)
NEUTROPHILS NFR BLD AUTO: 61.4 %
NITRITE UR QL STRIP.AUTO: NEGATIVE
NRBC BLD AUTO-RTO: 0 %
PH UR STRIP.AUTO: 5.5 [PH]
PLATELET # BLD AUTO: 242 X10(3)/MCL (ref 130–400)
PMV BLD AUTO: 11.7 FL (ref 7.4–10.4)
POCT GLUCOSE: 173 MG/DL (ref 70–110)
POCT GLUCOSE: 225 MG/DL (ref 70–110)
POCT GLUCOSE: 232 MG/DL (ref 70–110)
POCT GLUCOSE: 246 MG/DL (ref 70–110)
POTASSIUM SERPL-SCNC: 3.5 MMOL/L (ref 3.5–5.1)
PROT SERPL-MCNC: 5.8 GM/DL (ref 6.4–8.3)
PROT UR QL STRIP.AUTO: NEGATIVE MG/DL
RBC # BLD AUTO: 4.02 X10(6)/MCL (ref 4.2–5.4)
RBC #/AREA URNS AUTO: ABNORMAL /HPF
RBC UR QL AUTO: NEGATIVE UNIT/L
SODIUM SERPL-SCNC: 136 MMOL/L (ref 136–145)
SP GR UR STRIP.AUTO: 1.01
SQUAMOUS #/AREA URNS LPF: ABNORMAL /HPF
UROBILINOGEN UR STRIP-ACNC: NORMAL MG/DL
VANCOMYCIN TROUGH SERPL-MCNC: 10.7 UG/ML (ref 15–20)
WBC # SPEC AUTO: 15.65 X10(3)/MCL (ref 4.5–11.5)
WBC #/AREA URNS AUTO: ABNORMAL /HPF

## 2023-06-14 PROCEDURE — 82010 KETONE BODYS QUAN: CPT | Performed by: STUDENT IN AN ORGANIZED HEALTH CARE EDUCATION/TRAINING PROGRAM

## 2023-06-14 PROCEDURE — 63600175 PHARM REV CODE 636 W HCPCS

## 2023-06-14 PROCEDURE — 85025 COMPLETE CBC W/AUTO DIFF WBC: CPT

## 2023-06-14 PROCEDURE — 83605 ASSAY OF LACTIC ACID: CPT | Performed by: STUDENT IN AN ORGANIZED HEALTH CARE EDUCATION/TRAINING PROGRAM

## 2023-06-14 PROCEDURE — 80202 ASSAY OF VANCOMYCIN: CPT

## 2023-06-14 PROCEDURE — 25000003 PHARM REV CODE 250

## 2023-06-14 PROCEDURE — 83605 ASSAY OF LACTIC ACID: CPT

## 2023-06-14 PROCEDURE — 25000003 PHARM REV CODE 250: Performed by: STUDENT IN AN ORGANIZED HEALTH CARE EDUCATION/TRAINING PROGRAM

## 2023-06-14 PROCEDURE — 11000001 HC ACUTE MED/SURG PRIVATE ROOM

## 2023-06-14 PROCEDURE — A4216 STERILE WATER/SALINE, 10 ML: HCPCS

## 2023-06-14 PROCEDURE — 80053 COMPREHEN METABOLIC PANEL: CPT

## 2023-06-14 PROCEDURE — 81001 URINALYSIS AUTO W/SCOPE: CPT

## 2023-06-14 RX ORDER — SODIUM CHLORIDE 9 MG/ML
INJECTION, SOLUTION INTRAVENOUS CONTINUOUS
Status: DISCONTINUED | OUTPATIENT
Start: 2023-06-14 | End: 2023-06-14

## 2023-06-14 RX ORDER — INSULIN ASPART 100 [IU]/ML
5 INJECTION, SOLUTION INTRAVENOUS; SUBCUTANEOUS
Status: DISCONTINUED | OUTPATIENT
Start: 2023-06-14 | End: 2023-06-15 | Stop reason: HOSPADM

## 2023-06-14 RX ORDER — SODIUM CHLORIDE 9 MG/ML
INJECTION, SOLUTION INTRAVENOUS CONTINUOUS
Status: DISCONTINUED | OUTPATIENT
Start: 2023-06-14 | End: 2023-06-15 | Stop reason: HOSPADM

## 2023-06-14 RX ORDER — SODIUM CHLORIDE 9 MG/ML
INJECTION, SOLUTION INTRAVENOUS CONTINUOUS
Status: ACTIVE | OUTPATIENT
Start: 2023-06-14 | End: 2023-06-14

## 2023-06-14 RX ADMIN — INSULIN ASPART 9 UNITS: 100 INJECTION, SOLUTION INTRAVENOUS; SUBCUTANEOUS at 12:06

## 2023-06-14 RX ADMIN — INSULIN DETEMIR 22 UNITS: 100 INJECTION, SOLUTION SUBCUTANEOUS at 08:06

## 2023-06-14 RX ADMIN — SODIUM CHLORIDE: 9 INJECTION, SOLUTION INTRAVENOUS at 04:06

## 2023-06-14 RX ADMIN — PIPERACILLIN AND TAZOBACTAM 4.5 G: 4; .5 INJECTION, POWDER, LYOPHILIZED, FOR SOLUTION INTRAVENOUS; PARENTERAL at 12:06

## 2023-06-14 RX ADMIN — PIPERACILLIN AND TAZOBACTAM 4.5 G: 4; .5 INJECTION, POWDER, LYOPHILIZED, FOR SOLUTION INTRAVENOUS; PARENTERAL at 07:06

## 2023-06-14 RX ADMIN — HYDROCODONE BITARTRATE AND ACETAMINOPHEN 1 TABLET: 5; 325 TABLET ORAL at 05:06

## 2023-06-14 RX ADMIN — SODIUM CHLORIDE, PRESERVATIVE FREE 10 ML: 5 INJECTION INTRAVENOUS at 12:06

## 2023-06-14 RX ADMIN — PIPERACILLIN AND TAZOBACTAM 4.5 G: 4; .5 INJECTION, POWDER, LYOPHILIZED, FOR SOLUTION INTRAVENOUS; PARENTERAL at 11:06

## 2023-06-14 RX ADMIN — ATORVASTATIN CALCIUM 40 MG: 40 TABLET, FILM COATED ORAL at 08:06

## 2023-06-14 RX ADMIN — Medication 6 MG: at 09:06

## 2023-06-14 RX ADMIN — INSULIN ASPART 4 UNITS: 100 INJECTION, SOLUTION INTRAVENOUS; SUBCUTANEOUS at 07:06

## 2023-06-14 RX ADMIN — MUPIROCIN: 20 OINTMENT TOPICAL at 09:06

## 2023-06-14 RX ADMIN — SODIUM CHLORIDE: 9 INJECTION, SOLUTION INTRAVENOUS at 09:06

## 2023-06-14 RX ADMIN — GABAPENTIN 300 MG: 300 CAPSULE ORAL at 08:06

## 2023-06-14 RX ADMIN — INSULIN ASPART 2 UNITS: 100 INJECTION, SOLUTION INTRAVENOUS; SUBCUTANEOUS at 09:06

## 2023-06-14 RX ADMIN — ROPINIROLE 0.5 MG: 0.25 TABLET, FILM COATED ORAL at 04:06

## 2023-06-14 RX ADMIN — INSULIN ASPART 4 UNITS: 100 INJECTION, SOLUTION INTRAVENOUS; SUBCUTANEOUS at 08:06

## 2023-06-14 RX ADMIN — HYDROCODONE BITARTRATE AND ACETAMINOPHEN 1 TABLET: 5; 325 TABLET ORAL at 11:06

## 2023-06-14 RX ADMIN — HYDROCODONE BITARTRATE AND ACETAMINOPHEN 1 TABLET: 5; 325 TABLET ORAL at 07:06

## 2023-06-14 RX ADMIN — INSULIN ASPART 7 UNITS: 100 INJECTION, SOLUTION INTRAVENOUS; SUBCUTANEOUS at 05:06

## 2023-06-14 RX ADMIN — VANCOMYCIN HYDROCHLORIDE 1500 MG: 1 INJECTION, POWDER, LYOPHILIZED, FOR SOLUTION INTRAVENOUS at 04:06

## 2023-06-14 RX ADMIN — ROPINIROLE 0.5 MG: 0.25 TABLET, FILM COATED ORAL at 09:06

## 2023-06-14 RX ADMIN — LISINOPRIL 20 MG: 10 TABLET ORAL at 08:06

## 2023-06-14 RX ADMIN — FLUTICASONE PROPIONATE 100 MCG: 50 SPRAY, METERED NASAL at 08:06

## 2023-06-14 RX ADMIN — ONDANSETRON 4 MG: 2 INJECTION INTRAMUSCULAR; INTRAVENOUS at 07:06

## 2023-06-14 RX ADMIN — GABAPENTIN 300 MG: 300 CAPSULE ORAL at 09:06

## 2023-06-14 RX ADMIN — SODIUM CHLORIDE, PRESERVATIVE FREE 10 ML: 5 INJECTION INTRAVENOUS at 11:06

## 2023-06-14 RX ADMIN — PIPERACILLIN AND TAZOBACTAM 4.5 G: 4; .5 INJECTION, POWDER, LYOPHILIZED, FOR SOLUTION INTRAVENOUS; PARENTERAL at 04:06

## 2023-06-14 RX ADMIN — PANTOPRAZOLE SODIUM 40 MG: 40 TABLET, DELAYED RELEASE ORAL at 08:06

## 2023-06-14 RX ADMIN — NIFEDIPINE 30 MG: 30 TABLET, FILM COATED, EXTENDED RELEASE ORAL at 08:06

## 2023-06-14 RX ADMIN — ENOXAPARIN SODIUM 40 MG: 40 INJECTION SUBCUTANEOUS at 09:06

## 2023-06-14 RX ADMIN — GABAPENTIN 300 MG: 300 CAPSULE ORAL at 04:06

## 2023-06-14 RX ADMIN — VANCOMYCIN HYDROCHLORIDE 1750 MG: 1 INJECTION, POWDER, LYOPHILIZED, FOR SOLUTION INTRAVENOUS at 05:06

## 2023-06-14 RX ADMIN — ROPINIROLE 0.5 MG: 0.25 TABLET, FILM COATED ORAL at 08:06

## 2023-06-14 RX ADMIN — SODIUM CHLORIDE, PRESERVATIVE FREE 10 ML: 5 INJECTION INTRAVENOUS at 05:06

## 2023-06-14 RX ADMIN — INSULIN DETEMIR 22 UNITS: 100 INJECTION, SOLUTION SUBCUTANEOUS at 09:06

## 2023-06-14 RX ADMIN — ONDANSETRON 4 MG: 2 INJECTION INTRAMUSCULAR; INTRAVENOUS at 05:06

## 2023-06-14 RX ADMIN — MUPIROCIN: 20 OINTMENT TOPICAL at 08:06

## 2023-06-14 NOTE — PROGRESS NOTES
Pharmacokinetic Assessment Follow Up: IV Vancomycin    Vancomycin serum concentration assessment(s):    The trough level was drawn correctly and can be used to guide therapy at this time. The measurement is below the desired definitive target range of 15 to 20 mcg/mL.    Vancomycin Regimen Plan:    Change regimen to Vancomycin 1750 mg IV every 12 hours with next serum trough concentration measured at 1600 prior to 3rd dose on 6/15/2023    Drug levels (last 3 results):  Recent Labs   Lab Result Units 06/13/23  1433 06/14/23  1444   Vancomycin Trough ug/ml 7.2* 10.7*       Pharmacy will continue to follow and monitor vancomycin.    Please contact pharmacy at extension [phone] for questions regarding this assessment.    Thank you for the consult,   Shanell Kern       Patient brief summary:  Suki Anton is a 57 y.o. female initiated on antimicrobial therapy with IV Vancomycin for treatment of bacteremia    The patient's current regimen is 1750 mg q12h    Drug Allergies:   Review of patient's allergies indicates:  No Known Allergies    Actual Body Weight:   105.5 kg    Renal Function:   Estimated Creatinine Clearance: 107 mL/min (based on SCr of 0.69 mg/dL).,     Dialysis Method (if applicable):  N/A    CBC (last 72 hours):  Recent Labs   Lab Result Units 06/12/23  1344 06/13/23  0318 06/14/23  0332   WBC x10(3)/mcL 14.05* 14.47* 15.65*   Hgb g/dL 12.3 11.5* 11.6*   Hct % 35.2* 33.6* 34.5*   Platelet x10(3)/mcL 270 249 242   Mono % % 5.0  --  5.4   Monocyte Man %  --  7  --    Eos % % 4.8  --  4.9   Eos Man %  --  6  --    Basophil % % 0.6  --  0.5       Metabolic Panel (last 72 hours):  Recent Labs   Lab Result Units 06/12/23  1344 06/13/23  0318 06/14/23  0332 06/14/23  1117   Sodium Level mmol/L 138 137 136  --    Potassium Level mmol/L 4.0 3.6 3.5  --    Chloride mmol/L 103 102 101  --    Carbon Dioxide mmol/L 24 25 21*  --    Glucose Level mg/dL 344* 213* 266*  --    Glucose, UA mg/dL  --   --   --  3+*   Blood Urea  Nitrogen mg/dL 15.1 16.1 14.0  --    Creatinine mg/dL 0.78 0.71 0.69  --    Albumin Level g/dL  --  3.1* 2.9*  --    Bilirubin Total mg/dL  --  0.3 0.3  --    Alkaline Phosphatase unit/L  --  101 96  --    Aspartate Aminotransferase unit/L  --  9 8  --    Alanine Aminotransferase unit/L  --  11 10  --        Vancomycin Administrations:  vancomycin given in the last 96 hours                     vancomycin (VANCOCIN) 1,500 mg in dextrose 5 % (D5W) 250 mL IVPB (mg) 1,500 mg New Bag 06/14/23 0427     1,500 mg New Bag 06/13/23 1602    vancomycin (VANCOCIN) 1,250 mg in sodium chloride 0.9% 250 mL IVPB (mg) 1,250 mg New Bag 06/13/23 0504    vancomycin (VANCOCIN) 2,000 mg in sodium chloride 0.9% 500 mL IVPB (mg) 2,000 mg New Bag 06/12/23 1606                    Microbiologic Results:  Microbiology Results (last 7 days)       ** No results found for the last 168 hours. **

## 2023-06-14 NOTE — PLAN OF CARE
Problem: Adult Inpatient Plan of Care  Goal: Plan of Care Review  Outcome: Ongoing, Progressing  Flowsheets (Taken 6/14/2023 1824)  Plan of Care Reviewed With: patient  Goal: Absence of Hospital-Acquired Illness or Injury  Outcome: Ongoing, Progressing  Intervention: Identify and Manage Fall Risk  Flowsheets (Taken 6/14/2023 1824)  Safety Promotion/Fall Prevention:   assistive device/personal item within reach   Fall Risk reviewed with patient/family   nonskid shoes/socks when out of bed   medications reviewed   side rails raised x 2   instructed to call staff for mobility  Intervention: Prevent Skin Injury  Flowsheets (Taken 6/14/2023 1824)  Body Position: position changed independently  Skin Protection: tubing/devices free from skin contact

## 2023-06-14 NOTE — PLAN OF CARE
Problem: Adult Inpatient Plan of Care  Goal: Plan of Care Review  Outcome: Ongoing, Progressing  Goal: Patient-Specific Goal (Individualized)  Outcome: Ongoing, Progressing  Goal: Absence of Hospital-Acquired Illness or Injury  Outcome: Ongoing, Progressing  Goal: Optimal Comfort and Wellbeing  Outcome: Ongoing, Progressing  Goal: Readiness for Transition of Care  Outcome: Ongoing, Progressing     Problem: Bariatric Environmental Safety  Goal: Safety Maintained with Care  Outcome: Ongoing, Progressing     Problem: Diabetes Comorbidity  Goal: Blood Glucose Level Within Targeted Range  Outcome: Ongoing, Progressing     Problem: Infection  Goal: Absence of Infection Signs and Symptoms  Outcome: Ongoing, Progressing     Problem: Pain Acute  Goal: Acceptable Pain Control and Functional Ability  Outcome: Ongoing, Progressing

## 2023-06-14 NOTE — PROGRESS NOTES
[facility] Wards   Progress Note     Resident Team: [facility] Medicine List 2  Attending Physician: Monico Miranda MD  Resident: Laurence Enamorado  Intern: Antony Gallagher Mathew   Hospital Length of Stay: 3 days    Subjective:      Brief HPI:  Suki Anton is a 57 y.o. female who with a history of poorly controlled T2DM A1C 11.4, GERD, HTN,  who presented to [facility] ED on 6/12/2023 with a primary complaint of Left foot swelling, and abnormal Xray reading.      Patient was evaluated in the ED last Friday for abnormal X-ray reading of concern for chronic osteomyelitis. MRI was performed which revealed marrow and soft tissue changes within hindfoot, could be chronic osteomyelitis but was atypical. Neuropathic arthropathy/inflammatory arthropathy is favored. Patient was to be admitted for IV antibiotics last Friday, but left AMA due to work/getting house affairs in order. Now returns today for admission for IV antibiotics. Foot pain has been present for the past 5 months, initially attributed to poorly fitting shoes and standing on feet all day. Edema slowly worsened over the next 3 months before next visit in April. Denies any trauma, but does report worsening pain and edema. Increasing difficulty with weight bearing. Denies fevers/chills, no history of reactive joint disease.      In the ED, vitals Afebrile 98.6, Pulse 100, /91, 97% on RA. CBC revealed leukocytosis of 14.05, otherwise normal. CMP significant for hyperglycemia of 344. Blood cultures drawn from 6/9 are no growth at 72 hours.        Interval History:  No acute events overnight.  Blood sugars remain in the mid 200s despite split dosing of 40 units Levemir, instituting 4 units aspart t.i.d. a.c..  Patient states that she needs to work tomorrow.  Was seen by General surgery day prior and no acute surgical 0s plan at this time though recommended podiatry/orthopedic follow up. Noted to have a gap acidosis with increased lactic acid today, as well as increasing white  count possibly associated with dehydration.       Review of Systems:  Pertinent items are noted in HPI.     Objective:     Vital Signs (Most Recent):  Temp: 97.9 °F (36.6 °C) (06/14/23 0742)  Pulse: 98 (06/14/23 1054)  Resp: 18 (06/14/23 0751)  BP: 118/68 (06/14/23 1054)  SpO2: 97 % (06/14/23 1054) Vital Signs (24h Range):  Temp:  [97.5 °F (36.4 °C)-98.5 °F (36.9 °C)] 97.9 °F (36.6 °C)  Pulse:  [81-98] 98  Resp:  [18] 18  SpO2:  [93 %-97 %] 97 %  BP: (111-170)/(68-85) 118/68       Physical Examination:  Lungs: clear to auscultation bilaterally  Heart: regular rate and rhythm, S1, S2 normal, no murmur, click, rub or gallop  Abdomen: soft, non-tender; bowel sounds normal; no masses,  no organomegaly  Extremities: edematous left ankle, tender to palpation, no erythema. Noted. Pulses present.   Pulses: 2+ and symmetric  Skin: Skin color, texture, turgor normal. No rashes or lesions    Laboratory:  Most Recent Data:  CBC:   Recent Labs   Lab 06/12/23  1344 06/13/23  0318 06/14/23  0332   WBC 14.05* 14.47* 15.65*   HGB 12.3 11.5* 11.6*   HCT 35.2* 33.6* 34.5*    249 242     CMP:   Recent Labs   Lab 06/14/23  0332   CALCIUM 8.4   ALBUMIN 2.9*      K 3.5   CO2 21*   BUN 14.0   CREATININE 0.69   ALKPHOS 96   ALT 10   AST 8   BILITOT 0.3         Microbiology Data Reviewed: yes  Pertinent Findings:  6/9/2023 Blood cultures NGTD  6/14/2023 Blood cultures repeated    Other Results:      Radiology:  Imaging Results    None         Current Medications:     Infusions:       Scheduled:   atorvastatin  40 mg Oral Daily    enoxparin  40 mg Subcutaneous Q12H    fluticasone propionate  2 spray Each Nostril Daily    gabapentin  300 mg Oral TID    insulin aspart U-100  5 Units Subcutaneous TIDWM    insulin detemir U-100  22 Units Subcutaneous BID    lisinopriL  20 mg Oral Daily    mupirocin   Nasal BID    NIFEdipine  30 mg Oral Daily    pantoprazole  40 mg Oral Daily    piperacillin-tazobactam (Zosyn) IV (PEDS and ADULTS)  (extended infusion is not appropriate)  4.5 g Intravenous Q8H    rOPINIRole  0.5 mg Oral TID    sodium chloride 0.9%  10 mL Intravenous Q6H    vancomycin (VANCOCIN) IVPB  1,500 mg Intravenous Q12H        PRN:  dextrose 10 % in water (D10W), dextrose 10 % in water (D10W), glucagon (human recombinant), glucose, glucose, hydrALAZINE, HYDROcodone-acetaminophen, insulin aspart U-100, labetalol, melatonin, naloxone, ondansetron, sodium chloride 0.9%, Flushing PICC Protocol **AND** sodium chloride 0.9% **AND** sodium chloride 0.9%, vancomycin - pharmacy to dose    Antibiotics and Day Number of Therapy:  Antibiotics (From admission, onward)      Start     Stop Route Frequency Ordered    06/14/23 1600  vancomycin (VANCOCIN) 1,500 mg in dextrose 5 % (D5W) 250 mL IVPB         -- IV Every 12 hours (non-standard times) 06/14/23 1006    06/14/23 1105  vancomycin - pharmacy to dose         -- IV pharmacy to manage frequency 06/14/23 1006    06/13/23 2100  mupirocin 2 % ointment         06/18 2059 Nasl 2 times daily 06/13/23 1336    06/12/23 1600  piperacillin-tazobactam (ZOSYN) 4.5 g in dextrose 5 % in water (D5W) 5 % 100 mL IVPB (MB+)         -- IV Every 8 hours (non-standard times) 06/12/23 1454                Intake/Output Summary (Last 24 hours) at 6/14/2023 1110  Last data filed at 6/14/2023 0557  Gross per 24 hour   Intake 450 ml   Output --   Net 450 ml       Lines/Drains/Airways       Peripherally Inserted Central Catheter Line  Duration             PICC Double Lumen 06/13/23 0742 left basilic 1 day              Peripheral Intravenous Line  Duration                  Peripheral IV - Single Lumen 06/12/23 1412 20 G Left Antecubital 1 day                      Assessment & Plan:     1) Arthropathy, chronic osteomyelitis vs neuropathic arthropathy  SIRS 2/4 on admission- white count uptrending with positive lactic acid  -4-5 month hx of foot pain, with increased swelling, increasing difficulty with bearing weight.   -X-ray  concerning for chronic osteomyelitis, MRI revealing neuropathic/inflammatory arthropathy  -SIRS 2/4, initially with tachycardic 100, leukocytosis 14, increased from 12.82, no fevers and chills  -currently tachycardia has been resolved, however is present on ambulation  -lactic acidosis present with 3.0 lactic and gap, negative BHOB  -getting orthostatics, repeating blood cultures, continuing vanc despite MRSA PCR negative, in the setting of possible bacteremia though likely dehydration state  -white count 15.65 today  -Currently treating as chronic osteomyelitis, continue zosyn  -general surgery saw patient with no plans for acute intervention at this time, recommend ortho/podiatry follow up     2) Diabetes mellitus, type 2, 4/13/23 A1C 11.4  -Currently on 35 U detemir, with moderate sliding scale insulin  -A1C improved with having medications, unable to afford medications at times  -Glucose still 200s   -we will increase Levemir to 22 units b.i.d., aspart 5 units t.i.d. a.c..  -Will monitor daily glucose levels     3) Hypertension  -Lisinopril 20 mg at home   -Does not measure blood pressure at home  -Continue home lisinopril  -PRN labetalol and hydralazine ordered      CODE STATUS: Full Code  Access: PICC and Peripheral  Antibiotics: Vancomycin, Zosyn  Diet: Cardiac, diabetic  DVT Prophylaxis: Lovenox 40 mg BID  GI Prophylaxis: Protonix  Fluids: None    Disposition: day 3 of admission lesser concern for chronic osteo though Continuing IV antibiotics in the setting of increased white count, repeated blood cultures, PICC line placed though less likely osteomyelitis, will work CTM lactic acidosis    Mani Hardy MD  [facility] PGY-1

## 2023-06-15 VITALS
SYSTOLIC BLOOD PRESSURE: 144 MMHG | TEMPERATURE: 98 F | OXYGEN SATURATION: 96 % | RESPIRATION RATE: 18 BRPM | DIASTOLIC BLOOD PRESSURE: 84 MMHG | HEART RATE: 76 BPM | HEIGHT: 63 IN | BODY MASS INDEX: 41.21 KG/M2 | WEIGHT: 232.56 LBS

## 2023-06-15 PROBLEM — G89.29 CHRONIC HEEL PAIN, LEFT: Status: ACTIVE | Noted: 2023-06-15

## 2023-06-15 PROBLEM — M21.962 LEFT ANKLE JOINT DEFORMITY: Status: ACTIVE | Noted: 2023-06-15

## 2023-06-15 PROBLEM — M79.672 CHRONIC HEEL PAIN, LEFT: Status: ACTIVE | Noted: 2023-06-15

## 2023-06-15 PROBLEM — M79.672 CHRONIC HEEL PAIN, LEFT: Status: RESOLVED | Noted: 2023-06-15 | Resolved: 2023-06-15

## 2023-06-15 PROBLEM — G89.29 CHRONIC HEEL PAIN, LEFT: Status: RESOLVED | Noted: 2023-06-15 | Resolved: 2023-06-15

## 2023-06-15 LAB
ALBUMIN SERPL-MCNC: 2.9 G/DL (ref 3.5–5)
ALBUMIN/GLOB SERPL: 1 RATIO (ref 1.1–2)
ALP SERPL-CCNC: 91 UNIT/L (ref 40–150)
ALT SERPL-CCNC: 12 UNIT/L (ref 0–55)
AST SERPL-CCNC: 8 UNIT/L (ref 5–34)
BASOPHILS # BLD AUTO: 0.07 X10(3)/MCL
BASOPHILS NFR BLD AUTO: 0.5 %
BILIRUBIN DIRECT+TOT PNL SERPL-MCNC: 0.3 MG/DL
BUN SERPL-MCNC: 14.5 MG/DL (ref 9.8–20.1)
CALCIUM SERPL-MCNC: 8.1 MG/DL (ref 8.4–10.2)
CHLORIDE SERPL-SCNC: 106 MMOL/L (ref 98–107)
CO2 SERPL-SCNC: 21 MMOL/L (ref 22–29)
CREAT SERPL-MCNC: 0.75 MG/DL (ref 0.55–1.02)
EOSINOPHIL # BLD AUTO: 0.8 X10(3)/MCL (ref 0–0.9)
EOSINOPHIL NFR BLD AUTO: 5.4 %
ERYTHROCYTE [DISTWIDTH] IN BLOOD BY AUTOMATED COUNT: 12.6 % (ref 11.5–17)
GFR SERPLBLD CREATININE-BSD FMLA CKD-EPI: >60 MLS/MIN/1.73/M2
GLOBULIN SER-MCNC: 2.9 GM/DL (ref 2.4–3.5)
GLUCOSE SERPL-MCNC: 267 MG/DL (ref 74–100)
HCT VFR BLD AUTO: 33.7 % (ref 37–47)
HGB BLD-MCNC: 11.3 G/DL (ref 12–16)
IMM GRANULOCYTES # BLD AUTO: 0.07 X10(3)/MCL (ref 0–0.04)
IMM GRANULOCYTES NFR BLD AUTO: 0.5 %
LACTATE SERPL-SCNC: 2.2 MMOL/L (ref 0.5–2.2)
LACTATE SERPL-SCNC: 2.5 MMOL/L (ref 0.5–2.2)
LYMPHOCYTES # BLD AUTO: 3.65 X10(3)/MCL (ref 0.6–4.6)
LYMPHOCYTES NFR BLD AUTO: 24.7 %
MCH RBC QN AUTO: 28.8 PG (ref 27–31)
MCHC RBC AUTO-ENTMCNC: 33.5 G/DL (ref 33–36)
MCV RBC AUTO: 86 FL (ref 80–94)
MONOCYTES # BLD AUTO: 0.77 X10(3)/MCL (ref 0.1–1.3)
MONOCYTES NFR BLD AUTO: 5.2 %
NEUTROPHILS # BLD AUTO: 9.41 X10(3)/MCL (ref 2.1–9.2)
NEUTROPHILS NFR BLD AUTO: 63.7 %
NRBC BLD AUTO-RTO: 0 %
PLATELET # BLD AUTO: 223 X10(3)/MCL (ref 130–400)
PMV BLD AUTO: 11.9 FL (ref 7.4–10.4)
POCT GLUCOSE: 197 MG/DL (ref 70–110)
POTASSIUM SERPL-SCNC: 4 MMOL/L (ref 3.5–5.1)
PROT SERPL-MCNC: 5.8 GM/DL (ref 6.4–8.3)
RBC # BLD AUTO: 3.92 X10(6)/MCL (ref 4.2–5.4)
SODIUM SERPL-SCNC: 137 MMOL/L (ref 136–145)
WBC # SPEC AUTO: 14.77 X10(3)/MCL (ref 4.5–11.5)

## 2023-06-15 PROCEDURE — A4216 STERILE WATER/SALINE, 10 ML: HCPCS

## 2023-06-15 PROCEDURE — 63600175 PHARM REV CODE 636 W HCPCS

## 2023-06-15 PROCEDURE — 25000003 PHARM REV CODE 250

## 2023-06-15 PROCEDURE — 85025 COMPLETE CBC W/AUTO DIFF WBC: CPT

## 2023-06-15 PROCEDURE — 83605 ASSAY OF LACTIC ACID: CPT

## 2023-06-15 PROCEDURE — 80053 COMPREHEN METABOLIC PANEL: CPT

## 2023-06-15 RX ORDER — LISINOPRIL 40 MG/1
40 TABLET ORAL DAILY
Qty: 90 TABLET | Refills: 0 | Status: SHIPPED | OUTPATIENT
Start: 2023-06-15 | End: 2023-10-03 | Stop reason: SDUPTHER

## 2023-06-15 RX ORDER — GABAPENTIN 300 MG/1
600 CAPSULE ORAL 3 TIMES DAILY
Qty: 180 CAPSULE | Refills: 2 | Status: SHIPPED | OUTPATIENT
Start: 2023-06-15 | End: 2023-10-03 | Stop reason: SDUPTHER

## 2023-06-15 RX ORDER — ONDANSETRON 4 MG/1
4 TABLET, ORALLY DISINTEGRATING ORAL 2 TIMES DAILY
Qty: 60 TABLET | Refills: 2 | Status: SHIPPED | OUTPATIENT
Start: 2023-06-15 | End: 2023-09-13

## 2023-06-15 RX ORDER — TRAMADOL HYDROCHLORIDE 50 MG/1
50 TABLET ORAL EVERY 6 HOURS PRN
Qty: 20 TABLET | Refills: 0 | Status: SHIPPED | OUTPATIENT
Start: 2023-06-15 | End: 2023-07-05

## 2023-06-15 RX ORDER — INSULIN DETEMIR 100 [IU]/ML
25 INJECTION, SOLUTION SUBCUTANEOUS 2 TIMES DAILY
Qty: 15 ML | Refills: 5 | Status: SHIPPED | OUTPATIENT
Start: 2023-06-15 | End: 2023-12-21 | Stop reason: SDUPTHER

## 2023-06-15 RX ORDER — INSULIN ASPART 100 [IU]/ML
7 INJECTION, SOLUTION INTRAVENOUS; SUBCUTANEOUS
Qty: 18.9 ML | Refills: 1 | Status: SHIPPED | OUTPATIENT
Start: 2023-06-15 | End: 2023-12-21 | Stop reason: SDUPTHER

## 2023-06-15 RX ADMIN — ATORVASTATIN CALCIUM 40 MG: 40 TABLET, FILM COATED ORAL at 08:06

## 2023-06-15 RX ADMIN — ONDANSETRON 4 MG: 2 INJECTION INTRAMUSCULAR; INTRAVENOUS at 09:06

## 2023-06-15 RX ADMIN — MUPIROCIN: 20 OINTMENT TOPICAL at 08:06

## 2023-06-15 RX ADMIN — VANCOMYCIN HYDROCHLORIDE 1750 MG: 1 INJECTION, POWDER, LYOPHILIZED, FOR SOLUTION INTRAVENOUS at 05:06

## 2023-06-15 RX ADMIN — ENOXAPARIN SODIUM 40 MG: 40 INJECTION SUBCUTANEOUS at 08:06

## 2023-06-15 RX ADMIN — PIPERACILLIN AND TAZOBACTAM 4.5 G: 4; .5 INJECTION, POWDER, LYOPHILIZED, FOR SOLUTION INTRAVENOUS; PARENTERAL at 08:06

## 2023-06-15 RX ADMIN — SODIUM CHLORIDE, PRESERVATIVE FREE 10 ML: 5 INJECTION INTRAVENOUS at 05:06

## 2023-06-15 RX ADMIN — NIFEDIPINE 30 MG: 30 TABLET, FILM COATED, EXTENDED RELEASE ORAL at 08:06

## 2023-06-15 RX ADMIN — INSULIN ASPART 5 UNITS: 100 INJECTION, SOLUTION INTRAVENOUS; SUBCUTANEOUS at 08:06

## 2023-06-15 RX ADMIN — INSULIN DETEMIR 22 UNITS: 100 INJECTION, SOLUTION SUBCUTANEOUS at 08:06

## 2023-06-15 RX ADMIN — PANTOPRAZOLE SODIUM 40 MG: 40 TABLET, DELAYED RELEASE ORAL at 08:06

## 2023-06-15 RX ADMIN — GABAPENTIN 300 MG: 300 CAPSULE ORAL at 08:06

## 2023-06-15 RX ADMIN — FLUTICASONE PROPIONATE 100 MCG: 50 SPRAY, METERED NASAL at 09:06

## 2023-06-15 RX ADMIN — SODIUM CHLORIDE: 9 INJECTION, SOLUTION INTRAVENOUS at 05:06

## 2023-06-15 RX ADMIN — LISINOPRIL 20 MG: 10 TABLET ORAL at 08:06

## 2023-06-15 RX ADMIN — ROPINIROLE 0.5 MG: 0.25 TABLET, FILM COATED ORAL at 08:06

## 2023-06-15 RX ADMIN — HYDROCODONE BITARTRATE AND ACETAMINOPHEN 1 TABLET: 5; 325 TABLET ORAL at 09:06

## 2023-06-15 RX ADMIN — INSULIN ASPART 2 UNITS: 100 INJECTION, SOLUTION INTRAVENOUS; SUBCUTANEOUS at 08:06

## 2023-06-15 NOTE — DISCHARGE SUMMARY
[facility] Discharge Summary    Admitting Physician: Monico Miranda MD  Attending Physician: Monico Miranda MD  Date of Admit: 6/12/2023  Date of Discharge: 6/15/2023    Condition: Stable  Outcome: Patient tolerated treatment/procedure well without complication and is now ready for discharge.  DISPOSITION: Home or Self Care        Discharge Diagnoses:     Patient Active Problem List   Diagnosis    Diabetes mellitus    Hyperlipidemia    Obesity    Severe acute respiratory syndrome coronavirus 2 (SARS-CoV-2) vaccination not indicated    Closed fracture of proximal end of right humerus with routine healing    Left ankle joint deformity       Principal Problem:  Left ankle joint deformity    Consultants and Procedures:     Consultants:  IP CONSULT TO INTERNAL MEDICINE  IP CONSULT TO SOCIAL WORK/CASE MANAGEMENT  IP CONSULT TO PICC TEAM (NIAS)  IP CONSULT TO GENERAL SURGERY  IP CONSULT TO INTERVENTIONAL RADIOLOGY  PHARMACY TO DOSE VANCOMYCIN CONSULT    Procedures:   * No surgery found *      Brief Admission History:      Suki Anton is a 57 y.o. female who with a history of poorly controlled T2DM A1C 11.4, GERD, HTN,  who presented to [facility] ED on 6/12/2023 with a primary complaint of Left foot swelling, and abnormal Xray reading.      Patient was evaluated in the ED last Friday for abnormal X-ray reading of concern for chronic osteomyelitis. MRI was performed which revealed marrow and soft tissue changes within hindfoot, could be chronic osteomyelitis but was atypical. Neuropathic arthropathy/inflammatory arthropathy is favored. Patient was to be admitted for IV antibiotics last Friday, but left AMA due to work/getting house affairs in order. Now returns today for admission for IV antibiotics. Foot pain has been present for the past 5 months, initially attributed to poorly fitting shoes and standing on feet all day. Edema slowly worsened over the next 3 months before next visit in April. Denies any trauma, but  "does report worsening pain and edema. Increasing difficulty with weight bearing. Denies fevers/chills, no history of reactive joint disease.      In the ED, vitals Afebrile 98.6, Pulse 100, /91, 97% on RA. CBC revealed leukocytosis of 14.05, otherwise normal. CMP significant for hyperglycemia of 344. Blood cultures drawn from 6/9 are no growth at 72 hours.     Hospital Course with Pertinent Findings:      Patient was admitted to internal medicine for IV antibiotics. Atypical presentation on MRI for chronic osteomyelitis. General surgery consulted for further evaluation of possible chronic osteomyelitis, however, without injury/open wounds, determined that it was unlikely chronic osteomyelitis and recommended outpatient follow up with orthopedics/podiatry. While hospitalized, she did develop a lactic acidosis, to which she admitted not drinking enough fluids. DKA was ruled out due to negative beta-hydroxybutyrate. She was bolused with fluids and her lactic acid levels returned to normal. Patient status improved and vitals improved prior to discharge. Changed insulin regimen to Levemir 25U BID and Aspart 7 TIDWM. Lisinopril uptitrated to 40 mg qd. Will follow up on glucose logs and blood pressure control in post wards clinic. Patient was given strict ED precautions. Vitals stable upon discharge.     Discharge physical exam:  Vitals  BP: (!) 144/84  Temp: 97.8 °F (36.6 °C)  Temp Source: Oral  Pulse: 76  Resp: 18  SpO2: 96 %  Height: 5' 3" (160 cm)  Weight: 105.5 kg (232 lb 9.4 oz)    Physical Exam  Vitals reviewed.   Constitutional:       Appearance: Normal appearance. She is obese.   HENT:      Mouth/Throat:      Mouth: Mucous membranes are moist.   Eyes:      Pupils: Pupils are equal, round, and reactive to light.   Cardiovascular:      Rate and Rhythm: Normal rate and regular rhythm.      Pulses: Normal pulses.      Heart sounds: Normal heart sounds. No murmur heard.    No friction rub. No gallop.   Pulmonary: "      Effort: Pulmonary effort is normal. No respiratory distress.      Breath sounds: Normal breath sounds. No wheezing.   Abdominal:      General: There is no distension.      Palpations: Abdomen is soft.   Musculoskeletal:         General: Swelling (left ankle swelling) present. Normal range of motion.   Skin:     General: Skin is warm and dry.      Capillary Refill: Capillary refill takes less than 2 seconds.      Coloration: Skin is not jaundiced or pale.   Neurological:      General: No focal deficit present.      Mental Status: She is alert and oriented to person, place, and time.       TIME SPENT ON DISCHARGE: 35 minutes    Discharge Medications:         Medication List        START taking these medications      insulin aspart U-100 100 unit/mL (3 mL) Inpn pen  Commonly known as: NovoLOG  Inject 7 Units into the skin 3 (three) times daily with meals.     ondansetron 4 MG Tbdl  Commonly known as: ZOFRAN-ODT  Take 1 tablet (4 mg total) by mouth 2 (two) times daily.            CHANGE how you take these medications      gabapentin 300 MG capsule  Commonly known as: NEURONTIN  Take 2 capsules (600 mg total) by mouth 3 (three) times daily.  What changed: how much to take     LEVEMIR FLEXTOUCH U100 INSULIN 100 unit/mL (3 mL) Inpn pen  Generic drug: insulin detemir U-100 (Levemir)  Inject 25 Units into the skin 2 (two) times daily.  What changed:   how much to take  when to take this     lisinopriL 40 MG tablet  Commonly known as: PRINIVIL,ZESTRIL  Take 1 tablet (40 mg total) by mouth once daily.  What changed:   medication strength  how much to take            CONTINUE taking these medications      atorvastatin 40 MG tablet  Commonly known as: LIPITOR  Take 1 tablet (40 mg total) by mouth once daily.     blood sugar diagnostic Strp  Test strips to check CBG's     * blood-glucose meter kit  Commonly known as: TRUE METRIX GLUCOSE METER  Use as instructed     * blood-glucose meter kit  1 each by Other route 2 (two)  "times daily. Use as instructed     * TRUE METRIX GLUCOSE METER Misc  Generic drug: blood-glucose meter     diclofenac sodium 1 % Gel  Commonly known as: VOLTAREN  Apply 2 g topically 4 (four) times daily.     EASY TOUCH TWIST LANCETS 33 gauge Misc  Generic drug: lancets  USE TO CHECK BLOOD SUAGR LEVELS TWO TIMES A DAY     glipiZIDE 10 MG tablet  Commonly known as: GLUCOTROL  Take 1 tablet (10 mg total) by mouth 2 (two) times daily with meals.     ibuprofen 800 MG tablet  Commonly known as: ADVIL,MOTRIN  Take 1 tablet (800 mg total) by mouth every 6 (six) hours as needed for Pain.     lancing device Misc  1 each by Misc.(Non-Drug; Combo Route) route 2 (two) times a day.     omeprazole 20 MG capsule  Commonly known as: PRILOSEC  Take 2 capsules (40 mg total) by mouth once daily.     rOPINIRole 0.5 MG tablet  Commonly known as: REQUIP  Take 1 tablet (0.5 mg total) by mouth 3 (three) times daily.     TRUEPLUS INSULIN 0.5 mL 31 gauge x 5/16" Syrg  Generic drug: insulin syringe-needle U-100           * This list has 3 medication(s) that are the same as other medications prescribed for you. Read the directions carefully, and ask your doctor or other care provider to review them with you.                STOP taking these medications      cetirizine 10 MG tablet  Commonly known as: ZYRTEC     traMADoL 50 mg tablet  Commonly known as: ULTRAM               Where to Get Your Medications        These medications were sent to [address] [address]      Phone: [phone]   gabapentin 300 MG capsule  insulin aspart U-100 100 unit/mL (3 mL) Inpn pen  LEVEMIR FLEXTOUCH U100 INSULIN 100 unit/mL (3 mL) Inpn pen  lisinopriL 40 MG tablet  ondansetron 4 MG Tbdl         Discharge Instructions:         Suki Anton is being discharged Home or Self Care.    Discharge Procedure Orders   Ambulatory referral/consult to Internal Medicine   Standing " Status: Future   Referral Priority: Routine Referral Type: Consultation   Referral Reason: Specialty Services Required   Referred to Provider: DICK GALLAGHER Requested Specialty: Internal Medicine   Number of Visits Requested: 1        Follow-Up Appointments:  Follow up with internal medicine, post wards clinic with Dr. Gallagher.       To address at follow-up:  -The following labs are to be drawn at the [facility] visit: None  -The following imaging studies are to be ordered at the [facility] visit: None    At this time, Suki Anton is determined to have maximized benefits of IP hospitalization. she is discharged in stable condition with OP f/u recommendations and instructions. All questions answered, and patient verbalized agreement with the POC. They were given return precautions prior to d/c including symptoms that should prompt return to ED or to call PCP. Total time spent of DC of 35 minutes.       Dick Gallagher DO  [facility] Internal Medicine, PGY-1

## 2023-06-19 ENCOUNTER — PATIENT MESSAGE (OUTPATIENT)
Dept: ADMINISTRATIVE | Facility: CLINIC | Age: 58
End: 2023-06-19

## 2023-06-19 ENCOUNTER — PATIENT OUTREACH (OUTPATIENT)
Dept: ADMINISTRATIVE | Facility: CLINIC | Age: 58
End: 2023-06-19

## 2023-06-19 NOTE — PROGRESS NOTES
C3 nurse attempted to contact Suki Desean for a TCC post hospital discharge follow up call. No answer. Left voicemail with callback information. The patient has a scheduled HOSFU appointment with Brooke Mathur MD on  Jul 3, 2023 1:15 PM @ .

## 2023-06-19 NOTE — PROGRESS NOTES
C3 nurse attempted to contact Suki Anton for a TCC post hospital discharge follow up call. Patient requested call back in about 30 minutes. The patient has a scheduled HOSFU appointment with Brooke Mathur MD on  Jul 3, 2023 1:15 PM.

## 2023-06-21 ENCOUNTER — PATIENT MESSAGE (OUTPATIENT)
Dept: ADMINISTRATIVE | Facility: HOSPITAL | Age: 58
End: 2023-06-21

## 2023-06-26 ENCOUNTER — TELEPHONE (OUTPATIENT)
Dept: INTERNAL MEDICINE | Facility: CLINIC | Age: 58
End: 2023-06-26

## 2023-06-26 DIAGNOSIS — M21.962 LEFT ANKLE JOINT DEFORMITY: Primary | ICD-10-CM

## 2023-06-26 NOTE — TELEPHONE ENCOUNTER
----- Message from Jocelynn Washington sent at 6/26/2023  3:04 PM CDT -----  Patient states that her referral to ortho was denied because they do not see patients with that issue. I did a follow up to ortho here to confirm. Patient asked if the referral can be sent somewhere else asap because she is still dealing with some pain in foot. Please advise.

## 2023-06-29 NOTE — TELEPHONE ENCOUNTER
Ambulatory referral placed to Dr. Rubi, podiatry. Please let me know if there are any issues. Thank you.

## 2023-08-29 ENCOUNTER — TELEPHONE (OUTPATIENT)
Dept: INTERNAL MEDICINE | Facility: CLINIC | Age: 58
End: 2023-08-29

## 2023-10-03 DIAGNOSIS — E11.69 DIABETES MELLITUS TYPE 2 IN OBESE: ICD-10-CM

## 2023-10-03 DIAGNOSIS — E66.9 DIABETES MELLITUS TYPE 2 IN OBESE: ICD-10-CM

## 2023-10-03 DIAGNOSIS — I10 HYPERTENSION, UNSPECIFIED TYPE: ICD-10-CM

## 2023-10-03 DIAGNOSIS — G25.81 RESTLESS LEG SYNDROME: ICD-10-CM

## 2023-10-03 RX ORDER — ROPINIROLE 0.5 MG/1
0.5 TABLET, FILM COATED ORAL 3 TIMES DAILY
Qty: 90 TABLET | Refills: 2 | Status: SHIPPED | OUTPATIENT
Start: 2023-10-03 | End: 2024-03-11 | Stop reason: SDUPTHER

## 2023-10-03 RX ORDER — LISINOPRIL 40 MG/1
40 TABLET ORAL DAILY
Qty: 90 TABLET | Refills: 0 | Status: SHIPPED | OUTPATIENT
Start: 2023-10-03 | End: 2023-12-21 | Stop reason: SDUPTHER

## 2023-10-03 RX ORDER — GABAPENTIN 300 MG/1
600 CAPSULE ORAL 3 TIMES DAILY
Qty: 180 CAPSULE | Refills: 2 | Status: SHIPPED | OUTPATIENT
Start: 2023-10-03 | End: 2023-12-21 | Stop reason: SDUPTHER

## 2023-12-18 DIAGNOSIS — E11.69 DIABETES MELLITUS TYPE 2 IN OBESE: Primary | ICD-10-CM

## 2023-12-18 DIAGNOSIS — E66.9 DIABETES MELLITUS TYPE 2 IN OBESE: Primary | ICD-10-CM

## 2023-12-21 ENCOUNTER — OFFICE VISIT (OUTPATIENT)
Dept: INTERNAL MEDICINE | Facility: CLINIC | Age: 58
End: 2023-12-21
Payer: COMMERCIAL

## 2023-12-21 VITALS
HEIGHT: 63 IN | OXYGEN SATURATION: 96 % | DIASTOLIC BLOOD PRESSURE: 70 MMHG | TEMPERATURE: 98 F | BODY MASS INDEX: 40.4 KG/M2 | RESPIRATION RATE: 16 BRPM | SYSTOLIC BLOOD PRESSURE: 144 MMHG | WEIGHT: 228 LBS | HEART RATE: 86 BPM

## 2023-12-21 DIAGNOSIS — Z12.11 SCREENING FOR COLORECTAL CANCER: ICD-10-CM

## 2023-12-21 DIAGNOSIS — E11.69 DIABETES MELLITUS TYPE 2 IN OBESE: ICD-10-CM

## 2023-12-21 DIAGNOSIS — Z79.4 TYPE 2 DIABETES MELLITUS WITHOUT COMPLICATION, WITH LONG-TERM CURRENT USE OF INSULIN: ICD-10-CM

## 2023-12-21 DIAGNOSIS — Z11.3 SCREENING FOR STD (SEXUALLY TRANSMITTED DISEASE): ICD-10-CM

## 2023-12-21 DIAGNOSIS — Z12.31 ENCOUNTER FOR SCREENING MAMMOGRAM FOR MALIGNANT NEOPLASM OF BREAST: ICD-10-CM

## 2023-12-21 DIAGNOSIS — E11.9 TYPE 2 DIABETES MELLITUS WITHOUT COMPLICATION, WITH LONG-TERM CURRENT USE OF INSULIN: ICD-10-CM

## 2023-12-21 DIAGNOSIS — I10 HYPERTENSION, UNSPECIFIED TYPE: ICD-10-CM

## 2023-12-21 DIAGNOSIS — E66.9 DIABETES MELLITUS TYPE 2 IN OBESE: ICD-10-CM

## 2023-12-21 DIAGNOSIS — M12.9 ARTHROPATHY: ICD-10-CM

## 2023-12-21 DIAGNOSIS — Z12.12 SCREENING FOR COLORECTAL CANCER: ICD-10-CM

## 2023-12-21 DIAGNOSIS — E11.65 UNCONTROLLED TYPE 2 DIABETES MELLITUS WITH HYPERGLYCEMIA, WITH LONG-TERM CURRENT USE OF INSULIN: ICD-10-CM

## 2023-12-21 DIAGNOSIS — E78.1 HYPERTRIGLYCERIDEMIA: ICD-10-CM

## 2023-12-21 DIAGNOSIS — Z12.39 ENCOUNTER FOR SPECIAL SCREENING EXAMINATION FOR NEOPLASM OF BREAST: Primary | ICD-10-CM

## 2023-12-21 DIAGNOSIS — Z79.4 UNCONTROLLED TYPE 2 DIABETES MELLITUS WITH HYPERGLYCEMIA, WITH LONG-TERM CURRENT USE OF INSULIN: ICD-10-CM

## 2023-12-21 LAB — HBA1C MFR BLD: 13.7 %

## 2023-12-21 PROCEDURE — 99215 OFFICE O/P EST HI 40 MIN: CPT | Mod: PBBFAC | Performed by: FAMILY MEDICINE

## 2023-12-21 PROCEDURE — 83036 HEMOGLOBIN GLYCOSYLATED A1C: CPT | Mod: PBBFAC | Performed by: FAMILY MEDICINE

## 2023-12-21 RX ORDER — LISINOPRIL 40 MG/1
40 TABLET ORAL DAILY
Qty: 90 TABLET | Refills: 3 | Status: SHIPPED | OUTPATIENT
Start: 2023-12-21 | End: 2024-03-11 | Stop reason: SDUPTHER

## 2023-12-21 RX ORDER — GABAPENTIN 300 MG/1
600 CAPSULE ORAL 3 TIMES DAILY
Qty: 180 CAPSULE | Refills: 2 | Status: SHIPPED | OUTPATIENT
Start: 2023-12-21 | End: 2024-03-11 | Stop reason: SDUPTHER

## 2023-12-21 RX ORDER — LANCING DEVICE
1 EACH MISCELLANEOUS 2 TIMES DAILY
Qty: 200 EACH | Refills: 5 | Status: SHIPPED | OUTPATIENT
Start: 2023-12-21 | End: 2024-12-20

## 2023-12-21 RX ORDER — INSULIN DETEMIR 100 [IU]/ML
25 INJECTION, SOLUTION SUBCUTANEOUS 2 TIMES DAILY
Qty: 15 ML | Refills: 5 | Status: SHIPPED | OUTPATIENT
Start: 2023-12-21 | End: 2024-03-11 | Stop reason: SDUPTHER

## 2023-12-21 RX ORDER — INSULIN ASPART 100 [IU]/ML
7 INJECTION, SOLUTION INTRAVENOUS; SUBCUTANEOUS
Qty: 18.9 ML | Refills: 1 | Status: SHIPPED | OUTPATIENT
Start: 2023-12-21 | End: 2024-03-11 | Stop reason: SDUPTHER

## 2023-12-21 NOTE — PROGRESS NOTES
[facility] INTERNAL MEDICINE  OUTPATIENT OFFICE VISIT NOTE    SUBJECTIVE:      Chief Complaint: Establish Care (Routine follow up visit. C/O Lt foot pain. )       HPI: Suki Anton is a 57 y.o. yo female w/ PMH of  has a past medical history of Diabetes mellitus, Diabetes mellitus, type 2, GERD (gastroesophageal reflux disease), and Hypertension., who presents for medication refills  Continues to have heel pain.  Reportedly has been able to obtain health insurance after signing up for [employer] plan.  Was unable to follow up with Podiatry that was ordered at last visit.  She had an MRI which demonstrated possible chronic osteo versus neuropathic arthropathy.  She was admitted with IV antibiotics and this was discontinued as it was thought to be due to neuropathic arthropathy.  She continues to have the same heel pain and feet deformities/swelling intermittently.  She has bilateral onychomycosis.  She reports that she has stopped taking her insulin roughly 2 months ago and that her morning blood sugar was around a Ay[place] 208.  She states that she stopped taking insulin because her stomach was feeling slightly upset.  She denies any polyphagia, weight loss, polyuria or polydipsia.  She reports that she is ready to try taking control of her health again.  Denies fevers chills, chest pain, shortness of breath, abdominal pain, nausea, vomiting, diarrhea. Does report recent sinus drainage due to pollen season, denies any fevers/cough.     Past Medical History:   has a past medical history of Diabetes mellitus, Diabetes mellitus, type 2, GERD (gastroesophageal reflux disease), and Hypertension.     Past Surgical History:   has no past surgical history on file.     Family History:  family history includes Cancer in her father; Cataracts in her mother.     Social History:   reports that she has never smoked. She has never used smokeless tobacco. She reports that she does not drink alcohol and does  "not use drugs.     Allergies:  has No Known Allergies.     Home Medications:  Prior to Admission medications    Medication Sig Start Date End Date Taking? Authorizing Provider   atorvastatin (LIPITOR) 20 MG tablet Take 1 tablet (20 mg total) by mouth once daily. 8/29/22 1/19/23 Yes Dick Gallagher DO   blood sugar diagnostic Strp Test strips to check CBG's 8/29/22 1/19/23 Yes Dick Gallagher DO   blood-glucose meter (TRUE METRIX GLUCOSE METER) kit Use as instructed 5/31/22 1/19/23 Yes Batsheva Ballesteros, SERENA   diclofenac sodium (VOLTAREN) 1 % Gel Apply 2 g topically 4 (four) times daily. 8/29/22 1/19/23 Yes Dick Gallagher DO   gabapentin (NEURONTIN) 300 MG capsule Take 1 capsule (300 mg total) by mouth 3 (three) times daily. 12/1/22 1/19/23 Yes Dick Gallagher DO   glipiZIDE (GLUCOTROL) 10 MG tablet Take 1 tablet (10 mg total) by mouth 2 (two) times daily with meals. 8/29/22 1/19/23 Yes Dick Gallagher DO   insulin detemir U-100 (LEVEMIR FLEXTOUCH U-100 INSULN) 100 unit/mL (3 mL) InPn pen Inject 30 Units into the skin every evening. 8/29/22 1/19/23 Yes Dick Gallagher DO   lisinopriL (PRINIVIL,ZESTRIL) 20 MG tablet Take 1 tablet (20 mg total) by mouth once daily. 12/1/22 1/19/23 Yes Dick Gallagher DO   meloxicam (MOBIC) 15 MG tablet Take 1 tablet (15 mg total) by mouth once daily. 8/29/22 1/19/23 Yes Dick Gallagher DO   pen needle, diabetic 32 gauge x 5/32" Ndle Use BD fany needle one times a day injections 8/29/22 1/19/23 Yes Dick Gallagher DO   rOPINIRole (REQUIP) 0.5 MG tablet Take 1 tablet (0.5 mg total) by mouth 3 (three) times daily. 12/1/22 1/19/23 Yes Dick Gallagher DO   atorvastatin (LIPITOR) 20 MG tablet Take 1 tablet (20 mg total) by mouth once daily. 1/19/23 1/19/24  Dick Gallagher, DO   blood sugar diagnostic Strp Test strips to check CBG's 1/19/23   Dick Gallagher, DO   blood-glucose meter (TRUE METRIX GLUCOSE METER) kit Use as instructed 1/19/23 1/19/24  Dick Gallagher, DO   blood-glucose meter kit 1 each by Other route 2 (two) times " "daily. Use as instructed 1/19/23   Dick Gallagher DO   cetirizine (ZYRTEC) 10 MG tablet Take 1 tablet (10 mg total) by mouth once daily. 1/19/23 4/19/23  Dick Gallagher DO   clotrimazole (LOTRIMIN) 1 % cream Apply topically 2 (two) times daily. for 14 days 1/19/23 2/2/23  Dick Gallagher DO   diclofenac sodium (VOLTAREN) 1 % Gel Apply 2 g topically 4 (four) times daily. 1/19/23   Dick Gallagher DO   gabapentin (NEURONTIN) 300 MG capsule Take 1 capsule (300 mg total) by mouth 3 (three) times daily. 1/19/23 4/19/23  Dick Gallagher DO   glipiZIDE (GLUCOTROL) 10 MG tablet Take 1 tablet (10 mg total) by mouth 2 (two) times daily with meals. 1/19/23   Dick Gallagher DO   insulin detemir U-100 (LEVEMIR FLEXTOUCH U-100 INSULN) 100 unit/mL (3 mL) InPn pen Inject 30 Units into the skin every evening. 1/19/23 1/19/24  Dick Gallagher DO   lisinopriL (PRINIVIL,ZESTRIL) 20 MG tablet Take 1 tablet (20 mg total) by mouth once daily. 1/19/23 4/19/23  Dick Gallagher DO   meloxicam (MOBIC) 15 MG tablet Take 1 tablet (15 mg total) by mouth once daily. 1/19/23   Dick Gallagher DO   omeprazole (PRILOSEC) 20 MG capsule Take 2 capsules (40 mg total) by mouth once daily. 1/19/23 4/19/23  Dick Gallagher DO   pen needle, diabetic 32 gauge x 5/32" Ndle Use BD fany needle one times a day injections 1/19/23   Dick Gallagher DO   rOPINIRole (REQUIP) 0.5 MG tablet Take 1 tablet (0.5 mg total) by mouth 3 (three) times daily. 1/19/23 4/19/23  Dick Gallagher DO   blood-glucose meter kit 1 each by Other route 2 (two) times daily. Use as instructed  1/19/23  Historical Provider   cetirizine (ZYRTEC) 10 MG tablet Take 1 tablet (10 mg total) by mouth once daily. 8/29/22 1/19/23  Dick Gallagher, DO   clotrimazole (LOTRIMIN) 1 % cream Apply topically 2 (two) times daily. for 14 days 9/23/22 1/19/23  Ashley Le NP   famotidine (PEPCID) 20 MG tablet Take 1 tablet (20 mg total) by mouth once daily. for 7 days 8/29/22 1/19/23  Dick Gallagher DO   fluticasone propionate (FLONASE) " "50 mcg/actuation nasal spray 1 spray (50 mcg total) by Each Nostril route once daily.  Patient not taking: Reported on 1/19/2023 8/29/22 1/19/23  Dick Gallagher DO   nystatin (MYCOSTATIN) ointment Apply topically 3 (three) times daily. for 14 days 10/6/22 1/19/23  Devilexus Alegre, FNP   omeprazole (PRILOSEC) 20 MG capsule Take 2 capsules (40 mg total) by mouth once daily. 8/29/22 1/19/23  Dick Gallagher DO       ROS:  Review of Systems   All other systems reviewed and are negative.          OBJECTIVE:     Vital signs:   BP (!) 144/70 (BP Location: Left arm, Patient Position: Sitting, BP Method: Medium (Manual)) Comment: Manual BP. Pt asymptomatic. Dr humphrey.  Pulse 86   Temp 97.7 °F (36.5 °C)   Resp 16   Ht 5' 3" (1.6 m)   Wt 103.4 kg (228 lb)   SpO2 96%   BMI 40.39 kg/m²      Physical Examination:  General: Patient resting comfortably in chair, in no acute distress   Eye: PERRLA, EOMI, clear conjunctiva, eyelids normal  HENT: Head-normocephalic and atraumatic  Neck: full range of motion, no thyromegaly or lymphadenopathy, trachea midline  Respiratory: clear to auscultation bilaterally without wheezes, rales, rhonchi  Cardiovascular: regular rate and rhythm without murmurs.  No gallops or rubs no JVD.  Capillary refill within normal limits.  Musculoskeletal: full range of motion of all extremities/spine without limitation or discomfort.  Integumentary: no rashes or skin lesions present  Neurologic: no signs of peripheral neurological deficit, motor/sensory function intact  Psychiatric:  alert and oriented, cognitive function intact, cooperative with exam, good eye contact, judgement and insight intact, mood and affect full range.     Labs:  CMP:   Lab Results   Component Value Date    GLUCOSE 267 (H) 06/15/2023    CALCIUM 8.1 (L) 06/15/2023    ALBUMIN 2.9 (L) 06/15/2023     06/15/2023    K 4.0 06/15/2023    CO2 21 (L) 06/15/2023    BUN 14.5 06/15/2023    CREATININE 0.75 06/15/2023    ALKPHOS 91 06/15/2023 "    ALT 12 06/15/2023    AST 8 06/15/2023    BILITOT 0.3 06/15/2023          ASSESSMENT & PLAN:     Left ankle swelling, with very abnormal xray, severe arthritis with some calcaneal displacement  -Previously had trauma 2 years ago, xray did not show any fractures/broken bones  -Continues to be swollen today, painful with standing, unable to stand for long periods of time.  Pain regimen currently not covering her pain, Voltaren gel and ibuprofen 800 mg q.6 hours p.r.n.  -x-ray performed today revealed gross abnormality, particularly in the calcaneus.  Pending official read from Radiology  -will try to manage pain with tramadol 50 mg q.6 hours, given 45 tablets  -urgent referral placed to [facility], will obtain MRI if recommended by Radiology.    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 plus aspart 7 t.i.d.     [facility] now off of self-pay    Hypertension  -Patient's blood pressure is well controlled today, 138/82  -Refilled lisinopril 40 mg q.d.     Hyperlipidemia  -triglycerides significantly uncontrolled likely due to diabetes  -continue atorvastatin to 40 mg, will recheck today     GERD  -No reported issues today  -Continue Omeprazole and Pepcid     Restless Leg Syndrome  -Ropinirole has been helpful, however ran out before visit today  -Refilled ropinirole 0.5 mg take every evening     Sciatica, Right sided- Improved, no complaints today  -Continue meloxicam 15 mg qd  -Recommended patient to lose weight, stretch,  and yoga to improve her sciatica pain  -Patient declined lumbar Xray due to being self pay  -Cautioned continuous usage of NSAIDs given PMHx Diabetes    Health Maintenance  Vaccinations  Immunization History   Administered Date(s) Administered    Influenza - Quadrivalent 12/10/2019, 2020    Influenza - Quadrivalent - PF (6-35 months) 01/15/2018, 11/15/2018    Influenza - Quadrivalent - PF *Preferred* (6 months and older) 2023    Pneumococcal Conjugate - 20 Valent 2023    Pneumococcal Polysaccharide - 23 Valent 2017    Tdap 2017    Zoster Recombinant 2020, 2021       -Flu: Done 2023   -Pneumonia: Done 2023   -Shingles: done on 2021, two doses   -Tdap: done on 2017   -COVID:  Advised to obtain it pharmacy  Screening   -Pap Smear:  missed appointment needs to call to reschedule   -Mammogram:  Reordered   -Osteoporosis:  N/a   -Lung Ca. Screening:  Never smoker   -Colon Ca. Screenin2023 Cologuard sent   -Hep C, HIV: will address at next visit  Social   -EtOH:  reports no history of alcohol use.   -Tobacco:  reports that she has never smoked. She has never used smokeless tobacco.   -Drugs:  reports no history of drug use.    -Depression:   Depression: Low Risk  (2023)    Depression     Last PHQ-4: Flowsheet Data: 0        Return to clinic in 1 month    Tyler Welsh MD - PGY3  [employer] [facility]

## 2024-01-16 ENCOUNTER — OFFICE VISIT (OUTPATIENT)
Dept: INTERNAL MEDICINE | Facility: CLINIC | Age: 59
End: 2024-01-16
Payer: COMMERCIAL

## 2024-01-16 VITALS
SYSTOLIC BLOOD PRESSURE: 125 MMHG | TEMPERATURE: 98 F | HEART RATE: 83 BPM | DIASTOLIC BLOOD PRESSURE: 76 MMHG | WEIGHT: 229.38 LBS | BODY MASS INDEX: 40.64 KG/M2 | RESPIRATION RATE: 18 BRPM | HEIGHT: 63 IN

## 2024-01-16 DIAGNOSIS — G47.00 INSOMNIA, UNSPECIFIED TYPE: Primary | ICD-10-CM

## 2024-01-16 PROCEDURE — 99215 OFFICE O/P EST HI 40 MIN: CPT | Mod: PBBFAC | Performed by: INTERNAL MEDICINE

## 2024-01-16 RX ORDER — HYDROXYZINE HYDROCHLORIDE 25 MG/1
25 TABLET, FILM COATED ORAL NIGHTLY
Qty: 30 TABLET | Refills: 3 | Status: SHIPPED | OUTPATIENT
Start: 2024-01-16 | End: 2024-03-11

## 2024-01-16 NOTE — PROGRESS NOTES
[facility] INTERNAL MEDICINE  OUTPATIENT OFFICE VISIT NOTE    SUBJECTIVE:      HPI: One month follow up  PCP Dr Gallagher, seen in clinic on 12/21/23    Unable to fill tirzepatide and aspartate due to $$  On better diet, glucoses less than 200 per patient  Able to  medications tomorrow  Wants something to help her sleep/anxiety    Med Hx  Left ankle arthritis/swelling- MRI- marrow and soft tissue changes, ?osteomyelitis vs neuropathy arthropathy or other inflammatory arthropathy- has appt tomorrow with Ortho in [place]  DM-poor control 13.7- says sugars less than 200, couldn't afford other diabetic medications  HTN- at goal  HLD- on statin  Restless Leg Syndrome- on requip  Sciatica- no pains today  Obesity- weight loss         OBJECTIVE:     Physical Examination:    Vital signs:     Vitals:    01/16/24 1252   BP: 125/76   Pulse: 83   Resp: 18   Temp: 98.1 °F (36.7 °C)        General: Well nourished w/o distress      Pulm: CTA bilaterally, normal work of breathing  CV: S1, S2 w/o murmurs or gallops; no edema noted  GI: Soft non tender  Left foot ankle, swollen      Psych: Cooperative; appropriate mood and affect    Reviewed MRI with patient  Lab work ordered to be done at next appt       ASSESSMENT & PLAN:     Left ankle arthritis/swelling- MRI- marrow and soft tissue changes, ?osteomyelitis vs neuropathy arthropathy or other inflammatory arthropathy- has appt tomorrow with Ortho in [place]  DM-poor control 13.7- says sugars less than 200, couldn't afford other diabetic medications but picking them up tomorrow  HTN- at goal  HLD- on statin  Restless Leg Syndrome- on requip  Sciatica- no pains today  Obesity- weight loss        Follow up with Dr Gallagher in 3 months    Estephania Culver MD

## 2024-03-11 ENCOUNTER — CLINICAL SUPPORT (OUTPATIENT)
Dept: INTERNAL MEDICINE | Facility: CLINIC | Age: 59
End: 2024-03-11
Payer: COMMERCIAL

## 2024-03-11 ENCOUNTER — OFFICE VISIT (OUTPATIENT)
Dept: INTERNAL MEDICINE | Facility: CLINIC | Age: 59
End: 2024-03-11
Payer: COMMERCIAL

## 2024-03-11 VITALS
WEIGHT: 225.38 LBS | DIASTOLIC BLOOD PRESSURE: 74 MMHG | TEMPERATURE: 98 F | SYSTOLIC BLOOD PRESSURE: 152 MMHG | BODY MASS INDEX: 39.93 KG/M2 | RESPIRATION RATE: 18 BRPM | HEART RATE: 94 BPM | HEIGHT: 63 IN

## 2024-03-11 DIAGNOSIS — M21.962 LEFT ANKLE JOINT DEFORMITY: ICD-10-CM

## 2024-03-11 DIAGNOSIS — G47.00 INSOMNIA, UNSPECIFIED TYPE: ICD-10-CM

## 2024-03-11 DIAGNOSIS — K21.9 GASTROESOPHAGEAL REFLUX DISEASE, UNSPECIFIED WHETHER ESOPHAGITIS PRESENT: ICD-10-CM

## 2024-03-11 DIAGNOSIS — G25.81 RESTLESS LEG SYNDROME: ICD-10-CM

## 2024-03-11 DIAGNOSIS — I10 HYPERTENSION, UNSPECIFIED TYPE: ICD-10-CM

## 2024-03-11 DIAGNOSIS — Z23 NEED FOR INFLUENZA VACCINATION: ICD-10-CM

## 2024-03-11 DIAGNOSIS — E11.65 UNCONTROLLED TYPE 2 DIABETES MELLITUS WITH HYPERGLYCEMIA, WITH LONG-TERM CURRENT USE OF INSULIN: ICD-10-CM

## 2024-03-11 DIAGNOSIS — Z12.31 ENCOUNTER FOR SCREENING MAMMOGRAM FOR MALIGNANT NEOPLASM OF BREAST: ICD-10-CM

## 2024-03-11 DIAGNOSIS — Z79.4 UNCONTROLLED TYPE 2 DIABETES MELLITUS WITH HYPERGLYCEMIA, WITH LONG-TERM CURRENT USE OF INSULIN: ICD-10-CM

## 2024-03-11 DIAGNOSIS — Z00.00 HEALTHCARE MAINTENANCE: Primary | ICD-10-CM

## 2024-03-11 DIAGNOSIS — E66.9 DIABETES MELLITUS TYPE 2 IN OBESE: ICD-10-CM

## 2024-03-11 DIAGNOSIS — E11.69 DIABETES MELLITUS TYPE 2 IN OBESE: ICD-10-CM

## 2024-03-11 PROCEDURE — 2024F 7 FLD RTA PHOTO EVC RTNOPTHY: CPT | Mod: CPTII,,, | Performed by: OPTOMETRIST

## 2024-03-11 PROCEDURE — 90686 IIV4 VACC NO PRSV 0.5 ML IM: CPT | Mod: PBBFAC

## 2024-03-11 PROCEDURE — 92228 IMG RTA DETC/MNTR DS PHY/QHP: CPT | Mod: 26,S$PBB,, | Performed by: OPTOMETRIST

## 2024-03-11 PROCEDURE — 92228 IMG RTA DETC/MNTR DS PHY/QHP: CPT | Mod: PBBFAC

## 2024-03-11 PROCEDURE — 99215 OFFICE O/P EST HI 40 MIN: CPT | Mod: PBBFAC,25

## 2024-03-11 PROCEDURE — 92228 IMG RTA DETC/MNTR DS PHY/QHP: CPT | Mod: TC,PBBFAC | Performed by: INTERNAL MEDICINE

## 2024-03-11 RX ORDER — ATORVASTATIN CALCIUM 40 MG/1
40 TABLET, FILM COATED ORAL DAILY
Qty: 90 TABLET | Refills: 0 | Status: SHIPPED | OUTPATIENT
Start: 2024-03-11 | End: 2024-06-10 | Stop reason: SDUPTHER

## 2024-03-11 RX ORDER — GABAPENTIN 300 MG/1
600 CAPSULE ORAL 3 TIMES DAILY
Qty: 180 CAPSULE | Refills: 2 | Status: SHIPPED | OUTPATIENT
Start: 2024-03-11 | End: 2024-06-10 | Stop reason: SDUPTHER

## 2024-03-11 RX ORDER — ROPINIROLE 0.5 MG/1
0.5 TABLET, FILM COATED ORAL 3 TIMES DAILY
Qty: 90 TABLET | Refills: 2 | Status: SHIPPED | OUTPATIENT
Start: 2024-03-11 | End: 2024-06-10 | Stop reason: SDUPTHER

## 2024-03-11 RX ORDER — GLIPIZIDE 10 MG/1
10 TABLET ORAL 2 TIMES DAILY WITH MEALS
Qty: 60 TABLET | Refills: 5 | Status: SHIPPED | OUTPATIENT
Start: 2024-03-11 | End: 2024-06-10 | Stop reason: SDUPTHER

## 2024-03-11 RX ORDER — INSULIN DETEMIR 100 [IU]/ML
25 INJECTION, SOLUTION SUBCUTANEOUS 2 TIMES DAILY
Qty: 15 ML | Refills: 5 | Status: SHIPPED | OUTPATIENT
Start: 2024-03-11 | End: 2024-06-10 | Stop reason: SDUPTHER

## 2024-03-11 RX ORDER — INSULIN ASPART 100 [IU]/ML
7 INJECTION, SOLUTION INTRAVENOUS; SUBCUTANEOUS
Qty: 18.9 ML | Refills: 1 | Status: SHIPPED | OUTPATIENT
Start: 2024-03-11 | End: 2024-06-10 | Stop reason: SDUPTHER

## 2024-03-11 RX ORDER — OMEPRAZOLE 20 MG/1
40 CAPSULE, DELAYED RELEASE ORAL DAILY
Qty: 60 CAPSULE | Refills: 2 | Status: SHIPPED | OUTPATIENT
Start: 2024-03-11 | End: 2024-06-09

## 2024-03-11 RX ORDER — ACETAMINOPHEN 500 MG
500 TABLET ORAL EVERY 6 HOURS PRN
COMMUNITY

## 2024-03-11 RX ORDER — LISINOPRIL 40 MG/1
40 TABLET ORAL DAILY
Qty: 90 TABLET | Refills: 3 | Status: SHIPPED | OUTPATIENT
Start: 2024-03-11 | End: 2024-06-10 | Stop reason: SDUPTHER

## 2024-03-11 RX ORDER — TRAMADOL HYDROCHLORIDE 50 MG/1
50 TABLET ORAL EVERY 6 HOURS
Qty: 20 TABLET | Refills: 0 | Status: SHIPPED | OUTPATIENT
Start: 2024-03-11 | End: 2024-06-07 | Stop reason: SDUPTHER

## 2024-03-11 NOTE — PROGRESS NOTES
Suki Anton is a 58 y.o. female here for a diabetic eye screening with non-dilated fundus photos per Dick Gallagher, .    Patient cooperative?: Yes  Small pupils?: No  Last eye exam: unknown    For exam results, see Encounter Report.

## 2024-03-11 NOTE — PROGRESS NOTES
[facility] INTERNAL MEDICINE  OUTPATIENT OFFICE VISIT NOTE    SUBJECTIVE:      Chief Complaint: Follow-up       HPI: Suki Atnon is a 58 y.o. yo female w/ PMH of  has a past medical history of Diabetes mellitus, Diabetes mellitus, type 2, GERD (gastroesophageal reflux disease), and Hypertension., who presents for medication refills  Continues to have left heel pain, due to left ankle arthropathy.  Has been following with Dr. Fabian in [place], who was attempting to get her diabetic shoes, and a referral to Orthopedics.  Contacted the clinic and we will obtain x-ray of foot and heel.  Patient currently has on ankle brace, which has been helping with the pain.  Obtain [employer] exchange plan recently, which should be coveredby [facility].  We will need studies performed at [facility].  She denies any polyphagia, weight loss, polyuria or polydipsia.  She reports that she is ready to try taking control of her health again.  Denies fevers chills, chest pain, shortness of breath, abdominal pain, nausea, vomiting, diarrhea. Does report recent sinus drainage due to pollen season, denies any fevers/cough.     Past Medical History:   has a past medical history of Diabetes mellitus, Diabetes mellitus, type 2, GERD (gastroesophageal reflux disease), and Hypertension.     Past Surgical History:   has no past surgical history on file.     Family History:  family history includes Cancer in her father; Cataracts in her mother.     Social History:   reports that she has never smoked. She has never used smokeless tobacco. She reports that she does not drink alcohol and does not use drugs.     Allergies:  is allergic to shellfish containing products.     Home Medications:  Prior to Admission medications    Medication Sig Start Date End Date Taking? Authorizing Provider   atorvastatin (LIPITOR) 20 MG tablet Take 1 tablet (20 mg total) by mouth once daily. 8/29/22 1/19/23 Yes Dick Gallagher, DO   blood sugar diagnostic Strp Test strips to  "check CBG's 8/29/22 1/19/23 Yes Dick Gallagher DO   blood-glucose meter (TRUE METRIX GLUCOSE METER) kit Use as instructed 5/31/22 1/19/23 Yes Batsheva Ballesteros, SERENA   diclofenac sodium (VOLTAREN) 1 % Gel Apply 2 g topically 4 (four) times daily. 8/29/22 1/19/23 Yes Dick Gallagher DO   gabapentin (NEURONTIN) 300 MG capsule Take 1 capsule (300 mg total) by mouth 3 (three) times daily. 12/1/22 1/19/23 Yes Dick Gallagher DO   glipiZIDE (GLUCOTROL) 10 MG tablet Take 1 tablet (10 mg total) by mouth 2 (two) times daily with meals. 8/29/22 1/19/23 Yes Dick Gallagher DO   insulin detemir U-100 (LEVEMIR FLEXTOUCH U-100 INSULN) 100 unit/mL (3 mL) InPn pen Inject 30 Units into the skin every evening. 8/29/22 1/19/23 Yes Dick Gallagher DO   lisinopriL (PRINIVIL,ZESTRIL) 20 MG tablet Take 1 tablet (20 mg total) by mouth once daily. 12/1/22 1/19/23 Yes Dick Gallagher DO   meloxicam (MOBIC) 15 MG tablet Take 1 tablet (15 mg total) by mouth once daily. 8/29/22 1/19/23 Yes Dick Gallagher DO   pen needle, diabetic 32 gauge x 5/32" Ndle Use BD fany needle one times a day injections 8/29/22 1/19/23 Yes Dick Gallagher DO   rOPINIRole (REQUIP) 0.5 MG tablet Take 1 tablet (0.5 mg total) by mouth 3 (three) times daily. 12/1/22 1/19/23 Yes Dick Gallagher DO   atorvastatin (LIPITOR) 20 MG tablet Take 1 tablet (20 mg total) by mouth once daily. 1/19/23 1/19/24  Dick Gallagher DO   blood sugar diagnostic Strp Test strips to check CBG's 1/19/23   Dick Gallagher DO   blood-glucose meter (TRUE METRIX GLUCOSE METER) kit Use as instructed 1/19/23 1/19/24  Dick Yeh, DO   blood-glucose meter kit 1 each by Other route 2 (two) times daily. Use as instructed 1/19/23   Dick Gallagher,    cetirizine (ZYRTEC) 10 MG tablet Take 1 tablet (10 mg total) by mouth once daily. 1/19/23 4/19/23  Dick Gallagher, DO   clotrimazole (LOTRIMIN) 1 % cream Apply topically 2 (two) times daily. for 14 days 1/19/23 2/2/23  Dick Gallagher DO   diclofenac sodium (VOLTAREN) 1 % Gel Apply 2 g topically 4 " "(four) times daily. 1/19/23   Dick Gallagher DO   gabapentin (NEURONTIN) 300 MG capsule Take 1 capsule (300 mg total) by mouth 3 (three) times daily. 1/19/23 4/19/23  Dick Gallagher DO   glipiZIDE (GLUCOTROL) 10 MG tablet Take 1 tablet (10 mg total) by mouth 2 (two) times daily with meals. 1/19/23   Dick Gallagher DO   insulin detemir U-100 (LEVEMIR FLEXTOUCH U-100 INSULN) 100 unit/mL (3 mL) InPn pen Inject 30 Units into the skin every evening. 1/19/23 1/19/24  Dick Gallagher DO   lisinopriL (PRINIVIL,ZESTRIL) 20 MG tablet Take 1 tablet (20 mg total) by mouth once daily. 1/19/23 4/19/23  Dick Gallagher DO   meloxicam (MOBIC) 15 MG tablet Take 1 tablet (15 mg total) by mouth once daily. 1/19/23   Dick Gallagher DO   omeprazole (PRILOSEC) 20 MG capsule Take 2 capsules (40 mg total) by mouth once daily. 1/19/23 4/19/23  Dick Gallagher DO   pen needle, diabetic 32 gauge x 5/32" Ndle Use BD fany needle one times a day injections 1/19/23   Dick Gallagher DO   rOPINIRole (REQUIP) 0.5 MG tablet Take 1 tablet (0.5 mg total) by mouth 3 (three) times daily. 1/19/23 4/19/23  Dick Gallagher DO   blood-glucose meter kit 1 each by Other route 2 (two) times daily. Use as instructed  1/19/23  Historical Provider   cetirizine (ZYRTEC) 10 MG tablet Take 1 tablet (10 mg total) by mouth once daily. 8/29/22 1/19/23  Dick Gallagher DO   clotrimazole (LOTRIMIN) 1 % cream Apply topically 2 (two) times daily. for 14 days 9/23/22 1/19/23  Ashley Le, NP   famotidine (PEPCID) 20 MG tablet Take 1 tablet (20 mg total) by mouth once daily. for 7 days 8/29/22 1/19/23  Dick Gallagher,    fluticasone propionate (FLONASE) 50 mcg/actuation nasal spray 1 spray (50 mcg total) by Each Nostril route once daily.  Patient not taking: Reported on 1/19/2023 8/29/22 1/19/23  Dick Gallagher DO   nystatin (MYCOSTATIN) ointment Apply topically 3 (three) times daily. for 14 days 10/6/22 1/19/23  JANE Henry   omeprazole (PRILOSEC) 20 MG capsule Take 2 capsules (40 mg " "total) by mouth once daily. 8/29/22 1/19/23  Dick Gallagher DO       ROS:  Review of Systems   All other systems reviewed and are negative.          OBJECTIVE:     Vital signs:   BP (!) 152/74 (BP Location: Left arm, Patient Position: Sitting, BP Method: Large (Manual))   Pulse 94   Temp 98.4 °F (36.9 °C) (Oral)   Resp 18   Ht 5' 3" (1.6 m)   Wt 102.2 kg (225 lb 6.4 oz)   BMI 39.93 kg/m²      Physical Examination:  General: Patient resting comfortably in chair, in no acute distress   Eye: PERRLA, EOMI, clear conjunctiva, eyelids normal  HENT: Head-normocephalic and atraumatic  Neck: full range of motion, no thyromegaly or lymphadenopathy, trachea midline  Respiratory: clear to auscultation bilaterally without wheezes, rales, rhonchi  Cardiovascular: regular rate and rhythm without murmurs.  No gallops or rubs no JVD.  Capillary refill within normal limits.  Musculoskeletal: full range of motion of all extremities/spine without limitation or discomfort.  Left ankle in brace, limited range of motion.  Integumentary: no rashes or skin lesions present  Neurologic: no signs of peripheral neurological deficit, motor/sensory function intact  Psychiatric:  alert and oriented, cognitive function intact, cooperative with exam, good eye contact, judgement and insight intact, mood and affect full range.     Labs:  CMP:   Lab Results   Component Value Date    GLUCOSE 267 (H) 06/15/2023    CALCIUM 8.1 (L) 06/15/2023    ALBUMIN 2.9 (L) 06/15/2023     06/15/2023    K 4.0 06/15/2023    CO2 21 (L) 06/15/2023    BUN 14.5 06/15/2023    CREATININE 0.75 06/15/2023    ALKPHOS 91 06/15/2023    ALT 12 06/15/2023    AST 8 06/15/2023    BILITOT 0.3 06/15/2023          ASSESSMENT & PLAN:     Left ankle swelling, with very abnormal xray, severe arthritis with some calcaneal displacement  -Previously had trauma 2 years ago, xray did not show any fractures/broken bones  -Continues to be swollen today, painful with standing, unable to " stand for long periods of time.  Pain regimen currently not covering her pain, we will increase pain regimen to tramadol 50 mg q.h.s.  -repeat left foot and ankle x-ray, we will send results to podiatry, appreciate assistance with care.    Diabetes mellitus, Type 2  -12/21/23 A1C 13.7, previously on Levemir 30-35 nightly, after hospital discharge was switched to levimir 25 BID + aspart 7 TID + glipizide 10 BID but patient has not been taking any of her diabetic medications  -Diabetic foot exam performed previous visit, Eye exam previous visit showed mild diabetic eye disease  -Unable to tolerate metformin due to constant diarrhea  -ordered GLP 1 tirzepatide, currently unable to afford  -advised to restart Levemir 25 b.i.d. plus aspart 7 t.i.d.  -previously has shown improvement in blood sugar control if able to afford medications     [facility] now off of self-pay    Hypertension  -Patient's blood pressure is not well controlled today, but did not take antihypertensives.  Current blood pressure was 152/74  -Refilled lisinopril 40 mg q.d.     Hyperlipidemia  -triglycerides significantly uncontrolled likely due to diabetes  -continue atorvastatin to 40 mg, will recheck today     GERD  -No reported issues today  -Continue Omeprazole and Pepcid     Restless Leg Syndrome  -Ropinirole has been helpful, however ran out before visit today  -Refilled ropinirole 0.5 mg take every evening     Sciatica, Right sided- Improved, no complaints today  -Continue meloxicam 15 mg qd  -Recommended patient to lose weight, stretch, and yoga to improve her sciatica pain  -Patient declined lumbar Xray due to being self pay  -Cautioned continuous usage of NSAIDs given PMHx Diabetes    Health Maintenance  Vaccinations  Immunization History   Administered Date(s) Administered    Influenza - Quadrivalent 12/10/2019, 11/12/2020    Influenza - Quadrivalent - PF (6-35 months) 01/15/2018, 11/15/2018    Influenza - Quadrivalent - PF  *Preferred* (6 months and older) 01/19/2023, 03/11/2024    Pneumococcal Conjugate - 20 Valent 01/19/2023    Pneumococcal Polysaccharide - 23 Valent 02/23/2017    Tdap 02/23/2017    Zoster Recombinant 11/12/2020, 06/19/2021       -Flu: Done 3/11/24   -Pneumonia: Done 1/19/2023   -Shingles: done on 6/19/2021, two doses   -Tdap: done on 2/23/2017   -COVID:  Advised to obtain it pharmacy  Screening   -Pap Smear:  missed appointment needs to call to reschedule   -Mammogram:  Reordered   -Osteoporosis:  N/a   -Lung Ca. Screening:  Never smoker   -Colon Ca. Screening: 3/11/2024 Cologuard sent   -Hep C, HIV: will address at next visit  Social   -EtOH:  reports no history of alcohol use.   -Tobacco:  reports that she has never smoked. She has never used smokeless tobacco.   -Drugs:  reports no history of drug use.    -Depression:   Depression: Low Risk  (1/16/2024)    Depression     Last PHQ-4: Flowsheet Data: 0        Return to clinic in 2 months    Dick Gallagher DO  [facility], PGY-II

## 2024-04-04 ENCOUNTER — TELEPHONE (OUTPATIENT)
Dept: PHARMACY | Facility: CLINIC | Age: 59
End: 2024-04-04
Payer: COMMERCIAL

## 2024-04-04 ENCOUNTER — PATIENT MESSAGE (OUTPATIENT)
Dept: DIABETES | Facility: CLINIC | Age: 59
End: 2024-04-04

## 2024-04-04 ENCOUNTER — CLINICAL SUPPORT (OUTPATIENT)
Dept: DIABETES | Facility: CLINIC | Age: 59
End: 2024-04-04
Payer: COMMERCIAL

## 2024-04-04 VITALS — WEIGHT: 233.69 LBS | BODY MASS INDEX: 41.4 KG/M2

## 2024-04-04 DIAGNOSIS — E11.69 DIABETES MELLITUS TYPE 2 IN OBESE: ICD-10-CM

## 2024-04-04 DIAGNOSIS — E66.9 DIABETES MELLITUS TYPE 2 IN OBESE: ICD-10-CM

## 2024-04-04 PROCEDURE — G0108 DIAB MANAGE TRN  PER INDIV: HCPCS | Mod: PBBFAC | Performed by: DIETITIAN, REGISTERED

## 2024-04-04 PROCEDURE — 99212 OFFICE O/P EST SF 10 MIN: CPT | Mod: PBBFAC | Performed by: DIETITIAN, REGISTERED

## 2024-04-04 NOTE — TELEPHONE ENCOUNTER
We have reviewed Ms. Anton current medication list and/or insurance status. Unfortunately, The Pharmacy Patient Assistance Team is unable to assist at this time due to the following reasons      There are no Manufacture or Co-pay Savings Programs available for requested medication.  and [employer]/ [facility] is not currently accepting applications for new or renewal patients. Patient has been put on [facility]'s waiting list. [facility] will send the patient and myself and email once the crystal begins accepting applications         [facility] is not currently accepting applications and Glipizide has not PAP programs available.        Pharmacy Patient Assistance Team

## 2024-04-04 NOTE — TELEPHONE ENCOUNTER
I have reached out to Suki Anton to inform her of the [employer] application process for Novolog and whats required to apply.  Suki Anton did not answer. I left a voicemail and mailed a letter introducing her to the pharmacy patient assistance program. I will follow up in 5 business days.

## 2024-04-04 NOTE — PROGRESS NOTES
"Diabetes Care Specialist Progress Note  Author: Mariza Hanson RD  Date: 4/4/2024    Program Intake  Reason for Diabetes Program Visit:: Initial Diabetes Assessment  Current diabetes risk level:: moderate  In the last 12 months, have you:: been admitted to a hospital  Was the ER or hospital admission related to diabetes?: No  Continuous Glucose Monitoring  Patient has CGM: No    Lab Results   Component Value Date    HGBA1C 11.4 (H)  13.7 (H) 04/13/2023 12/21/2023       Clinical    Weight: 106 kg (233 lb 11 oz)       Body mass index is 41.4 kg/m².         Problem Review  Reviewed Problem List with Patient: yes  Active comorbidities affecting diabetes self-care.: yes  Comorbidities: Hypertension  Reviewed health maintenance: no (see comments) (time)    Clinical Assessment  Current Diabetes Treatment: Oral Medication, Insulin (Levemir 25 units and Glipizide 10mg BID with a meal (unless skips meals))  Have you ever experienced hypoglycemia (low blood sugar)?: yes  Are you able to tell when your blood sugar is low?: Yes  What symptoms do you experience?: Other ("like I'm going to fall")  Have you ever been hospitalized because your blood sugar was too low?: no  How do you treat hypoglycemia (low blood sugar)?: 1/2 can soda/fruit juice    Medication Information  How do you obtain your medications?: Patient drives  How many days a week do you miss your medications?: 3 or more (cannot afford Novolog)  Do you sometimes have difficulty refilling your medications?: Yes (see comment) (affordability)  Medication adherence impacting ability to self-manage diabetes?: Yes    Labs  Do you have regular lab work to monitor your medications?: No  Lab Compliance Barriers: No    Nutritional Status  Diet: Regular  Meal Plan 24 Hour Recall: Breakfast, Snack  Meal Plan 24 Hour Recall - Breakfast: often skips  Meal Plan 24 Hour Recall - Snack: drinks 1 cup broth at night for a snack to curb appetite  Change in appetite?: No  Dentation:: " Intact  Recent Changes in Weight: No Recent Weight Change  Current nutritional status an area of need that is impacting patient's ability to self-manage diabetes?: No    Additional Social History    Support  Does anyone support you with your diabetes care?: yes  Who supports you?: friend (friend's son)  Who takes you to your medical appointments?: self  Does the current support meet the patient's needs?: Yes  Is Support an area impacting ability to self-manage diabetes?: No    Access to Mass Media & Technology  Does the patient have access to any of the following devices or technologies?: Smart phone (iPhone)  Media or technology needs impacting ability to self-manage diabetes?: No    Cognitive/Behavioral Health  Alert and Oriented: Yes  Difficulty Thinking: No  Requires Prompting: No  Requires assistance for routine expression?: No  Cognitive or behavioral barriers impacting ability to self-manage diabetes?: No         Communication  Language preference: English  Hearing Problems: No  Vision Problems: No  Communication needs impacting ability to self-manage diabetes?: No    Health Literacy  Preferred Learning Method: Face to Face, Reading Materials  How often do you need to have someone help you read instructions, pamphlets, or written material from your doctor or pharmacy?: Never  Health literacy needs impacting ability to self-manage diabetes?: No      Diabetes Self-Management Skills Assessment    Diabetes Disease Process/Treatment Options  Diabetes Disease Process/Treatment Options: Skills Assessment Completed: No  Deferred due to:: Time  Area of need?: Deferred    Nutrition/Healthy Eating  Patient can identify foods that impact blood sugar.: yes  Patient-identified foods:: starches (bread, pasta, rice, cereal), sweets, soda  Nutrition/Healthy Eating Skills Assessment Completed:: Yes  Assessment indicates:: Instruction Needed  Area of need?: Yes    Physical Activity/Exercise  Patient's daily activity level::  "constantly moving  Patient formally exercises outside of work.: no  Reasons for not exercising:: physically unable to exercise currently (has a swollen ankle and is wearing a brace)  Patient can identify forms of physical activity.: yes  Stated forms of physical activity:: manual activity at work  Patient can identify reasons why exercise/physical activity is important in diabetes management.: no  Physical Activity/Exercise Skills Assessment Completed: : No  Deffered due to:: Time  Area of need?: Deferred    Medications  Patient is able to describe current diabetes management routine.: no  Patient is able to identify current diabetes medications, dosages, and appropriate timing of medications.: no  Patient understands the purpose of the medications taken for diabetes.: no  Patient reports problems or concerns with current medication regimen.: yes  Medication regimen problems/concerns:: financial concerns  Medication Skills Assessment Completed:: Yes  Assessment indicates:: Instruction Needed  Area of need?: Yes    Home Blood Glucose Monitoring  Patient states that blood sugar is checked at home daily.: no  Reasons for not monitoring:: finger pain  Home Blood Glucose Monitoring Skills Assessment Completed: : Yes  Assessment indicates:: Instruction Needed  Area of need?: Yes    Acute Complications  Patient is able to identify types of acute complications: Yes  Patient Identified:: Hypoglycemia  Patient is able to state the basic meaning of hypoglycemia?: Yes  Patient can identify general symptoms of hypoglycemia: yes  Patient identified:: other (see comments) ("feels like I'm going to fall")  Able to state proper treatment of hypoglycemia?: yes  Patient identified:: 1/2 can soda/fruit juice  Acute Complications Skills Assessment Completed: : Yes  Assessment indicates:: Instruction Needed  Area of need?: Yes    Chronic Complications  Chronic Complications Skills Assessment Completed: : No  Deferred due to:: Time  Area " "of need?: Deferred    Psychosocial/Coping  Psychosocial/Coping Skills Assessment Completed: : No  Deffered due to:: Time  Area of need?: Deferred      Assessment Summary and Plan    Based on today's diabetes care assessment, the following areas of need were identified:          4/4/2024    12:01 AM   Social   Support No   Access to Mass Media/Tech No   Cognitive/Behavioral Health No   Communication No   Health Literacy No            4/4/2024    12:01 AM   Clinical   Medication Adherence Yes - see care plan.   Lab Compliance No   Nutritional Status No            4/4/2024    12:01 AM   Diabetes Self-Management Skills   Diabetes Disease Process/Treatment Options Deferred   Nutrition/Healthy Eating Yes - reviewed carb sources, portions and meal/snack recommendations. Reviewed breakfast ideas to encouraged Glipizide every morning. Issued diabets management guide with menu and snack ideas.    Physical Activity/Exercise Deferred   Medication Yes - see care plan. Has been "stacking" Levemir and taking 2 doses within 24 hr window (example: takes at night if forget in am and then will repeat the following am, leading to hypoglycemia)   Home Blood Glucose Monitoring Yes - pt is not checking SMBG due to finger pain and inconvenience at work. She would like a CGM. Instructed that she would need to contact her insurance company to determine coverage since she has private pay insurance. Also informed that coverage may be dependent on MDI. Offered [employer] patient assistance program phone # to determine if she is eligible for [employer] $75 self-pay program. States she has a glucometer and supplies at home. Encouraged to resume SMBG. Informed that [employer] will have an over-the-counter CGM this summer. Otherwise, she will need a script from her PCP.    Acute Complications Yes - Discussed prevention, identification signs/symptoms and treatment of hyperglycemia and hypoglycemia and when to contact clinic.    Chronic Complications " Deferred   Psychosocial/Coping Deferred          Today's interventions were provided through individual discussion, instruction, and written materials were provided.      Patient verbalized understanding of instruction and written materials.  Pt was able to return back demonstration of instructions today. Patient understood key points, needs reinforcement and further instruction.     Diabetes Self-Management Care Plan:    Today's Diabetes Self-Management Care Plan was developed with Suki's input. Suki has agreed to work toward the following goal(s) to improve his/her overall diabetes control.      Care Plan: Diabetes Management   Updates made since 3/5/2024 12:00 AM        Problem: Medications         Goal: Patient Agrees to take Diabetes Medication(s) Glipizide 10 mg BID with breakfast and dinner and Levemir 25 units at 10am daily.    Start Date: 4/4/2024   Expected End Date: 5/8/2024   Priority: High   Barriers: No Barriers Identified   Note:    4/4/24 - Instructed to contact insurance company to determine out-of-pocket cost for Novolog and/or formulary prandial insulin. Will make referral to [facility] medication assistance program.       Task: Reviewed with patient all current diabetes medications and provided basic review of the purpose, dosage, frequency, side effects, and storage of both oral and injectable diabetes medications. Completed 4/4/2024        Task: Reviewed possible resources for acquiring cost prohibitive medication. Completed 4/4/2024        Task: Discussed guidelines for preventing, detecting and treating hypoglycemia and hyperglycemia and reviewed the importance of meal and medication timing with diabetes mediations for prevention of hypoglycemia and maximum drug benefit. Completed 4/4/2024          Follow Up Plan     Follow up in about 5 weeks (around 5/9/2024) for medication, monitoring, Disease Process, Psychosocial/Coping, chronic complications.    Today's care plan and follow up schedule  was discussed with patient.  Suki verbalized understanding of the care plan, goals, and agrees to follow up plan.        The patient was encouraged to communicate with his/her health care provider/physician and care team regarding his/her condition(s) and treatment.  I provided the patient with my contact information today and encouraged to contact me via phone or [facility] Patient Portal as needed.     Length of Visit   Total Time: 60 Minutes

## 2024-04-12 NOTE — TELEPHONE ENCOUNTER
Suki Anton was scheduled on 04/12/2024 to provide the following documentation to initiate the application process.          Proof of household Income( such as social security statement, 1099 form, pension statement or 3 consecutive pay stubs  Copy of all Insurance cards( front and back)  Signed and dated HIPAA /Patient Information Forms          As of today, the documents have not been received.  Please instruct the patient to email requested documentation to [email or web address]  or reach out to Venus Chen [phone] with any follow-up questions.       Pharmacy Patient Assistance  [address] [place] [address]  [id number] [place] [id number]  Fax: [phone]  Email: [email or web address]

## 2024-04-12 NOTE — TELEPHONE ENCOUNTER
A 2nd attempt has been made to establish contact with Suki Anton  via MY CHART. The final contact attempt will be made in 5 business days

## 2024-04-19 NOTE — TELEPHONE ENCOUNTER
Suki Anton has not returned calls or messages regarding support for her Levemir and Novolog Pens after multiple attempts to reach her over 10 business days. A final letter has been mailed to the patient to reach back out to Pharmacy Patient Assistance to initiate this process. Please instruct the patient to reach out to Venus Chen   @ [phone] or [email or web address] if she is still in need of assistance.

## 2024-06-05 ENCOUNTER — TELEPHONE (OUTPATIENT)
Dept: INTERNAL MEDICINE | Facility: CLINIC | Age: 59
End: 2024-06-05
Payer: COMMERCIAL

## 2024-06-05 DIAGNOSIS — M21.962 LEFT ANKLE JOINT DEFORMITY: ICD-10-CM

## 2024-06-05 DIAGNOSIS — M25.572 ACUTE LEFT ANKLE PAIN: Primary | ICD-10-CM

## 2024-06-05 NOTE — TELEPHONE ENCOUNTER
TELEPHONE:        MS. WAN,       MS. GENAO ASKED THAT YOU GIVE HER A CALL.  SHE STATED THAT SHE IS HAVING TROUBLE WITH HER FOOT.  THANK YOU.

## 2024-06-06 NOTE — TELEPHONE ENCOUNTER
Pt calling again to speak with Dr Gallagher concerning issues with her foot. Pt requesting a call back ASAP.

## 2024-06-07 ENCOUNTER — HOSPITAL ENCOUNTER (OUTPATIENT)
Dept: RADIOLOGY | Facility: HOSPITAL | Age: 59
Discharge: HOME | End: 2024-06-07
Payer: COMMERCIAL

## 2024-06-07 DIAGNOSIS — M25.572 ACUTE LEFT ANKLE PAIN: ICD-10-CM

## 2024-06-07 PROCEDURE — 73610 X-RAY EXAM OF ANKLE: CPT | Mod: TC,LT

## 2024-06-07 PROCEDURE — 73590 X-RAY EXAM OF LOWER LEG: CPT | Mod: TC,LT

## 2024-06-07 RX ORDER — TRAMADOL HYDROCHLORIDE 50 MG/1
50 TABLET ORAL EVERY 6 HOURS
Qty: 20 TABLET | Refills: 0 | Status: SHIPPED | OUTPATIENT
Start: 2024-06-07

## 2024-06-07 NOTE — TELEPHONE ENCOUNTER
Called patient, verified . Patient reported L foot pain that is constant. Rolled her ankle. L ankle Xray and tib/fib ordered. Sent in tramadol for pain relief. ED precautions given, patient verbalized understanding.    Will send referral to ortho when xrays come back.    Dick Gallagher,   [facility] Internal Medicine, PGY-II

## 2024-06-10 ENCOUNTER — TELEPHONE (OUTPATIENT)
Dept: INTERNAL MEDICINE | Facility: CLINIC | Age: 59
End: 2024-06-10
Payer: COMMERCIAL

## 2024-06-10 DIAGNOSIS — E11.65 UNCONTROLLED TYPE 2 DIABETES MELLITUS WITH HYPERGLYCEMIA, WITH LONG-TERM CURRENT USE OF INSULIN: ICD-10-CM

## 2024-06-10 DIAGNOSIS — L97.522 DIABETIC ULCER OF OTHER PART OF LEFT FOOT ASSOCIATED WITH TYPE 2 DIABETES MELLITUS, WITH FAT LAYER EXPOSED: Primary | ICD-10-CM

## 2024-06-10 DIAGNOSIS — Z79.4 TYPE 2 DIABETES MELLITUS WITHOUT COMPLICATION, WITH LONG-TERM CURRENT USE OF INSULIN: ICD-10-CM

## 2024-06-10 DIAGNOSIS — E11.621 DIABETIC ULCER OF OTHER PART OF LEFT FOOT ASSOCIATED WITH TYPE 2 DIABETES MELLITUS, WITH FAT LAYER EXPOSED: Primary | ICD-10-CM

## 2024-06-10 DIAGNOSIS — Z79.4 UNCONTROLLED TYPE 2 DIABETES MELLITUS WITH HYPERGLYCEMIA, WITH LONG-TERM CURRENT USE OF INSULIN: ICD-10-CM

## 2024-06-10 DIAGNOSIS — I10 HYPERTENSION, UNSPECIFIED TYPE: ICD-10-CM

## 2024-06-10 DIAGNOSIS — E11.9 TYPE 2 DIABETES MELLITUS WITHOUT COMPLICATION, WITH LONG-TERM CURRENT USE OF INSULIN: ICD-10-CM

## 2024-06-10 DIAGNOSIS — G25.81 RESTLESS LEG SYNDROME: ICD-10-CM

## 2024-06-10 DIAGNOSIS — E11.69 DIABETES MELLITUS TYPE 2 IN OBESE: ICD-10-CM

## 2024-06-10 DIAGNOSIS — E66.9 DIABETES MELLITUS TYPE 2 IN OBESE: ICD-10-CM

## 2024-06-10 RX ORDER — GLIPIZIDE 10 MG/1
10 TABLET ORAL 2 TIMES DAILY WITH MEALS
Qty: 60 TABLET | Refills: 5 | Status: SHIPPED | OUTPATIENT
Start: 2024-06-10

## 2024-06-10 RX ORDER — BLOOD-GLUCOSE METER
1 EACH MISCELLANEOUS
Qty: 1 EACH | Refills: 0 | Status: SHIPPED | OUTPATIENT
Start: 2024-06-10

## 2024-06-10 RX ORDER — GABAPENTIN 300 MG/1
600 CAPSULE ORAL 3 TIMES DAILY
Qty: 180 CAPSULE | Refills: 2 | Status: SHIPPED | OUTPATIENT
Start: 2024-06-10 | End: 2024-09-08

## 2024-06-10 RX ORDER — LISINOPRIL 40 MG/1
40 TABLET ORAL DAILY
Qty: 90 TABLET | Refills: 3 | Status: SHIPPED | OUTPATIENT
Start: 2024-06-10

## 2024-06-10 RX ORDER — LANCING DEVICE
1 EACH MISCELLANEOUS 2 TIMES DAILY
Qty: 200 EACH | Refills: 5 | Status: SHIPPED | OUTPATIENT
Start: 2024-06-10 | End: 2025-06-10

## 2024-06-10 RX ORDER — ROPINIROLE 0.5 MG/1
0.5 TABLET, FILM COATED ORAL 3 TIMES DAILY
Qty: 90 TABLET | Refills: 2 | Status: SHIPPED | OUTPATIENT
Start: 2024-06-10 | End: 2024-09-08

## 2024-06-10 RX ORDER — INSULIN DEGLUDEC 100 U/ML
25 INJECTION, SOLUTION SUBCUTANEOUS NIGHTLY
Qty: 7.5 ML | Refills: 2 | Status: SHIPPED | OUTPATIENT
Start: 2024-06-10 | End: 2024-09-08

## 2024-06-10 RX ORDER — ATORVASTATIN CALCIUM 40 MG/1
40 TABLET, FILM COATED ORAL DAILY
Qty: 90 TABLET | Refills: 0 | Status: SHIPPED | OUTPATIENT
Start: 2024-06-10 | End: 2024-09-08

## 2024-06-10 RX ORDER — INSULIN LISPRO 100 [IU]/ML
7 INJECTION, SOLUTION INTRAVENOUS; SUBCUTANEOUS
Qty: 6.3 ML | Refills: 2 | Status: SHIPPED | OUTPATIENT
Start: 2024-06-10 | End: 2024-09-08

## 2024-06-10 NOTE — TELEPHONE ENCOUNTER
Pt called checking on her x-ray result from a xray  6/7/2024 and she explain she is in need of the result so her apt with the orthopedic apt on 6/11/2024

## 2024-06-11 NOTE — TELEPHONE ENCOUNTER
Called patient in regards to x-ray results. Has appointment with orthopedics in [place] on 6/12.     Also notified that patient has been wearing boot for her L ankle stability and has developed an open wound. Will send to wound care clinic for further evaluation. Appreciate assistance with care.    Refilled all medications- patient has insurance and can afford prescriptions now.    Will follow closely.    Dick Gallagher, DO  [facility] Internal Medicine, PGY-II

## 2024-06-12 ENCOUNTER — OFFICE VISIT (OUTPATIENT)
Dept: ORTHOPEDICS | Facility: CLINIC | Age: 59
End: 2024-06-12
Payer: COMMERCIAL

## 2024-06-12 VITALS
DIASTOLIC BLOOD PRESSURE: 95 MMHG | SYSTOLIC BLOOD PRESSURE: 168 MMHG | HEIGHT: 63 IN | BODY MASS INDEX: 41.41 KG/M2 | WEIGHT: 233.69 LBS | HEART RATE: 91 BPM

## 2024-06-12 DIAGNOSIS — G57.92 NEUROPATHY OF LEFT ANKLE: Primary | ICD-10-CM

## 2024-06-12 DIAGNOSIS — M86.9 OSTEOMYELITIS, ANKLE AND FOOT: ICD-10-CM

## 2024-06-12 DIAGNOSIS — M14.672 CHARCOT'S JOINT OF LEFT ANKLE: ICD-10-CM

## 2024-06-12 PROCEDURE — 3077F SYST BP >= 140 MM HG: CPT | Mod: CPTII,,, | Performed by: ORTHOPAEDIC SURGERY

## 2024-06-12 PROCEDURE — 99214 OFFICE O/P EST MOD 30 MIN: CPT | Mod: ,,, | Performed by: ORTHOPAEDIC SURGERY

## 2024-06-12 PROCEDURE — 4010F ACE/ARB THERAPY RXD/TAKEN: CPT | Mod: CPTII,,, | Performed by: ORTHOPAEDIC SURGERY

## 2024-06-12 PROCEDURE — 3080F DIAST BP >= 90 MM HG: CPT | Mod: CPTII,,, | Performed by: ORTHOPAEDIC SURGERY

## 2024-06-12 PROCEDURE — 1159F MED LIST DOCD IN RCRD: CPT | Mod: CPTII,,, | Performed by: ORTHOPAEDIC SURGERY

## 2024-06-12 PROCEDURE — 1160F RVW MEDS BY RX/DR IN RCRD: CPT | Mod: CPTII,,, | Performed by: ORTHOPAEDIC SURGERY

## 2024-06-12 PROCEDURE — 3008F BODY MASS INDEX DOCD: CPT | Mod: CPTII,,, | Performed by: ORTHOPAEDIC SURGERY

## 2024-06-12 RX ORDER — SULFAMETHOXAZOLE AND TRIMETHOPRIM 800; 160 MG/1; MG/1
1 TABLET ORAL 2 TIMES DAILY
Qty: 20 TABLET | Refills: 0 | Status: SHIPPED | OUTPATIENT
Start: 2024-06-12

## 2024-06-12 NOTE — PROGRESS NOTES
"Subjective:    CC: Pain of the Left Ankle (LT foot/ ankle pain, pt states about two years ago she had a fall which caused ankle to turn at an awkward angle, states was told at that time nothing was wrong with foot/ankle, states has swelling but was minimal,  states  recently swelling has been severe, states hurts to ambulate on foot, presents in boot, ambulating with cane, )       HPI:  Patient comes in today for her 1st visit.  Patient states he has been having pain, instability her ankle for over a couple of years.  She has had outside x-rays as well as a previous CT scan over a year ago.  She continues to have pain and swelling.  She states now over the last couple of months she has noticed a wound, she states the boot is rubbing against her outside part of her ankle.  She did have outside x-rays last week as well.  She states she does have diabetes.  She continues to ambulate with a cane.    ROS: Refer to HPI for pertinent ROS. All other 12 point systems negative.    Objective:  Vitals:    06/12/24 0944   BP: (!) 168/95   Pulse: 91   Weight: 106 kg (233 lb 11 oz)   Height: 5' 3" (1.6 m)        Physical Exam:  Patient is well-nourished developed female she is awake alert and oriented x3 she has in no apparent stress is pleasant and cooperative.  She has ambulating with a boot and cane today in clinic.  Examination left lower extremity compartment soft and warm.  Skin is intact.  There is no signs of DVT.  She does have a lateral-sided ankle wound.  There is some foul-smelling odor.  There was no specific drainage or obvious fluctuant abscess.  There was no bone exposed.  She has a proximally 5° of ankle motion.  She has no pain with stressing of her ankle joint.  She has very minimal if any sensation along the plantar aspect of her foot, minimal along the dorsal aspect.  Brisk capillary refill.    Images:  Outside x-rays were reviewed of her left tibia and ankle, suspected neuropathic Charcot joint versus chronic " osteomyelitis. Images Reviewed and discussed with patient.    Assessment:  1. Neuropathy of left ankle  - CT Ankle (Including Hindfoot) W W/O Contrast Left; Future    2. Charcot's joint of left ankle  - CT Ankle (Including Hindfoot) W W/O Contrast Left; Future    3. Osteomyelitis, ankle and foot        Plan:  At this time we discussed her physical exam and outside x-rays.  We have discussed her diabetic control.  She will be placed in a new boot today.  We will dress her wound, we will refer to wound care.  In regards to her Charcot versus chronic osteo, we will proceed with a CT scan of her left ankle with and without contrast.  We have also discussed referral to a foot and ankle specialist, we will proceed with this as well.  We will also start antibiotics given her open wound.    Follow UP: No follow-ups on file.

## 2024-06-13 ENCOUNTER — HOSPITAL ENCOUNTER (OUTPATIENT)
Dept: WOUND CARE | Facility: HOSPITAL | Age: 59
Discharge: HOME | End: 2024-06-13
Attending: NURSE PRACTITIONER
Payer: COMMERCIAL

## 2024-06-13 VITALS
TEMPERATURE: 99 F | SYSTOLIC BLOOD PRESSURE: 149 MMHG | DIASTOLIC BLOOD PRESSURE: 82 MMHG | HEART RATE: 92 BPM | OXYGEN SATURATION: 94 %

## 2024-06-13 DIAGNOSIS — G57.92 NEUROPATHY OF LEFT ANKLE: Primary | ICD-10-CM

## 2024-06-13 DIAGNOSIS — L97.522 DIABETIC ULCER OF LEFT FOOT ASSOCIATED WITH TYPE 2 DIABETES MELLITUS, WITH FAT LAYER EXPOSED, UNSPECIFIED PART OF FOOT: Primary | ICD-10-CM

## 2024-06-13 DIAGNOSIS — E11.622 TYPE 2 DIABETES MELLITUS WITH OTHER SKIN ULCER, WITHOUT LONG-TERM CURRENT USE OF INSULIN: ICD-10-CM

## 2024-06-13 DIAGNOSIS — E66.01 CLASS 3 SEVERE OBESITY WITH BODY MASS INDEX (BMI) OF 40.0 TO 44.9 IN ADULT, UNSPECIFIED OBESITY TYPE, UNSPECIFIED WHETHER SERIOUS COMORBIDITY PRESENT: ICD-10-CM

## 2024-06-13 DIAGNOSIS — M14.672 CHARCOT'S JOINT OF LEFT ANKLE: ICD-10-CM

## 2024-06-13 DIAGNOSIS — L97.522 DIABETIC ULCER OF OTHER PART OF LEFT FOOT ASSOCIATED WITH TYPE 2 DIABETES MELLITUS, WITH FAT LAYER EXPOSED: ICD-10-CM

## 2024-06-13 DIAGNOSIS — E11.621 DIABETIC ULCER OF OTHER PART OF LEFT FOOT ASSOCIATED WITH TYPE 2 DIABETES MELLITUS, WITH FAT LAYER EXPOSED: ICD-10-CM

## 2024-06-13 DIAGNOSIS — M21.962 LEFT ANKLE JOINT DEFORMITY: ICD-10-CM

## 2024-06-13 DIAGNOSIS — E11.621 DIABETIC ULCER OF LEFT FOOT ASSOCIATED WITH TYPE 2 DIABETES MELLITUS, WITH FAT LAYER EXPOSED, UNSPECIFIED PART OF FOOT: Primary | ICD-10-CM

## 2024-06-13 DIAGNOSIS — M86.179 OTHER ACUTE OSTEOMYELITIS, UNSPECIFIED ANKLE AND FOOT: ICD-10-CM

## 2024-06-13 DIAGNOSIS — M86.9 OSTEOMYELITIS, ANKLE AND FOOT: ICD-10-CM

## 2024-06-13 PROCEDURE — 97597 DBRDMT OPN WND 1ST 20 CM/<: CPT

## 2024-06-13 PROCEDURE — 99204 OFFICE O/P NEW MOD 45 MIN: CPT | Mod: ,,, | Performed by: NURSE PRACTITIONER

## 2024-06-13 PROCEDURE — 99211 OFF/OP EST MAY X REQ PHY/QHP: CPT | Mod: 25

## 2024-06-13 PROCEDURE — 27000999 HC MEDICAL RECORD PHOTO DOCUMENTATION

## 2024-06-13 PROCEDURE — 29580 STRAPPING UNNA BOOT: CPT

## 2024-06-13 PROCEDURE — 87077 CULTURE AEROBIC IDENTIFY: CPT | Performed by: NURSE PRACTITIONER

## 2024-06-13 NOTE — LETTER
June 13, 2024      [facility]  [address] [address]  Phone: [phone]  Fax: [phone]       Patient: Suki Anton   YOB: 1965  Date of Visit: 06/13/2024    To Whom It May Concern:    Mars Anton  was at [facility] on 06/13/2024. The patient may return to work on 6/21/24  with no restrictions. If you have any questions or concerns, or if I can be of further assistance, please do not hesitate to contact me.    Sincerely,        JANE Vieyra

## 2024-06-13 NOTE — PROGRESS NOTES
[facility]   Outpatient Wound Care     Subjective:   Patient ID: Suki Anton is a 58 y.o. female.    Chief Complaint: Wound Care (Left lateral ankle)      History of Present Illness:   58 y.o. White female being seen today for left ankle diabetic foot ulcer, and left ankle Charcot with infection. Wound has been present for approximately 2 months.  She is a referral from Orthopedics.  Patient is currently awaiting CT scan of left ankle, and referral to a foot ankle specialist.  Patient voices 2 months ago twisted ankle and purchased a boot from [employer] which created an ulcer to ankle area. Followed by Dick Gallagher DO for PCP last appt 3/11/24.    Today's visit: 06/13/2024:    Presents to the clinic with grandchildren.  Ambulates with left foot orthopedic walking boot.  Walking boot and left ankle dressing removed per nursing staff at bedside.  Left ankle ulcer red granulating wound bed with moderate amount of slough and serosanguineous drainage with the jak wound erythema and nonpitting edema.  Discussion with the patient today will need to perform selective debridement and obtain tissue culture.  Written consent obtained.  See quick procedure note.  Following debridement red granulating wound bed with moderate serosanguineous drainage discussion with the patient today will benefit from applying Unna boot to increased granulation to area and decrease edema.  All wound care performed per nursing staff at bedside.  Left ankle wound cleansed with 0.5% Dakins, apply Silver polymem to wound bed, cover with unna boot, Drawtex wrap, Kerlix, and Coban.  Tolerated well.  Instructed on unna boot precautions.  Will return to the clinic on Monday for a nurse visit for Unna boot change.  Will follow up with me next Thursday.       History includes:      Past Medical  History:   Diagnosis Date    Diabetes mellitus     Diabetes mellitus, type 2     GERD (gastroesophageal reflux disease)     Hypertension     History reviewed. No pertinent surgical history.   Social History     Socioeconomic History    Marital status:     Number of children: 0   Occupational History    Occupation: Manager     Comment: [employer]   Tobacco Use    Smoking status: Never    Smokeless tobacco: Never   Substance and Sexual Activity    Alcohol use: Never    Drug use: Never    Sexual activity: Yes     Partners: Male     Birth control/protection: None     Social Determinants of Health     Financial Resource Strain: Medium Risk (4/4/2024)    Overall Financial Resource Strain (CARDIA)     Difficulty of Paying Living Expenses: Somewhat hard   Food Insecurity: Food Insecurity Present (3/11/2024)    Hunger Vital Sign     Worried About Running Out of Food in the Last Year: Sometimes true     Ran Out of Food in the Last Year: Sometimes true   Transportation Needs: Unmet Transportation Needs (4/4/2024)    PRAPARE - Transportation     Lack of Transportation (Medical): Yes     Lack of Transportation (Non-Medical): Yes   Physical Activity: Inactive (4/4/2024)    Exercise Vital Sign     Days of Exercise per Week: 0 days     Minutes of Exercise per Session: 0 min   Stress: Stress Concern Present (4/4/2024)    [employer] [place] [employer] - Occupational Stress Questionnaire     Feeling of Stress : Rather much   Housing Stability: High Risk (3/11/2024)    Housing Stability Vital Sign     Unable to Pay for Housing in the Last Year: Yes     Number of Places Lived in the Last Year: 1     Unstable Housing in the Last Year: No   .      Current Outpatient Medications   Medication Sig Dispense Refill    acetaminophen (TYLENOL) 500 MG tablet Take 500 mg by mouth every 6 (six) hours as needed for Pain.      atorvastatin (LIPITOR) 40 MG tablet Take 1 tablet (40 mg total) by mouth once daily. 90 tablet 0     "blood sugar diagnostic Strp Test strips to check CBG's 50 each 11    blood-glucose meter kit 1 each by Other route 2 (two) times daily. Use as instructed 1 each 0    diclofenac sodium (VOLTAREN) 1 % Gel Apply 2 g topically 4 (four) times daily. 100 g 2    EASY TOUCH TWIST LANCETS 33 gauge Misc USE TO CHECK BLOOD SUAGR LEVELS TWO TIMES A  each 3    gabapentin (NEURONTIN) 300 MG capsule Take 2 capsules (600 mg total) by mouth 3 (three) times daily. 180 capsule 2    glipiZIDE (GLUCOTROL) 10 MG tablet Take 1 tablet (10 mg total) by mouth 2 (two) times daily with meals. 60 tablet 5    insulin degludec (TRESIBA FLEXTOUCH U-100) 100 unit/mL (3 mL) insulin pen Inject 25 Units into the skin every evening. 7.5 mL 2    insulin lispro (HUMALOG KWIKPEN INSULIN) 100 unit/mL pen Inject 7 Units into the skin 3 (three) times daily with meals. 6.3 mL 2    lancing device Misc 1 each by Misc.(Non-Drug; Combo Route) route 2 (two) times a day. 200 each 5    lisinopriL (PRINIVIL,ZESTRIL) 40 MG tablet Take 1 tablet (40 mg total) by mouth once daily. 90 tablet 3    rOPINIRole (REQUIP) 0.5 MG tablet Take 1 tablet (0.5 mg total) by mouth 3 (three) times daily. 90 tablet 2    sulfamethoxazole-trimethoprim 800-160mg (BACTRIM DS) 800-160 mg Tab Take 1 tablet by mouth 2 (two) times daily. 20 tablet 0    traMADoL (ULTRAM) 50 mg tablet Take 1 tablet (50 mg total) by mouth every 6 (six) hours. 20 tablet 0    TRUE METRIX GLUCOSE METER Misc Inject 1 each into the skin 4 (four) times daily with meals and nightly. use as directed 1 each 0    TRUEPLUS INSULIN 0.5 mL 31 gauge x 5/16" Syrg USE 1 daily      omeprazole (PRILOSEC) 20 MG capsule Take 2 capsules (40 mg total) by mouth once daily. 60 capsule 2    tirzepatide 7.5 mg/0.5 mL PnIj Inject 7.5 mg into the skin every 7 days. (Patient not taking: Reported on 1/16/2024) 4 Pen 0     No current facility-administered medications for this encounter.       Review of Systems   Skin:  Positive for wound. "   All other systems reviewed and are negative.         Labs Reviewed:   Chemistry:  Lab Results   Component Value Date    BUN 14.5 06/15/2023    BUN 14.0 06/14/2023    CREATININE 0.75 06/15/2023    CREATININE 0.69 06/14/2023    EGFRNORACEVR >60 06/15/2023    EGFRNORACEVR >60 06/14/2023    GLUCOSE 267 (H) 06/15/2023    AST 8 06/15/2023    AST 8 06/14/2023    ALT 12 06/15/2023    ALT 10 06/14/2023    HGBA1C 11.4 (H) 04/13/2023        Hematology:  Lab Results   Component Value Date    WBC 14.77 (H) 06/15/2023    WBC 15.65 (H) 06/14/2023    HGB 11.3 (L) 06/15/2023    HGB 11.6 (L) 06/14/2023    HCT 33.7 (L) 06/15/2023    HCT 34.5 (L) 06/14/2023     06/15/2023     06/14/2023       Inflammatory Markers:  Lab Results   Component Value Date    SEDRATE 64 (H) 07/02/2020    SEDRATE 95 (H) 06/30/2020    SEDRATE 94 (H) 06/28/2020        Objective:        Physical Exam  Vitals reviewed.   Cardiovascular:      Pulses:           Dorsalis pedis pulses are detected w/ Doppler on the right side and detected w/ Doppler on the left side.        Posterior tibial pulses are detected w/ Doppler on the right side and detected w/ Doppler on the left side.   Musculoskeletal:      Left lower leg: Edema present.      Left foot: Charcot foot present.   Feet:      Right foot:      Skin integrity: Skin integrity normal.      Toenail Condition: Right toenails are abnormally thick. Fungal disease present.     Left foot:      Skin integrity: Erythema and warmth present.      Toenail Condition: Left toenails are abnormally thick. Fungal disease present.  Skin:     General: Skin is warm.      Capillary Refill: Capillary refill takes less than 2 seconds.      Findings: Wound present.             Comments: Left ankle ulcer after debridement red granulating wound bed with moderate serosanguineous drainage, erythema noted to jak wound.   Neurological:      Mental Status: She is alert.            Wound 06/13/24 0827 Pressure Injury Left  lateral Ankle (Active)   06/13/24 0827   Present on Original Admission: Y   Primary Wound Type: Pressure inj   Side: Left   Orientation: lateral   Location: Ankle   Wound Approximate Age at First Assessment (Weeks):    Wound Number:    Is this injury device related?:    Incision Type:    Closure Method:    Wound Description (Comments):    Type:    Additional Comments:    Ankle-Brachial Index:    Pulses:    Removal Indication and Assessment:    Wound Outcome:    Wound Image   06/13/24 0826   Dressing Appearance Moist drainage 06/13/24 0826   Drainage Amount Large 06/13/24 0826   Drainage Characteristics/Odor Serosanguineous 06/13/24 0826   Appearance Pink;Yellow 06/13/24 0826   Wound Length (cm) 4.5 cm 06/13/24 0826   Wound Width (cm) 2.5 cm 06/13/24 0826   Wound Depth (cm) 0.2 cm 06/13/24 0826   Wound Volume (cm^3) 2.25 cm^3 06/13/24 0826   Wound Surface Area (cm^2) 11.25 cm^2 06/13/24 0826         Assessment:         ICD-10-CM ICD-9-CM   1. Diabetic ulcer of other part of left foot associated with type 2 diabetes mellitus, with fat layer exposed  E11.621 250.80    L97.522 707.15   2. Charcot's joint of left ankle  M14.672 094.0     713.5   3. Type 2 diabetes mellitus with other skin ulcer, without long-term current use of insulin  E11.622 250.80   4. Class 3 severe obesity with body mass index (BMI) of 40.0 to 44.9 in adult, unspecified obesity type, unspecified whether serious comorbidity present  E66.01 278.01    Z68.41 V85.41         Plan:   Tissue pathology and/or culture taken:  [x] Yes [] No   Sharp debridement performed:   [] Yes [x] No   Labs ordered this visit:   [] Yes [x] No   Imaging ordered this visit:   [] Yes [x] No         1. Diabetic ulcer of other part of left foot associated with type 2 diabetes mellitus, with fat layer exposed     Applied unna boot with silver polymem silver to wound bed, will return on Monday and Thursday for Unna boot changes.     Monitor for any signs of infection: Watch for  increased drainage or pain, fevers, chills, swelling and report this to clinic.      2. Charcot's joint of left ankle     Under the care of orthopedics.  Currently waiting for referral to a foot ankle specialist.       3. Type 2 diabetes mellitus with other skin ulcer, without long-term current use of insulin     Co-factor in wound development and delayed wound healing.     Last A1c 13.7.    Must have a strict diabetic diet, take glucose lowering medications as prescribed and encourage lower HA1C.   4. Class 3 severe obesity with body mass index (BMI) of 40.0 to 44.9 in adult, unspecified obesity type, unspecified whether serious comorbidity present     Co-factor in delayed wound healing.        Orders Placed This Encounter   Procedures    Tissue Culture - Aerobic    Ambulatory referral/consult to Wound Clinic     Standing Status:   Standing     Number of Occurrences:   1     Referral Priority:   Routine     Referral Type:   Consultation     Referral Reason:   Specialty Services Required     Requested Specialty:   Wound Care     Number of Visits Requested:   1     The time spent including preparing to see the patient, obtaining patient history and assessment, evaluation of the plan of care, patient/caregiver counseling and education, orders, documentation, coordination of care, and other professional medical management activities for today's encounter was 20 minute.    Time spent performing procedures during today's encounter was 20 minute.    Follow up in about 4 days (around 6/17/2024) for Nurse visit and with Martha on 6/20/24 unna boot change . Teaching provided on s/s to call wound clinic for promptly.  Unna boot and ER precautions taught for after hours and weekends.         JANE Vieyra

## 2024-06-17 ENCOUNTER — TELEPHONE (OUTPATIENT)
Dept: INTERNAL MEDICINE | Facility: CLINIC | Age: 59
End: 2024-06-17
Payer: COMMERCIAL

## 2024-06-17 ENCOUNTER — HOSPITAL ENCOUNTER (OUTPATIENT)
Dept: WOUND CARE | Facility: HOSPITAL | Age: 59
Discharge: HOME | End: 2024-06-17
Attending: NURSE PRACTITIONER
Payer: COMMERCIAL

## 2024-06-17 VITALS
OXYGEN SATURATION: 94 % | TEMPERATURE: 98 F | DIASTOLIC BLOOD PRESSURE: 89 MMHG | SYSTOLIC BLOOD PRESSURE: 152 MMHG | HEART RATE: 93 BPM

## 2024-06-17 DIAGNOSIS — E11.622 TYPE 2 DIABETES MELLITUS WITH OTHER SKIN ULCER, WITHOUT LONG-TERM CURRENT USE OF INSULIN: ICD-10-CM

## 2024-06-17 DIAGNOSIS — L97.522 DIABETIC ULCER OF LEFT FOOT ASSOCIATED WITH TYPE 2 DIABETES MELLITUS, WITH FAT LAYER EXPOSED, UNSPECIFIED PART OF FOOT: ICD-10-CM

## 2024-06-17 DIAGNOSIS — M14.672 CHARCOT'S JOINT OF LEFT ANKLE: ICD-10-CM

## 2024-06-17 DIAGNOSIS — E11.621 DIABETIC ULCER OF LEFT FOOT ASSOCIATED WITH TYPE 2 DIABETES MELLITUS, WITH FAT LAYER EXPOSED, UNSPECIFIED PART OF FOOT: ICD-10-CM

## 2024-06-17 LAB
BACTERIA TISS AEROBE CULT: ABNORMAL
BACTERIA TISS AEROBE CULT: ABNORMAL

## 2024-06-17 PROCEDURE — 29580 STRAPPING UNNA BOOT: CPT

## 2024-06-17 PROCEDURE — 99211 OFF/OP EST MAY X REQ PHY/QHP: CPT | Mod: 25

## 2024-06-17 PROCEDURE — 27000999 HC MEDICAL RECORD PHOTO DOCUMENTATION

## 2024-06-17 NOTE — TELEPHONE ENCOUNTER
Spoke with patient, wanted to inform of wound care appt today, and to get a work excuse for job, instructed ortho will be able to get letter for her, verbalized understanding and pleased.

## 2024-06-17 NOTE — TELEPHONE ENCOUNTER
Pt called requesting a call back concerning her seeing different Dr.'s. That's all the pt would discuss to me.  Pt requesting an urgent call back.

## 2024-06-17 NOTE — PROGRESS NOTES
[facility]                Wound Care Services    Subjective:       Patient ID: Suki Anton is a 58 y.o. female.    Chief Complaint: Wound Care (Left lateral ankle wound/unna)    HPI  Review of Systems      Objective:     Vitals:    06/17/24 0754   BP: (!) 152/89   Pulse: 93   Temp: 98.3 °F (36.8 °C)         Physical Exam         Assessment/Plan:         ICD-10-CM ICD-9-CM   1. Charcot's joint of left ankle  M14.672 094.0     713.5   2. Type 2 diabetes mellitus with other skin ulcer, without long-term current use of insulin  E11.622 250.80   3. Diabetic ulcer of left foot associated with type 2 diabetes mellitus, with fat layer exposed, unspecified part of foot  E11.621 250.80    L97.522 707.15           Tissue pathology and/or culture taken:  [] Yes [x] No   Sharp debridement performed:   [] Yes [x] No   Labs ordered this visit:   [] Yes [x] No   Imaging ordered this visit:   [] Yes [x] No     Patient arrived with unna boot still in tact. Unna boot removed and wound washed with hibiclens. Silver polymem applied to wound and unna boot reapplied by LPQUETA Fenton.      Orders Placed This Encounter   Procedures    Change dressing     See last progress note     Standing Status:   Standing     Number of Occurrences:   1        No follow-ups on file.

## 2024-06-18 ENCOUNTER — TELEPHONE (OUTPATIENT)
Dept: WOUND CARE | Facility: HOSPITAL | Age: 59
End: 2024-06-18
Payer: COMMERCIAL

## 2024-06-18 RX ORDER — PENICILLIN V POTASSIUM 500 MG/1
500 TABLET, FILM COATED ORAL EVERY 8 HOURS
Qty: 30 TABLET | Refills: 0 | Status: SHIPPED | OUTPATIENT
Start: 2024-06-18 | End: 2024-06-28

## 2024-06-18 NOTE — TELEPHONE ENCOUNTER
Called informed of wound culture report currently on Bactrim DS. Add PCN  to regimen and will follow up with Dr. Barnes on Friday.

## 2024-06-21 ENCOUNTER — TELEPHONE (OUTPATIENT)
Dept: ORTHOPEDICS | Facility: CLINIC | Age: 59
End: 2024-06-21
Payer: COMMERCIAL

## 2024-06-21 ENCOUNTER — HOSPITAL ENCOUNTER (OUTPATIENT)
Dept: WOUND CARE | Facility: HOSPITAL | Age: 59
Discharge: HOME | End: 2024-06-21
Attending: FAMILY MEDICINE
Payer: COMMERCIAL

## 2024-06-21 VITALS
HEART RATE: 89 BPM | DIASTOLIC BLOOD PRESSURE: 85 MMHG | RESPIRATION RATE: 20 BRPM | SYSTOLIC BLOOD PRESSURE: 147 MMHG | OXYGEN SATURATION: 97 % | TEMPERATURE: 98 F

## 2024-06-21 DIAGNOSIS — S81.802D WOUND OF LEFT LOWER EXTREMITY, SUBSEQUENT ENCOUNTER: Primary | ICD-10-CM

## 2024-06-21 PROCEDURE — 27000999 HC MEDICAL RECORD PHOTO DOCUMENTATION

## 2024-06-21 PROCEDURE — 29580 STRAPPING UNNA BOOT: CPT

## 2024-06-21 PROCEDURE — 99211 OFF/OP EST MAY X REQ PHY/QHP: CPT | Mod: 25

## 2024-06-21 NOTE — PROGRESS NOTES
CHIEF COMPLAINT:  Chief Complaint   Patient presents with    Wound Care     Left lateral ankle wound/unna     HISTORY OF  PRESENT ILLNESS:  58 y.o. White female being seen today for left ankle diabetic foot ulcer, and left ankle Charcot with infection. Wound has been present for approximately 2 months.  She is a referral from Orthopedics.  Patient is currently awaiting CT scan of left ankle, and referral to a foot ankle specialist.  Patient voices 2 months ago twisted ankle and purchased a boot from [employer] which created an ulcer to ankle area. Followed by Dick Gallagher DO for PCP last appt 3/11/24.     Today's information:  Patient here for follow-up left ankle wound, she had an unna boot placed at last clinic visit and reports pain is decreased.  She is following with orthopedist who is trying to order a CT scan.  She has a high out-of-pocket co-pay so she is trying to work out a payment plan.  She denies any fever or chills.    REVIEW OF SYSTEMS:  Review of Systems   Constitutional:  Negative for chills and fever.   Respiratory:  Negative for cough.    Gastrointestinal:  Negative for nausea.   Musculoskeletal:         As stated in HPI   Skin:         As stated in HPI   Psychiatric/Behavioral: Negative.         Past Medical History:   Diagnosis Date    Diabetes mellitus     Diabetes mellitus, type 2     GERD (gastroesophageal reflux disease)     Hypertension     History reviewed. No pertinent surgical history.   Social History     Socioeconomic History    Marital status:     Number of children: 0   Occupational History    Occupation: Manager     Comment: [employer]   Tobacco Use    Smoking status: Never    Smokeless tobacco: Never   Substance and Sexual Activity    Alcohol use: Never    Drug use: Never    Sexual activity: Yes     Partners: Male     Birth control/protection: None     Social Determinants of Health     Financial Resource Strain: Medium Risk (4/4/2024)    Overall Financial Resource Strain  (CARDIA)     Difficulty of Paying Living Expenses: Somewhat hard   Food Insecurity: Food Insecurity Present (3/11/2024)    Hunger Vital Sign     Worried About Running Out of Food in the Last Year: Sometimes true     Ran Out of Food in the Last Year: Sometimes true   Transportation Needs: Unmet Transportation Needs (4/4/2024)    PRAPARE - Transportation     Lack of Transportation (Medical): Yes     Lack of Transportation (Non-Medical): Yes   Physical Activity: Inactive (4/4/2024)    Exercise Vital Sign     Days of Exercise per Week: 0 days     Minutes of Exercise per Session: 0 min   Stress: Stress Concern Present (4/4/2024)    [employer] - Occupational Stress Questionnaire     Feeling of Stress : Rather much   Housing Stability: High Risk (3/11/2024)    Housing Stability Vital Sign     Unable to Pay for Housing in the Last Year: Yes     Number of Places Lived in the Last Year: 1     Unstable Housing in the Last Year: No   .    Review of Most Recent Wound Care-Related Labs:  Lab Results   Component Value Date/Time    WBC 14.77 (H) 06/15/2023 02:10 AM    RBC 3.92 (L) 06/15/2023 02:10 AM    HGB 11.3 (L) 06/15/2023 02:10 AM    HCT 33.7 (L) 06/15/2023 02:10 AM    MCV 86.0 06/15/2023 02:10 AM    MCH 28.8 06/15/2023 02:10 AM    CREATININE 0.75 06/15/2023 02:10 AM    HGBA1C 11.4 (H) 04/13/2023 12:04 PM    CRP 7.00 (H) 06/09/2023 08:44 AM        Wound-Related Imaging:      PHYSICAL EXAMINATION:  Blood pressure (!) 147/85, pulse 89, temperature 98.1 °F (36.7 °C), resp. rate 20, SpO2 97%.  General:  VSS, afebrile. No acute distress.   Respiratory: Non labored.  No coughing.  Cardiovascular:  DP pulse palpable on the left  Musculoskeletal:  joint deformity on the left ankle  Neurologic:  A&O X 3.    Psychiatric:  Calm, cooperative.  Mood and effect normal.  Responses appropriate.   Integumentary:          Wound 06/13/24 0827 Pressure Injury Left lateral Ankle (Active)   06/13/24 0827   Present on  Original Admission: Y   Primary Wound Type: Pressure inj   Side: Left   Orientation: lateral   Location: Ankle   Wound Approximate Age at First Assessment (Weeks):    Wound Number:    Is this injury device related?:    Incision Type:    Closure Method:    Wound Description (Comments):    Type:    Additional Comments:    Ankle-Brachial Index:    Pulses:    Removal Indication and Assessment:    Wound Outcome:    Wound Image   06/21/24 0853   Dressing Appearance Moist drainage 06/21/24 0853   Drainage Amount Moderate 06/21/24 0853   Drainage Characteristics/Odor Serosanguineous 06/21/24 0853   Appearance Pink 06/21/24 0853   Periwound Area Intact 06/21/24 0853   Wound Length (cm) 4.2 cm 06/21/24 0853   Wound Width (cm) 2.4 cm 06/21/24 0853   Wound Depth (cm) 0.2 cm 06/21/24 0853   Wound Volume (cm^3) 2.016 cm^3 06/21/24 0853   Wound Surface Area (cm^2) 10.08 cm^2 06/21/24 0853       ASSESSMENT/PLAN:    Patient Active Problem List    Diagnosis Date Noted    Diabetic ulcer of left foot associated with type 2 diabetes mellitus, with fat layer exposed 06/13/2024    Charcot's joint of left ankle 06/13/2024    Left ankle joint deformity 06/15/2023    Closed fracture of proximal end of right humerus with routine healing 06/01/2022    Diabetes mellitus 05/31/2022    Hyperlipidemia 05/31/2022    Obesity 05/31/2022    Severe acute respiratory syndrome coronavirus 2 (SARS-CoV-2) vaccination not indicated 05/31/2022     Left lower extremity wound- puracol followed by Drawtex and then Unna boot placed  Uncontrolled diabetes-effects wound healing   Continue Bactrim and penicillin  Return to clinic next week

## 2024-06-21 NOTE — TELEPHONE ENCOUNTER
Patient left  stating that her CT was denied. She would like to know why.     Attempted to call patient - no answer.     Denial reason:  CT ordered with and without contrast. In the denial it states that ordering either CT with or CT without contrast will be sufficient.     Message sent to [place] to see which one to proceed with.

## 2024-06-24 ENCOUNTER — TELEPHONE (OUTPATIENT)
Dept: ORTHOPEDICS | Facility: CLINIC | Age: 59
End: 2024-06-24

## 2024-06-24 DIAGNOSIS — Z79.4 TYPE 2 DIABETES MELLITUS WITH MODERATE NONPROLIFERATIVE RETINOPATHY OF BOTH EYES, WITH LONG-TERM CURRENT USE OF INSULIN, MACULAR EDEMA PRESENCE UNSPECIFIED: Primary | ICD-10-CM

## 2024-06-24 DIAGNOSIS — E11.3393 TYPE 2 DIABETES MELLITUS WITH MODERATE NONPROLIFERATIVE RETINOPATHY OF BOTH EYES, WITH LONG-TERM CURRENT USE OF INSULIN, MACULAR EDEMA PRESENCE UNSPECIFIED: Primary | ICD-10-CM

## 2024-06-24 NOTE — PROGRESS NOTES
Patient is unable to have original order for CT with and without contrast due to insurance denial.     Spoke to doctors and ordered CT left ankle with contrast at [facility].     Called patient - advised that order was changed and placed at [facility].     Pt verbalized understanding and will call with any questions or concerns.

## 2024-06-24 NOTE — PROGRESS NOTES
Contacted patient with results. Sent in referral for ophthalmology for dilated exam.     Dick Gallagher,   [facility] Internal Medicine, PGY-II

## 2024-06-26 ENCOUNTER — TELEPHONE (OUTPATIENT)
Dept: INTERNAL MEDICINE | Facility: CLINIC | Age: 59
End: 2024-06-26
Payer: COMMERCIAL

## 2024-06-26 ENCOUNTER — HOSPITAL ENCOUNTER (OUTPATIENT)
Dept: WOUND CARE | Facility: HOSPITAL | Age: 59
Discharge: HOME | End: 2024-06-26
Attending: NURSE PRACTITIONER
Payer: COMMERCIAL

## 2024-06-26 VITALS
HEART RATE: 83 BPM | SYSTOLIC BLOOD PRESSURE: 163 MMHG | OXYGEN SATURATION: 95 % | DIASTOLIC BLOOD PRESSURE: 91 MMHG | TEMPERATURE: 98 F

## 2024-06-26 DIAGNOSIS — L97.522 DIABETIC ULCER OF LEFT FOOT ASSOCIATED WITH TYPE 2 DIABETES MELLITUS, WITH FAT LAYER EXPOSED, UNSPECIFIED PART OF FOOT: Primary | ICD-10-CM

## 2024-06-26 DIAGNOSIS — E11.621 DIABETIC ULCER OF LEFT FOOT ASSOCIATED WITH TYPE 2 DIABETES MELLITUS, WITH FAT LAYER EXPOSED, UNSPECIFIED PART OF FOOT: Primary | ICD-10-CM

## 2024-06-26 DIAGNOSIS — F51.01 PRIMARY INSOMNIA: Primary | ICD-10-CM

## 2024-06-26 DIAGNOSIS — E66.01 CLASS 3 SEVERE OBESITY WITH BODY MASS INDEX (BMI) OF 40.0 TO 44.9 IN ADULT, UNSPECIFIED OBESITY TYPE, UNSPECIFIED WHETHER SERIOUS COMORBIDITY PRESENT: ICD-10-CM

## 2024-06-26 DIAGNOSIS — M14.672 CHARCOT'S JOINT OF LEFT ANKLE: ICD-10-CM

## 2024-06-26 DIAGNOSIS — E11.622 TYPE 2 DIABETES MELLITUS WITH OTHER SKIN ULCER, WITHOUT LONG-TERM CURRENT USE OF INSULIN: ICD-10-CM

## 2024-06-26 PROCEDURE — 29580 STRAPPING UNNA BOOT: CPT

## 2024-06-26 PROCEDURE — 99211 OFF/OP EST MAY X REQ PHY/QHP: CPT | Mod: 25

## 2024-06-26 PROCEDURE — 27000999 HC MEDICAL RECORD PHOTO DOCUMENTATION

## 2024-06-26 PROCEDURE — 99214 OFFICE O/P EST MOD 30 MIN: CPT | Mod: ,,, | Performed by: NURSE PRACTITIONER

## 2024-06-26 NOTE — LETTER
June 26, 2024      [facility]  [address] [address]  Phone: [phone]  Fax: [phone]       Patient: Suki Anton   YOB: 1965  Date of Visit: 06/26/2024    To Whom It May Concern:    Mars Anton  was at [facility] on 6/13/24 and place off of work until further testing performed of left ankle. The patient may return to work date unknown at this time due to awaiting healing of wound to left ankle and imaging of left ankle.If you have any questions or concerns, or if I can be of further assistance, please do not hesitate to contact me.    Sincerely,        JANE Vieyra

## 2024-06-26 NOTE — TELEPHONE ENCOUNTER
GOOD MORNING DR. COLON,       MS. GENAO CALLED STATING THAT SHE HAS BEEN HAVING INSOMNIA FOR THE PAST 2 WEEKS.  SHE IS ASKING THAT WE SEND SOMETHING INTO OUR PHARMACY.  PLEASE ADVISE.

## 2024-06-27 RX ORDER — TRAZODONE HYDROCHLORIDE 50 MG/1
50 TABLET ORAL NIGHTLY PRN
Qty: 30 TABLET | Refills: 0 | Status: SHIPPED | OUTPATIENT
Start: 2024-06-27 | End: 2024-07-27

## 2024-06-27 NOTE — PROGRESS NOTES
[facility]   Outpatient Wound Care     Subjective:   Patient ID: Suki Anton is a 58 y.o. female.    Chief Complaint: Wound Care (Left lateral ankle wound/unna)      History of Present Illness:   58 y.o. White female being seen today for follow up regarding left ankle diabetic foot ulcer after Unna boot placement, and Charcot ankle.. Wound has been present for approximately 2 months.  She is a referral from Orthopedics.  Patient is currently awaiting CT scan of left ankle, and referral to a foot ankle specialist.  Patient voices 2 months ago twisted ankle and purchased a boot from [employer] which created an ulcer to ankle area. Followed by Dick Gallagher DO for PCP last appt 3/11/24.    Today's visit:  06/26/2024:  Presents to clinic alone.  Left lower leg unna boot removed per nursing staff at bedside.  Left ankle ulcer red granulating wound bed with moderate serosanguineous drainage minimal slough.  Macerated jak wound.  Discussion with the patient today will continue Unna boot.  Patient is taking all oral antibiotics as directed.  All wound care performed per nursing staff at bedside. Left ankle wound cleansed with 0.5% Dakins, apply Triad to periwound edge, Silver polymem to wound bed, cover with unna boot, Drawtex wrap, Kerlix, and Coban. Tolerated well.  Reinforced unna boot precautions.  Will return to the clinic on Monday to re-evaluate left ankle ulcer.  Instructed the importance of calling the office with any questions, concerns, or any reasons having to remove Unna boot.  Doctors excuse given.  Verbalized understanding of all instructions.      06/13/2024:  Presents to the clinic with grandchildren.  Ambulates with left foot orthopedic walking boot.  Walking boot and left ankle dressing removed per nursing staff at bedside.  Left ankle ulcer red  The patient is Stable - Low risk of patient condition declining or worsening      Patient is not progressing towards the following goals:      Problem: Infection  Goal: Patient will remain free from infection  Outcome: Not Progressing  Note: Pt taking Omnicef for UTI      granulating wound bed with moderate amount of slough and serosanguineous drainage with the jak wound erythema and nonpitting edema.  Discussion with the patient today will need to perform selective debridement and obtain tissue culture.  Written consent obtained.  See quick procedure note.  Following debridement red granulating wound bed with moderate serosanguineous drainage discussion with the patient today will benefit from applying Unna boot to increased granulation to area and decrease edema.  All wound care performed per nursing staff at bedside.  Left ankle wound cleansed with 0.5% Dakins, apply Silver polymem to wound bed, cover with unna boot, Drawtex wrap, Kerlix, and Coban.  Tolerated well.  Instructed on unna boot precautions.  Will return to the clinic on Monday for a nurse visit for Unna boot change.  Will follow up with me next Thursday.       History includes:      Past Medical History:   Diagnosis Date    Diabetes mellitus     Diabetes mellitus, type 2     GERD (gastroesophageal reflux disease)     Hypertension     History reviewed. No pertinent surgical history.   Social History     Socioeconomic History    Marital status:     Number of children: 0   Occupational History    Occupation: Manager     Comment: [employer]   Tobacco Use    Smoking status: Never    Smokeless tobacco: Never   Substance and Sexual Activity    Alcohol use: Never    Drug use: Never    Sexual activity: Yes     Partners: Male     Birth control/protection: None     Social Determinants of Health     Financial Resource Strain: Medium Risk (4/4/2024)    Overall Financial Resource Strain (CARDIA)     Difficulty of Paying Living Expenses: Somewhat hard   Food Insecurity: Food Insecurity Present (3/11/2024)    Hunger Vital Sign     Worried About Running Out of Food in the Last Year: Sometimes true     Ran Out of Food in the Last Year: Sometimes true   Transportation Needs: Unmet Transportation Needs (4/4/2024)    KRIS  - Transportation     Lack of Transportation (Medical): Yes     Lack of Transportation (Non-Medical): Yes   Physical Activity: Inactive (4/4/2024)    Exercise Vital Sign     Days of Exercise per Week: 0 days     Minutes of Exercise per Session: 0 min   Stress: Stress Concern Present (4/4/2024)    [facility] of Occupational Health - Occupational Stress Questionnaire     Feeling of Stress : Rather much   Housing Stability: High Risk (3/11/2024)    Housing Stability Vital Sign     Unable to Pay for Housing in the Last Year: Yes     Number of Places Lived in the Last Year: 1     Unstable Housing in the Last Year: No   .      Current Outpatient Medications   Medication Sig Dispense Refill    acetaminophen (TYLENOL) 500 MG tablet Take 500 mg by mouth every 6 (six) hours as needed for Pain.      atorvastatin (LIPITOR) 40 MG tablet Take 1 tablet (40 mg total) by mouth once daily. 90 tablet 0    blood sugar diagnostic Strp Test strips to check CBG's 50 each 11    blood-glucose meter kit 1 each by Other route 2 (two) times daily. Use as instructed 1 each 0    diclofenac sodium (VOLTAREN) 1 % Gel Apply 2 g topically 4 (four) times daily. 100 g 2    EASY TOUCH TWIST LANCETS 33 gauge Misc USE TO CHECK BLOOD SUAGR LEVELS TWO TIMES A  each 3    gabapentin (NEURONTIN) 300 MG capsule Take 2 capsules (600 mg total) by mouth 3 (three) times daily. 180 capsule 2    glipiZIDE (GLUCOTROL) 10 MG tablet Take 1 tablet (10 mg total) by mouth 2 (two) times daily with meals. 60 tablet 5    insulin degludec (TRESIBA FLEXTOUCH U-100) 100 unit/mL (3 mL) insulin pen Inject 25 Units into the skin every evening. 7.5 mL 2    insulin lispro (HUMALOG KWIKPEN INSULIN) 100 unit/mL pen Inject 7 Units into the skin 3 (three) times daily with meals. 6.3 mL 2    lancing device Misc 1 each by Misc.(Non-Drug; Combo Route) route 2 (two) times a day. 200 each 5    lisinopriL (PRINIVIL,ZESTRIL) 40 MG tablet Take 1 tablet (40 mg total) by mouth once  "daily. 90 tablet 3    penicillin v potassium (VEETID) 500 MG tablet Take 1 tablet (500 mg total) by mouth every 8 (eight) hours. for 10 days 30 tablet 0    rOPINIRole (REQUIP) 0.5 MG tablet Take 1 tablet (0.5 mg total) by mouth 3 (three) times daily. 90 tablet 2    sulfamethoxazole-trimethoprim 800-160mg (BACTRIM DS) 800-160 mg Tab Take 1 tablet by mouth 2 (two) times daily. 20 tablet 0    tirzepatide 7.5 mg/0.5 mL PnIj Inject 7.5 mg into the skin every 7 days. 4 Pen 0    traMADoL (ULTRAM) 50 mg tablet Take 1 tablet (50 mg total) by mouth every 6 (six) hours. 20 tablet 0    TRUE METRIX GLUCOSE METER Misc Inject 1 each into the skin 4 (four) times daily with meals and nightly. use as directed 1 each 0    TRUEPLUS INSULIN 0.5 mL 31 gauge x 5/16" Syrg USE 1 daily      omeprazole (PRILOSEC) 20 MG capsule Take 2 capsules (40 mg total) by mouth once daily. 60 capsule 2     No current facility-administered medications for this encounter.       Review of Systems   Skin:  Positive for wound.   All other systems reviewed and are negative.         Labs Reviewed:   Chemistry:  Lab Results   Component Value Date    BUN 14.5 06/15/2023    BUN 14.0 06/14/2023    CREATININE 0.75 06/15/2023    CREATININE 0.69 06/14/2023    EGFRNORACEVR >60 06/15/2023    EGFRNORACEVR >60 06/14/2023    GLUCOSE 267 (H) 06/15/2023    AST 8 06/15/2023    AST 8 06/14/2023    ALT 12 06/15/2023    ALT 10 06/14/2023    HGBA1C 11.4 (H) 04/13/2023        Hematology:  Lab Results   Component Value Date    WBC 14.77 (H) 06/15/2023    WBC 15.65 (H) 06/14/2023    HGB 11.3 (L) 06/15/2023    HGB 11.6 (L) 06/14/2023    HCT 33.7 (L) 06/15/2023    HCT 34.5 (L) 06/14/2023     06/15/2023     06/14/2023       Inflammatory Markers:  Lab Results   Component Value Date    SEDRATE 64 (H) 07/02/2020    SEDRATE 95 (H) 06/30/2020    SEDRATE 94 (H) 06/28/2020        Objective:        Physical Exam  Vitals reviewed.   Cardiovascular:      Pulses:           Dorsalis " pedis pulses are detected w/ Doppler on the right side and detected w/ Doppler on the left side.        Posterior tibial pulses are detected w/ Doppler on the right side and detected w/ Doppler on the left side.   Musculoskeletal:      Left lower leg: Edema present.      Left foot: Charcot foot present.        Feet:    Feet:      Right foot:      Skin integrity: Skin integrity normal.      Toenail Condition: Right toenails are abnormally thick. Fungal disease present.     Left foot:      Skin integrity: Ulcer present.      Toenail Condition: Left toenails are abnormally thick. Fungal disease present.  Skin:     General: Skin is warm.      Capillary Refill: Capillary refill takes less than 2 seconds.      Findings: Wound present.             Comments: Left ankle ulcer after red granulating wound bed with minimal slough, and moderate serosanguineous drainage.   Neurological:      Mental Status: She is alert.            Wound 06/13/24 0827 Pressure Injury Left lateral Ankle (Active)   06/13/24 0827   Present on Original Admission: Y   Primary Wound Type: Pressure inj   Side: Left   Orientation: lateral   Location: Ankle   Wound Approximate Age at First Assessment (Weeks):    Wound Number:    Is this injury device related?:    Incision Type:    Closure Method:    Wound Description (Comments):    Type:    Additional Comments:    Ankle-Brachial Index:    Pulses:    Removal Indication and Assessment:    Wound Outcome:    Wound Image   06/26/24 1340   Dressing Appearance Moist drainage 06/26/24 1340   Drainage Amount Large 06/26/24 1340   Drainage Characteristics/Odor Serosanguineous 06/26/24 1340   Appearance Pink;Yellow 06/26/24 1340   Wound Length (cm) 4.5 cm 06/26/24 1340   Wound Width (cm) 2.7 cm 06/26/24 1340   Wound Depth (cm) 0.2 cm 06/26/24 1340   Wound Volume (cm^3) 2.43 cm^3 06/26/24 1340   Wound Surface Area (cm^2) 12.15 cm^2 06/26/24 1340         Assessment:         ICD-10-CM ICD-9-CM   1. Diabetic ulcer of  left foot associated with type 2 diabetes mellitus, with fat layer exposed, unspecified part of foot  E11.621 250.80    L97.522 707.15   2. Charcot's joint of left ankle  M14.672 094.0     713.5   3. Type 2 diabetes mellitus with other skin ulcer, without long-term current use of insulin  E11.622 250.80   4. Class 3 severe obesity with body mass index (BMI) of 40.0 to 44.9 in adult, unspecified obesity type, unspecified whether serious comorbidity present  E66.01 278.01    Z68.41 V85.41         Plan:   Tissue pathology and/or culture taken:  [] Yes [x] No   Sharp debridement performed:   [] Yes [x] No   Labs ordered this visit:   [] Yes [x] No   Imaging ordered this visit:   [] Yes [x] No         1. Diabetic ulcer of other part of left foot associated with type 2 diabetes mellitus, with fat layer exposed     Applied unna boot with silver polymem silver to wound bed, will return on Monday.    Monitor for any signs of infection: Watch for increased drainage or pain, fevers, chills, swelling and report this to clinic.   2. Charcot's joint of left ankle     Under the care of orthopedics.  Currently waiting for referral to a foot ankle specialist.    3. Type 2 diabetes mellitus with other skin ulcer, without long-term current use of insulin     Co-factor in wound development and delayed wound healing.     Last A1c 13.7.    Must have a strict diabetic diet, take glucose lowering medications as prescribed and encourage lower HA1C.   4. Class 3 severe obesity with body mass index (BMI) of 40.0 to 44.9 in adult, unspecified obesity type, unspecified whether serious comorbidity present     Co-factor in delayed wound healing.          The time spent including preparing to see the patient, obtaining patient history and assessment, evaluation of the plan of care, patient/caregiver counseling and education, orders, documentation, coordination of care, and other professional medical management activities for today's encounter was  20 minute.    Time spent performing procedures during today's encounter was 15 minute.    Follow up in about 5 days (around 7/1/2024) for with Martha and nurses visit on Wed 7/3/ and with Martha on 7/8/24.. Teaching provided on s/s to call wound clinic for promptly.  Unna boot and ER precautions taught for after hours and weekends.         JANE Vieyra

## 2024-06-27 NOTE — TELEPHONE ENCOUNTER
Sent in trazodone 50 mg qhs PRN for insomnia. Just a short course until she can catch up on some sleep.    Dick Gallagher, DO  [facility], PGY-II

## 2024-06-28 ENCOUNTER — TELEPHONE (OUTPATIENT)
Dept: INTERNAL MEDICINE | Facility: CLINIC | Age: 59
End: 2024-06-28
Payer: COMMERCIAL

## 2024-07-01 ENCOUNTER — OFFICE VISIT (OUTPATIENT)
Dept: ORTHOPEDICS | Facility: CLINIC | Age: 59
End: 2024-07-01
Payer: COMMERCIAL

## 2024-07-01 ENCOUNTER — TELEPHONE (OUTPATIENT)
Dept: INTERNAL MEDICINE | Facility: CLINIC | Age: 59
End: 2024-07-01
Payer: COMMERCIAL

## 2024-07-01 ENCOUNTER — HOSPITAL ENCOUNTER (OUTPATIENT)
Dept: WOUND CARE | Facility: HOSPITAL | Age: 59
Discharge: HOME | End: 2024-07-01
Attending: NURSE PRACTITIONER
Payer: COMMERCIAL

## 2024-07-01 VITALS
HEART RATE: 85 BPM | SYSTOLIC BLOOD PRESSURE: 165 MMHG | TEMPERATURE: 98 F | OXYGEN SATURATION: 96 % | DIASTOLIC BLOOD PRESSURE: 79 MMHG

## 2024-07-01 DIAGNOSIS — M14.672 CHARCOT'S JOINT OF LEFT ANKLE: ICD-10-CM

## 2024-07-01 DIAGNOSIS — E11.621 DIABETIC ULCER OF LEFT FOOT ASSOCIATED WITH TYPE 2 DIABETES MELLITUS, WITH FAT LAYER EXPOSED, UNSPECIFIED PART OF FOOT: Primary | ICD-10-CM

## 2024-07-01 DIAGNOSIS — E66.01 CLASS 3 SEVERE OBESITY WITH BODY MASS INDEX (BMI) OF 40.0 TO 44.9 IN ADULT, UNSPECIFIED OBESITY TYPE, UNSPECIFIED WHETHER SERIOUS COMORBIDITY PRESENT: ICD-10-CM

## 2024-07-01 DIAGNOSIS — L97.522 DIABETIC ULCER OF LEFT FOOT ASSOCIATED WITH TYPE 2 DIABETES MELLITUS, WITH FAT LAYER EXPOSED, UNSPECIFIED PART OF FOOT: Primary | ICD-10-CM

## 2024-07-01 DIAGNOSIS — E11.622 TYPE 2 DIABETES MELLITUS WITH OTHER SKIN ULCER, WITHOUT LONG-TERM CURRENT USE OF INSULIN: ICD-10-CM

## 2024-07-01 DIAGNOSIS — M14.672 CHARCOT'S JOINT OF LEFT ANKLE: Primary | ICD-10-CM

## 2024-07-01 PROCEDURE — 99211 OFF/OP EST MAY X REQ PHY/QHP: CPT

## 2024-07-01 PROCEDURE — 1160F RVW MEDS BY RX/DR IN RCRD: CPT | Mod: CPTII,95,,

## 2024-07-01 PROCEDURE — 1159F MED LIST DOCD IN RCRD: CPT | Mod: CPTII,95,,

## 2024-07-01 PROCEDURE — 27000999 HC MEDICAL RECORD PHOTO DOCUMENTATION

## 2024-07-01 PROCEDURE — 99214 OFFICE O/P EST MOD 30 MIN: CPT | Mod: ,,, | Performed by: NURSE PRACTITIONER

## 2024-07-01 PROCEDURE — 4010F ACE/ARB THERAPY RXD/TAKEN: CPT | Mod: CPTII,95,,

## 2024-07-01 PROCEDURE — 29580 STRAPPING UNNA BOOT: CPT

## 2024-07-01 PROCEDURE — 99442 PR PHYSICIAN TELEPHONE EVALUATION 11-20 MIN: CPT | Mod: 95,,,

## 2024-07-01 NOTE — PROGRESS NOTES
[facility]   Outpatient Wound Care     Subjective:   Patient ID: Suki Anton is a 58 y.o. female.    Chief Complaint: Wound Care (Left lateral ankle wound/unna)      History of Present Illness:   58 y.o. White female being seen today for follow up regarding left ankle diabetic foot ulcer after Unna boot placement, and Charcot ankle.  Reviewed previous medical history.  Wound has been present for approximately 2 months.  She is a referral from Orthopedics.  Patient is currently awaiting CT scan of left ankle, and referral to a foot ankle specialist.  Patient voices 2 months ago twisted ankle and purchased a boot from [employer] which created an ulcer to ankle area. Followed by Dick Gallagher DO for PCP last appt 3/11/24.    Today's visit:  07/01/2024:  Reviewed previous progress notes.  Left lower leg unna boot removed per nursing staff at bedside.  Left ankle ulcer red granulating wound bed with minimal slough and moderate serosanguineous drainage.  Patient voices  will have virtual visit with orthopedic talk to discuss CT scan today.  All wound care performed per nursing staff at bedside.  Will continue Unna boot. Left ankle wound cleansed with 0.5% Dakins, apply Triad to periwound edge, Silver polymem to wound bed, cover with unna boot, Drawtex 4 x 4, Kerlix, and Coban. Tolerated well.  Reinforced unna boot precautions.  Will return to the clinic on Wednesday for a nurse visit, and will return the following Monday to re-evaluate left ankle ulcer.  Instructed the importance of calling the office with any questions, concerns, or any reasons having to remove Unna boot.  Verbalized understanding of all instructions.    06/26/2024:  Presents to clinic alone.  Left lower leg unna boot removed per nursing staff at bedside.  Left ankle ulcer red granulating wound bed  with moderate serosanguineous drainage minimal slough.  Macerated jak wound.  Discussion with the patient today will continue Unna boot.  Patient is taking all oral antibiotics as directed.  All wound care performed per nursing staff at bedside. Left ankle wound cleansed with 0.5% Dakins, apply Triad to periwound edge, Silver polymem to wound bed, cover with unna boot, Drawtex wrap, Kerlix, and Coban. Tolerated well.  Reinforced unna boot precautions.  Will return to the clinic on Monday to re-evaluate left ankle ulcer.  Instructed the importance of calling the office with any questions, concerns, or any reasons having to remove Unna boot.  Doctors excuse given.  Verbalized understanding of all instructions.      06/13/2024:  Presents to the clinic with grandchildren.  Ambulates with left foot orthopedic walking boot.  Walking boot and left ankle dressing removed per nursing staff at bedside.  Left ankle ulcer red granulating wound bed with moderate amount of slough and serosanguineous drainage with the jak wound erythema and nonpitting edema.  Discussion with the patient today will need to perform selective debridement and obtain tissue culture.  Written consent obtained.  See quick procedure note.  Following debridement red granulating wound bed with moderate serosanguineous drainage discussion with the patient today will benefit from applying Unna boot to increased granulation to area and decrease edema.  All wound care performed per nursing staff at bedside.  Left ankle wound cleansed with 0.5% Dakins, apply Silver polymem to wound bed, cover with unna boot, Drawtex wrap, Kerlix, and Coban.  Tolerated well.  Instructed on unna boot precautions.  Will return to the clinic on Monday for a nurse visit for Unna boot change.  Will follow up with me next Thursday.       History includes:      Past Medical History:   Diagnosis Date    Diabetes mellitus     Diabetes mellitus, type 2     GERD (gastroesophageal reflux  disease)     Hypertension     History reviewed. No pertinent surgical history.   Social History     Socioeconomic History    Marital status:     Number of children: 0   Occupational History    Occupation: Manager     Comment: [employer]   Tobacco Use    Smoking status: Never    Smokeless tobacco: Never   Substance and Sexual Activity    Alcohol use: Never    Drug use: Never    Sexual activity: Yes     Partners: Male     Birth control/protection: None     Social Determinants of Health     Financial Resource Strain: Medium Risk (4/4/2024)    Overall Financial Resource Strain (CARDIA)     Difficulty of Paying Living Expenses: Somewhat hard   Food Insecurity: Food Insecurity Present (3/11/2024)    Hunger Vital Sign     Worried About Running Out of Food in the Last Year: Sometimes true     Ran Out of Food in the Last Year: Sometimes true   Transportation Needs: Unmet Transportation Needs (4/4/2024)    PRAPARE - Transportation     Lack of Transportation (Medical): Yes     Lack of Transportation (Non-Medical): Yes   Physical Activity: Inactive (4/4/2024)    Exercise Vital Sign     Days of Exercise per Week: 0 days     Minutes of Exercise per Session: 0 min   Stress: Stress Concern Present (4/4/2024)    [employer] [place] [employer] - Occupational Stress Questionnaire     Feeling of Stress : Rather much   Housing Stability: High Risk (3/11/2024)    Housing Stability Vital Sign     Unable to Pay for Housing in the Last Year: Yes     Number of Places Lived in the Last Year: 1     Unstable Housing in the Last Year: No   .      Current Outpatient Medications   Medication Sig Dispense Refill    acetaminophen (TYLENOL) 500 MG tablet Take 500 mg by mouth every 6 (six) hours as needed for Pain.      atorvastatin (LIPITOR) 40 MG tablet Take 1 tablet (40 mg total) by mouth once daily. 90 tablet 0    blood sugar diagnostic Strp Test strips to check CBG's 50 each 11    blood-glucose meter kit 1 each by Other  "route 2 (two) times daily. Use as instructed 1 each 0    diclofenac sodium (VOLTAREN) 1 % Gel Apply 2 g topically 4 (four) times daily. 100 g 2    EASY TOUCH TWIST LANCETS 33 gauge Misc USE TO CHECK BLOOD SUAGR LEVELS TWO TIMES A  each 3    gabapentin (NEURONTIN) 300 MG capsule Take 2 capsules (600 mg total) by mouth 3 (three) times daily. 180 capsule 2    glipiZIDE (GLUCOTROL) 10 MG tablet Take 1 tablet (10 mg total) by mouth 2 (two) times daily with meals. 60 tablet 5    insulin degludec (TRESIBA FLEXTOUCH U-100) 100 unit/mL (3 mL) insulin pen Inject 25 Units into the skin every evening. 7.5 mL 2    insulin lispro (HUMALOG KWIKPEN INSULIN) 100 unit/mL pen Inject 7 Units into the skin 3 (three) times daily with meals. 6.3 mL 2    lancing device Misc 1 each by Misc.(Non-Drug; Combo Route) route 2 (two) times a day. 200 each 5    lisinopriL (PRINIVIL,ZESTRIL) 40 MG tablet Take 1 tablet (40 mg total) by mouth once daily. 90 tablet 3    omeprazole (PRILOSEC) 20 MG capsule Take 2 capsules (40 mg total) by mouth once daily. 60 capsule 2    rOPINIRole (REQUIP) 0.5 MG tablet Take 1 tablet (0.5 mg total) by mouth 3 (three) times daily. 90 tablet 2    sulfamethoxazole-trimethoprim 800-160mg (BACTRIM DS) 800-160 mg Tab Take 1 tablet by mouth 2 (two) times daily. 20 tablet 0    tirzepatide 7.5 mg/0.5 mL PnIj Inject 7.5 mg into the skin every 7 days. 4 Pen 0    traMADoL (ULTRAM) 50 mg tablet Take 1 tablet (50 mg total) by mouth every 6 (six) hours. 20 tablet 0    traZODone (DESYREL) 50 MG tablet Take 1 tablet (50 mg total) by mouth nightly as needed for Insomnia. 30 tablet 0    TRUE METRIX GLUCOSE METER Misc Inject 1 each into the skin 4 (four) times daily with meals and nightly. use as directed 1 each 0    TRUEPLUS INSULIN 0.5 mL 31 gauge x 5/16" Syrg USE 1 daily       No current facility-administered medications for this encounter.       Review of Systems   Skin:  Positive for wound.   All other systems reviewed and " are negative.         Labs Reviewed:   Chemistry:  Lab Results   Component Value Date    BUN 14.5 06/15/2023    BUN 14.0 06/14/2023    CREATININE 0.75 06/15/2023    CREATININE 0.69 06/14/2023    EGFRNORACEVR >60 06/15/2023    EGFRNORACEVR >60 06/14/2023    GLUCOSE 267 (H) 06/15/2023    AST 8 06/15/2023    AST 8 06/14/2023    ALT 12 06/15/2023    ALT 10 06/14/2023    HGBA1C 11.4 (H) 04/13/2023        Hematology:  Lab Results   Component Value Date    WBC 14.77 (H) 06/15/2023    WBC 15.65 (H) 06/14/2023    HGB 11.3 (L) 06/15/2023    HGB 11.6 (L) 06/14/2023    HCT 33.7 (L) 06/15/2023    HCT 34.5 (L) 06/14/2023     06/15/2023     06/14/2023       Inflammatory Markers:  Lab Results   Component Value Date    SEDRATE 64 (H) 07/02/2020    SEDRATE 95 (H) 06/30/2020    SEDRATE 94 (H) 06/28/2020        Objective:        Physical Exam  Vitals reviewed.   Cardiovascular:      Pulses:           Dorsalis pedis pulses are detected w/ Doppler on the right side and detected w/ Doppler on the left side.        Posterior tibial pulses are detected w/ Doppler on the right side and detected w/ Doppler on the left side.   Musculoskeletal:      Left lower leg: Edema present.      Left foot: Charcot foot present.        Feet:    Feet:      Right foot:      Skin integrity: Skin integrity normal.      Toenail Condition: Right toenails are abnormally thick. Fungal disease present.     Left foot:      Skin integrity: Ulcer present.      Toenail Condition: Left toenails are abnormally thick. Fungal disease present.  Skin:     General: Skin is warm.      Capillary Refill: Capillary refill takes less than 2 seconds.      Findings: Wound present.             Comments: Left ankle ulcer after red granulating wound bed with minimal slough, and moderate serosanguineous drainage.   Neurological:      Mental Status: She is alert.            Wound 06/13/24 0827 Pressure Injury Left lateral Ankle (Active)   06/13/24 0827   Present on Original  Admission: Y   Primary Wound Type: Pressure inj   Side: Left   Orientation: lateral   Location: Ankle   Wound Approximate Age at First Assessment (Weeks):    Wound Number:    Is this injury device related?:    Incision Type:    Closure Method:    Wound Description (Comments):    Type:    Additional Comments:    Ankle-Brachial Index:    Pulses:    Removal Indication and Assessment:    Wound Outcome:    Wound Image   07/01/24 0833   Dressing Appearance Moist drainage 07/01/24 0833   Drainage Amount Moderate 07/01/24 0833   Drainage Characteristics/Odor Serosanguineous 07/01/24 0833   Appearance Pink 07/01/24 0833   Wound Length (cm) 4.5 cm 07/01/24 0833   Wound Width (cm) 2.3 cm 07/01/24 0833   Wound Depth (cm) 0.4 cm 07/01/24 0833   Wound Volume (cm^3) 4.14 cm^3 07/01/24 0833   Wound Surface Area (cm^2) 10.35 cm^2 07/01/24 0833         Assessment:         ICD-10-CM ICD-9-CM   1. Diabetic ulcer of left foot associated with type 2 diabetes mellitus, with fat layer exposed, unspecified part of foot  E11.621 250.80    L97.522 707.15   2. Charcot's joint of left ankle  M14.672 094.0     713.5   3. Type 2 diabetes mellitus with other skin ulcer, without long-term current use of insulin  E11.622 250.80   4. Class 3 severe obesity with body mass index (BMI) of 40.0 to 44.9 in adult, unspecified obesity type, unspecified whether serious comorbidity present  E66.01 278.01    Z68.41 V85.41         Plan:   Tissue pathology and/or culture taken:  [] Yes [x] No   Sharp debridement performed:   [] Yes [x] No   Labs ordered this visit:   [] Yes [x] No   Imaging ordered this visit:   [] Yes [x] No         1. Diabetic ulcer of other part of left foot associated with type 2 diabetes mellitus, with fat layer exposed     Applied Triad to wound bed, silver polymem silver to wound bed, unna boot with will return on Wednesday for nurse visit and next Monday.     Monitor for any signs of infection: Watch for increased drainage or pain,  fevers, chills, swelling and report this to clinic.   2. Charcot's joint of left ankle     Under the care of orthopedics.  Will have virtual visit today to discuss CT scan results.     3. Type 2 diabetes mellitus with other skin ulcer, without long-term current use of insulin     Co-factor in wound development and delayed wound healing.     Last A1c 13.7.    Must have a strict diabetic diet, take glucose lowering medications as prescribed and encourage lower HA1C.   4. Class 3 severe obesity with body mass index (BMI) of 40.0 to 44.9 in adult, unspecified obesity type, unspecified whether serious comorbidity present     Co-factor in delayed wound healing.          The time spent including preparing to see the patient, obtaining patient history and assessment, evaluation of the plan of care, patient/caregiver counseling and education, orders, documentation, coordination of care, and other professional medical management activities for today's encounter was 15 minute.    Time spent performing procedures during today's encounter was 15 minute.    No follow-ups on file. Teaching provided on s/s to call wound clinic for promptly.  Unna boot and ER precautions taught for after hours and weekends.         Martha Duncan, JANE

## 2024-07-01 NOTE — TELEPHONE ENCOUNTER
Called patient with results. Assisted in obtaining appointment with Dr. Villar on 7/10 8:45AM. Will continue to follow    Dick Gallagher DO  \[facility]\"" Internal Medicine, PGY-III

## 2024-07-01 NOTE — PROGRESS NOTES
Established Patient - Audio Only Telehealth Visit       The chief complaint leading to consultation is: Review of L ankle Ct results  Visit type: Virtual visit with audio only (telephone)  Total time spent with patient: 15 minutes       The reason for the audio only service rather than synchronous audio and video virtual visit was related to technical difficulties or patient preference/necessity.     Each patient to whom I provide medical services by telemedicine is:  (1) informed of the relationship between the physician and patient and the respective role of any other health care provider with respect to management of the patient; and (2) notified that they may decline to receive medical services by telemedicine and may withdraw from such care at any time. Patient verbally consented to receive this service via voice-only telephone call.       HPI:  Called patient to discuss left ankle CT results.  We have again discussed her significant Charcot deformity.  She is currently being followed by wound care at [facility].  She is on antibiotics with frequent wound checks.  She states she is wearing her boot and minimally putting weight through her left foot.  She states there has been no contact from a podiatrist office.  A referral was put into Dr. Shakir Durbin on 06/13/2024.  She also states that she is taking her diabetic medications as instructed in his being followed by her PCP for glucose control.      Assessment and plan:  Imaging findings discussed with patient and her sister who is also on the phone.  No evidence of osteomyelitis at this time.  She will continue follow up with wound care.  Heavily emphasized importance on keeping these appointments.  I have also again stressed the importance of controlling her diabetes.  We have discussed diet and lifestyle modifications as well as the importance of proper utilizing diabetic medications.  Continue follow up with PCP for this.  Patient understands that she needs to see a  foot specialist.  She was given the number to Dr. Durbin's office to see about getting her appointment scheduled.  Patient was given instruction to call my clinic if she is unable to obtain an appointment with him or is unable to see him in the near future.  Patient expressed understanding and agreement with plan.                        This service was not originating from a related E/M service provided within the previous 7 days nor will  to an E/M service or procedure within the next 24 hours or my soonest available appointment.  Prevailing standard of care was able to be met in this audio-only visit.

## 2024-07-03 ENCOUNTER — HOSPITAL ENCOUNTER (OUTPATIENT)
Dept: WOUND CARE | Facility: HOSPITAL | Age: 59
Discharge: HOME | End: 2024-07-03
Attending: NURSE PRACTITIONER
Payer: COMMERCIAL

## 2024-07-03 VITALS
TEMPERATURE: 98 F | DIASTOLIC BLOOD PRESSURE: 73 MMHG | OXYGEN SATURATION: 93 % | SYSTOLIC BLOOD PRESSURE: 129 MMHG | HEART RATE: 82 BPM

## 2024-07-03 DIAGNOSIS — M14.672 CHARCOT'S JOINT OF LEFT ANKLE: ICD-10-CM

## 2024-07-03 DIAGNOSIS — E11.621 DIABETIC ULCER OF LEFT FOOT ASSOCIATED WITH TYPE 2 DIABETES MELLITUS, WITH FAT LAYER EXPOSED, UNSPECIFIED PART OF FOOT: ICD-10-CM

## 2024-07-03 DIAGNOSIS — L97.522 DIABETIC ULCER OF LEFT FOOT ASSOCIATED WITH TYPE 2 DIABETES MELLITUS, WITH FAT LAYER EXPOSED, UNSPECIFIED PART OF FOOT: ICD-10-CM

## 2024-07-03 PROCEDURE — 27000999 HC MEDICAL RECORD PHOTO DOCUMENTATION

## 2024-07-03 PROCEDURE — 99211 OFF/OP EST MAY X REQ PHY/QHP: CPT | Mod: 25

## 2024-07-03 PROCEDURE — 29580 STRAPPING UNNA BOOT: CPT

## 2024-07-03 NOTE — PROGRESS NOTES
[facility]                Wound Care Services    Subjective:       Patient ID: Suki Anton is a 58 y.o. female.    Chief Complaint: Wound Care (Left lateral ankle wound/unna)    HPI  Review of Systems      Objective:     Vitals:    07/03/24 0820   BP: 129/73   Pulse: 82   Temp: 98.1 °F (36.7 °C)         Physical Exam       Wound 06/13/24 0827 Pressure Injury Left lateral Ankle (Active)   06/13/24 0827   Present on Original Admission: Y   Primary Wound Type: Pressure inj   Side: Left   Orientation: lateral   Location: Ankle   Wound Approximate Age at First Assessment (Weeks):    Wound Number:    Is this injury device related?:    Incision Type:    Closure Method:    Wound Description (Comments):    Type:    Additional Comments:    Ankle-Brachial Index:    Pulses:    Removal Indication and Assessment:    Wound Outcome:    Wound Image   07/03/24 0821   Dressing Appearance Moist drainage 07/03/24 0821   Drainage Amount Large 07/03/24 0821   Drainage Characteristics/Odor Serosanguineous 07/03/24 0821   Appearance Pink 07/03/24 0821   Periwound Area Macerated 07/03/24 0821   Wound Length (cm) 2.4 cm 07/03/24 0821   Wound Width (cm) 4.4 cm 07/03/24 0821   Wound Depth (cm) 0.3 cm 07/03/24 0821   Wound Volume (cm^3) 3.168 cm^3 07/03/24 0821   Wound Surface Area (cm^2) 10.56 cm^2 07/03/24 0821         Assessment/Plan:         ICD-10-CM ICD-9-CM   1. Charcot's joint of left ankle  M14.672 094.0     713.5   2. Diabetic ulcer of left foot associated with type 2 diabetes mellitus, with fat layer exposed, unspecified part of foot  E11.621 250.80    L97.522 707.15           Tissue pathology and/or culture taken:  [] Yes [] No   Sharp debridement performed:   [] Yes [] No   Labs ordered this visit:   [] Yes [] No   Imaging ordered this visit:   [] Yes [] No           Orders Placed This Encounter   Procedures    Change dressing     Unna boot see last progress note     Standing Status:    Standing     Number of Occurrences:   1        No follow-ups on file.               Removed dressing. Cleansed leg with hibiclense. Cleaned wound with vashe. Applied triad to periwound and silver polymem to wound. Wrapped leg with unna boot, drawtex edema over wound followed by angela per Martha Duncan verbal orders in the room. Patient tolerated well. RTC Monday for visit with Martha Duncan.

## 2024-07-08 ENCOUNTER — HOSPITAL ENCOUNTER (OUTPATIENT)
Dept: WOUND CARE | Facility: HOSPITAL | Age: 59
Discharge: HOME | End: 2024-07-08
Attending: NURSE PRACTITIONER

## 2024-07-08 VITALS
TEMPERATURE: 98 F | HEART RATE: 92 BPM | DIASTOLIC BLOOD PRESSURE: 77 MMHG | RESPIRATION RATE: 18 BRPM | SYSTOLIC BLOOD PRESSURE: 157 MMHG | OXYGEN SATURATION: 95 %

## 2024-07-08 DIAGNOSIS — M14.672 CHARCOT'S JOINT OF LEFT ANKLE: ICD-10-CM

## 2024-07-08 DIAGNOSIS — E66.01 CLASS 3 SEVERE OBESITY WITH BODY MASS INDEX (BMI) OF 40.0 TO 44.9 IN ADULT, UNSPECIFIED OBESITY TYPE, UNSPECIFIED WHETHER SERIOUS COMORBIDITY PRESENT: ICD-10-CM

## 2024-07-08 DIAGNOSIS — L97.522 DIABETIC ULCER OF LEFT FOOT ASSOCIATED WITH TYPE 2 DIABETES MELLITUS, WITH FAT LAYER EXPOSED, UNSPECIFIED PART OF FOOT: Primary | ICD-10-CM

## 2024-07-08 DIAGNOSIS — E11.622 TYPE 2 DIABETES MELLITUS WITH OTHER SKIN ULCER, WITHOUT LONG-TERM CURRENT USE OF INSULIN: ICD-10-CM

## 2024-07-08 DIAGNOSIS — E11.621 DIABETIC ULCER OF LEFT FOOT ASSOCIATED WITH TYPE 2 DIABETES MELLITUS, WITH FAT LAYER EXPOSED, UNSPECIFIED PART OF FOOT: Primary | ICD-10-CM

## 2024-07-08 PROCEDURE — 99214 OFFICE O/P EST MOD 30 MIN: CPT | Mod: ,,, | Performed by: NURSE PRACTITIONER

## 2024-07-08 PROCEDURE — 27000999 HC MEDICAL RECORD PHOTO DOCUMENTATION

## 2024-07-08 PROCEDURE — 99211 OFF/OP EST MAY X REQ PHY/QHP: CPT | Mod: 25

## 2024-07-08 PROCEDURE — 29580 STRAPPING UNNA BOOT: CPT

## 2024-07-08 NOTE — PROGRESS NOTES
[facility]   Outpatient Wound Care     Subjective:   Patient ID: Suki Anton is a 58 y.o. female.    Chief Complaint: Wound Care      History of Present Illness:   58 y.o. White female being seen today for follow up regarding left ankle diabetic foot ulcer , Unna boot change, and assessment of Charcot ankle.  Jennifer saw orthopedics last week and was informed of CT scan.  She is waiting to see podiatry no appointment scheduled at this time.  Will continue our clinic until ulcer is healed.  Reviewed previous medical history.  Wound has been present for approximately 2 months.  She is a referral from Orthopedics.  Patient is currently awaiting CT scan of left ankle, and referral to a foot ankle specialist.  Patient voices 2 months ago twisted ankle and purchased a boot from [employer] which created an ulcer to ankle area. Followed by Dick Gallagher DO for PCP last appt 3/11/24.    Today's visit 07/08/2024:  Reviewed previous progress notes.  Left lower leg unna boot removed per nursing staff at bedside.  Left ankle ulcer red granulating wound bed with moderate serosanguineous drainage with macerated jak wound.  Will continue same wound care.  All wound care performed per nursing staff at bedside.  Left ankle wound cleansed with apply Triad to periwound edge, Silver polymem to wound bed, cover with unna boot, Drawtex 4 x 4, Kerlix, and Coban. Tolerated well.  Reinforced unna boot precautions.  Will return to clinic on Thursday for a nurse visit for Unna boot change.  Will follow up with me next Monday. Instructed the importance of calling the office with any questions, concerns, or any reasons having to remove Unna boot.  Verbalized understanding of all instructions.      07/01/2024:  Reviewed previous progress notes.  Left lower leg unna boot removed per nursing  staff at bedside.  Left ankle ulcer red granulating wound bed with minimal slough and moderate serosanguineous drainage.  Patient voices  will have virtual visit with orthopedic talk to discuss CT scan today.  All wound care performed per nursing staff at bedside.  Will continue Unna boot. Left ankle wound cleansed with 0.5% Dakins, apply Triad to periwound edge, Silver polymem to wound bed, cover with unna boot, Drawtex 4 x 4, Kerlix, and Coban. Tolerated well.  Reinforced unna boot precautions.  Will return to the clinic on Wednesday for a nurse visit, and will return the following Monday to re-evaluate left ankle ulcer.  Instructed the importance of calling the office with any questions, concerns, or any reasons having to remove Unna boot.  Verbalized understanding of all instructions.    06/26/2024:  Presents to clinic alone.  Left lower leg unna boot removed per nursing staff at bedside.  Left ankle ulcer red granulating wound bed with moderate serosanguineous drainage minimal slough.  Macerated jak wound.  Discussion with the patient today will continue Unna boot.  Patient is taking all oral antibiotics as directed.  All wound care performed per nursing staff at bedside. Left ankle wound cleansed with 0.5% Dakins, apply Triad to periwound edge, Silver polymem to wound bed, cover with unna boot, Drawtex wrap, Kerlix, and Coban. Tolerated well.  Reinforced unna boot precautions.  Will return to the clinic on Monday to re-evaluate left ankle ulcer.  Instructed the importance of calling the office with any questions, concerns, or any reasons having to remove Unna boot.  Doctors excuse given.  Verbalized understanding of all instructions.      06/13/2024:  Presents to the clinic with grandchildren.  Ambulates with left foot orthopedic walking boot.  Walking boot and left ankle dressing removed per nursing staff at bedside.  Left ankle ulcer red granulating wound bed with moderate amount of slough and  serosanguineous drainage with the jak wound erythema and nonpitting edema.  Discussion with the patient today will need to perform selective debridement and obtain tissue culture.  Written consent obtained.  See quick procedure note.  Following debridement red granulating wound bed with moderate serosanguineous drainage discussion with the patient today will benefit from applying Unna boot to increased granulation to area and decrease edema.  All wound care performed per nursing staff at bedside.  Left ankle wound cleansed with 0.5% Dakins, apply Silver polymem to wound bed, cover with unna boot, Drawtex wrap, Kerlix, and Coban.  Tolerated well.  Instructed on unna boot precautions.  Will return to the clinic on Monday for a nurse visit for Unna boot change.  Will follow up with me next Thursday.       History includes:      Past Medical History:   Diagnosis Date    Diabetes mellitus     Diabetes mellitus, type 2     GERD (gastroesophageal reflux disease)     Hypertension     History reviewed. No pertinent surgical history.   Social History     Socioeconomic History    Marital status:     Number of children: 0   Occupational History    Occupation: Manager     Comment: [employer]   Tobacco Use    Smoking status: Never    Smokeless tobacco: Never   Substance and Sexual Activity    Alcohol use: Never    Drug use: Never    Sexual activity: Yes     Partners: Male     Birth control/protection: None     Social Determinants of Health     Financial Resource Strain: Medium Risk (4/4/2024)    Overall Financial Resource Strain (CARDIA)     Difficulty of Paying Living Expenses: Somewhat hard   Food Insecurity: Food Insecurity Present (3/11/2024)    Hunger Vital Sign     Worried About Running Out of Food in the Last Year: Sometimes true     Ran Out of Food in the Last Year: Sometimes true   Transportation Needs: Unmet Transportation Needs (4/4/2024)    PRAPARE - Transportation     Lack of Transportation (Medical):  Yes     Lack of Transportation (Non-Medical): Yes   Physical Activity: Inactive (4/4/2024)    Exercise Vital Sign     Days of Exercise per Week: 0 days     Minutes of Exercise per Session: 0 min   Stress: Stress Concern Present (4/4/2024)    [place] [employer] - Occupational Stress Questionnaire     Feeling of Stress : Rather much   Housing Stability: High Risk (3/11/2024)    Housing Stability Vital Sign     Unable to Pay for Housing in the Last Year: Yes     Number of Places Lived in the Last Year: 1     Unstable Housing in the Last Year: No   .      Current Outpatient Medications   Medication Sig Dispense Refill    acetaminophen (TYLENOL) 500 MG tablet Take 500 mg by mouth every 6 (six) hours as needed for Pain.      atorvastatin (LIPITOR) 40 MG tablet Take 1 tablet (40 mg total) by mouth once daily. 90 tablet 0    blood sugar diagnostic Strp Test strips to check CBG's 50 each 11    blood-glucose meter kit 1 each by Other route 2 (two) times daily. Use as instructed 1 each 0    diclofenac sodium (VOLTAREN) 1 % Gel Apply 2 g topically 4 (four) times daily. 100 g 2    EASY TOUCH TWIST LANCETS 33 gauge Misc USE TO CHECK BLOOD SUAGR LEVELS TWO TIMES A  each 3    gabapentin (NEURONTIN) 300 MG capsule Take 2 capsules (600 mg total) by mouth 3 (three) times daily. 180 capsule 2    glipiZIDE (GLUCOTROL) 10 MG tablet Take 1 tablet (10 mg total) by mouth 2 (two) times daily with meals. 60 tablet 5    insulin degludec (TRESIBA FLEXTOUCH U-100) 100 unit/mL (3 mL) insulin pen Inject 25 Units into the skin every evening. 7.5 mL 2    insulin lispro (HUMALOG KWIKPEN INSULIN) 100 unit/mL pen Inject 7 Units into the skin 3 (three) times daily with meals. 6.3 mL 2    lancing device Misc 1 each by Misc.(Non-Drug; Combo Route) route 2 (two) times a day. 200 each 5    lisinopriL (PRINIVIL,ZESTRIL) 40 MG tablet Take 1 tablet (40 mg total) by mouth once daily. 90 tablet 3    omeprazole (PRILOSEC) 20 MG  "capsule Take 2 capsules (40 mg total) by mouth once daily. 60 capsule 2    rOPINIRole (REQUIP) 0.5 MG tablet Take 1 tablet (0.5 mg total) by mouth 3 (three) times daily. 90 tablet 2    sulfamethoxazole-trimethoprim 800-160mg (BACTRIM DS) 800-160 mg Tab Take 1 tablet by mouth 2 (two) times daily. 20 tablet 0    tirzepatide 7.5 mg/0.5 mL PnIj Inject 7.5 mg into the skin every 7 days. 4 Pen 0    traMADoL (ULTRAM) 50 mg tablet Take 1 tablet (50 mg total) by mouth every 6 (six) hours. 20 tablet 0    traZODone (DESYREL) 50 MG tablet Take 1 tablet (50 mg total) by mouth nightly as needed for Insomnia. 30 tablet 0    TRUE METRIX GLUCOSE METER Misc Inject 1 each into the skin 4 (four) times daily with meals and nightly. use as directed 1 each 0    TRUEPLUS INSULIN 0.5 mL 31 gauge x 5/16" Syrg USE 1 daily       No current facility-administered medications for this encounter.       Review of Systems   Skin:  Positive for wound.   All other systems reviewed and are negative.         Labs Reviewed:   Chemistry:  Lab Results   Component Value Date    BUN 14.5 06/15/2023    BUN 14.0 06/14/2023    CREATININE 0.75 06/15/2023    CREATININE 0.69 06/14/2023    EGFRNORACEVR >60 06/15/2023    EGFRNORACEVR >60 06/14/2023    GLUCOSE 267 (H) 06/15/2023    AST 8 06/15/2023    AST 8 06/14/2023    ALT 12 06/15/2023    ALT 10 06/14/2023    HGBA1C 11.4 (H) 04/13/2023        Hematology:  Lab Results   Component Value Date    WBC 14.77 (H) 06/15/2023    WBC 15.65 (H) 06/14/2023    HGB 11.3 (L) 06/15/2023    HGB 11.6 (L) 06/14/2023    HCT 33.7 (L) 06/15/2023    HCT 34.5 (L) 06/14/2023     06/15/2023     06/14/2023       Inflammatory Markers:  Lab Results   Component Value Date    SEDRATE 64 (H) 07/02/2020    SEDRATE 95 (H) 06/30/2020    SEDRATE 94 (H) 06/28/2020        Objective:        Physical Exam  Vitals reviewed.   Cardiovascular:      Pulses:           Dorsalis pedis pulses are detected w/ Doppler on the right side and detected " w/ Doppler on the left side.        Posterior tibial pulses are detected w/ Doppler on the right side and detected w/ Doppler on the left side.   Musculoskeletal:      Left lower leg: Edema present.      Left foot: Charcot foot present.        Feet:    Feet:      Right foot:      Skin integrity: Skin integrity normal.      Toenail Condition: Right toenails are abnormally thick. Fungal disease present.     Left foot:      Skin integrity: Ulcer present.      Toenail Condition: Left toenails are abnormally thick. Fungal disease present.  Skin:     General: Skin is warm.      Capillary Refill: Capillary refill takes less than 2 seconds.      Findings: Wound present.             Comments: Left ankle ulcer after red granulating wound bed and moderate serosanguineous drainage.   Neurological:      Mental Status: She is alert.            Wound 06/13/24 0827 Pressure Injury Left lateral Ankle (Active)   06/13/24 0827   Present on Original Admission: Y   Primary Wound Type: Pressure inj   Side: Left   Orientation: lateral   Location: Ankle   Wound Approximate Age at First Assessment (Weeks):    Wound Number:    Is this injury device related?:    Incision Type:    Closure Method:    Wound Description (Comments):    Type:    Additional Comments:    Ankle-Brachial Index:    Pulses:    Removal Indication and Assessment:    Wound Outcome:    Wound Image   07/08/24 0841   Dressing Appearance Moist drainage 07/08/24 0841   Drainage Amount Moderate 07/08/24 0841   Drainage Characteristics/Odor Serosanguineous 07/08/24 0841   Appearance Pink 07/08/24 0841   Wound Length (cm) 2.5 cm 07/08/24 0841   Wound Width (cm) 4 cm 07/08/24 0841   Wound Depth (cm) 0.3 cm 07/08/24 0841   Wound Volume (cm^3) 3 cm^3 07/08/24 0841   Wound Surface Area (cm^2) 10 cm^2 07/08/24 0841         Assessment:         ICD-10-CM ICD-9-CM   1. Diabetic ulcer of left foot associated with type 2 diabetes mellitus, with fat layer exposed, unspecified part of foot   E11.621 250.80    L97.522 707.15   2. Charcot's joint of left ankle  M14.672 094.0     713.5   3. Type 2 diabetes mellitus with other skin ulcer, without long-term current use of insulin  E11.622 250.80   4. Class 3 severe obesity with body mass index (BMI) of 40.0 to 44.9 in adult, unspecified obesity type, unspecified whether serious comorbidity present  E66.01 278.01    Z68.41 V85.41         Plan:   Tissue pathology and/or culture taken:  [] Yes [x] No   Sharp debridement performed:   [] Yes [x] No   Labs ordered this visit:   [] Yes [x] No   Imaging ordered this visit:   [] Yes [x] No         1. Diabetic ulcer of other part of left foot associated with type 2 diabetes mellitus, with fat layer exposed     Applied Triad to wound bed, silver polymem silver to wound bed, unna boot with will return on Thursday for nurse visit and next Monday.     Monitor for any signs of infection: Watch for increased drainage or pain, fevers, chills, swelling and report this to clinic.   2. Charcot's joint of left ankle     Under the care of orthopedics.  Will have virtual visit today to discuss CT scan results.     3. Type 2 diabetes mellitus with other skin ulcer, without long-term current use of insulin     Co-factor in wound development and delayed wound healing.     Last A1c 13.7.    Must have a strict diabetic diet, take glucose lowering medications as prescribed and encourage lower HA1C.   4. Class 3 severe obesity with body mass index (BMI) of 40.0 to 44.9 in adult, unspecified obesity type, unspecified whether serious comorbidity present     Co-factor in delayed wound healing.          The time spent including preparing to see the patient, obtaining patient history and assessment, evaluation of the plan of care, patient/caregiver counseling and education, orders, documentation, coordination of care, and other professional medical management activities for today's encounter was 15 minute.    Time spent performing procedures  during today's encounter was 15 minute.    Follow up in about 3 days (around 7/11/2024) for nurse visit for unna boot  with Martha next Monday.. Teaching provided on s/s to call wound clinic for promptly.  Unna boot and ER precautions taught for after hours and weekends.         JANE Vieyra

## 2024-07-11 ENCOUNTER — HOSPITAL ENCOUNTER (OUTPATIENT)
Dept: WOUND CARE | Facility: HOSPITAL | Age: 59
Discharge: HOME | End: 2024-07-11
Attending: NURSE PRACTITIONER

## 2024-07-11 DIAGNOSIS — M14.672 CHARCOT'S JOINT OF LEFT ANKLE: ICD-10-CM

## 2024-07-11 DIAGNOSIS — E11.621 DIABETIC ULCER OF LEFT FOOT ASSOCIATED WITH TYPE 2 DIABETES MELLITUS, WITH FAT LAYER EXPOSED, UNSPECIFIED PART OF FOOT: ICD-10-CM

## 2024-07-11 DIAGNOSIS — L97.522 DIABETIC ULCER OF LEFT FOOT ASSOCIATED WITH TYPE 2 DIABETES MELLITUS, WITH FAT LAYER EXPOSED, UNSPECIFIED PART OF FOOT: ICD-10-CM

## 2024-07-11 DIAGNOSIS — E11.622 TYPE 2 DIABETES MELLITUS WITH OTHER SKIN ULCER, WITHOUT LONG-TERM CURRENT USE OF INSULIN: ICD-10-CM

## 2024-07-11 PROCEDURE — 29580 STRAPPING UNNA BOOT: CPT

## 2024-07-11 PROCEDURE — 27000999 HC MEDICAL RECORD PHOTO DOCUMENTATION

## 2024-07-11 PROCEDURE — 99211 OFF/OP EST MAY X REQ PHY/QHP: CPT | Mod: 25

## 2024-07-11 NOTE — PROGRESS NOTES
[facility]                Wound Care Services    Subjective:       Patient ID: Suki Anton is a 58 y.o. female.    Chief Complaint: No chief complaint on file.    HPI  Review of Systems      Objective:   There were no vitals filed for this visit.      Physical Exam         Assessment/Plan:         ICD-10-CM ICD-9-CM   1. Charcot's joint of left ankle  M14.672 094.0     713.5   2. Type 2 diabetes mellitus with other skin ulcer, without long-term current use of insulin  E11.622 250.80   3. Diabetic ulcer of left foot associated with type 2 diabetes mellitus, with fat layer exposed, unspecified part of foot  E11.621 250.80    L97.522 707.15           Tissue pathology and/or culture taken:  [] Yes [] No   Sharp debridement performed:   [] Yes [] No   Labs ordered this visit:   [] Yes [] No   Imaging ordered this visit:   [] Yes [] No           Orders Placed This Encounter   Procedures    Change dressing     See last progress note     Standing Status:   Standing     Number of Occurrences:   1        No follow-ups on file.      Patient arrives via ambulation with boot on.   Removed unna boot , washed leg with hibiclens.  Wound cleaned with vashe and soaked x 10 minutes.  Applied silver alginate to wound base then applied unna boot and drawtex edema.  Patient mica well.  Left in nad.

## 2024-07-15 ENCOUNTER — TELEPHONE (OUTPATIENT)
Dept: INTERNAL MEDICINE | Facility: CLINIC | Age: 59
End: 2024-07-15

## 2024-07-15 ENCOUNTER — HOSPITAL ENCOUNTER (OUTPATIENT)
Dept: WOUND CARE | Facility: HOSPITAL | Age: 59
Discharge: HOME | End: 2024-07-15
Attending: NURSE PRACTITIONER

## 2024-07-15 VITALS
SYSTOLIC BLOOD PRESSURE: 136 MMHG | TEMPERATURE: 98 F | DIASTOLIC BLOOD PRESSURE: 74 MMHG | HEART RATE: 90 BPM | OXYGEN SATURATION: 95 %

## 2024-07-15 DIAGNOSIS — Z79.4 TYPE 2 DIABETES MELLITUS WITHOUT COMPLICATION, WITH LONG-TERM CURRENT USE OF INSULIN: ICD-10-CM

## 2024-07-15 DIAGNOSIS — I10 HYPERTENSION, UNSPECIFIED TYPE: ICD-10-CM

## 2024-07-15 DIAGNOSIS — M14.672 CHARCOT'S JOINT OF LEFT ANKLE: ICD-10-CM

## 2024-07-15 DIAGNOSIS — G25.81 RESTLESS LEG SYNDROME: ICD-10-CM

## 2024-07-15 DIAGNOSIS — E11.69 DIABETES MELLITUS TYPE 2 IN OBESE: ICD-10-CM

## 2024-07-15 DIAGNOSIS — E11.65 UNCONTROLLED TYPE 2 DIABETES MELLITUS WITH HYPERGLYCEMIA, WITH LONG-TERM CURRENT USE OF INSULIN: ICD-10-CM

## 2024-07-15 DIAGNOSIS — E66.01 CLASS 3 SEVERE OBESITY WITH BODY MASS INDEX (BMI) OF 40.0 TO 44.9 IN ADULT, UNSPECIFIED OBESITY TYPE, UNSPECIFIED WHETHER SERIOUS COMORBIDITY PRESENT: ICD-10-CM

## 2024-07-15 DIAGNOSIS — E11.9 TYPE 2 DIABETES MELLITUS WITHOUT COMPLICATION, WITH LONG-TERM CURRENT USE OF INSULIN: ICD-10-CM

## 2024-07-15 DIAGNOSIS — Z79.4 UNCONTROLLED TYPE 2 DIABETES MELLITUS WITH HYPERGLYCEMIA, WITH LONG-TERM CURRENT USE OF INSULIN: ICD-10-CM

## 2024-07-15 DIAGNOSIS — M21.962 LEFT ANKLE JOINT DEFORMITY: ICD-10-CM

## 2024-07-15 DIAGNOSIS — L97.522 DIABETIC ULCER OF LEFT FOOT ASSOCIATED WITH TYPE 2 DIABETES MELLITUS, WITH FAT LAYER EXPOSED, UNSPECIFIED PART OF FOOT: Primary | ICD-10-CM

## 2024-07-15 DIAGNOSIS — E66.9 DIABETES MELLITUS TYPE 2 IN OBESE: ICD-10-CM

## 2024-07-15 DIAGNOSIS — F51.01 PRIMARY INSOMNIA: ICD-10-CM

## 2024-07-15 DIAGNOSIS — E11.622 TYPE 2 DIABETES MELLITUS WITH OTHER SKIN ULCER, WITHOUT LONG-TERM CURRENT USE OF INSULIN: ICD-10-CM

## 2024-07-15 DIAGNOSIS — E11.621 DIABETIC ULCER OF LEFT FOOT ASSOCIATED WITH TYPE 2 DIABETES MELLITUS, WITH FAT LAYER EXPOSED, UNSPECIFIED PART OF FOOT: Primary | ICD-10-CM

## 2024-07-15 PROCEDURE — 27000999 HC MEDICAL RECORD PHOTO DOCUMENTATION

## 2024-07-15 PROCEDURE — 99211 OFF/OP EST MAY X REQ PHY/QHP: CPT

## 2024-07-15 PROCEDURE — 99213 OFFICE O/P EST LOW 20 MIN: CPT | Mod: ,,, | Performed by: NURSE PRACTITIONER

## 2024-07-15 PROCEDURE — 29580 STRAPPING UNNA BOOT: CPT

## 2024-07-15 NOTE — TELEPHONE ENCOUNTER
Pt called requesting a referral to Podiatry and also requesting a call back to speak with someone about her left foot. Please Advise

## 2024-07-17 RX ORDER — GABAPENTIN 300 MG/1
600 CAPSULE ORAL 3 TIMES DAILY
Qty: 180 CAPSULE | Refills: 2 | Status: SHIPPED | OUTPATIENT
Start: 2024-07-17 | End: 2024-10-15

## 2024-07-17 RX ORDER — TRAMADOL HYDROCHLORIDE 50 MG/1
50 TABLET ORAL EVERY 6 HOURS
Qty: 20 TABLET | Refills: 0 | Status: SHIPPED | OUTPATIENT
Start: 2024-07-17

## 2024-07-17 RX ORDER — INSULIN DEGLUDEC 100 U/ML
25 INJECTION, SOLUTION SUBCUTANEOUS NIGHTLY
Qty: 7.5 ML | Refills: 2 | Status: SHIPPED | OUTPATIENT
Start: 2024-07-17 | End: 2024-10-15

## 2024-07-17 RX ORDER — INSULIN LISPRO 100 [IU]/ML
7 INJECTION, SOLUTION INTRAVENOUS; SUBCUTANEOUS
Qty: 6.3 ML | Refills: 2 | Status: SHIPPED | OUTPATIENT
Start: 2024-07-17 | End: 2024-10-15

## 2024-07-17 RX ORDER — GLIPIZIDE 10 MG/1
10 TABLET ORAL 2 TIMES DAILY WITH MEALS
Qty: 60 TABLET | Refills: 5 | Status: SHIPPED | OUTPATIENT
Start: 2024-07-17

## 2024-07-17 RX ORDER — TRAZODONE HYDROCHLORIDE 50 MG/1
50 TABLET ORAL NIGHTLY PRN
Qty: 30 TABLET | Refills: 0 | Status: SHIPPED | OUTPATIENT
Start: 2024-07-17 | End: 2024-08-16

## 2024-07-17 RX ORDER — LISINOPRIL 40 MG/1
40 TABLET ORAL DAILY
Qty: 90 TABLET | Refills: 3 | Status: SHIPPED | OUTPATIENT
Start: 2024-07-17

## 2024-07-17 RX ORDER — LANCING DEVICE
1 EACH MISCELLANEOUS 2 TIMES DAILY
Qty: 200 EACH | Refills: 5 | Status: SHIPPED | OUTPATIENT
Start: 2024-07-17 | End: 2025-07-17

## 2024-07-17 RX ORDER — ATORVASTATIN CALCIUM 40 MG/1
40 TABLET, FILM COATED ORAL DAILY
Qty: 90 TABLET | Refills: 0 | Status: SHIPPED | OUTPATIENT
Start: 2024-07-17 | End: 2024-10-15

## 2024-07-17 RX ORDER — ROPINIROLE 0.5 MG/1
0.5 TABLET, FILM COATED ORAL 3 TIMES DAILY
Qty: 90 TABLET | Refills: 2 | Status: SHIPPED | OUTPATIENT
Start: 2024-07-17 | End: 2024-10-15

## 2024-07-17 RX ORDER — LANCETS 33 GAUGE
EACH MISCELLANEOUS
Qty: 100 EACH | Refills: 3 | Status: SHIPPED | OUTPATIENT
Start: 2024-07-17

## 2024-07-17 NOTE — PROGRESS NOTES
[facility]   Outpatient Wound Care     Subjective:   Patient ID: Suki Anton is a 58 y.o. female.    Chief Complaint: Wound Care (Left lateral ankle wound/unna)      History of Present Illness:   58 y.o. White female being seen today for follow up visit regarding left ankle diabetic foot ulcer, Unna boot change, and assessment of Charcot ankle.  Voices saw podiatrist today but did not take self pay patients.  Voices awaiting insurance from work due to insurance was changed in his awaiting for reinstatement.  Reviewed all previous medical history and progress notes  Saw orthopedics on 7/1/24 and was informed of CT scan.  She is waiting to see podiatry no appointment scheduled at this time.  Will continue our clinic until ulcer is healed. Wound has been present for approximately 2 months.  She is a referral from Orthopedics.  Patient is currently awaiting CT scan of left ankle, and referral to a foot ankle specialist.  Patient voices 2 months ago twisted ankle and purchased a boot from [employer] which created an ulcer to ankle area. Followed by Dick Gallagher DO for PCP last appt 3/11/24.    Today's visit 07/15/2024:  Reviewed all previous progress notes.  Left lower leg unna boot removed per nursing staff at bedside.  Left ankle wound red granulating wound bed with moderate serosanguineous drainage and macerated jak wound.  Significant decrease in size since last visit, and now wound has created 2 separate wounds.  Discussion with the patient today we will continue Unna boot to assist in decreasing edema to ankle, and increase in granulation of wounds.  All wound care performed per nursing staff at bedside.  Left ankle wound cleansed with Vashe, applied Triad to periwound, silver polymem to wound bed, Unna boot, Drawtex wrap, Kerlix and Coban.  Currently wearing  left CAM boot.  Will have her return to the clinic on Thursday for a nurse visit for Unna boot change.  Reinforced unna boot precautions.  Reinforced offloading pressure to left foot as much as possible.  Instructed to call the office with any questions, concerns, or any reasons having to remove Unna boot.  Verbalized understanding of all instructions.    07/08/2024:  Reviewed previous progress notes.  Left lower leg unna boot removed per nursing staff at bedside.  Left ankle ulcer red granulating wound bed with moderate serosanguineous drainage with macerated jak wound.  Will continue same wound care.  All wound care performed per nursing staff at bedside.  Left ankle wound cleansed with Vashe, apply Triad to periwound edge, Silver polymem to wound bed, cover with unna boot, Drawtex 4 x 4, Kerlix, and Coban. Tolerated well.  Reinforced unna boot precautions.  Will return to clinic on Thursday for a nurse visit for Unna boot change.  Will follow up with me next Monday. Instructed the importance of calling the office with any questions, concerns, or any reasons having to remove Unna boot.  Verbalized understanding of all instructions.    07/01/2024:  Reviewed previous progress notes.  Left lower leg unna boot removed per nursing staff at bedside.  Left ankle ulcer red granulating wound bed with minimal slough and moderate serosanguineous drainage.  Patient voices  will have virtual visit with orthopedic talk to discuss CT scan today.  All wound care performed per nursing staff at bedside.  Will continue Unna boot. Left ankle wound cleansed with 0.5% Dakins, apply Triad to periwound edge, Silver polymem to wound bed, cover with unna boot, Drawtex 4 x 4, Kerlix, and Coban. Tolerated well.  Reinforced unna boot precautions.  Will return to the clinic on Wednesday for a nurse visit, and will return the following Monday to re-evaluate left ankle ulcer.  Instructed the importance of calling the office with any questions,  concerns, or any reasons having to remove Unna boot.  Verbalized understanding of all instructions.    06/26/2024:  Presents to clinic alone.  Left lower leg unna boot removed per nursing staff at bedside.  Left ankle ulcer red granulating wound bed with moderate serosanguineous drainage minimal slough.  Macerated jak wound.  Discussion with the patient today will continue Unna boot.  Patient is taking all oral antibiotics as directed.  All wound care performed per nursing staff at bedside. Left ankle wound cleansed with 0.5% Dakins, apply Triad to periwound edge, Silver polymem to wound bed, cover with unna boot, Drawtex wrap, Kerlix, and Coban. Tolerated well.  Reinforced unna boot precautions.  Will return to the clinic on Monday to re-evaluate left ankle ulcer.  Instructed the importance of calling the office with any questions, concerns, or any reasons having to remove Unna boot.  Doctors excuse given.  Verbalized understanding of all instructions.      06/13/2024:  Presents to the clinic with grandchildren.  Ambulates with left foot orthopedic walking boot.  Walking boot and left ankle dressing removed per nursing staff at bedside.  Left ankle ulcer red granulating wound bed with moderate amount of slough and serosanguineous drainage with the jak wound erythema and nonpitting edema.  Discussion with the patient today will need to perform selective debridement and obtain tissue culture.  Written consent obtained.  See quick procedure note.  Following debridement red granulating wound bed with moderate serosanguineous drainage discussion with the patient today will benefit from applying Unna boot to increased granulation to area and decrease edema.  All wound care performed per nursing staff at bedside.  Left ankle wound cleansed with 0.5% Dakins, apply Silver polymem to wound bed, cover with unna boot, Drawtex wrap, Kerlix, and Coban.  Tolerated well.  Instructed on unna boot precautions.  Will return to the  clinic on Monday for a nurse visit for Unna boot change.  Will follow up with me next Thursday.       History includes:      Past Medical History:   Diagnosis Date    Diabetes mellitus     Diabetes mellitus, type 2     GERD (gastroesophageal reflux disease)     Hypertension     History reviewed. No pertinent surgical history.   Social History     Socioeconomic History    Marital status:     Number of children: 0   Occupational History    Occupation: Manager     Comment: [employer]   Tobacco Use    Smoking status: Never    Smokeless tobacco: Never   Substance and Sexual Activity    Alcohol use: Never    Drug use: Never    Sexual activity: Yes     Partners: Male     Birth control/protection: None     Social Determinants of Health     Financial Resource Strain: Medium Risk (4/4/2024)    Overall Financial Resource Strain (CARDIA)     Difficulty of Paying Living Expenses: Somewhat hard   Food Insecurity: Food Insecurity Present (3/11/2024)    Hunger Vital Sign     Worried About Running Out of Food in the Last Year: Sometimes true     Ran Out of Food in the Last Year: Sometimes true   Transportation Needs: Unmet Transportation Needs (4/4/2024)    PRAPARE - Transportation     Lack of Transportation (Medical): Yes     Lack of Transportation (Non-Medical): Yes   Physical Activity: Inactive (4/4/2024)    Exercise Vital Sign     Days of Exercise per Week: 0 days     Minutes of Exercise per Session: 0 min   Stress: Stress Concern Present (4/4/2024)    [employer] - Occupational Stress Questionnaire     Feeling of Stress : Rather much   Housing Stability: High Risk (3/11/2024)    Housing Stability Vital Sign     Unable to Pay for Housing in the Last Year: Yes     Number of Places Lived in the Last Year: 1     Unstable Housing in the Last Year: No   .      Current Outpatient Medications   Medication Sig Dispense Refill    acetaminophen (TYLENOL) 500 MG tablet Take 500 mg by mouth every 6  (six) hours as needed for Pain.      atorvastatin (LIPITOR) 40 MG tablet Take 1 tablet (40 mg total) by mouth once daily. 90 tablet 0    blood sugar diagnostic Strp Test strips to check CBG's 200 each 11    blood-glucose meter kit 1 each by Other route 2 (two) times daily. Use as instructed 1 each 0    diclofenac sodium (VOLTAREN) 1 % Gel Apply 2 g topically 4 (four) times daily. 100 g 2    EASY TOUCH TWIST LANCETS 33 gauge Misc USE TO CHECK BLOOD SUAGR LEVELS TWO TIMES A  each 3    gabapentin (NEURONTIN) 300 MG capsule Take 2 capsules (600 mg total) by mouth 3 (three) times daily. 180 capsule 2    glipiZIDE (GLUCOTROL) 10 MG tablet Take 1 tablet (10 mg total) by mouth 2 (two) times daily with meals. 60 tablet 5    insulin degludec (TRESIBA FLEXTOUCH U-100) 100 unit/mL (3 mL) insulin pen Inject 25 Units into the skin every evening. 7.5 mL 2    insulin lispro (HUMALOG KWIKPEN INSULIN) 100 unit/mL pen Inject 7 Units into the skin 3 (three) times daily with meals. 6.3 mL 2    lancing device Misc 1 each by Misc.(Non-Drug; Combo Route) route 2 (two) times a day. 200 each 5    lisinopriL (PRINIVIL,ZESTRIL) 40 MG tablet Take 1 tablet (40 mg total) by mouth once daily. 90 tablet 3    omeprazole (PRILOSEC) 20 MG capsule Take 2 capsules (40 mg total) by mouth once daily. 60 capsule 2    rOPINIRole (REQUIP) 0.5 MG tablet Take 1 tablet (0.5 mg total) by mouth 3 (three) times daily. 90 tablet 2    sulfamethoxazole-trimethoprim 800-160mg (BACTRIM DS) 800-160 mg Tab Take 1 tablet by mouth 2 (two) times daily. 20 tablet 0    tirzepatide 7.5 mg/0.5 mL PnIj Inject 7.5 mg into the skin every 7 days. 4 Pen 0    traMADoL (ULTRAM) 50 mg tablet Take 1 tablet (50 mg total) by mouth every 6 (six) hours. 20 tablet 0    traZODone (DESYREL) 50 MG tablet Take 1 tablet (50 mg total) by mouth nightly as needed for Insomnia. 30 tablet 0    TRUE METRIX GLUCOSE METER Misc Inject 1 each into the skin 4 (four) times daily with meals and  nightly. use as directed 1 each 0     No current facility-administered medications for this encounter.       Review of Systems   Skin:  Positive for wound.   All other systems reviewed and are negative.         Labs Reviewed:   Chemistry:  Lab Results   Component Value Date    BUN 14.5 06/15/2023    BUN 14.0 06/14/2023    CREATININE 0.75 06/15/2023    CREATININE 0.69 06/14/2023    EGFRNORACEVR >60 06/15/2023    EGFRNORACEVR >60 06/14/2023    GLUCOSE 267 (H) 06/15/2023    AST 8 06/15/2023    AST 8 06/14/2023    ALT 12 06/15/2023    ALT 10 06/14/2023    HGBA1C 11.4 (H) 04/13/2023        Hematology:  Lab Results   Component Value Date    WBC 14.77 (H) 06/15/2023    WBC 15.65 (H) 06/14/2023    HGB 11.3 (L) 06/15/2023    HGB 11.6 (L) 06/14/2023    HCT 33.7 (L) 06/15/2023    HCT 34.5 (L) 06/14/2023     06/15/2023     06/14/2023       Inflammatory Markers:  Lab Results   Component Value Date    SEDRATE 64 (H) 07/02/2020    SEDRATE 95 (H) 06/30/2020    SEDRATE 94 (H) 06/28/2020        Objective:        Physical Exam  Vitals reviewed.   Cardiovascular:      Pulses:           Dorsalis pedis pulses are detected w/ Doppler on the right side and detected w/ Doppler on the left side.        Posterior tibial pulses are detected w/ Doppler on the right side and detected w/ Doppler on the left side.   Musculoskeletal:      Left lower leg: Edema present.      Left foot: Charcot foot present.        Feet:    Feet:      Right foot:      Skin integrity: Skin integrity normal.      Toenail Condition: Right toenails are abnormally thick. Fungal disease present.     Left foot:      Skin integrity: Ulcer present.      Toenail Condition: Left toenails are abnormally thick. Fungal disease present.  Skin:     General: Skin is warm.      Capillary Refill: Capillary refill takes less than 2 seconds.      Findings: Wound present.             Comments: Left ankle ulcer after red granulating wound bed and moderate serosanguineous  drainage.   Neurological:      Mental Status: She is alert.            Wound 06/13/24 0827 Pressure Injury Left lateral Ankle (Active)   06/13/24 0827   Present on Original Admission: Y   Primary Wound Type: Pressure inj   Side: Left   Orientation: lateral   Location: Ankle   Wound Approximate Age at First Assessment (Weeks):    Wound Number:    Is this injury device related?:    Incision Type:    Closure Method:    Wound Description (Comments):    Type:    Additional Comments:    Ankle-Brachial Index:    Pulses:    Removal Indication and Assessment:    Wound Outcome:    Dressing Appearance Moist drainage 07/15/24 1358   Drainage Amount Moderate 07/15/24 1358   Drainage Characteristics/Odor Serosanguineous 07/15/24 1358   Appearance Gwynn 07/15/24 1358   Periwound Area Macerated 07/15/24 1358   Wound Length (cm) 2.2 cm 07/15/24 1358   Wound Width (cm) 1.8 cm 07/15/24 1358   Wound Depth (cm) 0.4 cm 07/15/24 1358   Wound Volume (cm^3) 1.584 cm^3 07/15/24 1358   Wound Surface Area (cm^2) 3.96 cm^2 07/15/24 1358   Undermining (depth (cm)/location) 0.3cm from 11:00 to 1:00 07/15/24 1358            Wound 07/15/24 1358 Abrasion(s) Left lower;proximal;lateral Ankle (Active)   07/15/24 1358   Present on Original Admission: Y   Primary Wound Type: Abrasion(s)   Side: Left   Orientation: lower;proximal;lateral   Location: Ankle   Wound Approximate Age at First Assessment (Weeks):    Wound Number:    Is this injury device related?:    Incision Type:    Closure Method:    Wound Description (Comments):    Type:    Additional Comments:    Ankle-Brachial Index:    Pulses:    Removal Indication and Assessment:    Wound Outcome:    Wound Image   07/15/24 1358   Dressing Appearance Moist drainage 07/15/24 1358   Drainage Amount Moderate 07/15/24 1358   Drainage Characteristics/Odor Serosanguineous 07/15/24 1358   Appearance Gwynn 07/15/24 1358   Wound Length (cm) 0.4 cm 07/15/24 1358   Wound Width (cm) 0.6 cm 07/15/24 1358   Wound Depth  (cm) 0.2 cm 07/15/24 1358   Wound Volume (cm^3) 0.048 cm^3 07/15/24 1358   Wound Surface Area (cm^2) 0.24 cm^2 07/15/24 1358         Assessment:         ICD-10-CM ICD-9-CM   1. Diabetic ulcer of left foot associated with type 2 diabetes mellitus, with fat layer exposed, unspecified part of foot  E11.621 250.80    L97.522 707.15   2. Charcot's joint of left ankle  M14.672 094.0     713.5   3. Type 2 diabetes mellitus with other skin ulcer, without long-term current use of insulin  E11.622 250.80   4. Class 3 severe obesity with body mass index (BMI) of 40.0 to 44.9 in adult, unspecified obesity type, unspecified whether serious comorbidity present  E66.01 278.01    Z68.41 V85.41           Plan:   Tissue pathology and/or culture taken:  [] Yes [x] No   Sharp debridement performed:   [] Yes [x] No   Labs ordered this visit:   [] Yes [x] No   Imaging ordered this visit:   [] Yes [x] No         1. Diabetic ulcer of other part of left foot associated with type 2 diabetes mellitus, with fat layer exposed     Requiring Unna boot for closure.    Continued wound care:  Applied Triad to wound bed, silver polymem silver to wound bed, unna boot with will return on Thursday for nurse visit and next Monday.     Continued monitoring for any signs of infection: Watch for increased drainage or pain, fevers, chills, swelling and report this to clinic.   2. Charcot's joint of left ankle     Currently under the care of orthopedics, and awaiting follow up with podiatry.    Wearing offloaded boot at all times.  Reinforced the importance of offloading pressure to left ankle as much as possible.     3. Type 2 diabetes mellitus with other skin ulcer, without long-term current use of insulin     Continued monitoring due to can cause delayed wound healing.     Continued education due to last A1c was 13.7:  Must have a strict diabetic diet, take glucose lowering medications as prescribed and encourage lower HA1C.   4. Class 3 severe obesity  with body mass index (BMI) of 40.0 to 44.9 in adult, unspecified obesity type, unspecified whether serious comorbidity present     Continue monitoring due to lack of exercise caused by Charcot joint to left ankle.       The time spent including preparing to see the patient, obtaining patient history and assessment, evaluation of the plan of care, patient/caregiver counseling and education, orders, documentation, coordination of care, and other professional medical management activities for today's encounter was 20 minute.    Time spent performing procedures during today's encounter was 10 minute.    Follow up in about 3 days (around 7/18/2024) for 0900 nurse visit and with Martha on 7/22/24 at 0900. Teaching provided on s/s to call wound clinic for promptly.  Unna boot and ER precautions taught for after hours and weekends.         JANE Vieyra

## 2024-07-17 NOTE — TELEPHONE ENCOUNTER
Patient to have her insurance activated over the next week or so. Will wait for activation prior to sending referral for podiatry. Patient aware to call back when insurance is active. Medications refilled.    Dick Gallagher,   \[facility]\"" Internal Medicine, PGY-III

## 2024-07-18 ENCOUNTER — HOSPITAL ENCOUNTER (OUTPATIENT)
Dept: WOUND CARE | Facility: HOSPITAL | Age: 59
Discharge: HOME | End: 2024-07-18
Attending: NURSE PRACTITIONER

## 2024-07-18 VITALS
DIASTOLIC BLOOD PRESSURE: 80 MMHG | SYSTOLIC BLOOD PRESSURE: 153 MMHG | TEMPERATURE: 98 F | HEART RATE: 85 BPM | OXYGEN SATURATION: 94 %

## 2024-07-18 PROCEDURE — 99211 OFF/OP EST MAY X REQ PHY/QHP: CPT | Mod: 25

## 2024-07-18 PROCEDURE — 27000999 HC MEDICAL RECORD PHOTO DOCUMENTATION

## 2024-07-18 PROCEDURE — 29580 STRAPPING UNNA BOOT: CPT

## 2024-07-18 NOTE — PROGRESS NOTES
[facility]                Wound Care Services    Subjective:       Patient ID: Suki Anton is a 58 y.o. female.    Chief Complaint: Wound Care (Left lower leg wound/unna)    HPI  Review of Systems      Objective:     Vitals:    07/18/24 0909   BP: (!) 153/80   Pulse: 85   Temp: 98 °F (36.7 °C)         Physical Exam         Assessment/Plan:       No diagnosis found.        Tissue pathology and/or culture taken:  [] Yes [] No   Sharp debridement performed:   [] Yes [] No   Labs ordered this visit:   [] Yes [] No   Imaging ordered this visit:   [] Yes [] No           No orders of the defined types were placed in this encounter.       No follow-ups on file.               Pt here for nurse visit for Unna boot change, Unna boot removed per writer, extremity washed, cleaned and dried, polymem silver applied to woundbed, Triad applied to periwound, Unna boot, kerlix, drawtex edema, followed by Coban, wound bed noted pink with white edges, moderate amount serosanguinous drainage, no foul odor noted, pt tolerated well with no complaints. Re-educated on Unna boot precautions, verbalized understanding, aware of follow up appt Monday July 22, 2024

## 2024-07-22 ENCOUNTER — HOSPITAL ENCOUNTER (OUTPATIENT)
Dept: WOUND CARE | Facility: HOSPITAL | Age: 59
Discharge: HOME | End: 2024-07-22
Attending: NURSE PRACTITIONER

## 2024-07-22 VITALS
DIASTOLIC BLOOD PRESSURE: 79 MMHG | TEMPERATURE: 98 F | OXYGEN SATURATION: 96 % | SYSTOLIC BLOOD PRESSURE: 158 MMHG | HEART RATE: 86 BPM

## 2024-07-22 DIAGNOSIS — E11.622 TYPE 2 DIABETES MELLITUS WITH OTHER SKIN ULCER, WITHOUT LONG-TERM CURRENT USE OF INSULIN: ICD-10-CM

## 2024-07-22 DIAGNOSIS — L97.522 DIABETIC ULCER OF LEFT FOOT ASSOCIATED WITH TYPE 2 DIABETES MELLITUS, WITH FAT LAYER EXPOSED, UNSPECIFIED PART OF FOOT: Primary | ICD-10-CM

## 2024-07-22 DIAGNOSIS — E11.621 DIABETIC ULCER OF LEFT FOOT ASSOCIATED WITH TYPE 2 DIABETES MELLITUS, WITH FAT LAYER EXPOSED, UNSPECIFIED PART OF FOOT: Primary | ICD-10-CM

## 2024-07-22 DIAGNOSIS — E66.01 CLASS 3 SEVERE OBESITY WITH BODY MASS INDEX (BMI) OF 40.0 TO 44.9 IN ADULT, UNSPECIFIED OBESITY TYPE, UNSPECIFIED WHETHER SERIOUS COMORBIDITY PRESENT: ICD-10-CM

## 2024-07-22 DIAGNOSIS — M14.672 CHARCOT'S JOINT OF LEFT ANKLE: ICD-10-CM

## 2024-07-22 PROCEDURE — 99211 OFF/OP EST MAY X REQ PHY/QHP: CPT

## 2024-07-22 PROCEDURE — 27000999 HC MEDICAL RECORD PHOTO DOCUMENTATION

## 2024-07-22 PROCEDURE — 29580 STRAPPING UNNA BOOT: CPT

## 2024-07-22 PROCEDURE — 99213 OFFICE O/P EST LOW 20 MIN: CPT | Mod: ,,, | Performed by: NURSE PRACTITIONER

## 2024-07-22 NOTE — PROGRESS NOTES
[facility]   Outpatient Wound Care     Subjective:   Patient ID: Suki Anton is a 58 y.o. female.    Chief Complaint: Wound Care (Left lateral ankle wound/unna)      History of Present Illness:   58 y.o. White female being seen today for follow up visit regarding left ankle ulcer, Charcot joint of left ankle, and Unna boot change.  Voices will call Dr. Hawkins office for podiatry follow up due to insurance is now reinstated. Reviewed all previous medical history and progress notes since last visit if 07/15/2024.  Past medical history:  Voices saw podiatrist today but did not take self pay patients.  Voices awaiting insurance from work due to insurance was changed in his awaiting for reinstatement.  Saw orthopedics on 7/1/24 and was informed of CT scan.  She is waiting to see podiatry no appointment scheduled at this time.  Will continue our clinic until ulcer is healed. Wound has been present for approximately 2 months.  She is a referral from Orthopedics.  Patient is currently awaiting CT scan of left ankle, and referral to a foot ankle specialist.  Patient voices 2 months ago twisted ankle and purchased a boot from [employer] which created an ulcer to ankle area. Followed by Dick Gallagher DO for PCP last appt 3/11/24.    Today's visit 07/22/2024:  Reviewed all previous progress notes 07/15/2024.  Presents to the clinic with grandson.  Ambulates with CAM walking boot to left lower leg.  Left lower extremity Unna boot removed per nursing staff at bedside.  Left ankle wound red granulating wound bed macerated jak wound moderate serosanguineous drainage.  Discussion with the patient today significant decrease in size since last visit will continue Unna boot.  New wound care orders for left lower extremity ankle cleansed with Vashe apply, Triad to jak wound, silver  polymem to wound bed, cover with covamax, Unna boot, Kerlix, and Coban.  All wound care performed per nursing staff at bedside.  Will return to the clinic on Thursday for a nurse visit for Unna boot change.  Will follow up in wound clinic with me next Monday.  Instructed to call the office with any questions, concerns, or any reasons having to remove Unna boot.  Verbalized understanding of all instructions.      07/15/2024:  Reviewed all previous progress notes.  Left lower leg unna boot removed per nursing staff at bedside.  Left ankle wound red granulating wound bed with moderate serosanguineous drainage and macerated jak wound.  Significant decrease in size since last visit, and now wound has created 2 separate wounds.  Discussion with the patient today we will continue Unna boot to assist in decreasing edema to ankle, and increase in granulation of wounds.  All wound care performed per nursing staff at bedside.  Left ankle wound cleansed with Vashe, applied Triad to periwound, silver polymem to wound bed, Unna boot, Drawtex wrap, Kerlix and Coban.  Currently wearing left CAM boot.  Will have her return to the clinic on Thursday for a nurse visit for Unna boot change.  Reinforced unna boot precautions.  Reinforced offloading pressure to left foot as much as possible.  Instructed to call the office with any questions, concerns, or any reasons having to remove Unna boot.  Verbalized understanding of all instructions.    07/08/2024:  Reviewed previous progress notes.  Left lower leg unna boot removed per nursing staff at bedside.  Left ankle ulcer red granulating wound bed with moderate serosanguineous drainage with macerated jak wound.  Will continue same wound care.  All wound care performed per nursing staff at bedside.  Left ankle wound cleansed with Vashe, apply Triad to periwound edge, Silver polymem to wound bed, cover with unna boot, Drawtex 4 x 4, Kerlix, and Coban. Tolerated well.  Reinforced unna boot  precautions.  Will return to clinic on Thursday for a nurse visit for Unna boot change.  Will follow up with me next Monday. Instructed the importance of calling the office with any questions, concerns, or any reasons having to remove Unna boot.  Verbalized understanding of all instructions.    07/01/2024:  Reviewed previous progress notes.  Left lower leg unna boot removed per nursing staff at bedside.  Left ankle ulcer red granulating wound bed with minimal slough and moderate serosanguineous drainage.  Patient voices  will have virtual visit with orthopedic talk to discuss CT scan today.  All wound care performed per nursing staff at bedside.  Will continue Unna boot. Left ankle wound cleansed with 0.5% Dakins, apply Triad to periwound edge, Silver polymem to wound bed, cover with unna boot, Drawtex 4 x 4, Kerlix, and Coban. Tolerated well.  Reinforced unna boot precautions.  Will return to the clinic on Wednesday for a nurse visit, and will return the following Monday to re-evaluate left ankle ulcer.  Instructed the importance of calling the office with any questions, concerns, or any reasons having to remove Unna boot.  Verbalized understanding of all instructions.    06/26/2024:  Presents to clinic alone.  Left lower leg unna boot removed per nursing staff at bedside.  Left ankle ulcer red granulating wound bed with moderate serosanguineous drainage minimal slough.  Macerated jak wound.  Discussion with the patient today will continue Unna boot.  Patient is taking all oral antibiotics as directed.  All wound care performed per nursing staff at bedside. Left ankle wound cleansed with 0.5% Dakins, apply Triad to periwound edge, Silver polymem to wound bed, cover with unna boot, Drawtex wrap, Kerlix, and Coban. Tolerated well.  Reinforced unna boot precautions.  Will return to the clinic on Monday to re-evaluate left ankle ulcer.  Instructed the importance of calling the office with any questions, concerns, or  any reasons having to remove Unna boot.  Doctors excuse given.  Verbalized understanding of all instructions.      06/13/2024:  Presents to the clinic with grandchildren.  Ambulates with left foot orthopedic walking boot.  Walking boot and left ankle dressing removed per nursing staff at bedside.  Left ankle ulcer red granulating wound bed with moderate amount of slough and serosanguineous drainage with the jak wound erythema and nonpitting edema.  Discussion with the patient today will need to perform selective debridement and obtain tissue culture.  Written consent obtained.  See quick procedure note.  Following debridement red granulating wound bed with moderate serosanguineous drainage discussion with the patient today will benefit from applying Unna boot to increased granulation to area and decrease edema.  All wound care performed per nursing staff at bedside.  Left ankle wound cleansed with 0.5% Dakins, apply Silver polymem to wound bed, cover with unna boot, Drawtex wrap, Kerlix, and Coban.  Tolerated well.  Instructed on unna boot precautions.  Will return to the clinic on Monday for a nurse visit for Unna boot change.  Will follow up with me next Thursday.       History includes:      Past Medical History:   Diagnosis Date    Diabetes mellitus     Diabetes mellitus, type 2     GERD (gastroesophageal reflux disease)     Hypertension     History reviewed. No pertinent surgical history.   Social History     Socioeconomic History    Marital status:     Number of children: 0   Occupational History    Occupation: Manager     Comment: [employer]   Tobacco Use    Smoking status: Never    Smokeless tobacco: Never   Substance and Sexual Activity    Alcohol use: Never    Drug use: Never    Sexual activity: Yes     Partners: Male     Birth control/protection: None     Social Determinants of Health     Financial Resource Strain: Medium Risk (4/4/2024)    Overall Financial Resource Strain (CARDIA)      Difficulty of Paying Living Expenses: Somewhat hard   Food Insecurity: Food Insecurity Present (3/11/2024)    Hunger Vital Sign     Worried About Running Out of Food in the Last Year: Sometimes true     Ran Out of Food in the Last Year: Sometimes true   Transportation Needs: Unmet Transportation Needs (4/4/2024)    PRAPARE - Transportation     Lack of Transportation (Medical): Yes     Lack of Transportation (Non-Medical): Yes   Physical Activity: Inactive (4/4/2024)    Exercise Vital Sign     Days of Exercise per Week: 0 days     Minutes of Exercise per Session: 0 min   Stress: Stress Concern Present (4/4/2024)    [employer] - Occupational Stress Questionnaire     Feeling of Stress : Rather much   Housing Stability: High Risk (3/11/2024)    Housing Stability Vital Sign     Unable to Pay for Housing in the Last Year: Yes     Number of Places Lived in the Last Year: 1     Unstable Housing in the Last Year: No   .      Current Outpatient Medications   Medication Sig Dispense Refill    acetaminophen (TYLENOL) 500 MG tablet Take 500 mg by mouth every 6 (six) hours as needed for Pain.      atorvastatin (LIPITOR) 40 MG tablet Take 1 tablet (40 mg total) by mouth once daily. 90 tablet 0    blood sugar diagnostic Strp Test strips to check CBG's 200 each 11    blood-glucose meter kit 1 each by Other route 2 (two) times daily. Use as instructed 1 each 0    diclofenac sodium (VOLTAREN) 1 % Gel Apply 2 g topically 4 (four) times daily. 100 g 2    EASY TOUCH TWIST LANCETS 33 gauge Misc USE TO CHECK BLOOD SUAGR LEVELS TWO TIMES A  each 3    gabapentin (NEURONTIN) 300 MG capsule Take 2 capsules (600 mg total) by mouth 3 (three) times daily. 180 capsule 2    glipiZIDE (GLUCOTROL) 10 MG tablet Take 1 tablet (10 mg total) by mouth 2 (two) times daily with meals. 60 tablet 5    insulin degludec (TRESIBA FLEXTOUCH U-100) 100 unit/mL (3 mL) insulin pen Inject 25 Units into the skin every evening. 7.5  mL 2    insulin lispro (HUMALOG KWIKPEN INSULIN) 100 unit/mL pen Inject 7 Units into the skin 3 (three) times daily with meals. 6.3 mL 2    lancing device Misc 1 each by Misc.(Non-Drug; Combo Route) route 2 (two) times a day. 200 each 5    lisinopriL (PRINIVIL,ZESTRIL) 40 MG tablet Take 1 tablet (40 mg total) by mouth once daily. 90 tablet 3    omeprazole (PRILOSEC) 20 MG capsule Take 2 capsules (40 mg total) by mouth once daily. 60 capsule 2    rOPINIRole (REQUIP) 0.5 MG tablet Take 1 tablet (0.5 mg total) by mouth 3 (three) times daily. 90 tablet 2    sulfamethoxazole-trimethoprim 800-160mg (BACTRIM DS) 800-160 mg Tab Take 1 tablet by mouth 2 (two) times daily. 20 tablet 0    tirzepatide 7.5 mg/0.5 mL PnIj Inject 7.5 mg into the skin every 7 days. 4 Pen 0    traMADoL (ULTRAM) 50 mg tablet Take 1 tablet (50 mg total) by mouth every 6 (six) hours. 20 tablet 0    traZODone (DESYREL) 50 MG tablet Take 1 tablet (50 mg total) by mouth nightly as needed for Insomnia. 30 tablet 0    TRUE METRIX GLUCOSE METER Misc Inject 1 each into the skin 4 (four) times daily with meals and nightly. use as directed 1 each 0     No current facility-administered medications for this encounter.       Review of Systems   Skin:  Positive for wound.   All other systems reviewed and are negative.         Labs Reviewed:   Chemistry:  Lab Results   Component Value Date    BUN 14.5 06/15/2023    BUN 14.0 06/14/2023    CREATININE 0.75 06/15/2023    CREATININE 0.69 06/14/2023    EGFRNORACEVR >60 06/15/2023    EGFRNORACEVR >60 06/14/2023    GLUCOSE 267 (H) 06/15/2023    AST 8 06/15/2023    AST 8 06/14/2023    ALT 12 06/15/2023    ALT 10 06/14/2023    HGBA1C 11.4 (H) 04/13/2023        Hematology:  Lab Results   Component Value Date    WBC 14.77 (H) 06/15/2023    WBC 15.65 (H) 06/14/2023    HGB 11.3 (L) 06/15/2023    HGB 11.6 (L) 06/14/2023    HCT 33.7 (L) 06/15/2023    HCT 34.5 (L) 06/14/2023     06/15/2023     06/14/2023        Inflammatory Markers:  Lab Results   Component Value Date    SEDRATE 64 (H) 07/02/2020    SEDRATE 95 (H) 06/30/2020    SEDRATE 94 (H) 06/28/2020        Objective:        Physical Exam  Vitals reviewed.   Cardiovascular:      Pulses:           Dorsalis pedis pulses are detected w/ Doppler on the right side and detected w/ Doppler on the left side.        Posterior tibial pulses are detected w/ Doppler on the right side and detected w/ Doppler on the left side.   Musculoskeletal:      Left lower leg: Edema present.      Left foot: Charcot foot present.        Feet:    Feet:      Right foot:      Skin integrity: Skin integrity normal.      Toenail Condition: Right toenails are abnormally thick. Fungal disease present.     Left foot:      Skin integrity: Ulcer present.      Toenail Condition: Left toenails are abnormally thick. Fungal disease present.  Skin:     General: Skin is warm.      Capillary Refill: Capillary refill takes less than 2 seconds.      Findings: Wound present.             Comments: Left ankle wound red granulating wound bed with macerated jak wound moderate serosanguineous drainage   Neurological:      Mental Status: She is alert.            Wound 06/13/24 0827 Pressure Injury Left lateral Ankle (Active)   06/13/24 0827   Present on Original Admission: Y   Primary Wound Type: Pressure inj   Side: Left   Orientation: lateral   Location: Ankle   Wound Approximate Age at First Assessment (Weeks):    Wound Number:    Is this injury device related?:    Incision Type:    Closure Method:    Wound Description (Comments):    Type:    Additional Comments:    Ankle-Brachial Index:    Pulses:    Removal Indication and Assessment:    Wound Outcome:    Wound Image   07/22/24 0846   Dressing Appearance Moist drainage 07/22/24 0848   Drainage Amount Small 07/22/24 0848   Drainage Characteristics/Odor Serosanguineous 07/22/24 0848   Appearance Pink 07/22/24 0848   Periwound Area Macerated 07/22/24 0846   Wound  Length (cm) 0.2 cm 07/22/24 0848   Wound Width (cm) 0.4 cm 07/22/24 0848   Wound Depth (cm) 0.2 cm 07/22/24 0848   Wound Volume (cm^3) 0.016 cm^3 07/22/24 0848   Wound Surface Area (cm^2) 0.08 cm^2 07/22/24 0848         Assessment:         ICD-10-CM ICD-9-CM   1. Diabetic ulcer of left foot associated with type 2 diabetes mellitus, with fat layer exposed, unspecified part of foot  E11.621 250.80    L97.522 707.15   2. Charcot's joint of left ankle  M14.672 094.0     713.5   3. Type 2 diabetes mellitus with other skin ulcer, without long-term current use of insulin  E11.622 250.80   4. Class 3 severe obesity with body mass index (BMI) of 40.0 to 44.9 in adult, unspecified obesity type, unspecified whether serious comorbidity present  E66.01 278.01    Z68.41 V85.41           Plan:   Tissue pathology and/or culture taken:  [] Yes [x] No   Sharp debridement performed:   [] Yes [x] No   Labs ordered this visit:   [] Yes [x] No   Imaging ordered this visit:   [] Yes [x] No         1. Diabetic ulcer of other part of left foot associated with type 2 diabetes mellitus, with fat layer exposed     Will continue Unna boot for closure.    New wound care: Cleaning with Vashe, applying Triad to wound bed, silver polymem to wound bed, covamax, Unna boot, Kerlix, and Coban.    Continued monitoring for any signs of infection: Watch for increased drainage or pain, fevers, chills, swelling and report this to clinic.   2. Charcot's joint of left ankle     Will schedule follow up Dr. Hawkins.     Wearing CAM walking boot at all times.    Will continue offloading pressure to joint to assist with healing.   3. Type 2 diabetes mellitus with other skin ulcer, without long-term current use of insulin     Continued education and care:    Co-factor in delayed wound healing.     Continued education Must have a strict diabetic diet, take glucose lowering medications as prescribed and encourage lower HA1C.  Last A1c 13.7.   4. Class 3 severe  obesity with body mass index (BMI) of 40.0 to 44.9 in adult, unspecified obesity type, unspecified whether serious comorbidity present     Instructed on diet and exercise.    Continue monitoring due to lack of exercise caused by Charcot joint to left ankle.       The time spent including preparing to see the patient, obtaining patient history and assessment, evaluation of the plan of care, patient/caregiver counseling and education, orders, documentation, coordination of care, and other professional medical management activities for today's encounter was 25 minute.    Time spent performing procedures during today's encounter was 15 minute.    Follow up in about 3 days (around 7/25/2024) for at 9 am and with Martha on 7/29/24 . Teaching provided on s/s to call wound clinic for promptly.  Unna boot and ER precautions taught for after hours and weekends.         JANE Vieyra

## 2024-07-25 ENCOUNTER — HOSPITAL ENCOUNTER (OUTPATIENT)
Dept: WOUND CARE | Facility: HOSPITAL | Age: 59
Discharge: HOME | End: 2024-07-25
Attending: NURSE PRACTITIONER

## 2024-07-25 VITALS
HEART RATE: 82 BPM | DIASTOLIC BLOOD PRESSURE: 74 MMHG | TEMPERATURE: 98 F | SYSTOLIC BLOOD PRESSURE: 144 MMHG | OXYGEN SATURATION: 93 %

## 2024-07-25 PROCEDURE — 27000999 HC MEDICAL RECORD PHOTO DOCUMENTATION

## 2024-07-25 PROCEDURE — 99211 OFF/OP EST MAY X REQ PHY/QHP: CPT

## 2024-07-25 PROCEDURE — 29580 STRAPPING UNNA BOOT: CPT

## 2024-07-25 NOTE — PROGRESS NOTES
[facility]                Wound Care Services    Subjective:       Patient ID: Suki Anton is a 58 y.o. female.    Chief Complaint: Wound Care (Left lateral ankle wound/unna)    HPI  Review of Systems      Objective:     Vitals:    07/25/24 0851   BP: (!) 144/74   Pulse: 82   Temp: 98 °F (36.7 °C)         Physical Exam         Assessment/Plan:       No diagnosis found.        Tissue pathology and/or culture taken:  [] Yes [] No   Sharp debridement performed:   [] Yes [] No   Labs ordered this visit:   [] Yes [] No   Imaging ordered this visit:   [] Yes [] No           No orders of the defined types were placed in this encounter.       No follow-ups on file.               Pt here for Unna boot change, after removing Unna boot, Rt lower extremity washed with Hibiclen's, pat dry, NP Martha Duncan assessed wound, states to continue current tx plan, wound photographed, Triad applied to periwound, silver polymem to woundbed, convamax, unna boot, kerlix, coban applied as ordered, pt voices no complaints, Re-educated pt on Unna boot precautions, verbalized understanding. Aware of return to clinic appt

## 2024-07-29 ENCOUNTER — OFFICE VISIT (OUTPATIENT)
Dept: INTERNAL MEDICINE | Facility: CLINIC | Age: 59
End: 2024-07-29

## 2024-07-29 ENCOUNTER — HOSPITAL ENCOUNTER (OUTPATIENT)
Dept: WOUND CARE | Facility: HOSPITAL | Age: 59
Discharge: HOME | End: 2024-07-29
Attending: NURSE PRACTITIONER

## 2024-07-29 VITALS
BODY MASS INDEX: 42.31 KG/M2 | SYSTOLIC BLOOD PRESSURE: 144 MMHG | TEMPERATURE: 98 F | HEIGHT: 63 IN | HEART RATE: 88 BPM | RESPIRATION RATE: 18 BRPM | OXYGEN SATURATION: 95 % | DIASTOLIC BLOOD PRESSURE: 72 MMHG | WEIGHT: 238.81 LBS

## 2024-07-29 VITALS
OXYGEN SATURATION: 94 % | HEART RATE: 88 BPM | SYSTOLIC BLOOD PRESSURE: 144 MMHG | DIASTOLIC BLOOD PRESSURE: 72 MMHG | TEMPERATURE: 98 F

## 2024-07-29 DIAGNOSIS — E11.69 DIABETES MELLITUS TYPE 2 IN OBESE: ICD-10-CM

## 2024-07-29 DIAGNOSIS — E11.65 UNCONTROLLED TYPE 2 DIABETES MELLITUS WITH HYPERGLYCEMIA, WITH LONG-TERM CURRENT USE OF INSULIN: ICD-10-CM

## 2024-07-29 DIAGNOSIS — Z79.4 UNCONTROLLED TYPE 2 DIABETES MELLITUS WITH HYPERGLYCEMIA, WITH LONG-TERM CURRENT USE OF INSULIN: ICD-10-CM

## 2024-07-29 DIAGNOSIS — L97.522 DIABETIC ULCER OF LEFT FOOT ASSOCIATED WITH TYPE 2 DIABETES MELLITUS, WITH FAT LAYER EXPOSED, UNSPECIFIED PART OF FOOT: Primary | ICD-10-CM

## 2024-07-29 DIAGNOSIS — Z12.11 SCREENING FOR COLON CANCER: ICD-10-CM

## 2024-07-29 DIAGNOSIS — G25.81 RESTLESS LEG SYNDROME: ICD-10-CM

## 2024-07-29 DIAGNOSIS — I10 HYPERTENSION, UNSPECIFIED TYPE: ICD-10-CM

## 2024-07-29 DIAGNOSIS — M14.672 CHARCOT'S JOINT OF LEFT ANKLE: ICD-10-CM

## 2024-07-29 DIAGNOSIS — E11.621 DIABETIC ULCER OF LEFT FOOT ASSOCIATED WITH TYPE 2 DIABETES MELLITUS, WITH FAT LAYER EXPOSED, UNSPECIFIED PART OF FOOT: Primary | ICD-10-CM

## 2024-07-29 DIAGNOSIS — E11.622 TYPE 2 DIABETES MELLITUS WITH OTHER SKIN ULCER, WITHOUT LONG-TERM CURRENT USE OF INSULIN: ICD-10-CM

## 2024-07-29 DIAGNOSIS — E66.01 CLASS 3 SEVERE OBESITY WITH BODY MASS INDEX (BMI) OF 40.0 TO 44.9 IN ADULT, UNSPECIFIED OBESITY TYPE, UNSPECIFIED WHETHER SERIOUS COMORBIDITY PRESENT: ICD-10-CM

## 2024-07-29 DIAGNOSIS — K21.9 GASTROESOPHAGEAL REFLUX DISEASE, UNSPECIFIED WHETHER ESOPHAGITIS PRESENT: ICD-10-CM

## 2024-07-29 DIAGNOSIS — E66.9 DIABETES MELLITUS TYPE 2 IN OBESE: ICD-10-CM

## 2024-07-29 DIAGNOSIS — Z79.4 TYPE 2 DIABETES MELLITUS WITHOUT COMPLICATION, WITH LONG-TERM CURRENT USE OF INSULIN: Primary | ICD-10-CM

## 2024-07-29 DIAGNOSIS — E11.9 TYPE 2 DIABETES MELLITUS WITHOUT COMPLICATION, WITH LONG-TERM CURRENT USE OF INSULIN: Primary | ICD-10-CM

## 2024-07-29 LAB — HBA1C MFR BLD: 9.7 %

## 2024-07-29 PROCEDURE — 27000999 HC MEDICAL RECORD PHOTO DOCUMENTATION

## 2024-07-29 PROCEDURE — 83036 HEMOGLOBIN GLYCOSYLATED A1C: CPT | Mod: PBBFAC

## 2024-07-29 PROCEDURE — 29580 STRAPPING UNNA BOOT: CPT

## 2024-07-29 PROCEDURE — 99211 OFF/OP EST MAY X REQ PHY/QHP: CPT | Mod: 25

## 2024-07-29 PROCEDURE — 99214 OFFICE O/P EST MOD 30 MIN: CPT | Mod: ,,, | Performed by: NURSE PRACTITIONER

## 2024-07-29 PROCEDURE — 99214 OFFICE O/P EST MOD 30 MIN: CPT | Mod: PBBFAC,25,27

## 2024-07-29 RX ORDER — INSULIN LISPRO 100 [IU]/ML
7 INJECTION, SOLUTION INTRAVENOUS; SUBCUTANEOUS
Qty: 6.3 ML | Refills: 2 | Status: SHIPPED | OUTPATIENT
Start: 2024-07-29 | End: 2024-10-27

## 2024-07-29 RX ORDER — INSULIN DEGLUDEC 100 U/ML
25 INJECTION, SOLUTION SUBCUTANEOUS NIGHTLY
Qty: 7.5 ML | Refills: 2 | Status: SHIPPED | OUTPATIENT
Start: 2024-07-29 | End: 2024-10-27

## 2024-07-29 RX ORDER — LANCING DEVICE
1 EACH MISCELLANEOUS
Qty: 200 EACH | Refills: 5 | Status: SHIPPED | OUTPATIENT
Start: 2024-07-29 | End: 2025-07-29

## 2024-07-29 RX ORDER — GLIPIZIDE 10 MG/1
10 TABLET ORAL 2 TIMES DAILY WITH MEALS
Qty: 60 TABLET | Refills: 5 | Status: SHIPPED | OUTPATIENT
Start: 2024-07-29 | End: 2025-01-25

## 2024-07-29 RX ORDER — PEN NEEDLE, DIABETIC 32GX 5/32"
NEEDLE, DISPOSABLE MISCELLANEOUS
COMMUNITY
Start: 2024-06-13 | End: 2024-07-29

## 2024-07-29 RX ORDER — ROPINIROLE 0.5 MG/1
0.5 TABLET, FILM COATED ORAL 3 TIMES DAILY
Qty: 90 TABLET | Refills: 2 | Status: SHIPPED | OUTPATIENT
Start: 2024-07-29 | End: 2024-10-27

## 2024-07-29 RX ORDER — ATORVASTATIN CALCIUM 40 MG/1
40 TABLET, FILM COATED ORAL DAILY
Qty: 90 TABLET | Refills: 0 | Status: SHIPPED | OUTPATIENT
Start: 2024-07-29 | End: 2024-10-27

## 2024-07-29 RX ORDER — LISINOPRIL 40 MG/1
40 TABLET ORAL DAILY
Qty: 90 TABLET | Refills: 3 | Status: SHIPPED | OUTPATIENT
Start: 2024-07-29

## 2024-07-29 NOTE — PROGRESS NOTES
[facility]   Outpatient Wound Care     Subjective:   Patient ID: Suki Anton is a 58 y.o. female.    Chief Complaint: Wound Care (Left lateral ankle wound/unna)      History of Present Illness:   58 y.o. White female presents to wound care clinic today regarding left ankle ulcer, Unna boot removal, and  Charcot joint to left ankle.  Ambulates to wound clinic with a left walking CAM boot intact.  Patient voices should have insurance rinse stated this week.  Instructed the importance of following back up with Dr. Neumann office. Reviewed all previous medical history and progress notes since last visit if 7/22/24.  Past medical history:  Voices saw podiatrist today but did not take self pay patients.  Voices awaiting insurance from work due to insurance was changed in his awaiting for reinstatement.  Saw orthopedics on 7/1/24 and was informed of CT scan.  She is waiting to see podiatry no appointment scheduled at this time.  Will continue our clinic until ulcer is healed. Wound has been present for approximately 2 months.  She is a referral from Orthopedics.  Patient is currently awaiting CT scan of left ankle, and referral to a foot ankle specialist.  Patient voices 2 months ago twisted ankle and purchased a boot from [employer] which created an ulcer to ankle area. Followed by Dick Gallagher DO for PCP last appt 3/11/24.    Today's visit 7/29/24:  Reviewed all previous progress since last visit 07/22/2024.  Presents to clinic alone. Ambulating with CAM walking boot.  Left lower extremity Unna boot removed per nursing staff at bedside.  Left lower leg washed Hibiclens soap and water.  Wound red granulating wound bed, slight macerated jak wound with moderate serosanguineous drainage.  Discussion with the patient today will continue same wound care orders.  All wound  care performed per nursing staff at bedside.  Left lower extremity ankle cleansed with Vaseh, applied Triad to jak wound, silver polymemto  wound bed, cover with convamax, Unna boot, Kerlix, and Coban.  Tolerated well.  Will return to our clinic on Thursday for a nurse visit for Unna boot change.  Instructed to call the office with any questions, concerns, or any reasons removed Unna boot.  Instructed the importance to call and reschedule appointment with Dr. Neumann regarding left Charcot ankle.  Reinforced offloading pressure to areas much as possible.  Verbalized understanding of all instructions.    07/22/2024:  Reviewed all previous progress notes 07/15/2024.  Presents to the clinic with uma.  Ambulates with CAM walking boot to left lower leg.  Left lower extremity Unna boot removed per nursing staff at bedside.  Left ankle wound red granulating wound bed macerated jak wound moderate serosanguineous drainage.  Discussion with the patient today significant decrease in size since last visit will continue Unna boot.  New wound care orders for left lower extremity ankle cleansed with Vashe apply, Triad to jak wound, silver polymem to wound bed, cover with covamax, Unna boot, Kerlix, and Coban.  All wound care performed per nursing staff at bedside.  Will return to the clinic on Thursday for a nurse visit for Unna boot change.  Will follow up in wound clinic with me next Monday.  Instructed to call the office with any questions, concerns, or any reasons having to remove Unna boot.  Verbalized understanding of all instructions.      07/15/2024:  Reviewed all previous progress notes.  Left lower leg unna boot removed per nursing staff at bedside.  Left ankle wound red granulating wound bed with moderate serosanguineous drainage and macerated jak wound.  Significant decrease in size since last visit, and now wound has created 2 separate wounds.  Discussion with the patient today we will continue Unna boot to  assist in decreasing edema to ankle, and increase in granulation of wounds.  All wound care performed per nursing staff at bedside.  Left ankle wound cleansed with Vashe, applied Triad to periwound, silver polymem to wound bed, Unna boot, Drawtex wrap, Kerlix and Coban.  Currently wearing left CAM boot.  Will have her return to the clinic on Thursday for a nurse visit for Unna boot change.  Reinforced unna boot precautions.  Reinforced offloading pressure to left foot as much as possible.  Instructed to call the office with any questions, concerns, or any reasons having to remove Unna boot.  Verbalized understanding of all instructions.    07/08/2024:  Reviewed previous progress notes.  Left lower leg unna boot removed per nursing staff at bedside.  Left ankle ulcer red granulating wound bed with moderate serosanguineous drainage with macerated jak wound.  Will continue same wound care.  All wound care performed per nursing staff at bedside.  Left ankle wound cleansed with Vashe, apply Triad to periwound edge, Silver polymem to wound bed, cover with unna boot, Drawtex 4 x 4, Kerlix, and Coban. Tolerated well.  Reinforced unna boot precautions.  Will return to clinic on Thursday for a nurse visit for Unna boot change.  Will follow up with me next Monday. Instructed the importance of calling the office with any questions, concerns, or any reasons having to remove Unna boot.  Verbalized understanding of all instructions.    07/01/2024:  Reviewed previous progress notes.  Left lower leg unna boot removed per nursing staff at bedside.  Left ankle ulcer red granulating wound bed with minimal slough and moderate serosanguineous drainage.  Patient voices  will have virtual visit with orthopedic talk to discuss CT scan today.  All wound care performed per nursing staff at bedside.  Will continue Unna boot. Left ankle wound cleansed with 0.5% Dakins, apply Triad to periwound edge, Silver polymem to wound bed, cover with unna  boot, Drawtex 4 x 4, Kerlix, and Coban. Tolerated well.  Reinforced unna boot precautions.  Will return to the clinic on Wednesday for a nurse visit, and will return the following Monday to re-evaluate left ankle ulcer.  Instructed the importance of calling the office with any questions, concerns, or any reasons having to remove Unna boot.  Verbalized understanding of all instructions.    06/26/2024:  Presents to clinic alone.  Left lower leg unna boot removed per nursing staff at bedside.  Left ankle ulcer red granulating wound bed with moderate serosanguineous drainage minimal slough.  Macerated jak wound.  Discussion with the patient today will continue Unna boot.  Patient is taking all oral antibiotics as directed.  All wound care performed per nursing staff at bedside. Left ankle wound cleansed with 0.5% Dakins, apply Triad to periwound edge, Silver polymem to wound bed, cover with unna boot, Drawtex wrap, Kerlix, and Coban. Tolerated well.  Reinforced unna boot precautions.  Will return to the clinic on Monday to re-evaluate left ankle ulcer.  Instructed the importance of calling the office with any questions, concerns, or any reasons having to remove Unna boot.  Doctors excuse given.  Verbalized understanding of all instructions.      06/13/2024:  Presents to the clinic with grandchildren.  Ambulates with left foot orthopedic walking boot.  Walking boot and left ankle dressing removed per nursing staff at bedside.  Left ankle ulcer red granulating wound bed with moderate amount of slough and serosanguineous drainage with the jak wound erythema and nonpitting edema.  Discussion with the patient today will need to perform selective debridement and obtain tissue culture.  Written consent obtained.  See quick procedure note.  Following debridement red granulating wound bed with moderate serosanguineous drainage discussion with the patient today will benefit from applying Unna boot to increased granulation to  area and decrease edema.  All wound care performed per nursing staff at bedside.  Left ankle wound cleansed with 0.5% Dakins, apply Silver polymem to wound bed, cover with unna boot, Drawtex wrap, Kerlix, and Coban.  Tolerated well.  Instructed on unna boot precautions.  Will return to the clinic on Monday for a nurse visit for Unna boot change.  Will follow up with me next Thursday.       History includes:      Past Medical History:   Diagnosis Date    Diabetes mellitus     Diabetes mellitus, type 2     GERD (gastroesophageal reflux disease)     Hypertension     History reviewed. No pertinent surgical history.   Social History     Socioeconomic History    Marital status:     Number of children: 0   Occupational History    Occupation: Manager     Comment: [employer]   Tobacco Use    Smoking status: Never    Smokeless tobacco: Never   Substance and Sexual Activity    Alcohol use: Never    Drug use: Never    Sexual activity: Yes     Partners: Male     Birth control/protection: None     Social Determinants of Health     Financial Resource Strain: Medium Risk (4/4/2024)    Overall Financial Resource Strain (CARDIA)     Difficulty of Paying Living Expenses: Somewhat hard   Food Insecurity: Food Insecurity Present (3/11/2024)    Hunger Vital Sign     Worried About Running Out of Food in the Last Year: Sometimes true     Ran Out of Food in the Last Year: Sometimes true   Transportation Needs: Unmet Transportation Needs (4/4/2024)    PRAPARE - Transportation     Lack of Transportation (Medical): Yes     Lack of Transportation (Non-Medical): Yes   Physical Activity: Inactive (4/4/2024)    Exercise Vital Sign     Days of Exercise per Week: 0 days     Minutes of Exercise per Session: 0 min   Stress: Stress Concern Present (4/4/2024)    [employer] - Occupational Stress Questionnaire     Feeling of Stress : Rather much   Housing Stability: High Risk (3/11/2024)    Housing Stability  Vital Sign     Unable to Pay for Housing in the Last Year: Yes     Number of Places Lived in the Last Year: 1     Unstable Housing in the Last Year: No   .      Current Outpatient Medications   Medication Sig Dispense Refill    acetaminophen (TYLENOL) 500 MG tablet Take 500 mg by mouth every 6 (six) hours as needed for Pain.      atorvastatin (LIPITOR) 40 MG tablet Take 1 tablet (40 mg total) by mouth once daily. 90 tablet 0    blood sugar diagnostic Strp Test strips to check CBG's 200 each 11    blood-glucose meter kit 1 each by Other route 2 (two) times daily. Use as instructed 1 each 0    diclofenac sodium (VOLTAREN) 1 % Gel Apply 2 g topically 4 (four) times daily. (Patient not taking: Reported on 7/29/2024) 100 g 2    EASY TOUCH TWIST LANCETS 33 gauge Misc USE TO CHECK BLOOD SUAGR LEVELS TWO TIMES A  each 3    gabapentin (NEURONTIN) 300 MG capsule Take 2 capsules (600 mg total) by mouth 3 (three) times daily. 180 capsule 2    glipiZIDE (GLUCOTROL) 10 MG tablet Take 1 tablet (10 mg total) by mouth 2 (two) times daily with meals. 60 tablet 5    insulin degludec (TRESIBA FLEXTOUCH U-100) 100 unit/mL (3 mL) insulin pen Inject 25 Units into the skin every evening. 7.5 mL 2    insulin lispro (HUMALOG KWIKPEN INSULIN) 100 unit/mL pen Inject 7 Units into the skin 3 (three) times daily with meals. 6.3 mL 2    lancing device Misc 1 each by Misc.(Non-Drug; Combo Route) route 4 (four) times daily with meals and nightly. 200 each 5    lisinopriL (PRINIVIL,ZESTRIL) 40 MG tablet Take 1 tablet (40 mg total) by mouth once daily. 90 tablet 3    omeprazole (PRILOSEC) 20 MG capsule Take 2 capsules (40 mg total) by mouth once daily. 60 capsule 2    rOPINIRole (REQUIP) 0.5 MG tablet Take 1 tablet (0.5 mg total) by mouth 3 (three) times daily. 90 tablet 2    tirzepatide 7.5 mg/0.5 mL PnIj Inject 7.5 mg into the skin every 7 days. 4 Pen 0    traMADoL (ULTRAM) 50 mg tablet Take 1 tablet (50 mg total) by mouth every 6 (six)  hours. 20 tablet 0    traZODone (DESYREL) 50 MG tablet Take 1 tablet (50 mg total) by mouth nightly as needed for Insomnia. 30 tablet 0    TRUE METRIX GLUCOSE METER Misc Inject 1 each into the skin 4 (four) times daily with meals and nightly. use as directed 1 each 0     No current facility-administered medications for this encounter.       Review of Systems   Skin:  Positive for wound.   All other systems reviewed and are negative.         Labs Reviewed:   Chemistry:  Lab Results   Component Value Date    BUN 14.5 06/15/2023    BUN 14.0 06/14/2023    CREATININE 0.75 06/15/2023    CREATININE 0.69 06/14/2023    EGFRNORACEVR >60 06/15/2023    EGFRNORACEVR >60 06/14/2023    GLUCOSE 267 (H) 06/15/2023    AST 8 06/15/2023    AST 8 06/14/2023    ALT 12 06/15/2023    ALT 10 06/14/2023    HGBA1C 11.4 (H) 04/13/2023        Hematology:  Lab Results   Component Value Date    WBC 14.77 (H) 06/15/2023    WBC 15.65 (H) 06/14/2023    HGB 11.3 (L) 06/15/2023    HGB 11.6 (L) 06/14/2023    HCT 33.7 (L) 06/15/2023    HCT 34.5 (L) 06/14/2023     06/15/2023     06/14/2023       Inflammatory Markers:  Lab Results   Component Value Date    SEDRATE 64 (H) 07/02/2020    SEDRATE 95 (H) 06/30/2020    SEDRATE 94 (H) 06/28/2020        Objective:        Physical Exam  Vitals reviewed.   Cardiovascular:      Pulses:           Dorsalis pedis pulses are detected w/ Doppler on the right side and detected w/ Doppler on the left side.        Posterior tibial pulses are detected w/ Doppler on the right side and detected w/ Doppler on the left side.   Musculoskeletal:      Left lower leg: Edema present.      Left foot: Charcot foot present.        Feet:    Feet:      Right foot:      Skin integrity: Skin integrity normal.      Toenail Condition: Right toenails are abnormally thick. Fungal disease present.     Left foot:      Skin integrity: Ulcer present.      Toenail Condition: Left toenails are abnormally thick. Fungal disease  present.  Skin:     General: Skin is warm.      Capillary Refill: Capillary refill takes less than 2 seconds.      Findings: Wound present.             Comments: Left ankle wound red granulating wound bed with slight macerated jak wound moderate serosanguineous drainage   Neurological:      Mental Status: She is alert.            Wound 06/13/24 0827 Pressure Injury Left lateral Ankle (Active)   06/13/24 0827   Present on Original Admission: Y   Primary Wound Type: Pressure inj   Side: Left   Orientation: lateral   Location: Ankle   Wound Approximate Age at First Assessment (Weeks):    Wound Number:    Is this injury device related?:    Incision Type:    Closure Method:    Wound Description (Comments):    Type:    Additional Comments:    Ankle-Brachial Index:    Pulses:    Removal Indication and Assessment:    Wound Outcome:    Wound Image   07/29/24 0916   Dressing Appearance Moist drainage 07/29/24 0916   Drainage Amount Moderate 07/29/24 0916   Drainage Characteristics/Odor Serosanguineous 07/29/24 0916   Appearance [place] 07/29/24 0916   Periwound Area Macerated;Moist;Pale white 07/29/24 0916   Wound Length (cm) 1.6 cm 07/29/24 0916   Wound Width (cm) 1.7 cm 07/29/24 0916   Wound Depth (cm) 0.4 cm 07/29/24 0916   Wound Volume (cm^3) 1.088 cm^3 07/29/24 0916   Wound Surface Area (cm^2) 2.72 cm^2 07/29/24 0916   Undermining (depth (cm)/location) 11:00 to 12:00 @0.6cm 07/29/24 0916         Assessment:         ICD-10-CM ICD-9-CM   1. Diabetic ulcer of left foot associated with type 2 diabetes mellitus, with fat layer exposed, unspecified part of foot  E11.621 250.80    L97.522 707.15   2. Charcot's joint of left ankle  M14.672 094.0     713.5   3. Type 2 diabetes mellitus with other skin ulcer, without long-term current use of insulin  E11.622 250.80   4. Class 3 severe obesity with body mass index (BMI) of 40.0 to 44.9 in adult, unspecified obesity type, unspecified whether serious comorbidity present  E66.01 278.01     Z68.41 V85.41           Plan:   Tissue pathology and/or culture taken:  [] Yes [x] No   Sharp debridement performed:   [] Yes [x] No   Labs ordered this visit:   [] Yes [x] No   Imaging ordered this visit:   [] Yes [x] No         1. Diabetic ulcer of other part of left foot associated with type 2 diabetes mellitus, with fat layer exposed     Will continue Unna boot for closure.    Current wound care:  Cleaning with Vashe, applying Triad to wound bed, silver polymem to wound bed, covamax, Unna boot, Kerlix, and Coban.    Continue monitoring:   Watch for increased drainage or pain, fevers, chills, swelling and report this to clinic.   2. Charcot's joint of left ankle     Instructed to schedule follow up Dr. Neumann once insurance is available.     Wearing CAM walking boot at all times.    Will continue offloading pressure to joint to assist with healing.   3. Type 2 diabetes mellitus with other skin ulcer, without long-term current use of insulin     Continued education and care:    Co-factor in delayed wound healing.     Continued education Must have a strict diabetic diet, take glucose lowering medications as prescribed and encourage lower HA1C.  Last A1c 13.7.   4. Class 3 severe obesity with body mass index (BMI) of 40.0 to 44.9 in adult, unspecified obesity type, unspecified whether serious comorbidity present     Instructed on diet and exercise.    Continue monitoring due to lack of exercise caused by Charcot joint to left ankle.     Patient Instructions   Pt seen today by: Martha Duncan NP    Home health and self care DRESSING INSTRUCTIONS: If unna boot needs to be removed, clean wound and cover and call clinic.        Wound location: lower left ankle    Dressings to be changed at return visit.      Cleanse wound with wound cleanser or saline  Apply triad to the skin around the wound  Apply polymem silver  to the wound bed  Cover with convamax dressing and secure with unna boot, Kerlix, coban        Compression with: N/A    Return visit:       Wound may have been debrided in clinic: if so, WHAT YOU NEED TO KNOW:    Debridement is the removal of infected, damaged, or dead tissue so a wound can heal properly. Your wound may need more than one debridement. Debridement can cause bleeding, and a small amount of blood is expected.    AFTER A DEBRIDEMENT:    Keep your wound clean and dry. Do not remove the dressing unless instructed.  Follow the wound care orders provided to you or your home health care provider.  If you have pain, take over the counter pain relievers or pain medication if prescribed.  Elevate the wound and limit excessive activity to prevent bleeding and/or swelling in your wound.  If you see blood coming through the dressing, apply gauze and tape over the dressing and hold firm pressure to the wound with your hand for 5-10 minutes continuously, without peeking, to help the bleeding stop.    Contact [facility] wound care team at [phone] or go to the nearest Emergency department if:    You have a fever greater than 101 taken by mouth.  Your pain gets worse or does not go away, even after taking your regular pain medicine.  Your skin around your wound is red, hot, swollen, or draining pus.  You have bleeding that continues to come through the dressing after holding pressure for 10 minutes     Unna Boot:   It is important to move about with your unna boot on. Talk to your doctor about the right amount of activity for you. If the boot begins feeling tight, you may need to rest and prop your foot and leg on pillows. Try to get your foot higher than your heart.  Do not put things inside the boot to scratch your skin.  Keep your leg propped on pillows as much as you can during the day and at night.  Avoid sitting with your legs bent at the knees for a long time.       When do I need to call the doctor?   Your leg is numb or tingles  Toes are blue, grey, or cool  You have more swelling in your leg or  foot  You have pain when you walk  Drainage that soaks through your dressing    Compression: You may be using a compressive type of dressings to control edema: If so, keep your compression wrap or tubigrip in place. Do not get them wet, they should feel snug. If they feel tight, or cause pain in any way,  elevate your legs above your heart for 15 minutes. If the wraps still cause pain or you can not tolerate compression,  please remove and notify the clinic or your home health.     Nutrition:  The current daily value (%DV) for protein is 50 grams per day and is meant as a general goal for most people. Further increasing your dietary protein intake is very important for wound healing. Typically one needs over 100g of protein per day to help with wound healing needs.  If you are a dialysis patient or have problems with your kidneys, talk to your Nephrologist about how much protein you can take in with your condition.  Examples of high protein items that can be added to your diet include: eggs, chicken, red meats, almonds, cottage cheese, Greek yogurt, beans, and peanut butter.  Fortified protein bars, shakes and drinks can add 15-30 additional grams of protein per serving.   Also add:   1 daily general multivitamin   Vitamin C : 500mg twice daily   Zinc 220 mg daily  Vit D : once daily    Offloading   Offload your wound. This means to reduce pressure on and around the wound that reduces blood flow to the wound and prevents healing. Your wound care team will discuss specific ways for you to offload your specific wound. Common offloading strategies include:    Turn or reposition every 2 hours or sooner  Use pillows, wedges, ROHO wheelchair cushions or other special devices like boots and shoes to lift the wound off of hard surfaces  Alternating Low Air-loss (ALAL) mattress may be ordered  Padded dressings can reduce wound pressure        Call our [facility] wound clinic for questions/concerns a [phone] .          The  time spent including preparing to see the patient, obtaining patient history and assessment, evaluation of the plan of care, patient/caregiver counseling and education, orders, documentation, coordination of care, and other professional medical management activities for today's encounter was 20 minute.    Time spent performing procedures during today's encounter was 20 minute.    Follow up in about 3 days (around 8/1/2024) for thursday nurse visit and with Martha next Monday. Teaching provided on s/s to call wound clinic for promptly.  Unna boot and ER precautions taught for after hours and weekends.         Martha Duncan, JANE

## 2024-07-29 NOTE — PROGRESS NOTES
[facility] INTERNAL MEDICINE  OUTPATIENT OFFICE VISIT NOTE    SUBJECTIVE:      Chief Complaint: Follow-up       HPI: Suki Anton is a 58 y.o. yo female w/ PMH of  has a past medical history of Diabetes mellitus, Diabetes mellitus, type 2, GERD (gastroesophageal reflux disease), and Hypertension., who presents for medication refills    Continues to have severe L foot pain. Difficulty with ambulation, has not been working since June. Open L ankle wound followed by wound care, and is nearly closed. Denies any fevers and chills. Currently requesting disability, but does not have paperwork available. Pending insurance activation from work.     Past Medical History:   has a past medical history of Diabetes mellitus, Diabetes mellitus, type 2, GERD (gastroesophageal reflux disease), and Hypertension.     Past Surgical History:   has no past surgical history on file.     Family History:  family history includes Cancer in her father; Cataracts in her mother.     Social History:   reports that she has never smoked. She has never used smokeless tobacco. She reports that she does not drink alcohol and does not use drugs.     Allergies:  is allergic to shellfish containing products.     Home Medications:  Prior to Admission medications    Medication Sig Start Date End Date Taking? Authorizing Provider   atorvastatin (LIPITOR) 20 MG tablet Take 1 tablet (20 mg total) by mouth once daily. 8/29/22 1/19/23 Yes Dick Gallagher, DO   blood sugar diagnostic Strp Test strips to check CBG's 8/29/22 1/19/23 Yes Dick Gallagher, DO   blood-glucose meter (TRUE METRIX GLUCOSE METER) kit Use as instructed 5/31/22 1/19/23 Yes Batsheva Ballesteros, NP   diclofenac sodium (VOLTAREN) 1 % Gel Apply 2 g topically 4 (four) times daily. 8/29/22 1/19/23 Yes Dick Gallagher, DO   gabapentin (NEURONTIN) 300 MG capsule Take 1 capsule (300 mg total) by mouth 3 (three) times daily. 12/1/22 1/19/23 Yes Dick Gallagher, DO   glipiZIDE (GLUCOTROL) 10 MG tablet Take 1 tablet (10 mg  "total) by mouth 2 (two) times daily with meals. 8/29/22 1/19/23 Yes Dick Gallagher DO   insulin detemir U-100 (LEVEMIR FLEXTOUCH U-100 INSULN) 100 unit/mL (3 mL) InPn pen Inject 30 Units into the skin every evening. 8/29/22 1/19/23 Yes Dick Gallagher DO   lisinopriL (PRINIVIL,ZESTRIL) 20 MG tablet Take 1 tablet (20 mg total) by mouth once daily. 12/1/22 1/19/23 Yes Dick Gallagher DO   meloxicam (MOBIC) 15 MG tablet Take 1 tablet (15 mg total) by mouth once daily. 8/29/22 1/19/23 Yes Dick Gallagher DO   pen needle, diabetic 32 gauge x 5/32" Ndle Use BD fany needle one times a day injections 8/29/22 1/19/23 Yes Dick Gallagher DO   rOPINIRole (REQUIP) 0.5 MG tablet Take 1 tablet (0.5 mg total) by mouth 3 (three) times daily. 12/1/22 1/19/23 Yes Dick Gallagher DO   atorvastatin (LIPITOR) 20 MG tablet Take 1 tablet (20 mg total) by mouth once daily. 1/19/23 1/19/24  Dick Gallagher DO   blood sugar diagnostic Strp Test strips to check CBG's 1/19/23   Dick Gallagher DO   blood-glucose meter (TRUE METRIX GLUCOSE METER) kit Use as instructed 1/19/23 1/19/24  Dick Gallagher DO   blood-glucose meter kit 1 each by Other route 2 (two) times daily. Use as instructed 1/19/23   Dick Gallagher DO   cetirizine (ZYRTEC) 10 MG tablet Take 1 tablet (10 mg total) by mouth once daily. 1/19/23 4/19/23  Dick Gallagher DO   clotrimazole (LOTRIMIN) 1 % cream Apply topically 2 (two) times daily. for 14 days 1/19/23 2/2/23  Dick Gallagher DO   diclofenac sodium (VOLTAREN) 1 % Gel Apply 2 g topically 4 (four) times daily. 1/19/23   Dick Gallagher DO   gabapentin (NEURONTIN) 300 MG capsule Take 1 capsule (300 mg total) by mouth 3 (three) times daily. 1/19/23 4/19/23  Dick Gallagher DO   glipiZIDE (GLUCOTROL) 10 MG tablet Take 1 tablet (10 mg total) by mouth 2 (two) times daily with meals. 1/19/23   Dick Gallagher DO   insulin detemir U-100 (LEVEMIR FLEXTOUCH U-100 INSULN) 100 unit/mL (3 mL) InPn pen Inject 30 Units into the skin every evening. 1/19/23 1/19/24  Dick Gallagher,  " "  lisinopriL (PRINIVIL,ZESTRIL) 20 MG tablet Take 1 tablet (20 mg total) by mouth once daily. 1/19/23 4/19/23  Dick Gallagher DO   meloxicam (MOBIC) 15 MG tablet Take 1 tablet (15 mg total) by mouth once daily. 1/19/23   Dick Gallagher DO   omeprazole (PRILOSEC) 20 MG capsule Take 2 capsules (40 mg total) by mouth once daily. 1/19/23 4/19/23  Dick Gallagher DO   pen needle, diabetic 32 gauge x 5/32" Ndle Use BD fany needle one times a day injections 1/19/23   Dick Gallagher DO   rOPINIRole (REQUIP) 0.5 MG tablet Take 1 tablet (0.5 mg total) by mouth 3 (three) times daily. 1/19/23 4/19/23  Dick Gallagher DO   blood-glucose meter kit 1 each by Other route 2 (two) times daily. Use as instructed  1/19/23  Historical Provider   cetirizine (ZYRTEC) 10 MG tablet Take 1 tablet (10 mg total) by mouth once daily. 8/29/22 1/19/23  Dick Gallagher DO   clotrimazole (LOTRIMIN) 1 % cream Apply topically 2 (two) times daily. for 14 days 9/23/22 1/19/23  Ashley Le, NP   famotidine (PEPCID) 20 MG tablet Take 1 tablet (20 mg total) by mouth once daily. for 7 days 8/29/22 1/19/23  Dick Gallagher DO   fluticasone propionate (FLONASE) 50 mcg/actuation nasal spray 1 spray (50 mcg total) by Each Nostril route once daily.  Patient not taking: Reported on 1/19/2023 8/29/22 1/19/23  Dick Gallagher DO   nystatin (MYCOSTATIN) ointment Apply topically 3 (three) times daily. for 14 days 10/6/22 1/19/23  Devi Alegre, JAYDEP   omeprazole (PRILOSEC) 20 MG capsule Take 2 capsules (40 mg total) by mouth once daily. 8/29/22 1/19/23  Dick Gallagher DO       ROS:  Review of Systems   All other systems reviewed and are negative.          OBJECTIVE:     Vital signs:   BP (!) 144/72 (BP Location: Left arm, Patient Position: Sitting, BP Method: Large (Manual))   Pulse 88   Temp 98.2 °F (36.8 °C) (Oral)   Resp 18   Ht 5' 3" (1.6 m)   Wt 108.3 kg (238 lb 12.8 oz)   SpO2 95%   BMI 42.30 kg/m²      Physical Examination:  General: Patient resting comfortably in " chair, in no acute distress   Eye: PERRLA, EOMI, clear conjunctiva, eyelids normal  HENT: Head-normocephalic and atraumatic  Neck: full range of motion, no thyromegaly or lymphadenopathy, trachea midline  Respiratory: clear to auscultation bilaterally without wheezes, rales, rhonchi  Cardiovascular: regular rate and rhythm without murmurs.  No gallops or rubs no JVD.  Capillary refill within normal limits.  Musculoskeletal: full range of motion of all extremities/spine without limitation or discomfort.  Left ankle in brace, limited range of motion.  Integumentary: no rashes or skin lesions present  Neurologic: no signs of peripheral neurological deficit, motor/sensory function intact  Psychiatric:  alert and oriented, cognitive function intact, cooperative with exam, good eye contact, judgement and insight intact, mood and affect full range.     Protective Sensation (w/ 10 gram monofilament):  Right: Decreased  Left:  did not evaluate    Visual Inspection:  Masceration present R foot    Pedal Pulses:   Right: Present  Left:  did not evaluate    Posterior Tibialis Pulses:   Right:Present  Left:  did not evaluate      Labs:  CMP:   Lab Results   Component Value Date    GLUCOSE 267 (H) 06/15/2023    CALCIUM 8.1 (L) 06/15/2023    ALBUMIN 2.9 (L) 06/15/2023     06/15/2023    K 4.0 06/15/2023    CO2 21 (L) 06/15/2023     06/15/2023    BUN 14.5 06/15/2023    CREATININE 0.75 06/15/2023    ALKPHOS 91 06/15/2023    ALT 12 06/15/2023    AST 8 06/15/2023    BILITOT 0.3 06/15/2023          ASSESSMENT & PLAN:     Left ankle swelling, with very abnormal xray, severe arthritis with some calcaneal displacement  -Previously had trauma 2 years ago, xray did not show any fractures/broken bones  -Continues to be swollen today, painful with standing, unable to stand for long periods of time.  Pain regimen currently not covering her pain, we will increase pain regimen to tramadol 50 mg q.h.s.  -[place]ics [place], abhijit assistance with care  -Will need to refer to podiatry once insurance is confirmed    Diabetes mellitus, Type 2  -Patient now able to afford medications, will continue previous insulin regimen as before  -Continue tresiba 25U QHS, humalog 7U TIDWM  -7/29/24 POCT A1C 9.7, improved from 13.7 previously when unable to afford medications  -Diabetic foot exam performed previous visit, Eye exam previous visit showed mild diabetic eye disease  -Unable to tolerate metformin due to constant diarrhea    Hypertension  -Patient's blood pressure is not well controlled today, but in pain from L foot.  Current blood pressure was 144/72  -Refilled lisinopril 40 mg q.d.     Hyperlipidemia  -triglycerides significantly uncontrolled likely due to diabetes  -continue atorvastatin to 40 mg, hold off on lab work until insurance comes in     GERD  -No reported issues today  -Continue Omeprazole and Pepcid     Restless Leg Syndrome  -Ropinirole has been helpful, however ran out before visit today  -Refilled ropinirole 0.5 mg take every evening     Sciatica, Right sided- Improved, no complaints today  -Continue meloxicam 15 mg qd  -Recommended patient to lose weight, stretch, and yoga to improve her sciatica pain  -Patient declined lumbar Xray due to being self pay  -Cautioned continuous usage of NSAIDs given PMHx Diabetes    Health Maintenance  Vaccinations  Immunization History   Administered Date(s) Administered    Influenza - Quadrivalent 12/10/2019, 11/12/2020    Influenza - Quadrivalent - PF (6-35 months) 01/15/2018, 11/15/2018    Influenza - Quadrivalent - PF *Preferred* (6 months and older) 01/19/2023, 03/11/2024    Pneumococcal Conjugate - 20 Valent 01/19/2023    Pneumococcal Polysaccharide - 23 Valent 02/23/2017    Tdap 02/23/2017    Zoster Recombinant 11/12/2020, 06/19/2021       -Flu: Done 3/11/24   -Pneumonia: Done 1/19/2023   -Shingles: done on 6/19/2021, two doses   -Tdap: done on 2/23/2017   -COVID:   Advised to obtain it pharmacy  Screening   -Pap Smear:  missed appointment needs to call to reschedule   -Mammogram:  Reordered   -Osteoporosis:  N/a   -Lung Ca. Screening:  Never smoker   -Colon Ca. Screenin2024 Cologuard sent   -Hep C, HIV: will address at next visit  Social   -EtOH:  reports no history of alcohol use.   -Tobacco:  reports that she has never smoked. She has never used smokeless tobacco.   -Drugs:  reports no history of drug use.    -Depression:   Depression: Low Risk  (2024)    Depression     Last PHQ-4: Flowsheet Data: 0        Return to clinic in 3 months    Dick Gallagher DO  [facility] Internal Medicine, PGY-III

## 2024-07-29 NOTE — PATIENT INSTRUCTIONS
Pt seen today by: Martha Duncan NP    Home health and self care DRESSING INSTRUCTIONS: If unna boot needs to be removed, clean wound and cover and call clinic.        Wound location: lower left ankle    Dressings to be changed at return visit.      Cleanse wound with wound cleanser or saline  Apply triad to the skin around the wound  Apply polymem silver  to the wound bed  Cover with convamax dressing and secure with unna boot, Kerlix, coban       Compression with: N/A    Return visit:       Wound may have been debrided in clinic: if so, WHAT YOU NEED TO KNOW:    Debridement is the removal of infected, damaged, or dead tissue so a wound can heal properly. Your wound may need more than one debridement. Debridement can cause bleeding, and a small amount of blood is expected.    AFTER A DEBRIDEMENT:    Keep your wound clean and dry. Do not remove the dressing unless instructed.  Follow the wound care orders provided to you or your home health care provider.  If you have pain, take over the counter pain relievers or pain medication if prescribed.  Elevate the wound and limit excessive activity to prevent bleeding and/or swelling in your wound.  If you see blood coming through the dressing, apply gauze and tape over the dressing and hold firm pressure to the wound with your hand for 5-10 minutes continuously, without peeking, to help the bleeding stop.    Contact [facility] wound care team at [phone] or go to the nearest Emergency department if:    You have a fever greater than 101 taken by mouth.  Your pain gets worse or does not go away, even after taking your regular pain medicine.  Your skin around your wound is red, hot, swollen, or draining pus.  You have bleeding that continues to come through the dressing after holding pressure for 10 minutes     Unna Boot:   It is important to move about with your unna boot on. Talk to your doctor about the right amount of activity for you. If the boot begins feeling tight, you  may need to rest and prop your foot and leg on pillows. Try to get your foot higher than your heart.  Do not put things inside the boot to scratch your skin.  Keep your leg propped on pillows as much as you can during the day and at night.  Avoid sitting with your legs bent at the knees for a long time.       When do I need to call the doctor?   Your leg is numb or tingles  Toes are blue, grey, or cool  You have more swelling in your leg or foot  You have pain when you walk  Drainage that soaks through your dressing    Compression: You may be using a compressive type of dressings to control edema: If so, keep your compression wrap or tubigrip in place. Do not get them wet, they should feel snug. If they feel tight, or cause pain in any way,  elevate your legs above your heart for 15 minutes. If the wraps still cause pain or you can not tolerate compression,  please remove and notify the clinic or your home health.     Nutrition:  The current daily value (%DV) for protein is 50 grams per day and is meant as a general goal for most people. Further increasing your dietary protein intake is very important for wound healing. Typically one needs over 100g of protein per day to help with wound healing needs.  If you are a dialysis patient or have problems with your kidneys, talk to your Nephrologist about how much protein you can take in with your condition.  Examples of high protein items that can be added to your diet include: eggs, chicken, red meats, almonds, cottage cheese, Greek yogurt, beans, and peanut butter.  Fortified protein bars, shakes and drinks can add 15-30 additional grams of protein per serving.   Also add:   1 daily general multivitamin   Vitamin C : 500mg twice daily   Zinc 220 mg daily  Vit D : once daily    Offloading   Offload your wound. This means to reduce pressure on and around the wound that reduces blood flow to the wound and prevents healing. Your wound care team will discuss specific ways  for you to offload your specific wound. Common offloading strategies include:    Turn or reposition every 2 hours or sooner  Use pillows, wedges, ROHO wheelchair cushions or other special devices like boots and shoes to lift the wound off of hard surfaces  Alternating Low Air-loss (ALAL) mattress may be ordered  Padded dressings can reduce wound pressure        Call our [facility] wound clinic for questions/concerns a [phone] .

## 2024-07-30 NOTE — PROGRESS NOTES
Attending Addendum:   Patient seen and examined in clinic. Management and Plan were discussed with resident. Care was reasonable and necessary.   Jenny Sahni MD  [facility] - Internal Medicine

## 2024-08-01 ENCOUNTER — HOSPITAL ENCOUNTER (OUTPATIENT)
Dept: WOUND CARE | Facility: HOSPITAL | Age: 59
Discharge: HOME | End: 2024-08-01
Attending: NURSE PRACTITIONER

## 2024-08-01 VITALS
OXYGEN SATURATION: 96 % | RESPIRATION RATE: 20 BRPM | SYSTOLIC BLOOD PRESSURE: 164 MMHG | DIASTOLIC BLOOD PRESSURE: 89 MMHG | TEMPERATURE: 98 F | HEART RATE: 86 BPM

## 2024-08-01 DIAGNOSIS — M14.672 CHARCOT'S JOINT OF LEFT ANKLE: ICD-10-CM

## 2024-08-01 DIAGNOSIS — L97.522 DIABETIC ULCER OF LEFT FOOT ASSOCIATED WITH TYPE 2 DIABETES MELLITUS, WITH FAT LAYER EXPOSED, UNSPECIFIED PART OF FOOT: ICD-10-CM

## 2024-08-01 DIAGNOSIS — E11.621 DIABETIC ULCER OF LEFT FOOT ASSOCIATED WITH TYPE 2 DIABETES MELLITUS, WITH FAT LAYER EXPOSED, UNSPECIFIED PART OF FOOT: ICD-10-CM

## 2024-08-01 PROCEDURE — 29580 STRAPPING UNNA BOOT: CPT

## 2024-08-01 PROCEDURE — 99211 OFF/OP EST MAY X REQ PHY/QHP: CPT | Mod: 25

## 2024-08-01 PROCEDURE — 27000999 HC MEDICAL RECORD PHOTO DOCUMENTATION

## 2024-08-01 NOTE — PROGRESS NOTES
Patient arrives via ambulation with assist of cane.  Patient has orthotic boot to left lower leg.  Removed all boot and unna boot dressing.  Cleaned leg with soap and water, rinsed, and dried.  Cleaned wound to left lat ankle with vashe.  Picture taken.  No signs of infection.  Macerated periwound noted. Applied silver polymem to wound bed, convamax absorbent pad then unna boot, kerlix and coban.  CSMB adequate.  Patients b/p  elevated, notified Martha LARA of b/p.  Advised to have patient sign AMA form or go to er for hypertention tx.  Patient opted to sign AMA form.  Informed patient of high blood pressure diagnosis and possible cva, blindness, edema, loss of functionality of body functions.  Patient left and will go straight home and take medication that she forgot this am. Left in NAD.

## 2024-08-01 NOTE — PATIENT INSTRUCTIONS
Pt seen today by: Nurse visit, Sharon Tate LPN        Wound location: left ankle        Return visit: Monday 8/5/24    Nutrition:  The current daily value (%DV) for protein is 50 grams per day and is meant as a general goal for most people. Further increasing your dietary protein intake is very important for wound healing. Typically one needs over 100g of protein per day to help with wound healing needs.  If you are a dialysis patient or have problems with your kidneys, talk to your Nephrologist about how much protein you can take in with your condition.  Examples of high protein items that can be added to your diet include: eggs, chicken, red meats, almonds, cottage cheese, Greek yogurt, beans, and peanut butter.  Fortified protein bars, shakes and drinks can add 15-30 additional grams of protein per serving.  Also add:   1 daily general multivitamin   Vitamin C : 500mg twice daily   Zinc 220 mg daily  Vit D : once daily    Offloading   Offload your wound. This means to reduce pressure on and around the wound that reduces blood flow to the wound and prevents healing. Your wound care team will discuss specific ways for you to offload your specific wound. Common offloading strategies include:    Turn or reposition every 2 hours or sooner  Use pillows, wedges, ROHO wheelchair cushions or other special devices like boots and shoes to lift the wound off of hard surfaces  Alternating Low Air-loss (ALAL) mattress may be ordered  Padded dressings can reduce wound pressure        Call our [facility] wound clinic for questions/concerns a [phone] .     Unna Boot:   It is important to move about with your unna boot on. Talk to your doctor about the right amount of activity for you. If the boot begins feeling tight, you may need to rest and prop your foot and leg on pillows. Try to get your foot higher than your heart.  Do not put things inside the boot to scratch your skin.  Keep your leg propped on pillows as much as  you can during the day and at night.  Avoid sitting with your legs bent at the knees for a long time.       When do I need to call the doctor?   Your leg is numb or tingles  Toes are blue, grey, or cool  You have more swelling in your leg or foot  You have pain when you walk  Drainage that soaks through your dressing        Call our [facility] wound clinic for questions/concerns a [phone] .

## 2024-08-05 ENCOUNTER — HOSPITAL ENCOUNTER (OUTPATIENT)
Dept: WOUND CARE | Facility: HOSPITAL | Age: 59
Discharge: HOME | End: 2024-08-05
Attending: NURSE PRACTITIONER

## 2024-08-05 VITALS
DIASTOLIC BLOOD PRESSURE: 62 MMHG | RESPIRATION RATE: 20 BRPM | SYSTOLIC BLOOD PRESSURE: 139 MMHG | HEART RATE: 81 BPM | TEMPERATURE: 98 F | OXYGEN SATURATION: 96 %

## 2024-08-05 DIAGNOSIS — L97.522 DIABETIC ULCER OF LEFT FOOT ASSOCIATED WITH TYPE 2 DIABETES MELLITUS, WITH FAT LAYER EXPOSED, UNSPECIFIED PART OF FOOT: Primary | ICD-10-CM

## 2024-08-05 DIAGNOSIS — E11.622 TYPE 2 DIABETES MELLITUS WITH OTHER SKIN ULCER, WITHOUT LONG-TERM CURRENT USE OF INSULIN: ICD-10-CM

## 2024-08-05 DIAGNOSIS — E11.621 DIABETIC ULCER OF LEFT FOOT ASSOCIATED WITH TYPE 2 DIABETES MELLITUS, WITH FAT LAYER EXPOSED, UNSPECIFIED PART OF FOOT: Primary | ICD-10-CM

## 2024-08-05 DIAGNOSIS — E66.01 CLASS 3 SEVERE OBESITY WITH BODY MASS INDEX (BMI) OF 40.0 TO 44.9 IN ADULT, UNSPECIFIED OBESITY TYPE, UNSPECIFIED WHETHER SERIOUS COMORBIDITY PRESENT: ICD-10-CM

## 2024-08-05 DIAGNOSIS — M14.672 CHARCOT'S JOINT OF LEFT ANKLE: ICD-10-CM

## 2024-08-05 PROCEDURE — 99211 OFF/OP EST MAY X REQ PHY/QHP: CPT

## 2024-08-05 PROCEDURE — 27000999 HC MEDICAL RECORD PHOTO DOCUMENTATION

## 2024-08-05 PROCEDURE — 29580 STRAPPING UNNA BOOT: CPT

## 2024-08-05 PROCEDURE — 99214 OFFICE O/P EST MOD 30 MIN: CPT | Mod: ,,, | Performed by: NURSE PRACTITIONER

## 2024-08-08 ENCOUNTER — HOSPITAL ENCOUNTER (OUTPATIENT)
Dept: WOUND CARE | Facility: HOSPITAL | Age: 59
Discharge: HOME | End: 2024-08-08
Attending: NURSE PRACTITIONER

## 2024-08-08 VITALS
HEART RATE: 93 BPM | SYSTOLIC BLOOD PRESSURE: 167 MMHG | TEMPERATURE: 98 F | OXYGEN SATURATION: 95 % | DIASTOLIC BLOOD PRESSURE: 78 MMHG

## 2024-08-08 PROCEDURE — 27000999 HC MEDICAL RECORD PHOTO DOCUMENTATION

## 2024-08-08 PROCEDURE — 99211 OFF/OP EST MAY X REQ PHY/QHP: CPT

## 2024-08-08 PROCEDURE — 29580 STRAPPING UNNA BOOT: CPT

## 2024-08-12 ENCOUNTER — HOSPITAL ENCOUNTER (OUTPATIENT)
Dept: WOUND CARE | Facility: HOSPITAL | Age: 59
Discharge: HOME | End: 2024-08-12
Attending: NURSE PRACTITIONER
Payer: COMMERCIAL

## 2024-08-12 VITALS
SYSTOLIC BLOOD PRESSURE: 157 MMHG | TEMPERATURE: 98 F | OXYGEN SATURATION: 97 % | DIASTOLIC BLOOD PRESSURE: 73 MMHG | HEART RATE: 82 BPM

## 2024-08-12 DIAGNOSIS — E11.622 TYPE 2 DIABETES MELLITUS WITH OTHER SKIN ULCER, WITHOUT LONG-TERM CURRENT USE OF INSULIN: ICD-10-CM

## 2024-08-12 DIAGNOSIS — E11.621 DIABETIC ULCER OF LEFT FOOT ASSOCIATED WITH TYPE 2 DIABETES MELLITUS, WITH FAT LAYER EXPOSED, UNSPECIFIED PART OF FOOT: Primary | ICD-10-CM

## 2024-08-12 DIAGNOSIS — E66.01 CLASS 3 SEVERE OBESITY WITH BODY MASS INDEX (BMI) OF 40.0 TO 44.9 IN ADULT, UNSPECIFIED OBESITY TYPE, UNSPECIFIED WHETHER SERIOUS COMORBIDITY PRESENT: ICD-10-CM

## 2024-08-12 DIAGNOSIS — M14.672 CHARCOT'S JOINT OF LEFT ANKLE: ICD-10-CM

## 2024-08-12 DIAGNOSIS — L97.522 DIABETIC ULCER OF LEFT FOOT ASSOCIATED WITH TYPE 2 DIABETES MELLITUS, WITH FAT LAYER EXPOSED, UNSPECIFIED PART OF FOOT: Primary | ICD-10-CM

## 2024-08-12 PROCEDURE — 97597 DBRDMT OPN WND 1ST 20 CM/<: CPT

## 2024-08-12 PROCEDURE — 27000999 HC MEDICAL RECORD PHOTO DOCUMENTATION

## 2024-08-12 PROCEDURE — 99211 OFF/OP EST MAY X REQ PHY/QHP: CPT

## 2024-08-12 PROCEDURE — 29580 STRAPPING UNNA BOOT: CPT

## 2024-08-12 PROCEDURE — 11042 DBRDMT SUBQ TIS 1ST 20SQCM/<: CPT | Mod: ,,, | Performed by: NURSE PRACTITIONER

## 2024-08-12 NOTE — PROGRESS NOTES
[facility]   Outpatient Wound Care     Subjective:   Patient ID: Suki Anton is a 58 y.o. female.    Chief Complaint: Wound Care (Left lateral ankle)      History of Present Illness:   58 y.o. White female clinic today regarding right ankle ulcer and Unna boot change. Has a Charcot joint to left ankle.  Currently was instructed per Dr. Neumann to be non-weight bearing to left ankle/foot.  Patient voices she walked from her car to the front of the hospital and then obtained wheelchair.  Instructed the importance this was be non-weight bearing to left foot and ankle meeting no weight for the next 6 weeks per Dr. Neumann. Under the care of Dr. Neumann.  Reviewed all previous medical history and progress notes since last visit if 8/5/2024.  Past medical history: Voices saw podiatrist today but did not take self pay patients.  Voices awaiting insurance from work due to insurance was changed in his awaiting for reinstatement.  Saw orthopedics on 7/1/24 and was informed of CT scan.  She is waiting to see podiatry no appointment scheduled at this time.  Will continue our clinic until ulcer is healed. Wound has been present for approximately 2 months.  She is a referral from Orthopedics.  Patient is currently awaiting CT scan of left ankle, and referral to a foot ankle specialist.  Patient voices 2 months ago twisted ankle and purchased a boot from [employer] which created an ulcer to ankle area. Followed by Dick Gallagher DO for PCP last appt 3/11/24.    Today's visit 8/12/24:  Reviewed all previous progress notes since last visit of 08/08/2024.  Presents to clinic in wheelchair.  Wearing left lower leg CAM walking boot.  All wound care to left lower extremity removed per nursing staff at bedside.  Left ankle ulcer red granulating wound bed minimal slough with macerated  jak wound requiring debridement to decrease bio burden to increase granulation.  Selective debridement performed at bedside.  See quick procedure note.  All wound care performed per nursing staff at bedside.  Left ankle ulcer cleansed with Vashe, applied Triad to jak wound, silver polymem to wound bed, convamax, offloading foam, Kerlix, and Coban.  Attempted to provide the patient with crutches patient unable to use crutches due to his shoulder injury.  Instructed the importance of her using knee walker at all times.  Reinforced unna boot precautions.  Will return to the clinic on Thursday for Unna boot change.  Instructed to call the office with any questions, concerns, or any reasons having to remove Unna boot.  Verbalized understanding of all instructions.    08/05/2024:  Reviewed all previous progress notes since last visit 07/29/2024.  Presents to clinic in wheelchair.  Left lower leg CAM walking boot.  Left lower extremity Unna boot removed per nursing at bedside.  Left ankle ulcer red granulating wound bed with moderate serosanguineous drainage and macerated jak wound.  Instructed the patient today the importance of offloading pressure to left foot.  All wound care performed per nursing staff at bedside.  Left lower extremity cleansed with Hibiclens soap and water pat dry left ulcer cleansed with Vashe, applied Triad to jak wound, silver polymem, Convamax, unna boot, kerlix, and coban.  Unna boot precautions given.  Will return to the clinic on Thursday after appt with Dr. Neumann.  Instructed to call the office with any questions, concerns, or any reasons having to remove Unna boot.  Verbalized understanding of all instructions.      7/29/24:  Reviewed all previous progress since last visit 07/22/2024.  Presents to clinic alone. Ambulating with CAM walking boot.  Left lower extremity Unna boot removed per nursing staff at bedside.  Left lower leg washed Hibiclens soap and water.  Wound red granulating  wound bed, slight macerated jak wound with moderate serosanguineous drainage.  Discussion with the patient today will continue same wound care orders.  All wound care performed per nursing staff at bedside.  Left lower extremity ankle cleansed with Vashe, applied Triad to jak wound, silver polymem to  wound bed, cover with convamax, Unna boot, Kerlix, and Coban.  Tolerated well.  Will return to our clinic on Thursday for a nurse visit for Unna boot change.  Instructed to call the office with any questions, concerns, or any reasons removed Unna boot.  Instructed the importance to call and reschedule appointment with Dr. Neumann regarding left Charcot ankle.  Reinforced offloading pressure to areas much as possible.  Verbalized understanding of all instructions.    07/22/2024:  Reviewed all previous progress notes 07/15/2024.  Presents to the clinic with uma.  Ambulates with CAM walking boot to left lower leg.  Left lower extremity Unna boot removed per nursing staff at bedside.  Left ankle wound red granulating wound bed macerated jak wound moderate serosanguineous drainage.  Discussion with the patient today significant decrease in size since last visit will continue Unna boot.  New wound care orders for left lower extremity ankle cleansed with Vashe apply, Triad to jak wound, silver polymem to wound bed, cover with covamax, Unna boot, Kerlix, and Coban.  All wound care performed per nursing staff at bedside.  Will return to the clinic on Thursday for a nurse visit for Unna boot change.  Will follow up in wound clinic with me next Monday.  Instructed to call the office with any questions, concerns, or any reasons having to remove Unna boot.  Verbalized understanding of all instructions.      07/15/2024:  Reviewed all previous progress notes.  Left lower leg unna boot removed per nursing staff at bedside.  Left ankle wound red granulating wound bed with moderate serosanguineous drainage and macerated jak  wound.  Significant decrease in size since last visit, and now wound has created 2 separate wounds.  Discussion with the patient today we will continue Unna boot to assist in decreasing edema to ankle, and increase in granulation of wounds.  All wound care performed per nursing staff at bedside.  Left ankle wound cleansed with Vashe, applied Triad to periwound, silver polymem to wound bed, Unna boot, Drawtex wrap, Kerlix and Coban.  Currently wearing left CAM boot.  Will have her return to the clinic on Thursday for a nurse visit for Unna boot change.  Reinforced unna boot precautions.  Reinforced offloading pressure to left foot as much as possible.  Instructed to call the office with any questions, concerns, or any reasons having to remove Unna boot.  Verbalized understanding of all instructions.    07/08/2024:  Reviewed previous progress notes.  Left lower leg unna boot removed per nursing staff at bedside.  Left ankle ulcer red granulating wound bed with moderate serosanguineous drainage with macerated jak wound.  Will continue same wound care.  All wound care performed per nursing staff at bedside.  Left ankle wound cleansed with Vashe, apply Triad to periwound edge, Silver polymem to wound bed, cover with unna boot, Drawtex 4 x 4, Kerlix, and Coban. Tolerated well.  Reinforced unna boot precautions.  Will return to clinic on Thursday for a nurse visit for Unna boot change.  Will follow up with me next Monday. Instructed the importance of calling the office with any questions, concerns, or any reasons having to remove Unna boot.  Verbalized understanding of all instructions.    07/01/2024:  Reviewed previous progress notes.  Left lower leg unna boot removed per nursing staff at bedside.  Left ankle ulcer red granulating wound bed with minimal slough and moderate serosanguineous drainage.  Patient voices  will have virtual visit with orthopedic talk to discuss CT scan today.  All wound care performed per  nursing staff at bedside.  Will continue Unna boot. Left ankle wound cleansed with 0.5% Dakins, apply Triad to periwound edge, Silver polymem to wound bed, cover with unna boot, Drawtex 4 x 4, Kerlix, and Coban. Tolerated well.  Reinforced unna boot precautions.  Will return to the clinic on Wednesday for a nurse visit, and will return the following Monday to re-evaluate left ankle ulcer.  Instructed the importance of calling the office with any questions, concerns, or any reasons having to remove Unna boot.  Verbalized understanding of all instructions.    06/26/2024:  Presents to clinic alone.  Left lower leg unna boot removed per nursing staff at bedside.  Left ankle ulcer red granulating wound bed with moderate serosanguineous drainage minimal slough.  Macerated jak wound.  Discussion with the patient today will continue Unna boot.  Patient is taking all oral antibiotics as directed.  All wound care performed per nursing staff at bedside. Left ankle wound cleansed with 0.5% Dakins, apply Triad to periwound edge, Silver polymem to wound bed, cover with unna boot, Drawtex wrap, Kerlix, and Coban. Tolerated well.  Reinforced unna boot precautions.  Will return to the clinic on Monday to re-evaluate left ankle ulcer.  Instructed the importance of calling the office with any questions, concerns, or any reasons having to remove Unna boot.  Doctors excuse given.  Verbalized understanding of all instructions.      06/13/2024:  Presents to the clinic with grandchildren.  Ambulates with left foot orthopedic walking boot.  Walking boot and left ankle dressing removed per nursing staff at bedside.  Left ankle ulcer red granulating wound bed with moderate amount of slough and serosanguineous drainage with the jak wound erythema and nonpitting edema.  Discussion with the patient today will need to perform selective debridement and obtain tissue culture.  Written consent obtained.  See quick procedure note.  Following  debridement red granulating wound bed with moderate serosanguineous drainage discussion with the patient today will benefit from applying Unna boot to increased granulation to area and decrease edema.  All wound care performed per nursing staff at bedside.  Left ankle wound cleansed with 0.5% Dakins, apply Silver polymem to wound bed, cover with unna boot, Drawtex wrap, Kerlix, and Coban.  Tolerated well.  Instructed on unna boot precautions.  Will return to the clinic on Monday for a nurse visit for Unna boot change.  Will follow up with me next Thursday.       History includes:      Past Medical History:   Diagnosis Date    Diabetes mellitus     Diabetes mellitus, type 2     GERD (gastroesophageal reflux disease)     Hypertension     History reviewed. No pertinent surgical history.   Social History     Socioeconomic History    Marital status:     Number of children: 0   Occupational History    Occupation: Manager     Comment: [employer]   Tobacco Use    Smoking status: Never    Smokeless tobacco: Never   Substance and Sexual Activity    Alcohol use: Never    Drug use: Never    Sexual activity: Yes     Partners: Male     Birth control/protection: None     Social Determinants of Health     Financial Resource Strain: Medium Risk (4/4/2024)    Overall Financial Resource Strain (CARDIA)     Difficulty of Paying Living Expenses: Somewhat hard   Food Insecurity: Food Insecurity Present (3/11/2024)    Hunger Vital Sign     Worried About Running Out of Food in the Last Year: Sometimes true     Ran Out of Food in the Last Year: Sometimes true   Transportation Needs: Unmet Transportation Needs (4/4/2024)    PRAPARE - Transportation     Lack of Transportation (Medical): Yes     Lack of Transportation (Non-Medical): Yes   Physical Activity: Inactive (4/4/2024)    Exercise Vital Sign     Days of Exercise per Week: 0 days     Minutes of Exercise per Session: 0 min   Stress: Stress Concern Present (4/4/2024)     [facility]ational [facility] - Occupational Stress Questionnaire     Feeling of Stress : Rather much   Housing Stability: High Risk (3/11/2024)    Housing Stability Vital Sign     Unable to Pay for Housing in the Last Year: Yes     Number of Places Lived in the Last Year: 1     Unstable Housing in the Last Year: No   .      Current Outpatient Medications   Medication Sig Dispense Refill    acetaminophen (TYLENOL) 500 MG tablet Take 500 mg by mouth every 6 (six) hours as needed for Pain.      atorvastatin (LIPITOR) 40 MG tablet Take 1 tablet (40 mg total) by mouth once daily. 90 tablet 0    blood sugar diagnostic Strp Test strips to check CBG's 200 each 11    blood-glucose meter kit 1 each by Other route 2 (two) times daily. Use as instructed 1 each 0    EASY TOUCH TWIST LANCETS 33 gauge Misc USE TO CHECK BLOOD SUAGR LEVELS TWO TIMES A  each 3    gabapentin (NEURONTIN) 300 MG capsule Take 2 capsules (600 mg total) by mouth 3 (three) times daily. 180 capsule 2    glipiZIDE (GLUCOTROL) 10 MG tablet Take 1 tablet (10 mg total) by mouth 2 (two) times daily with meals. 60 tablet 5    insulin degludec (TRESIBA FLEXTOUCH U-100) 100 unit/mL (3 mL) insulin pen Inject 25 Units into the skin every evening. 7.5 mL 2    insulin lispro (HUMALOG KWIKPEN INSULIN) 100 unit/mL pen Inject 7 Units into the skin 3 (three) times daily with meals. 6.3 mL 2    lancing device Misc 1 each by Misc.(Non-Drug; Combo Route) route 4 (four) times daily with meals and nightly. 200 each 5    lisinopriL (PRINIVIL,ZESTRIL) 40 MG tablet Take 1 tablet (40 mg total) by mouth once daily. 90 tablet 3    omeprazole (PRILOSEC) 20 MG capsule Take 2 capsules (40 mg total) by mouth once daily. 60 capsule 2    rOPINIRole (REQUIP) 0.5 MG tablet Take 1 tablet (0.5 mg total) by mouth 3 (three) times daily. 90 tablet 2    traMADoL (ULTRAM) 50 mg tablet Take 1 tablet (50 mg total) by mouth every 6 (six) hours. 20 tablet 0    traZODone (DESYREL) 50  MG tablet Take 1 tablet (50 mg total) by mouth nightly as needed for Insomnia. 30 tablet 0    TRUE METRIX GLUCOSE METER Misc Inject 1 each into the skin 4 (four) times daily with meals and nightly. use as directed 1 each 0     No current facility-administered medications for this encounter.       Review of Systems   Skin:  Positive for wound.   All other systems reviewed and are negative.         Labs Reviewed:   Chemistry:  Lab Results   Component Value Date    BUN 14.5 06/15/2023    BUN 14.0 06/14/2023    CREATININE 0.75 06/15/2023    CREATININE 0.69 06/14/2023    EGFRNORACEVR >60 06/15/2023    EGFRNORACEVR >60 06/14/2023    GLUCOSE 267 (H) 06/15/2023    AST 8 06/15/2023    AST 8 06/14/2023    ALT 12 06/15/2023    ALT 10 06/14/2023    HGBA1C 11.4 (H) 04/13/2023        Hematology:  Lab Results   Component Value Date    WBC 14.77 (H) 06/15/2023    WBC 15.65 (H) 06/14/2023    HGB 11.3 (L) 06/15/2023    HGB 11.6 (L) 06/14/2023    HCT 33.7 (L) 06/15/2023    HCT 34.5 (L) 06/14/2023     06/15/2023     06/14/2023       Inflammatory Markers:  Lab Results   Component Value Date    SEDRATE 64 (H) 07/02/2020    SEDRATE 95 (H) 06/30/2020    SEDRATE 94 (H) 06/28/2020        Objective:        Physical Exam  Vitals reviewed.   Cardiovascular:      Pulses:           Dorsalis pedis pulses are detected w/ Doppler on the right side and detected w/ Doppler on the left side.        Posterior tibial pulses are detected w/ Doppler on the right side and detected w/ Doppler on the left side.   Musculoskeletal:      Left lower leg: Edema present.      Left foot: Charcot foot present.        Feet:    Feet:      Right foot:      Skin integrity: Skin integrity normal.      Toenail Condition: Right toenails are abnormally thick. Fungal disease present.     Left foot:      Skin integrity: Ulcer present.      Toenail Condition: Left toenails are abnormally thick. Fungal disease present.  Skin:     General: Skin is warm.       Capillary Refill: Capillary refill takes less than 2 seconds.      Findings: Wound present.             Comments: Red granulating wound bed with macerated wound edges moderate serosanguineous drainage.    Neurological:      Mental Status: She is alert.            Wound 06/13/24 0827 Pressure Injury Left lateral Ankle (Active)   06/13/24 0827   Present on Original Admission: Y   Primary Wound Type: Pressure inj   Side: Left   Orientation: lateral   Location: Ankle   Wound Approximate Age at First Assessment (Weeks):    Wound Number:    Is this injury device related?:    Incision Type:    Closure Method:    Wound Description (Comments):    Type:    Additional Comments:    Ankle-Brachial Index:    Pulses:    Removal Indication and Assessment:    Wound Outcome:    Wound Image   08/12/24 0830   Dressing Appearance Moist drainage 08/12/24 0830   Drainage Amount Moderate 08/12/24 0830   Drainage Characteristics/Odor Serosanguineous 08/12/24 0830   Appearance [place] 08/12/24 0830   Periwound Area Macerated 08/12/24 0830   Wound Length (cm) 1.6 cm 08/12/24 0830   Wound Width (cm) 1.4 cm 08/12/24 0830   Wound Depth (cm) 1 cm 08/12/24 0830   Wound Volume (cm^3) 2.24 cm^3 08/12/24 0830   Wound Surface Area (cm^2) 2.24 cm^2 08/12/24 0830         Assessment:         ICD-10-CM ICD-9-CM   1. Diabetic ulcer of left foot associated with type 2 diabetes mellitus, with fat layer exposed, unspecified part of foot  E11.621 250.80    L97.522 707.15   2. Charcot's joint of left ankle  M14.672 094.0     713.5   3. Type 2 diabetes mellitus with other skin ulcer, without long-term current use of insulin  E11.622 250.80   4. Class 3 severe obesity with body mass index (BMI) of 40.0 to 44.9 in adult, unspecified obesity type, unspecified whether serious comorbidity present  E66.01 278.01    Z68.41 V85.41           Plan:   Tissue pathology and/or culture taken:  [] Yes [x] No   Sharp debridement performed:   [] Yes [x] No   Labs ordered this  visit:   [] Yes [x] No   Imaging ordered this visit:   [] Yes [x] No         1. Diabetic ulcer of other part of left foot associated with type 2 diabetes mellitus, with fat layer exposed     Change wound care:  Unna boot applied:  Wound care performed cleanse ulcer with Vashe,Triad to jak wound, applied silver polymem to wound bed, convamax, off loading foam, Unna boot, Kerlix, and Coban.    Continue monitoring:  Watch for increased drainage or pain, fevers, chills, swelling and report this to clinic.   2. Charcot's joint of left ankle     Under the care of Dr. Neumann    Non-weight bearing for the next 6 weeks.  Knee walker provided.    Wearing CAM walking boot at all times.    Reinforced offloading pressure to joint to assist with healing.   3. Type 2 diabetes mellitus with other skin ulcer, without long-term current use of insulin     Reinforced the importance of diet, and taking all diabetic medications as directed.     Last A1c 9.7.    Co-factor in delayed wound healing.     Continued monitoring:  Must have a strict diabetic diet, take glucose lowering medications as prescribed and encourage lower HA1C.     4. Class 3 severe obesity with body mass index (BMI) of 40.0 to 44.9 in adult, unspecified obesity type, unspecified whether serious comorbidity present     Instructed the importance of diet, and decrease caloric intake due to non-weight bearing to left foot at this time.     Patient Instructions   Pt seen today by: Martha Duncan NP    Home health and self care DRESSING INSTRUCTIONS: In the event Unna Boot has to be removed, Go to wet to dry dressing-Moistened gauze with Vashe, apply to Woundbed, wrap with Kerlix, secure with tape.      Wound location: left ankle    Return visit: Thursday-Nurse visit    Nutrition:  The current daily value (%DV) for protein is 50 grams per day and is meant as a general goal for most people. Further increasing your dietary protein intake is very important for wound healing.  Typically one needs over 100g of protein per day to help with wound healing needs.  If you are a dialysis patient or have problems with your kidneys, talk to your Nephrologist about how much protein you can take in with your condition.  Examples of high protein items that can be added to your diet include: eggs, chicken, red meats, almonds, cottage cheese, Greek yogurt, beans, and peanut butter.  Fortified protein bars, shakes and drinks can add 15-30 additional grams of protein per serving.  Also add:   1 daily general multivitamin   Vitamin C : 500mg twice daily   Zinc 220 mg daily  Vit D : once daily    Offloading   Offload your wound. This means to reduce pressure on and around the wound that reduces blood flow to the wound and prevents healing. Your wound care team will discuss specific ways for you to offload your specific wound. Common offloading strategies include:    Turn or reposition every 2 hours or sooner  Use pillows, wedges, ROHO wheelchair cushions or other special devices like boots and shoes to lift the wound off of hard surfaces  Alternating Low Air-loss (ALAL) mattress may be ordered  Padded dressings can reduce wound pressure        Call our [facility] wound clinic for questions/concerns a [phone] .     Wound may have been debrided in clinic: if so, WHAT YOU NEED TO KNOW:    Debridement is the removal of infected, damaged, or dead tissue so a wound can heal properly. Your wound may need more than one debridement. Debridement can cause bleeding, and a small amount of blood is expected.    AFTER A DEBRIDEMENT:    Keep your wound clean and dry. Do not remove the dressing unless instructed.  Follow the wound care orders provided to you or your home health care provider.  If you have pain, take over the counter pain relievers or pain medication if prescribed.  Elevate the wound and limit excessive activity to prevent bleeding and/or swelling in your wound.  If you see blood coming through the  dressing, apply gauze and tape over the dressing and hold firm pressure to the wound with your hand for 5-10 minutes continuously, without peeking, to help the bleeding stop.    Contact [facility] wound care team at [phone] or go to the nearest Emergency department if:    You have a fever greater than 101 taken by mouth.  Your pain gets worse or does not go away, even after taking your regular pain medicine.  Your skin around your wound is red, hot, swollen, or draining pus.  You have bleeding that continues to come through the dressing after holding pressure for 10 minutes     Unna Boot:   It is important to move about with your unna boot on. Talk to your doctor about the right amount of activity for you. If the boot begins feeling tight, you may need to rest and prop your foot and leg on pillows. Try to get your foot higher than your heart.  Do not put things inside the boot to scratch your skin.  Keep your leg propped on pillows as much as you can during the day and at night.  Avoid sitting with your legs bent at the knees for a long time.       When do I need to call the doctor?   Your leg is numb or tingles  Toes are blue, grey, or cool  You have more swelling in your leg or foot  You have pain when you walk  Drainage that soaks through your dressing    Compression: You may be using a compressive type of dressings to control edema: If so, keep your compression wrap or tubigrip in place. Do not get them wet, they should feel snug. If they feel tight, or cause pain in any way,  elevate your legs above your heart for 15 minutes. If the wraps still cause pain or you can not tolerate compression,  please remove and notify the clinic or your home health.         Call our [facility] wound clinic for questions/concerns a [phone] .      The time spent including preparing to see the patient, obtaining patient history and assessment, evaluation of the plan of care, patient/caregiver counseling and education, orders,  documentation, coordination of care, and other professional medical management activities for today's encounter was 20 minute.    Time spent performing procedures during today's encounter was 20 minute.    Follow up in about 3 days (around 8/15/2024) for 0900 nurse visit and Martha next Monday. Teaching provided on s/s to call wound clinic for promptly.  Unna boot and ER precautions taught for after hours and weekends.       JANE Vieyra

## 2024-08-12 NOTE — PROCEDURES
Debridement    Date/Time: 8/12/2024 8:22 AM    Performed by: Martha Duncan FNP  Authorized by: Martha Duncan FNP    Consent Done?:  Yes (Written)  Local anesthesia used?: No      Type of Debridement:  Non-excisional       Length (cm):  1.6       Area (sq cm):  2.24       Width (cm):  1.4       Percent Debrided (%):  50       Depth (cm):  1       Total Area Debrided (sq cm):  1.12    Depth of debridement:  Subcutaneous tissue    Tissue debrided:  Subcutaneous    Devitalized tissue debrided:  Slough    Instruments:  Curette  Bleeding:  Minimal  Hemostasis Achieved: Yes  Patient tolerance:  Patient tolerated the procedure well with no immediate complications

## 2024-08-12 NOTE — PATIENT INSTRUCTIONS
Pt seen today by: Martha Duncan NP    Home health and self care DRESSING INSTRUCTIONS: In the event Unna Boot has to be removed, Go to wet to dry dressing-Moistened gauze with Vashe, apply to Woundbed, wrap with Kerlix, secure with tape.      Wound location: left ankle    Return visit: Thursday-Nurse visit    Nutrition:  The current daily value (%DV) for protein is 50 grams per day and is meant as a general goal for most people. Further increasing your dietary protein intake is very important for wound healing. Typically one needs over 100g of protein per day to help with wound healing needs.  If you are a dialysis patient or have problems with your kidneys, talk to your Nephrologist about how much protein you can take in with your condition.  Examples of high protein items that can be added to your diet include: eggs, chicken, red meats, almonds, cottage cheese, Greek yogurt, beans, and peanut butter.  Fortified protein bars, shakes and drinks can add 15-30 additional grams of protein per serving.  Also add:   1 daily general multivitamin   Vitamin C : 500mg twice daily   Zinc 220 mg daily  Vit D : once daily    Offloading   Offload your wound. This means to reduce pressure on and around the wound that reduces blood flow to the wound and prevents healing. Your wound care team will discuss specific ways for you to offload your specific wound. Common offloading strategies include:    Turn or reposition every 2 hours or sooner  Use pillows, wedges, ROHO wheelchair cushions or other special devices like boots and shoes to lift the wound off of hard surfaces  Alternating Low Air-loss (ALAL) mattress may be ordered  Padded dressings can reduce wound pressure        Call our [facility] wound clinic for questions/concerns a [phone] .     Wound may have been debrided in clinic: if so, WHAT YOU NEED TO KNOW:    Debridement is the removal of infected, damaged, or dead tissue so a wound can heal properly. Your wound may  need more than one debridement. Debridement can cause bleeding, and a small amount of blood is expected.    AFTER A DEBRIDEMENT:    Keep your wound clean and dry. Do not remove the dressing unless instructed.  Follow the wound care orders provided to you or your home health care provider.  If you have pain, take over the counter pain relievers or pain medication if prescribed.  Elevate the wound and limit excessive activity to prevent bleeding and/or swelling in your wound.  If you see blood coming through the dressing, apply gauze and tape over the dressing and hold firm pressure to the wound with your hand for 5-10 minutes continuously, without peeking, to help the bleeding stop.    Contact [facility] wound care team at [phone] or go to the nearest Emergency department if:    You have a fever greater than 101 taken by mouth.  Your pain gets worse or does not go away, even after taking your regular pain medicine.  Your skin around your wound is red, hot, swollen, or draining pus.  You have bleeding that continues to come through the dressing after holding pressure for 10 minutes     Unna Boot:   It is important to move about with your unna boot on. Talk to your doctor about the right amount of activity for you. If the boot begins feeling tight, you may need to rest and prop your foot and leg on pillows. Try to get your foot higher than your heart.  Do not put things inside the boot to scratch your skin.  Keep your leg propped on pillows as much as you can during the day and at night.  Avoid sitting with your legs bent at the knees for a long time.       When do I need to call the doctor?   Your leg is numb or tingles  Toes are blue, grey, or cool  You have more swelling in your leg or foot  You have pain when you walk  Drainage that soaks through your dressing    Compression: You may be using a compressive type of dressings to control edema: If so, keep your compression wrap or tubigrip in place. Do not get them  wet, they should feel snug. If they feel tight, or cause pain in any way,  elevate your legs above your heart for 15 minutes. If the wraps still cause pain or you can not tolerate compression,  please remove and notify the clinic or your home health.         Call our [facility] wound clinic for questions/concerns a [phone] .

## 2024-08-15 ENCOUNTER — HOSPITAL ENCOUNTER (OUTPATIENT)
Dept: WOUND CARE | Facility: HOSPITAL | Age: 59
Discharge: HOME | End: 2024-08-15
Attending: NURSE PRACTITIONER
Payer: COMMERCIAL

## 2024-08-15 VITALS
RESPIRATION RATE: 18 BRPM | DIASTOLIC BLOOD PRESSURE: 90 MMHG | TEMPERATURE: 98 F | HEART RATE: 82 BPM | OXYGEN SATURATION: 95 % | SYSTOLIC BLOOD PRESSURE: 171 MMHG

## 2024-08-15 DIAGNOSIS — L97.522 DIABETIC ULCER OF LEFT FOOT ASSOCIATED WITH TYPE 2 DIABETES MELLITUS, WITH FAT LAYER EXPOSED, UNSPECIFIED PART OF FOOT: ICD-10-CM

## 2024-08-15 DIAGNOSIS — E11.621 DIABETIC ULCER OF LEFT FOOT ASSOCIATED WITH TYPE 2 DIABETES MELLITUS, WITH FAT LAYER EXPOSED, UNSPECIFIED PART OF FOOT: ICD-10-CM

## 2024-08-15 DIAGNOSIS — M14.672 CHARCOT'S JOINT OF LEFT ANKLE: ICD-10-CM

## 2024-08-15 PROCEDURE — 27000999 HC MEDICAL RECORD PHOTO DOCUMENTATION

## 2024-08-15 PROCEDURE — 29580 STRAPPING UNNA BOOT: CPT

## 2024-08-15 PROCEDURE — 99211 OFF/OP EST MAY X REQ PHY/QHP: CPT | Mod: 25

## 2024-08-15 NOTE — PATIENT INSTRUCTIONS
Pt seen today by: Sharon Tate LPN    Home health and self care DRESSING INSTRUCTIONS:        Wound location: Left lateral ankle    Dressings to be changed at next clinic visit.    Cleanse wound with Vashe wound cleanser  Apply triad to the skin around the wound  Apply polymem max to the wound bed  Wrapped leg with unna boot  Applied 6 inch strip of drawtex edema to top of ankle  Cover wound with convamax dressing, offloading foam and secure with kerlix and coban       Compression with: Unna boot    Return visit: Next week for visit with Martha Duncan    Nutrition:  The current daily value (%DV) for protein is 50 grams per day and is meant as a general goal for most people. Further increasing your dietary protein intake is very important for wound healing. Typically one needs over 100g of protein per day to help with wound healing needs.  If you are a dialysis patient or have problems with your kidneys, talk to your Nephrologist about how much protein you can take in with your condition.  Examples of high protein items that can be added to your diet include: eggs, chicken, red meats, almonds, cottage cheese, Greek yogurt, beans, and peanut butter.  Fortified protein bars, shakes and drinks can add 15-30 additional grams of protein per serving.  Also add:   1 daily general multivitamin   Vitamin C : 500mg twice daily   Zinc 220 mg daily  Vit D : once daily    Offloading   Offload your wound. This means to reduce pressure on and around the wound that reduces blood flow to the wound and prevents healing. Your wound care team will discuss specific ways for you to offload your specific wound. Common offloading strategies include:    Turn or reposition every 2 hours or sooner  Use pillows, wedges, ROHO wheelchair cushions or other special devices like boots and shoes to lift the wound off of hard surfaces  Alternating Low Air-loss (ALAL) mattress may be ordered  Padded dressings can reduce wound pressure        Unna  Boot:   It is important to move about with your unna boot on. Talk to your doctor about the right amount of activity for you. If the boot begins feeling tight, you may need to rest and prop your foot and leg on pillows. Try to get your foot higher than your heart.  Do not put things inside the boot to scratch your skin.  Keep your leg propped on pillows as much as you can during the day and at night.  Avoid sitting with your legs bent at the knees for a long time.       When do I need to call the doctor?   Your leg is numb or tingles  Toes are blue, grey, or cool  You have more swelling in your leg or foot  You have pain when you walk  Drainage that soaks through your dressing    Compression: You may be using a compressive type of dressings to control edema: If so, keep your compression wrap or tubigrip in place. Do not get them wet, they should feel snug. If they feel tight, or cause pain in any way,  elevate your legs above your heart for 15 minutes. If the wraps still cause pain or you can not tolerate compression,  please remove and notify the clinic or your home health.         Call our [facility] wound clinic for questions/concerns a [phone] .

## 2024-08-15 NOTE — PROGRESS NOTES
[facility]                Wound Care Services    Subjective:       Patient ID: Suki Anton is a 58 y.o. female.    Chief Complaint: Wound Care    HPI  Review of Systems      Objective:     Vitals:    08/15/24 0845   BP: (!) 171/90   Pulse: 82   Resp: 18   Temp: 97.8 °F (36.6 °C)         Physical Exam         Assessment/Plan:         ICD-10-CM ICD-9-CM   1. Charcot's joint of left ankle  M14.672 094.0     713.5   2. Diabetic ulcer of left foot associated with type 2 diabetes mellitus, with fat layer exposed, unspecified part of foot  E11.621 250.80    L97.522 707.15           Tissue pathology and/or culture taken:  [] Yes [] No   Sharp debridement performed:   [] Yes [] No   Labs ordered this visit:   [] Yes [] No   Imaging ordered this visit:   [] Yes [] No           Orders Placed This Encounter   Procedures    Change dressing     Unna boot see last progress note     Standing Status:   Standing     Number of Occurrences:   1        No follow-ups on file.              All wound care performed by Mela Tate LPN. Removed unna boot, washed leg with hibiclense soap and water. Cleaned wound with vashe. Consulted Martha Duncan NP due to drainage from wound. Verbal order from Martha Duncan NP to change to polymem max to wound. New wound care orders are as follows: Wash wound with vashe. Apply triad to periwound. Apply polymem max to wound. Wrap leg with unna boot. Apply strip of drawtex edema to top of foot at ankle. Apply convamax and offloading foam to wound. Wrap leg with kerlix followed by coban. Patient tolerated well. Applied blue shoe covering to foot followed by CAM boot. Brought patient via wheelchair to vehicle. Return next Monday for visit with Martha Duncan NP.

## 2024-08-19 ENCOUNTER — HOSPITAL ENCOUNTER (OUTPATIENT)
Dept: WOUND CARE | Facility: HOSPITAL | Age: 59
Discharge: HOME | End: 2024-08-19
Attending: NURSE PRACTITIONER
Payer: COMMERCIAL

## 2024-08-19 VITALS
SYSTOLIC BLOOD PRESSURE: 144 MMHG | TEMPERATURE: 98 F | OXYGEN SATURATION: 95 % | HEART RATE: 80 BPM | RESPIRATION RATE: 18 BRPM | DIASTOLIC BLOOD PRESSURE: 84 MMHG

## 2024-08-19 DIAGNOSIS — E66.01 CLASS 3 SEVERE OBESITY WITH BODY MASS INDEX (BMI) OF 40.0 TO 44.9 IN ADULT, UNSPECIFIED OBESITY TYPE, UNSPECIFIED WHETHER SERIOUS COMORBIDITY PRESENT: ICD-10-CM

## 2024-08-19 DIAGNOSIS — M14.672 CHARCOT'S JOINT OF LEFT ANKLE: ICD-10-CM

## 2024-08-19 DIAGNOSIS — L97.522 DIABETIC ULCER OF LEFT FOOT ASSOCIATED WITH TYPE 2 DIABETES MELLITUS, WITH FAT LAYER EXPOSED, UNSPECIFIED PART OF FOOT: Primary | ICD-10-CM

## 2024-08-19 DIAGNOSIS — E11.621 DIABETIC ULCER OF LEFT FOOT ASSOCIATED WITH TYPE 2 DIABETES MELLITUS, WITH FAT LAYER EXPOSED, UNSPECIFIED PART OF FOOT: Primary | ICD-10-CM

## 2024-08-19 DIAGNOSIS — E11.622 TYPE 2 DIABETES MELLITUS WITH OTHER SKIN ULCER, WITHOUT LONG-TERM CURRENT USE OF INSULIN: ICD-10-CM

## 2024-08-19 PROCEDURE — 99211 OFF/OP EST MAY X REQ PHY/QHP: CPT

## 2024-08-19 PROCEDURE — 27000999 HC MEDICAL RECORD PHOTO DOCUMENTATION

## 2024-08-19 PROCEDURE — 29580 STRAPPING UNNA BOOT: CPT

## 2024-08-19 PROCEDURE — 99214 OFFICE O/P EST MOD 30 MIN: CPT | Mod: ,,, | Performed by: NURSE PRACTITIONER

## 2024-08-19 NOTE — PATIENT INSTRUCTIONS
Pt seen today by: Martha Duncan FNP    Home health and self care DRESSING INSTRUCTIONS:        Wound location: Left lateral ankle    Dressings to be changed at next clinic visit.    Cleanse wound with Vashe wound cleanser  Apply polymem max to the wound bed  Wrapped leg with unna boot  Applied 6 inch strip of drawtex edema to top of ankle  Cover wound with kerramax dressing, offloading foam and secure with kerlix and coban       Compression with: Unna boot    Return visit: Thursday    Nutrition:  The current daily value (%DV) for protein is 50 grams per day and is meant as a general goal for most people. Further increasing your dietary protein intake is very important for wound healing. Typically one needs over 100g of protein per day to help with wound healing needs.  If you are a dialysis patient or have problems with your kidneys, talk to your Nephrologist about how much protein you can take in with your condition.  Examples of high protein items that can be added to your diet include: eggs, chicken, red meats, almonds, cottage cheese, Greek yogurt, beans, and peanut butter.  Fortified protein bars, shakes and drinks can add 15-30 additional grams of protein per serving.  Also add:   1 daily general multivitamin   Vitamin C : 500mg twice daily   Zinc 220 mg daily  Vit D : once daily    Offloading   Offload your wound. This means to reduce pressure on and around the wound that reduces blood flow to the wound and prevents healing. Your wound care team will discuss specific ways for you to offload your specific wound. Common offloading strategies include:    Turn or reposition every 2 hours or sooner  Use pillows, wedges, ROHO wheelchair cushions or other special devices like boots and shoes to lift the wound off of hard surfaces  Alternating Low Air-loss (ALAL) mattress may be ordered  Padded dressings can reduce wound pressure        Unna Boot:   It is important to move about with your unna boot on. Talk to your  doctor about the right amount of activity for you. If the boot begins feeling tight, you may need to rest and prop your foot and leg on pillows. Try to get your foot higher than your heart.  Do not put things inside the boot to scratch your skin.  Keep your leg propped on pillows as much as you can during the day and at night.  Avoid sitting with your legs bent at the knees for a long time.       When do I need to call the doctor?   Your leg is numb or tingles  Toes are blue, grey, or cool  You have more swelling in your leg or foot  You have pain when you walk  Drainage that soaks through your dressing    Compression: You may be using a compressive type of dressings to control edema: If so, keep your compression wrap or tubigrip in place. Do not get them wet, they should feel snug. If they feel tight, or cause pain in any way,  elevate your legs above your heart for 15 minutes. If the wraps still cause pain or you can not tolerate compression,  please remove and notify the clinic or your home health.         Call our [facility] wound clinic for questions/concerns a [phone] .

## 2024-08-19 NOTE — PROGRESS NOTES
[facility]   Outpatient Wound Care     Subjective:   Patient ID: Suki Anton is a 58 y.o. female.    Chief Complaint: Wound Care      History of Present Illness:   58 y.o. White female presents to wound care clinic today for right ankle ulcer and Unna boot change.  Currently under the care of  for left ankle Charcot foot joint.  Currently was instructed per Dr. Neumann to be non-weight bearing to left ankle/foot. Reviewed all previous medical history and progress notes since last visit if 8/12/2024.  Past medical history:  Patient voices she walked from her car to the front of the hospital and then obtained wheelchair.  Instructed the importance this was be non-weight bearing to left foot and ankle meeting no weight for the next 6 weeks per Dr. Neumann. Under the care of Dr. Neumann.  Voices saw podiatrist today but did not take self pay patients.  Voices awaiting insurance from work due to insurance was changed in his awaiting for reinstatement.  Saw orthopedics on 7/1/24 and was informed of CT scan.  She is waiting to see podiatry no appointment scheduled at this time.  Will continue our clinic until ulcer is healed. Wound has been present for approximately 2 months.  She is a referral from Orthopedics.  Patient is currently awaiting CT scan of left ankle, and referral to a foot ankle specialist.  Patient voices 2 months ago twisted ankle and purchased a boot from [employer] which created an ulcer to ankle area. Followed by Dick Gallagher DO for PCP last appt 3/11/24.    Today's visit 8/19/24:  Reviewed all previous progress since last visit of 08/12/2024.  Presents to clinic in wheelchair.  Left lower extremity unna boot removed per nursing staff at bedside.  Left lower extremity ankle ulcer red granulating wound bed , macerated skin edges with  moderate serosanguineous drainage.  Decrease in size since last visit.  Patient voices she is staying off of foot as much as possible.  All wound care performed per nursing staff at bedside.  Left ankle ulcer cleansed with Vashe, applied Triad to jak wound, polymem to wound bed, unna boot, super absorbent dressing, offloading foam, Kerlix and Coban.  Will return to the clinic on Thursday for Unna boot change.  Instructed the importance of offloading pressure as much as possible.  Using knee walker as much as possible.  Unna boot precautions.  Instructed to call the office with any questions, concerns, or any reasons having to remove Unna boot.  Verbal as understanding of all instructions.    8/12/24:  Reviewed all previous progress notes since last visit of 08/08/2024.  Presents to clinic in wheelchair.  Wearing left lower leg CAM walking boot.  All wound care to left lower extremity removed per nursing staff at bedside.  Left ankle ulcer red granulating wound bed minimal slough with macerated jak wound requiring debridement to decrease bio burden to increase granulation.  Selective debridement performed at bedside.  See quick procedure note.  All wound care performed per nursing staff at bedside.  Left ankle ulcer cleansed with Vashe, applied Triad to jak wound, silver polymem to wound bed, unna boot, convamax, offloading foam, Kerlix, and Coban.  Attempted to provide the patient with crutches patient unable to use crutches due to his shoulder injury.  Instructed the importance of her using knee walker at all times.  Reinforced unna boot precautions.  Will return to the clinic on Thursday for Unna boot change.  Instructed to call the office with any questions, concerns, or any reasons having to remove Unna boot.  Verbalized understanding of all instructions.    08/05/2024:  Reviewed all previous progress notes since last visit 07/29/2024.  Presents to clinic in wheelchair.  Left lower leg CAM walking boot.  Left  lower extremity Unna boot removed per nursing at bedside.  Left ankle ulcer red granulating wound bed with moderate serosanguineous drainage and macerated jak wound.  Instructed the patient today the importance of offloading pressure to left foot.  All wound care performed per nursing staff at bedside.  Left lower extremity cleansed with Hibiclens soap and water pat dry left ulcer cleansed with Vashe, applied Triad to jak wound, silver polymem, Convamax, unna boot, kerlix, and coban.  Unna boot precautions given.  Will return to the clinic on Thursday after appt with Dr. Neumann.  Instructed to call the office with any questions, concerns, or any reasons having to remove Unna boot.  Verbalized understanding of all instructions.      7/29/24:  Reviewed all previous progress since last visit 07/22/2024.  Presents to clinic alone. Ambulating with CAM walking boot.  Left lower extremity Unna boot removed per nursing staff at bedside.  Left lower leg washed Hibiclens soap and water.  Wound red granulating wound bed, slight macerated jak wound with moderate serosanguineous drainage.  Discussion with the patient today will continue same wound care orders.  All wound care performed per nursing staff at bedside.  Left lower extremity ankle cleansed with Vashe, applied Triad to jak wound, silver polymem to  wound bed, cover with convamax, Unna boot, Kerlix, and Coban.  Tolerated well.  Will return to our clinic on Thursday for a nurse visit for Unna boot change.  Instructed to call the office with any questions, concerns, or any reasons removed Unna boot.  Instructed the importance to call and reschedule appointment with Dr. Neumann regarding left Charcot ankle.  Reinforced offloading pressure to areas much as possible.  Verbalized understanding of all instructions.    07/22/2024:  Reviewed all previous progress notes 07/15/2024.  Presents to the clinic with uma.  Ambulates with CAM walking boot to left lower leg.   Left lower extremity Unna boot removed per nursing staff at bedside.  Left ankle wound red granulating wound bed macerated jak wound moderate serosanguineous drainage.  Discussion with the patient today significant decrease in size since last visit will continue Unna boot.  New wound care orders for left lower extremity ankle cleansed with Vashe apply, Triad to jak wound, silver polymem to wound bed, cover with covamax, Unna boot, Kerlix, and Coban.  All wound care performed per nursing staff at bedside.  Will return to the clinic on Thursday for a nurse visit for Unna boot change.  Will follow up in wound clinic with me next Monday.  Instructed to call the office with any questions, concerns, or any reasons having to remove Unna boot.  Verbalized understanding of all instructions.      07/15/2024:  Reviewed all previous progress notes.  Left lower leg unna boot removed per nursing staff at bedside.  Left ankle wound red granulating wound bed with moderate serosanguineous drainage and macerated jak wound.  Significant decrease in size since last visit, and now wound has created 2 separate wounds.  Discussion with the patient today we will continue Unna boot to assist in decreasing edema to ankle, and increase in granulation of wounds.  All wound care performed per nursing staff at bedside.  Left ankle wound cleansed with Vashe, applied Triad to periwound, silver polymem to wound bed, Unna boot, Drawtex wrap, Kerlix and Coban.  Currently wearing left CAM boot.  Will have her return to the clinic on Thursday for a nurse visit for Unna boot change.  Reinforced unna boot precautions.  Reinforced offloading pressure to left foot as much as possible.  Instructed to call the office with any questions, concerns, or any reasons having to remove Unna boot.  Verbalized understanding of all instructions.    07/08/2024:  Reviewed previous progress notes.  Left lower leg unna boot removed per nursing staff at bedside.  Left  ankle ulcer red granulating wound bed with moderate serosanguineous drainage with macerated jak wound.  Will continue same wound care.  All wound care performed per nursing staff at bedside.  Left ankle wound cleansed with Vashe, apply Triad to periwound edge, Silver polymem to wound bed, cover with unna boot, Drawtex 4 x 4, Kerlix, and Coban. Tolerated well.  Reinforced unna boot precautions.  Will return to clinic on Thursday for a nurse visit for Unna boot change.  Will follow up with me next Monday. Instructed the importance of calling the office with any questions, concerns, or any reasons having to remove Unna boot.  Verbalized understanding of all instructions.    07/01/2024:  Reviewed previous progress notes.  Left lower leg unna boot removed per nursing staff at bedside.  Left ankle ulcer red granulating wound bed with minimal slough and moderate serosanguineous drainage.  Patient voices  will have virtual visit with orthopedic talk to discuss CT scan today.  All wound care performed per nursing staff at bedside.  Will continue Unna boot. Left ankle wound cleansed with 0.5% Dakins, apply Triad to periwound edge, Silver polymem to wound bed, cover with unna boot, Drawtex 4 x 4, Kerlix, and Coban. Tolerated well.  Reinforced unna boot precautions.  Will return to the clinic on Wednesday for a nurse visit, and will return the following Monday to re-evaluate left ankle ulcer.  Instructed the importance of calling the office with any questions, concerns, or any reasons having to remove Unna boot.  Verbalized understanding of all instructions.    06/26/2024:  Presents to clinic alone.  Left lower leg unna boot removed per nursing staff at bedside.  Left ankle ulcer red granulating wound bed with moderate serosanguineous drainage minimal slough.  Macerated jak wound.  Discussion with the patient today will continue Unna boot.  Patient is taking all oral antibiotics as directed.  All wound care performed per  nursing staff at bedside. Left ankle wound cleansed with 0.5% Dakins, apply Triad to periwound edge, Silver polymem to wound bed, cover with unna boot, Drawtex wrap, Kerlix, and Coban. Tolerated well.  Reinforced unna boot precautions.  Will return to the clinic on Monday to re-evaluate left ankle ulcer.  Instructed the importance of calling the office with any questions, concerns, or any reasons having to remove Unna boot.  Doctors excuse given.  Verbalized understanding of all instructions.      06/13/2024:  Presents to the clinic with grandchildren.  Ambulates with left foot orthopedic walking boot.  Walking boot and left ankle dressing removed per nursing staff at bedside.  Left ankle ulcer red granulating wound bed with moderate amount of slough and serosanguineous drainage with the jak wound erythema and nonpitting edema.  Discussion with the patient today will need to perform selective debridement and obtain tissue culture.  Written consent obtained.  See quick procedure note.  Following debridement red granulating wound bed with moderate serosanguineous drainage discussion with the patient today will benefit from applying Unna boot to increased granulation to area and decrease edema.  All wound care performed per nursing staff at bedside.  Left ankle wound cleansed with 0.5% Dakins, apply Silver polymem to wound bed, cover with unna boot, Drawtex wrap, Kerlix, and Coban.  Tolerated well.  Instructed on unna boot precautions.  Will return to the clinic on Monday for a nurse visit for Unna boot change.  Will follow up with me next Thursday.       History includes:      Past Medical History:   Diagnosis Date    Diabetes mellitus     Diabetes mellitus, type 2     GERD (gastroesophageal reflux disease)     Hypertension     History reviewed. No pertinent surgical history.   Social History     Socioeconomic History    Marital status:     Number of children: 0   Occupational History    Occupation: Manager      Comment: [employer]   Tobacco Use    Smoking status: Never    Smokeless tobacco: Never   Substance and Sexual Activity    Alcohol use: Never    Drug use: Never    Sexual activity: Yes     Partners: Male     Birth control/protection: None     Social Determinants of Health     Financial Resource Strain: Medium Risk (4/4/2024)    Overall Financial Resource Strain (CARDIA)     Difficulty of Paying Living Expenses: Somewhat hard   Food Insecurity: Food Insecurity Present (3/11/2024)    Hunger Vital Sign     Worried About Running Out of Food in the Last Year: Sometimes true     Ran Out of Food in the Last Year: Sometimes true   Transportation Needs: Unmet Transportation Needs (4/4/2024)    PRAPARE - Transportation     Lack of Transportation (Medical): Yes     Lack of Transportation (Non-Medical): Yes   Physical Activity: Inactive (4/4/2024)    Exercise Vital Sign     Days of Exercise per Week: 0 days     Minutes of Exercise per Session: 0 min   Stress: Stress Concern Present (4/4/2024)    [place] [employer] - Occupational Stress Questionnaire     Feeling of Stress : Rather much   Housing Stability: High Risk (3/11/2024)    Housing Stability Vital Sign     Unable to Pay for Housing in the Last Year: Yes     Number of Places Lived in the Last Year: 1     Unstable Housing in the Last Year: No   .      Current Outpatient Medications   Medication Sig Dispense Refill    acetaminophen (TYLENOL) 500 MG tablet Take 500 mg by mouth every 6 (six) hours as needed for Pain.      atorvastatin (LIPITOR) 40 MG tablet Take 1 tablet (40 mg total) by mouth once daily. 90 tablet 0    blood sugar diagnostic Strp Test strips to check CBG's 200 each 11    blood-glucose meter kit 1 each by Other route 2 (two) times daily. Use as instructed 1 each 0    EASY TOUCH TWIST LANCETS 33 gauge Misc USE TO CHECK BLOOD SUAGR LEVELS TWO TIMES A  each 3    gabapentin (NEURONTIN) 300 MG capsule Take 2 capsules (600 mg  total) by mouth 3 (three) times daily. 180 capsule 2    glipiZIDE (GLUCOTROL) 10 MG tablet Take 1 tablet (10 mg total) by mouth 2 (two) times daily with meals. 60 tablet 5    insulin degludec (TRESIBA FLEXTOUCH U-100) 100 unit/mL (3 mL) insulin pen Inject 25 Units into the skin every evening. 7.5 mL 2    insulin lispro (HUMALOG KWIKPEN INSULIN) 100 unit/mL pen Inject 7 Units into the skin 3 (three) times daily with meals. 6.3 mL 2    lancing device Misc 1 each by Misc.(Non-Drug; Combo Route) route 4 (four) times daily with meals and nightly. 200 each 5    lisinopriL (PRINIVIL,ZESTRIL) 40 MG tablet Take 1 tablet (40 mg total) by mouth once daily. 90 tablet 3    omeprazole (PRILOSEC) 20 MG capsule Take 2 capsules (40 mg total) by mouth once daily. 60 capsule 2    rOPINIRole (REQUIP) 0.5 MG tablet Take 1 tablet (0.5 mg total) by mouth 3 (three) times daily. 90 tablet 2    traMADoL (ULTRAM) 50 mg tablet Take 1 tablet (50 mg total) by mouth every 6 (six) hours. 20 tablet 0    traZODone (DESYREL) 50 MG tablet Take 1 tablet (50 mg total) by mouth nightly as needed for Insomnia. 30 tablet 0    TRUE METRIX GLUCOSE METER Misc Inject 1 each into the skin 4 (four) times daily with meals and nightly. use as directed 1 each 0     No current facility-administered medications for this encounter.       Review of Systems   Skin:  Positive for wound.   All other systems reviewed and are negative.         Labs Reviewed:   Chemistry:  Lab Results   Component Value Date    BUN 14.5 06/15/2023    BUN 14.0 06/14/2023    CREATININE 0.75 06/15/2023    CREATININE 0.69 06/14/2023    EGFRNORACEVR >60 06/15/2023    EGFRNORACEVR >60 06/14/2023    GLUCOSE 267 (H) 06/15/2023    AST 8 06/15/2023    AST 8 06/14/2023    ALT 12 06/15/2023    ALT 10 06/14/2023    HGBA1C 11.4 (H) 04/13/2023        Hematology:  Lab Results   Component Value Date    WBC 14.77 (H) 06/15/2023    WBC 15.65 (H) 06/14/2023    HGB 11.3 (L) 06/15/2023    HGB 11.6 (L) 06/14/2023     HCT 33.7 (L) 06/15/2023    HCT 34.5 (L) 06/14/2023     06/15/2023     06/14/2023       Inflammatory Markers:  Lab Results   Component Value Date    SEDRATE 64 (H) 07/02/2020    SEDRATE 95 (H) 06/30/2020    SEDRATE 94 (H) 06/28/2020        Objective:        Physical Exam  Vitals reviewed.   Cardiovascular:      Pulses:           Dorsalis pedis pulses are detected w/ Doppler on the right side and detected w/ Doppler on the left side.        Posterior tibial pulses are detected w/ Doppler on the right side and detected w/ Doppler on the left side.   Musculoskeletal:      Left lower leg: Edema present.      Left foot: Charcot foot present.        Feet:    Feet:      Right foot:      Skin integrity: Skin integrity normal.      Toenail Condition: Right toenails are abnormally thick. Fungal disease present.     Left foot:      Skin integrity: Ulcer present.      Toenail Condition: Left toenails are abnormally thick. Fungal disease present.  Skin:     General: Skin is warm.      Capillary Refill: Capillary refill takes less than 2 seconds.      Findings: Wound present.             Comments: Red granulating wound bed macerated skin edges moderate serosanguineous drainage.   Neurological:      Mental Status: She is alert.            Wound 06/13/24 0827 Pressure Injury Left lateral Ankle (Active)   06/13/24 0827   Present on Original Admission: Y   Primary Wound Type: Pressure inj   Side: Left   Orientation: lateral   Location: Ankle   Wound Approximate Age at First Assessment (Weeks):    Wound Number:    Is this injury device related?:    Incision Type:    Closure Method:    Wound Description (Comments):    Type:    Additional Comments:    Ankle-Brachial Index:    Pulses:    Removal Indication and Assessment:    Wound Outcome:    Wound Image   08/19/24 0850   Dressing Appearance Moist drainage 08/19/24 0850   Drainage Amount Moderate 08/19/24 0850   Drainage Characteristics/Odor Serosanguineous 08/19/24 0850    Appearance [place] [address] 08/19/24 0850   Wound Length (cm) 1.8 cm 08/19/24 0850   Wound Width (cm) 1.5 cm 08/19/24 0850   Wound Depth (cm) 0.3 cm 08/19/24 0850   Wound Volume (cm^3) 0.81 cm^3 08/19/24 0850   Wound Surface Area (cm^2) 2.7 cm^2 08/19/24 0850         Assessment:         ICD-10-CM ICD-9-CM   1. Diabetic ulcer of left foot associated with type 2 diabetes mellitus, with fat layer exposed, unspecified part of foot  E11.621 250.80    L97.522 707.15   2. Charcot's joint of left ankle  M14.672 094.0     713.5   3. Type 2 diabetes mellitus with other skin ulcer, without long-term current use of insulin  E11.622 250.80   4. Class 3 severe obesity with body mass index (BMI) of 40.0 to 44.9 in adult, unspecified obesity type, unspecified whether serious comorbidity present  E66.01 278.01    Z68.41 V85.41           Plan:   Tissue pathology and/or culture taken:  [] Yes [x] No   Sharp debridement performed:   [] Yes [x] No   Labs ordered this visit:   [] Yes [x] No   Imaging ordered this visit:   [] Yes [x] No         1. Diabetic ulcer of other part of left foot associated with type 2 diabetes mellitus, with fat layer exposed     Wound care changed:    Wound care performed cleanse ulcer with Vashe,Triad to jak wound, applied polymem, unna boot, super absorbent dressing, off loading foam, Kerlix, and Coban.    Continue monitoring:  Watch for increased drainage or pain, fevers, chills, swelling and report this to clinic.   2. Charcot's joint of left ankle     Currently awaiting additional testing from Dr. Neumann.     Non-weight bearing for the next 6 weeks.  Knee walker provided.    Wearing CAM walking boot at all times.    Reinforced offloading pressure to joint to assist with healing.   3. Type 2 diabetes mellitus with other skin ulcer, without long-term current use of insulin     Continued education, monitoring, and reinforced with of diabetic diet.    Last A1c 9.7.    Co-factor in delayed wound healing.     Continued  monitoring:  Must have a strict diabetic diet, take glucose lowering medications as prescribed and encourage lower HA1C.     4. Class 3 severe obesity with body mass index (BMI) of 40.0 to 44.9 in adult, unspecified obesity type, unspecified whether serious comorbidity present     Continued education:  Instructed the importance of diet, and decrease caloric intake due to non-weight bearing to left foot at this time.     Patient Instructions   Pt seen today by: Martha LARA    Home health and self care DRESSING INSTRUCTIONS:        Wound location: Left lateral ankle    Dressings to be changed at next clinic visit.    Cleanse wound with Vashe wound cleanser  Apply polymem max to the wound bed  Wrapped leg with unna boot  Applied 6 inch strip of drawtex edema to top of ankle  Cover wound with kerramax dressing, offloading foam and secure with kerlix and coban       Compression with: Unna boot    Return visit: Thursday    Nutrition:  The current daily value (%DV) for protein is 50 grams per day and is meant as a general goal for most people. Further increasing your dietary protein intake is very important for wound healing. Typically one needs over 100g of protein per day to help with wound healing needs.  If you are a dialysis patient or have problems with your kidneys, talk to your Nephrologist about how much protein you can take in with your condition.  Examples of high protein items that can be added to your diet include: eggs, chicken, red meats, almonds, cottage cheese, Greek yogurt, beans, and peanut butter.  Fortified protein bars, shakes and drinks can add 15-30 additional grams of protein per serving.  Also add:   1 daily general multivitamin   Vitamin C : 500mg twice daily   Zinc 220 mg daily  Vit D : once daily    Offloading   Offload your wound. This means to reduce pressure on and around the wound that reduces blood flow to the wound and prevents healing. Your wound care team will discuss specific  ways for you to offload your specific wound. Common offloading strategies include:    Turn or reposition every 2 hours or sooner  Use pillows, wedges, ROHO wheelchair cushions or other special devices like boots and shoes to lift the wound off of hard surfaces  Alternating Low Air-loss (ALAL) mattress may be ordered  Padded dressings can reduce wound pressure        Unna Boot:   It is important to move about with your unna boot on. Talk to your doctor about the right amount of activity for you. If the boot begins feeling tight, you may need to rest and prop your foot and leg on pillows. Try to get your foot higher than your heart.  Do not put things inside the boot to scratch your skin.  Keep your leg propped on pillows as much as you can during the day and at night.  Avoid sitting with your legs bent at the knees for a long time.       When do I need to call the doctor?   Your leg is numb or tingles  Toes are blue, grey, or cool  You have more swelling in your leg or foot  You have pain when you walk  Drainage that soaks through your dressing    Compression: You may be using a compressive type of dressings to control edema: If so, keep your compression wrap or tubigrip in place. Do not get them wet, they should feel snug. If they feel tight, or cause pain in any way,  elevate your legs above your heart for 15 minutes. If the wraps still cause pain or you can not tolerate compression,  please remove and notify the clinic or your home health.         Call our [facility] wound clinic for questions/concerns a [phone] .      The time spent including preparing to see the patient, obtaining patient history and assessment, evaluation of the plan of care, patient/caregiver counseling and education, orders, documentation, coordination of care, and other professional medical management activities for today's encounter was 20 minute.    Time spent performing procedures during today's encounter was 20 minute.    Follow up in  about 3 days (around 8/22/2024) for Nurse visit at 9 am and with Martha 8/26/24.. Teaching provided on s/s to call wound clinic for promptly.  Unna boot and ER precautions taught for after hours and weekends.       JANE Vieyra

## 2024-08-21 DIAGNOSIS — E11.42 DIABETIC POLYNEUROPATHY: ICD-10-CM

## 2024-08-21 DIAGNOSIS — L97.522 ULCER OF LEFT FOOT, WITH FAT LAYER EXPOSED: Primary | ICD-10-CM

## 2024-08-21 DIAGNOSIS — M67.02 CONTRACTURE OF LEFT ACHILLES TENDON: ICD-10-CM

## 2024-08-21 DIAGNOSIS — M14.672 CHARCOT'S JOINT OF ANKLE, LEFT: ICD-10-CM

## 2024-08-22 ENCOUNTER — HOSPITAL ENCOUNTER (OUTPATIENT)
Dept: WOUND CARE | Facility: HOSPITAL | Age: 59
Discharge: HOME | End: 2024-08-22
Attending: NURSE PRACTITIONER
Payer: COMMERCIAL

## 2024-08-22 VITALS
HEART RATE: 78 BPM | OXYGEN SATURATION: 97 % | SYSTOLIC BLOOD PRESSURE: 150 MMHG | DIASTOLIC BLOOD PRESSURE: 79 MMHG | TEMPERATURE: 98 F | RESPIRATION RATE: 18 BRPM

## 2024-08-22 DIAGNOSIS — M14.672 CHARCOT'S JOINT OF LEFT ANKLE: ICD-10-CM

## 2024-08-22 DIAGNOSIS — E11.622 TYPE 2 DIABETES MELLITUS WITH OTHER SKIN ULCER, WITHOUT LONG-TERM CURRENT USE OF INSULIN: ICD-10-CM

## 2024-08-22 DIAGNOSIS — E11.621 DIABETIC ULCER OF LEFT FOOT ASSOCIATED WITH TYPE 2 DIABETES MELLITUS, WITH FAT LAYER EXPOSED, UNSPECIFIED PART OF FOOT: ICD-10-CM

## 2024-08-22 DIAGNOSIS — L97.522 DIABETIC ULCER OF LEFT FOOT ASSOCIATED WITH TYPE 2 DIABETES MELLITUS, WITH FAT LAYER EXPOSED, UNSPECIFIED PART OF FOOT: ICD-10-CM

## 2024-08-22 PROCEDURE — 29580 STRAPPING UNNA BOOT: CPT

## 2024-08-22 PROCEDURE — 99211 OFF/OP EST MAY X REQ PHY/QHP: CPT | Mod: 25

## 2024-08-22 PROCEDURE — 27000999 HC MEDICAL RECORD PHOTO DOCUMENTATION

## 2024-08-22 NOTE — PATIENT INSTRUCTIONS
Pt seen today by: Harish Ramos LPN    Home health and self care DRESSING INSTRUCTIONS:        Wound location: Left lateral ankle    Dressings to be changed at next clinic visit.    Cleanse wound with Vashe wound cleanser  Applied triad to periwound  Apply polymem max to the wound bed  Wrapped leg with unna boot  Applied 6 inch strip of drawtex edema to top of ankle  Cover wound with  two kerramax dressing, offloading foam and secure with kerlix and coban       Compression with: Unna boot    Return visit: Monday with Martha Duncan NP    Nutrition:  The current daily value (%DV) for protein is 50 grams per day and is meant as a general goal for most people. Further increasing your dietary protein intake is very important for wound healing. Typically one needs over 100g of protein per day to help with wound healing needs.  If you are a dialysis patient or have problems with your kidneys, talk to your Nephrologist about how much protein you can take in with your condition.  Examples of high protein items that can be added to your diet include: eggs, chicken, red meats, almonds, cottage cheese, Greek yogurt, beans, and peanut butter.  Fortified protein bars, shakes and drinks can add 15-30 additional grams of protein per serving.  Also add:   1 daily general multivitamin   Vitamin C : 500mg twice daily   Zinc 220 mg daily  Vit D : once daily    Offloading   Offload your wound. This means to reduce pressure on and around the wound that reduces blood flow to the wound and prevents healing. Your wound care team will discuss specific ways for you to offload your specific wound. Common offloading strategies include:    Turn or reposition every 2 hours or sooner  Use pillows, wedges, ROHO wheelchair cushions or other special devices like boots and shoes to lift the wound off of hard surfaces  Alternating Low Air-loss (ALAL) mattress may be ordered  Padded dressings can reduce wound pressure        Unna Boot:   It is important  to move about with your unna boot on. Talk to your doctor about the right amount of activity for you. If the boot begins feeling tight, you may need to rest and prop your foot and leg on pillows. Try to get your foot higher than your heart.  Do not put things inside the boot to scratch your skin.  Keep your leg propped on pillows as much as you can during the day and at night.  Avoid sitting with your legs bent at the knees for a long time.       When do I need to call the doctor?   Your leg is numb or tingles  Toes are blue, grey, or cool  You have more swelling in your leg or foot  You have pain when you walk  Drainage that soaks through your dressing    Compression: You may be using a compressive type of dressings to control edema: If so, keep your compression wrap or tubigrip in place. Do not get them wet, they should feel snug. If they feel tight, or cause pain in any way,  elevate your legs above your heart for 15 minutes. If the wraps still cause pain or you can not tolerate compression,  please remove and notify the clinic or your home health.         Call our [facility] wound clinic for questions/concerns [phone] .

## 2024-08-22 NOTE — PROGRESS NOTES
[facility]                Wound Care Services    Subjective:       Patient ID: Suki Anton is a 58 y.o. female.    Chief Complaint: Wound Care    HPI  Review of Systems      Objective:     Vitals:    08/22/24 0903   BP: (!) 150/79   Pulse: 78   Resp: 18   Temp: 97.9 °F (36.6 °C)         Physical Exam         Assessment/Plan:         ICD-10-CM ICD-9-CM   1. Charcot's joint of left ankle  M14.672 094.0     713.5   2. Type 2 diabetes mellitus with other skin ulcer, without long-term current use of insulin  E11.622 250.80   3. Diabetic ulcer of left foot associated with type 2 diabetes mellitus, with fat layer exposed, unspecified part of foot  E11.621 250.80    L97.522 707.15           Tissue pathology and/or culture taken:  [] Yes [] No   Sharp debridement performed:   [] Yes [] No   Labs ordered this visit:   [] Yes [] No   Imaging ordered this visit:   [] Yes [] No           Orders Placed This Encounter   Procedures    Change dressing     See last progress note     Standing Status:   Standing     Number of Occurrences:   1        No follow-ups on file.               Unna boot removed. Cleansed leg with hibiclense. Cleansed wound with vashe. Applied triad to periwound. Applied polymem max to wound. Wrapped leg with unna boot. Applied two keramax superabsorbant dressings followed by offloading foam over wound. Applied 6 inch strip of drawtex edema to top of foot/ankle. Wrapped leg with kerlix and coban.

## 2024-08-26 ENCOUNTER — TELEPHONE (OUTPATIENT)
Dept: INTERNAL MEDICINE | Facility: CLINIC | Age: 59
End: 2024-08-26
Payer: COMMERCIAL

## 2024-08-26 ENCOUNTER — HOSPITAL ENCOUNTER (OUTPATIENT)
Dept: WOUND CARE | Facility: HOSPITAL | Age: 59
Discharge: HOME | End: 2024-08-26
Attending: NURSE PRACTITIONER
Payer: COMMERCIAL

## 2024-08-26 VITALS
TEMPERATURE: 98 F | RESPIRATION RATE: 18 BRPM | SYSTOLIC BLOOD PRESSURE: 126 MMHG | DIASTOLIC BLOOD PRESSURE: 71 MMHG | OXYGEN SATURATION: 95 % | HEART RATE: 83 BPM

## 2024-08-26 DIAGNOSIS — L97.522 DIABETIC ULCER OF LEFT FOOT ASSOCIATED WITH TYPE 2 DIABETES MELLITUS, WITH FAT LAYER EXPOSED, UNSPECIFIED PART OF FOOT: Primary | ICD-10-CM

## 2024-08-26 DIAGNOSIS — L97.522 ULCER OF LEFT FOOT, WITH FAT LAYER EXPOSED: ICD-10-CM

## 2024-08-26 DIAGNOSIS — E11.621 DIABETIC ULCER OF LEFT FOOT ASSOCIATED WITH TYPE 2 DIABETES MELLITUS, WITH FAT LAYER EXPOSED, UNSPECIFIED PART OF FOOT: Primary | ICD-10-CM

## 2024-08-26 DIAGNOSIS — M14.672 CHARCOT'S JOINT OF ANKLE, LEFT: ICD-10-CM

## 2024-08-26 DIAGNOSIS — M67.02 CONTRACTURE OF LEFT ACHILLES TENDON: ICD-10-CM

## 2024-08-26 DIAGNOSIS — E11.42 DIABETIC POLYNEUROPATHY: Primary | ICD-10-CM

## 2024-08-26 DIAGNOSIS — E66.01 CLASS 3 SEVERE OBESITY WITH BODY MASS INDEX (BMI) OF 40.0 TO 44.9 IN ADULT, UNSPECIFIED OBESITY TYPE, UNSPECIFIED WHETHER SERIOUS COMORBIDITY PRESENT: ICD-10-CM

## 2024-08-26 DIAGNOSIS — M14.672 CHARCOT'S JOINT OF LEFT ANKLE: ICD-10-CM

## 2024-08-26 DIAGNOSIS — E11.622 TYPE 2 DIABETES MELLITUS WITH OTHER SKIN ULCER, WITHOUT LONG-TERM CURRENT USE OF INSULIN: ICD-10-CM

## 2024-08-26 PROCEDURE — 99211 OFF/OP EST MAY X REQ PHY/QHP: CPT

## 2024-08-26 PROCEDURE — 27000999 HC MEDICAL RECORD PHOTO DOCUMENTATION

## 2024-08-26 PROCEDURE — 99214 OFFICE O/P EST MOD 30 MIN: CPT | Mod: ,,, | Performed by: NURSE PRACTITIONER

## 2024-08-26 NOTE — TELEPHONE ENCOUNTER
Pt called requesting a call back from DR Gallagher concerning an appt patient have with Podiatry on Thursday. When asked exactly what is it pertaining to patient replied just have Dr Gallagher call me. Please Advise.

## 2024-08-26 NOTE — PATIENT INSTRUCTIONS
Pt seen today by: Martha Duncan NP    Home health and self care DRESSING INSTRUCTIONS:        Wound location: Left lateral ankle    Dressings to be changed at next clinic visit.    Cleanse wound with Vashe wound cleanser  Applied triad to periwound  Apply polymem max to the wound bed  Wrapped leg with unna boot  Applied 6 inch strip of drawtex edema to top of ankle  Cover wound with  two kerramax dressing, offloading foam and secure with kerlix and coban       Compression with: Unna boot    Return visit: Nurse visit Thursday and visit with Martha Duncan the following Wednesday.    Nutrition:  The current daily value (%DV) for protein is 50 grams per day and is meant as a general goal for most people. Further increasing your dietary protein intake is very important for wound healing. Typically one needs over 100g of protein per day to help with wound healing needs.  If you are a dialysis patient or have problems with your kidneys, talk to your Nephrologist about how much protein you can take in with your condition.  Examples of high protein items that can be added to your diet include: eggs, chicken, red meats, almonds, cottage cheese, Greek yogurt, beans, and peanut butter.  Fortified protein bars, shakes and drinks can add 15-30 additional grams of protein per serving.  Also add:   1 daily general multivitamin   Vitamin C : 500mg twice daily   Zinc 220 mg daily  Vit D : once daily    Offloading   Offload your wound. This means to reduce pressure on and around the wound that reduces blood flow to the wound and prevents healing. Your wound care team will discuss specific ways for you to offload your specific wound. Common offloading strategies include:    Turn or reposition every 2 hours or sooner  Use pillows, wedges, ROHO wheelchair cushions or other special devices like boots and shoes to lift the wound off of hard surfaces  Alternating Low Air-loss (ALAL) mattress may be ordered  Padded dressings can reduce  wound pressure        Unna Boot:   It is important to move about with your unna boot on. Talk to your doctor about the right amount of activity for you. If the boot begins feeling tight, you may need to rest and prop your foot and leg on pillows. Try to get your foot higher than your heart.  Do not put things inside the boot to scratch your skin.  Keep your leg propped on pillows as much as you can during the day and at night.  Avoid sitting with your legs bent at the knees for a long time.       When do I need to call the doctor?   Your leg is numb or tingles  Toes are blue, grey, or cool  You have more swelling in your leg or foot  You have pain when you walk  Drainage that soaks through your dressing    Compression: You may be using a compressive type of dressings to control edema: If so, keep your compression wrap or tubigrip in place. Do not get them wet, they should feel snug. If they feel tight, or cause pain in any way,  elevate your legs above your heart for 15 minutes. If the wraps still cause pain or you can not tolerate compression,  please remove and notify the clinic or your home health.         Call our [facility] wound clinic for questions/concerns [phone] .

## 2024-08-26 NOTE — PROGRESS NOTES
[facility]   Outpatient Wound Care     Subjective:   Patient ID: Suki Anton is a 58 y.o. female.    Chief Complaint: Wound Care      History of Present Illness:   58 y.o. White female presents to wound care clinic today for right ankle ulcer in Unna boot change.    Reviewed all previous medical history and progress notes since last visit if 8/19/2024.  Past medical history: Currently under the care of  for left ankle Charcot foot joint.  Currently was instructed per Dr. Neumann to be non-weight bearing to left ankle/foot.  Patient voices she walked from her car to the front of the hospital and then obtained wheelchair.  Instructed the importance this was be non-weight bearing to left foot and ankle meeting no weight for the next 6 weeks per Dr. Neumann. Under the care of Dr. Neumann.  Voices saw podiatrist today but did not take self pay patients.  Voices awaiting insurance from work due to insurance was changed in his awaiting for reinstatement.  Saw orthopedics on 7/1/24 and was informed of CT scan.  She is waiting to see podiatry no appointment scheduled at this time.  Will continue our clinic until ulcer is healed. Wound has been present for approximately 2 months.  She is a referral from Orthopedics.  Patient is currently awaiting CT scan of left ankle, and referral to a foot ankle specialist.  Patient voices 2 months ago twisted ankle and purchased a boot from [employer] which created an ulcer to ankle area. Followed by Dick Gallagher DO for PCP last appt 3/11/24.    Today's visit 08/26/2024:  Reviewed all previous progress notes since last visit 08/19/2024.  Presents to clinic in wheelchair escorted by staff member.  Unna boot to left lower extremity removed per nursing staff at bedside.  Left ankle ulcer red granulating wound bed with minimal  maceration to edges moderate serosanguineous drainage, and significant decrease in size since last visit.  Discussion we will continue Unna boot at this time.  Patient voices as a bone scan on Thursday this week.  Instructed to have them call wound care clinic if we need to remove Unna boot prior to appointment.  All wound care performed per nursing staff at bedside.  Left ankle ulcer cleansed with Vashe, applied Triad to wound edge, apply polymem max, unna boot, double super abssorbent, offloading foam, drawtex edema to dorsal foot, Kerlix and Coban.  Reinforced unna boot precautions.  Reinforced offloading as much as possible.  Using wheelchair and knee walker at this time.  Will return to the clinic on Thursday for Unna boot change.  Instructed to call the office with any questions, concerns, or any reasons having to remove Unna boot.  Verbalized understanding of all instructions.    8/19/24:  Reviewed all previous progress since last visit of 08/12/2024.  Presents to clinic in wheelchair.  Left lower extremity unna boot removed per nursing staff at bedside.  Left lower extremity ankle ulcer red granulating wound bed , macerated skin edges with moderate serosanguineous drainage.  Decrease in size since last visit.  Patient voices she is staying off of foot as much as possible.  All wound care performed per nursing staff at bedside.  Left ankle ulcer cleansed with Vashe, applied Triad to jak wound, polymem to wound bed, unna boot, super absorbent dressing, offloading foam, Kerlix and Coban.  Will return to the clinic on Thursday for Unna boot change.  Instructed the importance of offloading pressure as much as possible.  Using knee walker as much as possible.  Unna boot precautions.  Instructed to call the office with any questions, concerns, or any reasons having to remove Unna boot.  Verbalized understanding of all instructions.    8/12/24:  Reviewed all previous progress notes since last visit of 08/08/2024.   Presents to clinic in wheelchair.  Wearing left lower leg CAM walking boot.  All wound care to left lower extremity removed per nursing staff at bedside.  Left ankle ulcer red granulating wound bed minimal slough with macerated jak wound requiring debridement to decrease bio burden to increase granulation.  Selective debridement performed at bedside.  See quick procedure note.  All wound care performed per nursing staff at bedside.  Left ankle ulcer cleansed with Vashe, applied Triad to jak wound, silver polymem to wound bed, unna boot, convamax, offloading foam, Kerlix, and Coban.  Attempted to provide the patient with crutches patient unable to use crutches due to his shoulder injury.  Instructed the importance of her using knee walker at all times.  Reinforced unna boot precautions.  Will return to the clinic on Thursday for Unna boot change.  Instructed to call the office with any questions, concerns, or any reasons having to remove Unna boot.  Verbalized understanding of all instructions.    08/05/2024:  Reviewed all previous progress notes since last visit 07/29/2024.  Presents to clinic in wheelchair.  Left lower leg CAM walking boot.  Left lower extremity Unna boot removed per nursing at bedside.  Left ankle ulcer red granulating wound bed with moderate serosanguineous drainage and macerated jak wound.  Instructed the patient today the importance of offloading pressure to left foot.  All wound care performed per nursing staff at bedside.  Left lower extremity cleansed with Hibiclens soap and water pat dry left ulcer cleansed with Vashe, applied Triad to jak wound, silver polymem, Convamax, unna boot, kerlix, and coban.  Unna boot precautions given.  Will return to the clinic on Thursday after appt with Dr. Neumann.  Instructed to call the office with any questions, concerns, or any reasons having to remove Unna boot.  Verbalized understanding of all instructions.      7/29/24:  Reviewed all previous  progress since last visit 07/22/2024.  Presents to clinic alone. Ambulating with CAM walking boot.  Left lower extremity Unna boot removed per nursing staff at bedside.  Left lower leg washed Hibiclens soap and water.  Wound red granulating wound bed, slight macerated jak wound with moderate serosanguineous drainage.  Discussion with the patient today will continue same wound care orders.  All wound care performed per nursing staff at bedside.  Left lower extremity ankle cleansed with Vashe, applied Triad to jak wound, silver polymem to  wound bed, cover with convamax, Unna boot, Kerlix, and Coban.  Tolerated well.  Will return to our clinic on Thursday for a nurse visit for Unna boot change.  Instructed to call the office with any questions, concerns, or any reasons removed Unna boot.  Instructed the importance to call and reschedule appointment with Dr. Neumann regarding left Charcot ankle.  Reinforced offloading pressure to areas much as possible.  Verbalized understanding of all instructions.    07/22/2024:  Reviewed all previous progress notes 07/15/2024.  Presents to the clinic with uam.  Ambulates with CAM walking boot to left lower leg.  Left lower extremity Unna boot removed per nursing staff at bedside.  Left ankle wound red granulating wound bed macerated jak wound moderate serosanguineous drainage.  Discussion with the patient today significant decrease in size since last visit will continue Unna boot.  New wound care orders for left lower extremity ankle cleansed with Vashe apply, Triad to jak wound, silver polymem to wound bed, cover with covamax, Unna boot, Kerlix, and Coban.  All wound care performed per nursing staff at bedside.  Will return to the clinic on Thursday for a nurse visit for Unna boot change.  Will follow up in wound clinic with me next Monday.  Instructed to call the office with any questions, concerns, or any reasons having to remove Unna boot.  Verbalized understanding of  all instructions.      07/15/2024:  Reviewed all previous progress notes.  Left lower leg unna boot removed per nursing staff at bedside.  Left ankle wound red granulating wound bed with moderate serosanguineous drainage and macerated jak wound.  Significant decrease in size since last visit, and now wound has created 2 separate wounds.  Discussion with the patient today we will continue Unna boot to assist in decreasing edema to ankle, and increase in granulation of wounds.  All wound care performed per nursing staff at bedside.  Left ankle wound cleansed with Vashe, applied Triad to periwound, silver polymem to wound bed, Unna boot, Drawtex wrap, Kerlix and Coban.  Currently wearing left CAM boot.  Will have her return to the clinic on Thursday for a nurse visit for Unna boot change.  Reinforced unna boot precautions.  Reinforced offloading pressure to left foot as much as possible.  Instructed to call the office with any questions, concerns, or any reasons having to remove Unna boot.  Verbalized understanding of all instructions.    07/08/2024:  Reviewed previous progress notes.  Left lower leg unna boot removed per nursing staff at bedside.  Left ankle ulcer red granulating wound bed with moderate serosanguineous drainage with macerated jak wound.  Will continue same wound care.  All wound care performed per nursing staff at bedside.  Left ankle wound cleansed with Vashe, apply Triad to periwound edge, Silver polymem to wound bed, cover with unna boot, Drawtex 4 x 4, Kerlix, and Coban. Tolerated well.  Reinforced unna boot precautions.  Will return to clinic on Thursday for a nurse visit for Unna boot change.  Will follow up with me next Monday. Instructed the importance of calling the office with any questions, concerns, or any reasons having to remove Unna boot.  Verbalized understanding of all instructions.    07/01/2024:  Reviewed previous progress notes.  Left lower leg unna boot removed per nursing staff  at bedside.  Left ankle ulcer red granulating wound bed with minimal slough and moderate serosanguineous drainage.  Patient voices  will have virtual visit with orthopedic talk to discuss CT scan today.  All wound care performed per nursing staff at bedside.  Will continue Unna boot. Left ankle wound cleansed with 0.5% Dakins, apply Triad to periwound edge, Silver polymem to wound bed, cover with unna boot, Drawtex 4 x 4, Kerlix, and Coban. Tolerated well.  Reinforced unna boot precautions.  Will return to the clinic on Wednesday for a nurse visit, and will return the following Monday to re-evaluate left ankle ulcer.  Instructed the importance of calling the office with any questions, concerns, or any reasons having to remove Unna boot.  Verbalized understanding of all instructions.    06/26/2024:  Presents to clinic alone.  Left lower leg unna boot removed per nursing staff at bedside.  Left ankle ulcer red granulating wound bed with moderate serosanguineous drainage minimal slough.  Macerated jak wound.  Discussion with the patient today will continue Unna boot.  Patient is taking all oral antibiotics as directed.  All wound care performed per nursing staff at bedside. Left ankle wound cleansed with 0.5% Dakins, apply Triad to periwound edge, Silver polymem to wound bed, cover with unna boot, Drawtex wrap, Kerlix, and Coban. Tolerated well.  Reinforced unna boot precautions.  Will return to the clinic on Monday to re-evaluate left ankle ulcer.  Instructed the importance of calling the office with any questions, concerns, or any reasons having to remove Unna boot.  Doctors excuse given.  Verbalized understanding of all instructions.      06/13/2024:  Presents to the clinic with grandchildren.  Ambulates with left foot orthopedic walking boot.  Walking boot and left ankle dressing removed per nursing staff at bedside.  Left ankle ulcer red granulating wound bed with moderate amount of slough and serosanguineous  drainage with the jak wound erythema and nonpitting edema.  Discussion with the patient today will need to perform selective debridement and obtain tissue culture.  Written consent obtained.  See quick procedure note.  Following debridement red granulating wound bed with moderate serosanguineous drainage discussion with the patient today will benefit from applying Unna boot to increased granulation to area and decrease edema.  All wound care performed per nursing staff at bedside.  Left ankle wound cleansed with 0.5% Dakins, apply Silver polymem to wound bed, cover with unna boot, Drawtex wrap, Kerlix, and Coban.  Tolerated well.  Instructed on unna boot precautions.  Will return to the clinic on Monday for a nurse visit for Unna boot change.  Will follow up with me next Thursday.       History includes:      Past Medical History:   Diagnosis Date    Diabetes mellitus     Diabetes mellitus, type 2     GERD (gastroesophageal reflux disease)     Hypertension     History reviewed. No pertinent surgical history.   Social History     Socioeconomic History    Marital status:     Number of children: 0   Occupational History    Occupation: Manager     Comment: [employer]   Tobacco Use    Smoking status: Never    Smokeless tobacco: Never   Substance and Sexual Activity    Alcohol use: Never    Drug use: Never    Sexual activity: Yes     Partners: Male     Birth control/protection: None     Social Determinants of Health     Financial Resource Strain: Medium Risk (4/4/2024)    Overall Financial Resource Strain (CARDIA)     Difficulty of Paying Living Expenses: Somewhat hard   Food Insecurity: Food Insecurity Present (3/11/2024)    Hunger Vital Sign     Worried About Running Out of Food in the Last Year: Sometimes true     Ran Out of Food in the Last Year: Sometimes true   Transportation Needs: Unmet Transportation Needs (4/4/2024)    PRAPARE - Transportation     Lack of Transportation (Medical): Yes     Lack of  Transportation (Non-Medical): Yes   Physical Activity: Inactive (4/4/2024)    Exercise Vital Sign     Days of Exercise per Week: 0 days     Minutes of Exercise per Session: 0 min   Stress: Stress Concern Present (4/4/2024)    [employer] [place] [employer] - Occupational Stress Questionnaire     Feeling of Stress : Rather much   Housing Stability: High Risk (3/11/2024)    Housing Stability Vital Sign     Unable to Pay for Housing in the Last Year: Yes     Number of Places Lived in the Last Year: 1     Unstable Housing in the Last Year: No   .      Current Outpatient Medications   Medication Sig Dispense Refill    acetaminophen (TYLENOL) 500 MG tablet Take 500 mg by mouth every 6 (six) hours as needed for Pain.      atorvastatin (LIPITOR) 40 MG tablet Take 1 tablet (40 mg total) by mouth once daily. 90 tablet 0    blood sugar diagnostic Strp Test strips to check CBG's 200 each 11    blood-glucose meter kit 1 each by Other route 2 (two) times daily. Use as instructed 1 each 0    EASY TOUCH TWIST LANCETS 33 gauge Misc USE TO CHECK BLOOD SUAGR LEVELS TWO TIMES A  each 3    gabapentin (NEURONTIN) 300 MG capsule Take 2 capsules (600 mg total) by mouth 3 (three) times daily. 180 capsule 2    glipiZIDE (GLUCOTROL) 10 MG tablet Take 1 tablet (10 mg total) by mouth 2 (two) times daily with meals. 60 tablet 5    insulin degludec (TRESIBA FLEXTOUCH U-100) 100 unit/mL (3 mL) insulin pen Inject 25 Units into the skin every evening. 7.5 mL 2    insulin lispro (HUMALOG KWIKPEN INSULIN) 100 unit/mL pen Inject 7 Units into the skin 3 (three) times daily with meals. 6.3 mL 2    lancing device Misc 1 each by Misc.(Non-Drug; Combo Route) route 4 (four) times daily with meals and nightly. 200 each 5    lisinopriL (PRINIVIL,ZESTRIL) 40 MG tablet Take 1 tablet (40 mg total) by mouth once daily. 90 tablet 3    omeprazole (PRILOSEC) 20 MG capsule Take 2 capsules (40 mg total) by mouth once daily. 60 capsule 2    rOPINIRole  (REQUIP) 0.5 MG tablet Take 1 tablet (0.5 mg total) by mouth 3 (three) times daily. 90 tablet 2    traMADoL (ULTRAM) 50 mg tablet Take 1 tablet (50 mg total) by mouth every 6 (six) hours. 20 tablet 0    traZODone (DESYREL) 50 MG tablet Take 1 tablet (50 mg total) by mouth nightly as needed for Insomnia. 30 tablet 0    TRUE METRIX GLUCOSE METER Misc Inject 1 each into the skin 4 (four) times daily with meals and nightly. use as directed 1 each 0     No current facility-administered medications for this encounter.       Review of Systems   Skin:  Positive for wound.   All other systems reviewed and are negative.         Labs Reviewed:   Chemistry:  Lab Results   Component Value Date    BUN 14.5 06/15/2023    BUN 14.0 06/14/2023    CREATININE 0.75 06/15/2023    CREATININE 0.69 06/14/2023    EGFRNORACEVR >60 06/15/2023    EGFRNORACEVR >60 06/14/2023    GLUCOSE 267 (H) 06/15/2023    AST 8 06/15/2023    AST 8 06/14/2023    ALT 12 06/15/2023    ALT 10 06/14/2023    HGBA1C 11.4 (H) 04/13/2023        Hematology:  Lab Results   Component Value Date    WBC 14.77 (H) 06/15/2023    WBC 15.65 (H) 06/14/2023    HGB 11.3 (L) 06/15/2023    HGB 11.6 (L) 06/14/2023    HCT 33.7 (L) 06/15/2023    HCT 34.5 (L) 06/14/2023     06/15/2023     06/14/2023       Inflammatory Markers:  Lab Results   Component Value Date    SEDRATE 64 (H) 07/02/2020    SEDRATE 95 (H) 06/30/2020    SEDRATE 94 (H) 06/28/2020        Objective:        Physical Exam  Vitals reviewed.   Cardiovascular:      Pulses:           Dorsalis pedis pulses are detected w/ Doppler on the right side and detected w/ Doppler on the left side.        Posterior tibial pulses are detected w/ Doppler on the right side and detected w/ Doppler on the left side.   Musculoskeletal:      Left lower leg: Edema present.      Left foot: Charcot foot present.        Feet:    Feet:      Right foot:      Skin integrity: Skin integrity normal.      Toenail Condition: Right toenails  are abnormally thick. Fungal disease present.     Left foot:      Skin integrity: Ulcer present.      Toenail Condition: Left toenails are abnormally thick. Fungal disease present.  Skin:     General: Skin is warm.      Capillary Refill: Capillary refill takes less than 2 seconds.      Findings: Wound present.             Comments: Red granulating wound bed macerated skin edges moderate serosanguineous drainage.   Neurological:      Mental Status: She is alert.            Wound 06/13/24 0827 Pressure Injury Left lateral Ankle (Active)   06/13/24 0827   Present on Original Admission: Y   Primary Wound Type: Pressure inj   Side: Left   Orientation: lateral   Location: Ankle   Wound Approximate Age at First Assessment (Weeks):    Wound Number:    Is this injury device related?:    Incision Type:    Closure Method:    Wound Description (Comments):    Type:    Additional Comments:    Ankle-Brachial Index:    Pulses:    Removal Indication and Assessment:    Wound Outcome:    Wound Image   08/26/24 0921   Dressing Appearance Moist drainage 08/26/24 0921   Drainage Amount Moderate 08/26/24 0921   Drainage Characteristics/Odor Serosanguineous 08/26/24 0921   Appearance Pink 08/26/24 0921   Periwound Area Macerated 08/26/24 0921   Wound Length (cm) 1.5 cm 08/26/24 0921   Wound Width (cm) 1 cm 08/26/24 0921   Wound Depth (cm) 0.4 cm 08/26/24 0921   Wound Volume (cm^3) 0.6 cm^3 08/26/24 0921   Wound Surface Area (cm^2) 1.5 cm^2 08/26/24 0921         Assessment:         ICD-10-CM ICD-9-CM   1. Diabetic ulcer of left foot associated with type 2 diabetes mellitus, with fat layer exposed, unspecified part of foot  E11.621 250.80    L97.522 707.15   2. Charcot's joint of left ankle  M14.672 094.0     713.5   3. Type 2 diabetes mellitus with other skin ulcer, without long-term current use of insulin  E11.622 250.80   4. Class 3 severe obesity with body mass index (BMI) of 40.0 to 44.9 in adult, unspecified obesity type, unspecified  whether serious comorbidity present  E66.01 278.01    Z68.41 V85.41           Plan:   Tissue pathology and/or culture taken:  [] Yes [x] No   Sharp debridement performed:   [] Yes [x] No   Labs ordered this visit:   [] Yes [x] No   Imaging ordered this visit:   [] Yes [x] No         1. Diabetic ulcer of other part of left foot associated with type 2 diabetes mellitus, with fat layer exposed     Applied Unna boot.     Continue monitoring:  Watch for increased drainage or pain, fevers, chills, swelling and report this to clinic.   2. Charcot's joint of left ankle     Awaiting bone skin on Thursday.    Non-weight bearing continued.  Using knee walker and wheelchair.    Wearing CAM walking boot at all times.    Reinforced offloading pressure to joint to assist with healing.   3. Type 2 diabetes mellitus with other skin ulcer, without long-term current use of insulin     Reinforced diet, and medication compliance.    Last A1c 9.7.    Co-factor in delayed wound healing.     Continued monitoring:  Must have a strict diabetic diet, take glucose lowering medications as prescribed and encourage lower HA1C.     4. Class 3 severe obesity with body mass index (BMI) of 40.0 to 44.9 in adult, unspecified obesity type, unspecified whether serious comorbidity present     Continued monitoring and education  Instructed the importance of diet, and decrease caloric intake due to non-weight bearing to left foot at this time.     Patient Instructions   Pt seen today by: Martha Duncan NP    Home health and self care DRESSING INSTRUCTIONS:        Wound location: Left lateral ankle    Dressings to be changed at next clinic visit.    Cleanse wound with Vashe wound cleanser  Applied triad to periwound  Apply polymem max to the wound bed  Wrapped leg with unna boot  Applied 6 inch strip of drawtex edema to top of ankle  Cover wound with  two kerramax dressing, offloading foam and secure with kerlix and coban       Compression with: Unna  boot    Return visit: Nurse visit Thursday and visit with Martha Duncan the following Wednesday.    Nutrition:  The current daily value (%DV) for protein is 50 grams per day and is meant as a general goal for most people. Further increasing your dietary protein intake is very important for wound healing. Typically one needs over 100g of protein per day to help with wound healing needs.  If you are a dialysis patient or have problems with your kidneys, talk to your Nephrologist about how much protein you can take in with your condition.  Examples of high protein items that can be added to your diet include: eggs, chicken, red meats, almonds, cottage cheese, Greek yogurt, beans, and peanut butter.  Fortified protein bars, shakes and drinks can add 15-30 additional grams of protein per serving.  Also add:   1 daily general multivitamin   Vitamin C : 500mg twice daily   Zinc 220 mg daily  Vit D : once daily    Offloading   Offload your wound. This means to reduce pressure on and around the wound that reduces blood flow to the wound and prevents healing. Your wound care team will discuss specific ways for you to offload your specific wound. Common offloading strategies include:    Turn or reposition every 2 hours or sooner  Use pillows, wedges, ROHO wheelchair cushions or other special devices like boots and shoes to lift the wound off of hard surfaces  Alternating Low Air-loss (ALAL) mattress may be ordered  Padded dressings can reduce wound pressure        Unna Boot:   It is important to move about with your unna boot on. Talk to your doctor about the right amount of activity for you. If the boot begins feeling tight, you may need to rest and prop your foot and leg on pillows. Try to get your foot higher than your heart.  Do not put things inside the boot to scratch your skin.  Keep your leg propped on pillows as much as you can during the day and at night.  Avoid sitting with your legs bent at the knees for a long  time.       When do I need to call the doctor?   Your leg is numb or tingles  Toes are blue, grey, or cool  You have more swelling in your leg or foot  You have pain when you walk  Drainage that soaks through your dressing    Compression: You may be using a compressive type of dressings to control edema: If so, keep your compression wrap or tubigrip in place. Do not get them wet, they should feel snug. If they feel tight, or cause pain in any way,  elevate your legs above your heart for 15 minutes. If the wraps still cause pain or you can not tolerate compression,  please remove and notify the clinic or your home health.         Call our [facility] wound clinic for questions/concerns a [phone] .      The time spent including preparing to see the patient, obtaining patient history and assessment, evaluation of the plan of care, patient/caregiver counseling and education, orders, documentation, coordination of care, and other professional medical management activities for today's encounter was 15 minute.    Time spent performing procedures during today's encounter was 20 minute.    Follow up in about 3 days (around 8/29/2024) for nurse visit after 1 pm and yaw Thomas in am on 9/4/24.. Teaching provided on s/s to call wound clinic for promptly.  Unna boot and ER precautions taught for after hours and weekends.       JANE Vieyra

## 2024-08-27 NOTE — TELEPHONE ENCOUNTER
Called patient, she wanted to notify me that she was having an Indium scan done by Dr. Caal at the [facility]. Requested patient to send clinic notes to our clinic.    Dick Gallagher DO  [facility] Internal Medicine, PGY-III

## 2024-08-29 ENCOUNTER — TELEPHONE (OUTPATIENT)
Dept: INTERNAL MEDICINE | Facility: CLINIC | Age: 59
End: 2024-08-29
Payer: COMMERCIAL

## 2024-08-29 ENCOUNTER — HOSPITAL ENCOUNTER (OUTPATIENT)
Dept: WOUND CARE | Facility: HOSPITAL | Age: 59
Discharge: HOME | End: 2024-08-29
Attending: NURSE PRACTITIONER
Payer: COMMERCIAL

## 2024-08-29 VITALS
HEART RATE: 79 BPM | SYSTOLIC BLOOD PRESSURE: 152 MMHG | RESPIRATION RATE: 18 BRPM | OXYGEN SATURATION: 96 % | TEMPERATURE: 98 F | DIASTOLIC BLOOD PRESSURE: 87 MMHG

## 2024-08-29 DIAGNOSIS — E11.621 DIABETIC ULCER OF LEFT FOOT ASSOCIATED WITH TYPE 2 DIABETES MELLITUS, WITH FAT LAYER EXPOSED, UNSPECIFIED PART OF FOOT: ICD-10-CM

## 2024-08-29 DIAGNOSIS — E11.622 TYPE 2 DIABETES MELLITUS WITH OTHER SKIN ULCER, WITHOUT LONG-TERM CURRENT USE OF INSULIN: ICD-10-CM

## 2024-08-29 DIAGNOSIS — L97.522 DIABETIC ULCER OF LEFT FOOT ASSOCIATED WITH TYPE 2 DIABETES MELLITUS, WITH FAT LAYER EXPOSED, UNSPECIFIED PART OF FOOT: ICD-10-CM

## 2024-08-29 DIAGNOSIS — M25.572 ACUTE LEFT ANKLE PAIN: Primary | ICD-10-CM

## 2024-08-29 DIAGNOSIS — M14.672 CHARCOT'S JOINT OF LEFT ANKLE: ICD-10-CM

## 2024-08-29 PROCEDURE — 29580 STRAPPING UNNA BOOT: CPT

## 2024-08-29 PROCEDURE — 27000999 HC MEDICAL RECORD PHOTO DOCUMENTATION

## 2024-08-29 PROCEDURE — 99211 OFF/OP EST MAY X REQ PHY/QHP: CPT | Mod: 25

## 2024-08-29 RX ORDER — ALPRAZOLAM 0.25 MG/1
0.25 TABLET ORAL
Qty: 2 TABLET | Refills: 0 | Status: SHIPPED | OUTPATIENT
Start: 2024-08-29 | End: 2024-09-28

## 2024-08-29 NOTE — TELEPHONE ENCOUNTER
Pt called stating she has a full body scan on Sept. 5th and 6th. Pt states she is claustrophobic and will need something to take for those 2 days to keep her calm. Pt can be reached at [phone]

## 2024-08-29 NOTE — PROGRESS NOTES
[facility]                Wound Care Services    Subjective:       Patient ID: Suki Anton is a 58 y.o. female.    Chief Complaint: Wound Care    HPI  Review of Systems      Objective:     Vitals:    08/29/24 0946   BP: (!) 152/87   Pulse: 79   Resp: 18   Temp: 97.9 °F (36.6 °C)         Physical Exam         Assessment/Plan:         ICD-10-CM ICD-9-CM   1. Charcot's joint of left ankle  M14.672 094.0     713.5   2. Type 2 diabetes mellitus with other skin ulcer, without long-term current use of insulin  E11.622 250.80   3. Diabetic ulcer of left foot associated with type 2 diabetes mellitus, with fat layer exposed, unspecified part of foot  E11.621 250.80    L97.522 707.15           Tissue pathology and/or culture taken:  [] Yes [] No   Sharp debridement performed:   [] Yes [] No   Labs ordered this visit:   [] Yes [] No   Imaging ordered this visit:   [] Yes [] No           Orders Placed This Encounter   Procedures    Change dressing     See last progress note     Standing Status:   Standing     Number of Occurrences:   1        No follow-ups on file.              Removed unna boot, washed leg with hibiclense soap and water. Cleaned wound with vashe. Apply triad to periwound. Apply polymem max to wound. Wrap leg with unna boot. Apply strip of drawtex edema to top of foot at ankle. Apply two kerramax superabsorbant pad and offloading foam to wound. Wrap leg with kerlix followed by coban. Patient tolerated well. Applied blue shoe covering to foot followed by CAM boot. Brought patient via wheelchair to vehicle. Return next Wednesday for visit with Martha Duncan NP.

## 2024-08-29 NOTE — PATIENT INSTRUCTIONS
Pt seen today by: Harish Ramos LPN    Home health and self care DRESSING INSTRUCTIONS:        Wound location: Left lateral ankle    Dressings to be changed at next clinic visit.    Cleanse wound with Vashe wound cleanser  Applied triad to periwound  Apply polymem max to the wound bed  Wrapped leg with unna boot  Applied 6 inch strip of drawtex edema to top of ankle  Cover wound with  two kerramax dressing, offloading foam and secure with kerlix and coban       Compression with: Unna boot    Return visit:  Martha Duncan the following Wednesday.    Nutrition:  The current daily value (%DV) for protein is 50 grams per day and is meant as a general goal for most people. Further increasing your dietary protein intake is very important for wound healing. Typically one needs over 100g of protein per day to help with wound healing needs.  If you are a dialysis patient or have problems with your kidneys, talk to your Nephrologist about how much protein you can take in with your condition.  Examples of high protein items that can be added to your diet include: eggs, chicken, red meats, almonds, cottage cheese, Greek yogurt, beans, and peanut butter.  Fortified protein bars, shakes and drinks can add 15-30 additional grams of protein per serving.  Also add:   1 daily general multivitamin   Vitamin C : 500mg twice daily   Zinc 220 mg daily  Vit D : once daily    Offloading   Offload your wound. This means to reduce pressure on and around the wound that reduces blood flow to the wound and prevents healing. Your wound care team will discuss specific ways for you to offload your specific wound. Common offloading strategies include:    Turn or reposition every 2 hours or sooner  Use pillows, wedges, ROHO wheelchair cushions or other special devices like boots and shoes to lift the wound off of hard surfaces  Alternating Low Air-loss (ALAL) mattress may be ordered  Padded dressings can reduce wound pressure        Unna Boot:   It  SUBJECTIVE  Giselle Quan is a 25 year old female who presents today for a complete physical. The patient's prior medical history is significant for:    Patient Active Problem List   Diagnosis   • Congenital spondylolysis, lumbosacral region   • History of eating disorder         The patient also has the following additional concerns:     (1) R hand numbness - really only notices it with certain exercises/arm positions which causes elbow flexion - sleeping in the wrong position, arm presses - causes tingling in the hand - improves with stopping the movement.  Is working with a  who is also a PT and is trying some new stretches/moves to help with this, seems to help some but still having symptoms with certain exercises/if she sleeps all night with bent elbow.      LMP 2 weeks ago - on OCPs  Last PAP smear - 11/21 - normal    (+) sexually active        ALL:  ALLERGIES:  No Known Allergies    Current Outpatient Medications   Medication Sig Dispense Refill   • Norethin-Eth Estradiol-Fe (Layolis FE) 0.8-25 MG-MCG Chew Tab Chew 1 tablet by mouth 1 day a week. 84 tablet 3     No current facility-administered medications for this visit.       SURGICAL HISTORY:  The patient has had the following surgeries: wisdom teeth     FAMILY HISTORY:  Father: alive - has the following health problems HTN, DM2                   Mother: alive - has the following health problems HTN; family history of hyperlipidemia  Sibs: 2 - has the following health concerns - sisters - one with depression  Children: none     SOCIAL HISTORY:  Work/Activites: dietician   Marital Status: single  Smoking:denies smoking  Alcohol: socially  Drugs: denies  Lives with parents  Exercise: Yes Frequency: 5 times per week by olympic weight lifting, cardio  Sexuality: is sexually active    ROS:  As per above, otherwise:  General: Patient denies fever, chills, night sweats, weight changes, or appetite changes  Skin: No problems with hair or nails, no rash, no  is important to move about with your unna boot on. Talk to your doctor about the right amount of activity for you. If the boot begins feeling tight, you may need to rest and prop your foot and leg on pillows. Try to get your foot higher than your heart.  Do not put things inside the boot to scratch your skin.  Keep your leg propped on pillows as much as you can during the day and at night.  Avoid sitting with your legs bent at the knees for a long time.       When do I need to call the doctor?   Your leg is numb or tingles  Toes are blue, grey, or cool  You have more swelling in your leg or foot  You have pain when you walk  Drainage that soaks through your dressing    Compression: You may be using a compressive type of dressings to control edema: If so, keep your compression wrap or tubigrip in place. Do not get them wet, they should feel snug. If they feel tight, or cause pain in any way,  elevate your legs above your heart for 15 minutes. If the wraps still cause pain or you can not tolerate compression,  please remove and notify the clinic or your home health.         Call our [facility] wound clinic for questions/concerns a [phone] .     new skin lesions  Heent: Pt denies problems with head, eyes, ears, nose, mouth, throat and neck  Respiratory: No coughing, wheezing, changes in voice, no shortness of breath  Cardiovascular:No chest pain, palpitations or other cardiac complaints noted  Gastrointestinal: No diarrhea, constipation, abdominal pain or other complaints noted  Genitourinary: Patient denies urinary pain, bleeding or voiding problems   Musculoskeletal: Patient deines pain, swelling, weakness or limitation of motion  Neurologic:NUMBNESS and TINGLING in R HAND  Psychiatric: Patient denies sleep, anxiety, depression or other psychiatric problems  Hematologic/Lymphatic/Immunologic: Patient denies hematological, lymphatic or immunological problems  Endocrine: Patient denies thyroid, diabetic or other endocrine problems    OBJECTIVE:  Visit Vitals  BP 94/60   Pulse 79   Temp 96.6 °F (35.9 °C) (Temporal)   Ht 5' 2\" (1.575 m)   Wt 53.1 kg (117 lb)   LMP 12/12/2023 (Approximate)   SpO2 98%   BMI 21.40 kg/m²     Giselle's BMI is Body mass index is 21.4 kg/m².  Physical exam   General appearance - alert & oriented, pleasant and comfortable  Ears - External ears normal. Canals clear. TM's normal.  Nose/Sinuses - Nares normal. Septum midline. Mucosa normal. No drainage or sinus tenderness.  Oropharynx - Lips, mucosa, tongue, teeth and gums normal. Oropharynx pink and moist.  Neck - Neck supple. No adenopathy.  Thyroid symmetric, normal size and no nodules.  Heart - RRR w/o S3, S4, or murmurs.  The PMI is not displaced.  There is no JVD  Lungs - CTA throughout without crackles, rhonchi, or wheezes.  Patient has good air exchange without pleuritic symptoms.  Abdomen - BS X 4, soft, non-tender, no masses, organomegaly, rebound or guarding.  Extremities - Extremities normal. No deformities, edema, or skin discoloration  Musculoskeletal - A/P and lateral curves within normal limits.  Pt. has full ROM without difficulty.  Strength 5/5 B/L UE and LE.  Neuro -  CN II-XII GI, DTR's +2/4 patellar B/L, sensory and motor innervation intact, no pathologic reflexes elicited.    Lab results reviewed with patient.    ASSESSMENT/PLAN    Current problems:  Routine general medical examination at a health care facility  (primary encounter diagnosis)  Need for influenza vaccination  Dyslipidemia  Numbness and tingling in right hand      1.   Generally healthy 25 year old female - UTD on PAP/pelvic exam.  Encouraged patient to continue to work on healthy eating and regular exercise.  Will give flu vaccine today.  OCPs refilled x 1 year.  2.   Dyslipidemia - increase omega-3 supplement to BID and recheck in 1 year.  3.   R hand numbness - continue to work with /PT - consider formal PT versus evaluation by neurology if needed - patient to call/[employer] message if she would like to go in this direction.  Patient understands and agrees with plan.      Discussed age appropriate risk factors including: Medications - see orders - discussed side effects & signs of reaction to medicine    Call if questions or problems.  Complete Physical Exam in 1 year.    Electronically Signed by: Shahnaz Patten DO 12/26/2023

## 2024-08-29 NOTE — TELEPHONE ENCOUNTER
Called patient to let her know that xanax 0.25 to take prior to scans was sent in.    If she needs additional medications, she will need to ask the provider ordering the scans.    Dick Gallagher, DO  [facility] Internal Medicine, PGY-III

## 2024-09-04 ENCOUNTER — HOSPITAL ENCOUNTER (OUTPATIENT)
Dept: WOUND CARE | Facility: HOSPITAL | Age: 59
Discharge: HOME | End: 2024-09-04
Attending: NURSE PRACTITIONER
Payer: COMMERCIAL

## 2024-09-04 ENCOUNTER — HOSPITAL ENCOUNTER (OUTPATIENT)
Dept: RADIOLOGY | Facility: HOSPITAL | Age: 59
Discharge: HOME | End: 2024-09-04
Attending: PODIATRIST
Payer: COMMERCIAL

## 2024-09-04 VITALS
OXYGEN SATURATION: 95 % | TEMPERATURE: 98 F | SYSTOLIC BLOOD PRESSURE: 156 MMHG | DIASTOLIC BLOOD PRESSURE: 85 MMHG | HEART RATE: 89 BPM | RESPIRATION RATE: 20 BRPM

## 2024-09-04 DIAGNOSIS — E11.622 TYPE 2 DIABETES MELLITUS WITH OTHER SKIN ULCER, WITHOUT LONG-TERM CURRENT USE OF INSULIN: ICD-10-CM

## 2024-09-04 DIAGNOSIS — M67.02 CONTRACTURE OF LEFT ACHILLES TENDON: ICD-10-CM

## 2024-09-04 DIAGNOSIS — E66.01 CLASS 3 SEVERE OBESITY WITH BODY MASS INDEX (BMI) OF 40.0 TO 44.9 IN ADULT, UNSPECIFIED OBESITY TYPE, UNSPECIFIED WHETHER SERIOUS COMORBIDITY PRESENT: ICD-10-CM

## 2024-09-04 DIAGNOSIS — M14.672 CHARCOT'S JOINT OF LEFT ANKLE: ICD-10-CM

## 2024-09-04 DIAGNOSIS — E11.621 DIABETIC ULCER OF LEFT FOOT ASSOCIATED WITH TYPE 2 DIABETES MELLITUS, WITH FAT LAYER EXPOSED, UNSPECIFIED PART OF FOOT: Primary | ICD-10-CM

## 2024-09-04 DIAGNOSIS — L97.522 ULCER OF LEFT FOOT, WITH FAT LAYER EXPOSED: ICD-10-CM

## 2024-09-04 DIAGNOSIS — L97.522 DIABETIC ULCER OF LEFT FOOT ASSOCIATED WITH TYPE 2 DIABETES MELLITUS, WITH FAT LAYER EXPOSED, UNSPECIFIED PART OF FOOT: Primary | ICD-10-CM

## 2024-09-04 DIAGNOSIS — E11.42 DIABETIC POLYNEUROPATHY: ICD-10-CM

## 2024-09-04 DIAGNOSIS — M14.672 CHARCOT'S JOINT OF ANKLE, LEFT: ICD-10-CM

## 2024-09-04 PROCEDURE — 99214 OFFICE O/P EST MOD 30 MIN: CPT | Mod: 25,,, | Performed by: NURSE PRACTITIONER

## 2024-09-04 PROCEDURE — 78802 RP LOCLZJ TUM WHBDY 1 D IMG: CPT | Mod: TC

## 2024-09-04 PROCEDURE — 29580 STRAPPING UNNA BOOT: CPT

## 2024-09-04 PROCEDURE — 11055 PARING/CUTG B9 HYPRKER LES 1: CPT

## 2024-09-04 PROCEDURE — 11055 PARING/CUTG B9 HYPRKER LES 1: CPT | Mod: ,,, | Performed by: NURSE PRACTITIONER

## 2024-09-04 PROCEDURE — 27000999 HC MEDICAL RECORD PHOTO DOCUMENTATION

## 2024-09-04 PROCEDURE — A9570 INDIUM IN-111 AUTO WBC: HCPCS | Performed by: PODIATRIST

## 2024-09-04 PROCEDURE — 99211 OFF/OP EST MAY X REQ PHY/QHP: CPT | Mod: 25

## 2024-09-04 RX ORDER — INDIUM IN-111 OXYQUINOLINE 1 UG/ML
0.5 SOLUTION INTRAVENOUS
Status: COMPLETED | OUTPATIENT
Start: 2024-09-04 | End: 2024-09-04

## 2024-09-04 RX ADMIN — INDIUM IN-111 OXYQUINOLINE 0.48 MILLICURIE: 1 SOLUTION INTRAVENOUS at 11:09

## 2024-09-04 NOTE — PATIENT INSTRUCTIONS
Pt seen today by: Martha Duncan NP, Leg wrapped by Edyta Fenton LPN    Home health and self care DRESSING INSTRUCTIONS:        Wound location: Left lateral ankle    Dressings to be changed at next clinic visit.    Cleanse wound with Vashe wound cleanser  Applied triad to periwound  Apply polymem max to the wound bed  Wrapped leg with unna boot  Applied 6 inch strip of drawtex edema to top of ankle  Cover wound with  two kerramax dressing, offloading foam and secure with kerlix and coban       Compression with: Unna boot    Return visit:  Martha Duncan the following Wednesday.    Nutrition:  The current daily value (%DV) for protein is 50 grams per day and is meant as a general goal for most people. Further increasing your dietary protein intake is very important for wound healing. Typically one needs over 100g of protein per day to help with wound healing needs.  If you are a dialysis patient or have problems with your kidneys, talk to your Nephrologist about how much protein you can take in with your condition.  Examples of high protein items that can be added to your diet include: eggs, chicken, red meats, almonds, cottage cheese, Greek yogurt, beans, and peanut butter.  Fortified protein bars, shakes and drinks can add 15-30 additional grams of protein per serving.  Also add:   1 daily general multivitamin   Vitamin C : 500mg twice daily   Zinc 220 mg daily  Vit D : once daily    Offloading   Offload your wound. This means to reduce pressure on and around the wound that reduces blood flow to the wound and prevents healing. Your wound care team will discuss specific ways for you to offload your specific wound. Common offloading strategies include:    Turn or reposition every 2 hours or sooner  Use pillows, wedges, ROHO wheelchair cushions or other special devices like boots and shoes to lift the wound off of hard surfaces  Alternating Low Air-loss (ALAL) mattress may be ordered  Padded dressings can reduce  wound pressure        Unna Boot:   It is important to move about with your unna boot on. Talk to your doctor about the right amount of activity for you. If the boot begins feeling tight, you may need to rest and prop your foot and leg on pillows. Try to get your foot higher than your heart.  Do not put things inside the boot to scratch your skin.  Keep your leg propped on pillows as much as you can during the day and at night.  Avoid sitting with your legs bent at the knees for a long time.       When do I need to call the doctor?   Your leg is numb or tingles  Toes are blue, grey, or cool  You have more swelling in your leg or foot  You have pain when you walk  Drainage that soaks through your dressing    Compression: You may be using a compressive type of dressings to control edema: If so, keep your compression wrap or tubigrip in place. Do not get them wet, they should feel snug. If they feel tight, or cause pain in any way,  elevate your legs above your heart for 15 minutes. If the wraps still cause pain or you can not tolerate compression,  please remove and notify the clinic or your home health.         Call our [facility] wound clinic for questions/concerns a [phone] .

## 2024-09-04 NOTE — PROGRESS NOTES
[facility]   Outpatient Wound Care     Subjective:   Patient ID: Suki Anton is a 58 y.o. female.    Chief Complaint: Charcot foot with wound      History of Present Illness:   58 y.o. White female presents to wound care clinic to for right ankle ulcer, and Unna boot change.  Reviewed all previous medical history and progress notes since last visit if 8/26/2024.  Past medical history: Currently under the care of  for left ankle Charcot foot joint. Currently was instructed per Dr. Neumann to be non-weight bearing to left ankle/foot.  Patient voices she walked from her car to the front of the hospital and then obtained wheelchair.  Instructed the importance this was be non-weight bearing to left foot and ankle meeting no weight for the next 6 weeks per Dr. Neumann. Under the care of Dr. Neumann.  Voices saw podiatrist today but did not take self pay patients.  Voices awaiting insurance from work due to insurance was changed in his awaiting for reinstatement.  Saw orthopedics on 7/1/24 and was informed of CT scan.  She is waiting to see podiatry no appointment scheduled at this time.  Will continue our clinic until ulcer is healed. Wound has been present for approximately 2 months.  She is a referral from Orthopedics.  Patient is currently awaiting CT scan of left ankle, and referral to a foot ankle specialist.  Patient voices 2 months ago twisted ankle and purchased a boot from [employer] which created an ulcer to ankle area. Followed by Dick Gallagher DO for PCP last appt 3/11/24.    Today's visit 9/4/24:  Reviewed previous progress notes last visit on 08/26/2024.  Presents to clinic in wheelchair eschar noted by radiology.  Unna boot to left lower extremity removed per nursing staff at bedside.  Left lower leg wash with Hibiclens soap and water.  Left  ankle ulcer red granulating wound bed with macerated jak wound and moderate serosanguineous drainage.  Pared jak wound callus using #4 dermal curette.  Rationale for paring to decrease bioburden and increased granulation.  Tolerated well.  Left ankle cleansed with Vashe, applied Triad to jak wound, polymem max, Unna boot, double super absorbent, offloading foam, Drawtex edema to dorsal foot, Kerlix, and Coban.  Tolerated well.  Reinforced offloading and unna boot precautions.  Will return to the clinic in 1 week.  Escorted to radiology after appointment in wheelchair today for additional scans.  Instructed to call the office with any questions, concerns, or any reasons having to remove Unna boot.  Verbalized understanding of all instructions.      08/26/2024:  Reviewed all previous progress notes since last visit 08/19/2024.  Presents to clinic in wheelchair escorted by staff member.  Unna boot to left lower extremity removed per nursing staff at bedside.  Left ankle ulcer red granulating wound bed with minimal maceration to edges moderate serosanguineous drainage, and significant decrease in size since last visit.  Discussion we will continue Unna boot at this time.  Patient voices as a bone scan on Thursday this week.  Instructed to have them call wound care clinic if we need to remove Unna boot prior to appointment.  All wound care performed per nursing staff at bedside.  Left ankle ulcer cleansed with Vashe, applied Triad to wound edge, apply polymem max, unna boot, double super abssorbent, offloading foam, drawtex edema to dorsal foot, Kerlix and Coban.  Reinforced unna boot precautions.  Reinforced offloading as much as possible.  Using wheelchair and knee walker at this time.  Will return to the clinic on Thursday for Unna boot change.  Instructed to call the office with any questions, concerns, or any reasons having to remove Unna boot.  Verbalized understanding of all instructions.    8/19/24:  Reviewed  all previous progress since last visit of 08/12/2024.  Presents to clinic in wheelchair.  Left lower extremity unna boot removed per nursing staff at bedside.  Left lower extremity ankle ulcer red granulating wound bed , macerated skin edges with moderate serosanguineous drainage.  Decrease in size since last visit.  Patient voices she is staying off of foot as much as possible.  All wound care performed per nursing staff at bedside.  Left ankle ulcer cleansed with Vashe, applied Triad to jak wound, polymem to wound bed, unna boot, super absorbent dressing, offloading foam, Kerlix and Coban.  Will return to the clinic on Thursday for Unna boot change.  Instructed the importance of offloading pressure as much as possible.  Using knee walker as much as possible.  Unna boot precautions.  Instructed to call the office with any questions, concerns, or any reasons having to remove Unna boot.  Verbalized understanding of all instructions.    8/12/24:  Reviewed all previous progress notes since last visit of 08/08/2024.  Presents to clinic in wheelchair.  Wearing left lower leg CAM walking boot.  All wound care to left lower extremity removed per nursing staff at bedside.  Left ankle ulcer red granulating wound bed minimal slough with macerated jak wound requiring debridement to decrease bio burden to increase granulation.  Selective debridement performed at bedside.  See quick procedure note.  All wound care performed per nursing staff at bedside.  Left ankle ulcer cleansed with Vashe, applied Triad to jak wound, silver polymem to wound bed, unna boot, convamax, offloading foam, Kerlix, and Coban.  Attempted to provide the patient with crutches patient unable to use crutches due to his shoulder injury.  Instructed the importance of her using knee walker at all times.  Reinforced unna boot precautions.  Will return to the clinic on Thursday for Unna boot change.  Instructed to call the office with any questions,  concerns, or any reasons having to remove Unna boot.  Verbalized understanding of all instructions.    08/05/2024:  Reviewed all previous progress notes since last visit 07/29/2024.  Presents to clinic in wheelchair.  Left lower leg CAM walking boot.  Left lower extremity Unna boot removed per nursing at bedside.  Left ankle ulcer red granulating wound bed with moderate serosanguineous drainage and macerated jak wound.  Instructed the patient today the importance of offloading pressure to left foot.  All wound care performed per nursing staff at bedside.  Left lower extremity cleansed with Hibiclens soap and water pat dry left ulcer cleansed with Vashe, applied Triad to jak wound, silver polymem, Convamax, unna boot, kerlix, and coban.  Unna boot precautions given.  Will return to the clinic on Thursday after appt with Dr. Neumann.  Instructed to call the office with any questions, concerns, or any reasons having to remove Unna boot.  Verbalized understanding of all instructions.      7/29/24:  Reviewed all previous progress since last visit 07/22/2024.  Presents to clinic alone. Ambulating with CAM walking boot.  Left lower extremity Unna boot removed per nursing staff at bedside.  Left lower leg washed Hibiclens soap and water.  Wound red granulating wound bed, slight macerated jak wound with moderate serosanguineous drainage.  Discussion with the patient today will continue same wound care orders.  All wound care performed per nursing staff at bedside.  Left lower extremity ankle cleansed with Vashe, applied Triad to jak wound, silver polymem to  wound bed, cover with convamax, Unna boot, Kerlix, and Coban.  Tolerated well.  Will return to our clinic on Thursday for a nurse visit for Unna boot change.  Instructed to call the office with any questions, concerns, or any reasons removed Unna boot.  Instructed the importance to call and reschedule appointment with Dr. Neumann regarding left Charcot ankle.   Reinforced offloading pressure to areas much as possible.  Verbalized understanding of all instructions.    07/22/2024:  Reviewed all previous progress notes 07/15/2024.  Presents to the clinic with uma.  Ambulates with CAM walking boot to left lower leg.  Left lower extremity Unna boot removed per nursing staff at bedside.  Left ankle wound red granulating wound bed macerated jak wound moderate serosanguineous drainage.  Discussion with the patient today significant decrease in size since last visit will continue Unna boot.  New wound care orders for left lower extremity ankle cleansed with Vashe apply, Triad to jak wound, silver polymem to wound bed, cover with covamax, Unna boot, Kerlix, and Coban.  All wound care performed per nursing staff at bedside.  Will return to the clinic on Thursday for a nurse visit for Unna boot change.  Will follow up in wound clinic with me next Monday.  Instructed to call the office with any questions, concerns, or any reasons having to remove Unna boot.  Verbalized understanding of all instructions.      07/15/2024:  Reviewed all previous progress notes.  Left lower leg unna boot removed per nursing staff at bedside.  Left ankle wound red granulating wound bed with moderate serosanguineous drainage and macerated jak wound.  Significant decrease in size since last visit, and now wound has created 2 separate wounds.  Discussion with the patient today we will continue Unna boot to assist in decreasing edema to ankle, and increase in granulation of wounds.  All wound care performed per nursing staff at bedside.  Left ankle wound cleansed with Vashe, applied Triad to periwound, silver polymem to wound bed, Unna boot, Drawtex wrap, Kerlix and Coban.  Currently wearing left CAM boot.  Will have her return to the clinic on Thursday for a nurse visit for Unna boot change.  Reinforced unna boot precautions.  Reinforced offloading pressure to left foot as much as possible.  Instructed  to call the office with any questions, concerns, or any reasons having to remove Unna boot.  Verbalized understanding of all instructions.    07/08/2024:  Reviewed previous progress notes.  Left lower leg unna boot removed per nursing staff at bedside.  Left ankle ulcer red granulating wound bed with moderate serosanguineous drainage with macerated jak wound.  Will continue same wound care.  All wound care performed per nursing staff at bedside.  Left ankle wound cleansed with Vashe, apply Triad to periwound edge, Silver polymem to wound bed, cover with unna boot, Drawtex 4 x 4, Kerlix, and Coban. Tolerated well.  Reinforced unna boot precautions.  Will return to clinic on Thursday for a nurse visit for Unna boot change.  Will follow up with me next Monday. Instructed the importance of calling the office with any questions, concerns, or any reasons having to remove Unna boot.  Verbalized understanding of all instructions.    07/01/2024:  Reviewed previous progress notes.  Left lower leg unna boot removed per nursing staff at bedside.  Left ankle ulcer red granulating wound bed with minimal slough and moderate serosanguineous drainage.  Patient voices  will have virtual visit with orthopedic talk to discuss CT scan today.  All wound care performed per nursing staff at bedside.  Will continue Unna boot. Left ankle wound cleansed with 0.5% Dakins, apply Triad to periwound edge, Silver polymem to wound bed, cover with unna boot, Drawtex 4 x 4, Kerlix, and Coban. Tolerated well.  Reinforced unna boot precautions.  Will return to the clinic on Wednesday for a nurse visit, and will return the following Monday to re-evaluate left ankle ulcer.  Instructed the importance of calling the office with any questions, concerns, or any reasons having to remove Unna boot.  Verbalized understanding of all instructions.    06/26/2024:  Presents to clinic alone.  Left lower leg unna boot removed per nursing staff at bedside.  Left  ankle ulcer red granulating wound bed with moderate serosanguineous drainage minimal slough.  Macerated jak wound.  Discussion with the patient today will continue Unna boot.  Patient is taking all oral antibiotics as directed.  All wound care performed per nursing staff at bedside. Left ankle wound cleansed with 0.5% Dakins, apply Triad to periwound edge, Silver polymem to wound bed, cover with unna boot, Drawtex wrap, Kerlix, and Coban. Tolerated well.  Reinforced unna boot precautions.  Will return to the clinic on Monday to re-evaluate left ankle ulcer.  Instructed the importance of calling the office with any questions, concerns, or any reasons having to remove Unna boot.  Doctors excuse given.  Verbalized understanding of all instructions.      06/13/2024:  Presents to the clinic with grandchildren.  Ambulates with left foot orthopedic walking boot.  Walking boot and left ankle dressing removed per nursing staff at bedside.  Left ankle ulcer red granulating wound bed with moderate amount of slough and serosanguineous drainage with the jak wound erythema and nonpitting edema.  Discussion with the patient today will need to perform selective debridement and obtain tissue culture.  Written consent obtained.  See quick procedure note.  Following debridement red granulating wound bed with moderate serosanguineous drainage discussion with the patient today will benefit from applying Unna boot to increased granulation to area and decrease edema.  All wound care performed per nursing staff at bedside.  Left ankle wound cleansed with 0.5% Dakins, apply Silver polymem to wound bed, cover with unna boot, Drawtex wrap, Kerlix, and Coban.  Tolerated well.  Instructed on unna boot precautions.  Will return to the clinic on Monday for a nurse visit for Unna boot change.  Will follow up with me next Thursday.       History includes:      Past Medical History:   Diagnosis Date    Diabetes mellitus     Diabetes mellitus, type  2     GERD (gastroesophageal reflux disease)     Hypertension     History reviewed. No pertinent surgical history.   Social History     Socioeconomic History    Marital status:     Number of children: 0   Occupational History    Occupation: Manager     Comment: [employer]   Tobacco Use    Smoking status: Never    Smokeless tobacco: Never   Substance and Sexual Activity    Alcohol use: Never    Drug use: Never    Sexual activity: Yes     Partners: Male     Birth control/protection: None     Social Determinants of Health     Financial Resource Strain: Medium Risk (4/4/2024)    Overall Financial Resource Strain (CARDIA)     Difficulty of Paying Living Expenses: Somewhat hard   Food Insecurity: Food Insecurity Present (3/11/2024)    Hunger Vital Sign     Worried About Running Out of Food in the Last Year: Sometimes true     Ran Out of Food in the Last Year: Sometimes true   Transportation Needs: Unmet Transportation Needs (4/4/2024)    PRAPARE - Transportation     Lack of Transportation (Medical): Yes     Lack of Transportation (Non-Medical): Yes   Physical Activity: Inactive (4/4/2024)    Exercise Vital Sign     Days of Exercise per Week: 0 days     Minutes of Exercise per Session: 0 min   Stress: Stress Concern Present (4/4/2024)    [employer] - Occupational Stress Questionnaire     Feeling of Stress : Rather much   Housing Stability: High Risk (3/11/2024)    Housing Stability Vital Sign     Unable to Pay for Housing in the Last Year: Yes     Number of Places Lived in the Last Year: 1     Unstable Housing in the Last Year: No   .      Current Outpatient Medications   Medication Sig Dispense Refill    acetaminophen (TYLENOL) 500 MG tablet Take 500 mg by mouth every 6 (six) hours as needed for Pain.      ALPRAZolam (XANAX) 0.25 MG tablet Take 1 tablet (0.25 mg total) by mouth as needed for Anxiety (Take 30 min prior to getting scanned). 2 tablet 0    atorvastatin (LIPITOR) 40  MG tablet Take 1 tablet (40 mg total) by mouth once daily. 90 tablet 0    blood sugar diagnostic Strp Test strips to check CBG's 200 each 11    blood-glucose meter kit 1 each by Other route 2 (two) times daily. Use as instructed 1 each 0    EASY TOUCH TWIST LANCETS 33 gauge Misc USE TO CHECK BLOOD SUAGR LEVELS TWO TIMES A  each 3    gabapentin (NEURONTIN) 300 MG capsule Take 2 capsules (600 mg total) by mouth 3 (three) times daily. 180 capsule 2    glipiZIDE (GLUCOTROL) 10 MG tablet Take 1 tablet (10 mg total) by mouth 2 (two) times daily with meals. 60 tablet 5    insulin degludec (TRESIBA FLEXTOUCH U-100) 100 unit/mL (3 mL) insulin pen Inject 25 Units into the skin every evening. 7.5 mL 2    insulin lispro (HUMALOG KWIKPEN INSULIN) 100 unit/mL pen Inject 7 Units into the skin 3 (three) times daily with meals. 6.3 mL 2    lancing device Misc 1 each by Misc.(Non-Drug; Combo Route) route 4 (four) times daily with meals and nightly. 200 each 5    lisinopriL (PRINIVIL,ZESTRIL) 40 MG tablet Take 1 tablet (40 mg total) by mouth once daily. 90 tablet 3    omeprazole (PRILOSEC) 20 MG capsule Take 2 capsules (40 mg total) by mouth once daily. 60 capsule 2    rOPINIRole (REQUIP) 0.5 MG tablet Take 1 tablet (0.5 mg total) by mouth 3 (three) times daily. 90 tablet 2    traMADoL (ULTRAM) 50 mg tablet Take 1 tablet (50 mg total) by mouth every 6 (six) hours. 20 tablet 0    traZODone (DESYREL) 50 MG tablet Take 1 tablet (50 mg total) by mouth nightly as needed for Insomnia. 30 tablet 0    TRUE METRIX GLUCOSE METER Misc Inject 1 each into the skin 4 (four) times daily with meals and nightly. use as directed 1 each 0     No current facility-administered medications for this encounter.       Review of Systems   Skin:  Positive for wound.   All other systems reviewed and are negative.         Labs Reviewed:   Chemistry:  Lab Results   Component Value Date    BUN 14.5 06/15/2023    BUN 14.0 06/14/2023    CREATININE 0.75  06/15/2023    CREATININE 0.69 06/14/2023    EGFRNORACEVR >60 06/15/2023    EGFRNORACEVR >60 06/14/2023    GLUCOSE 267 (H) 06/15/2023    AST 8 06/15/2023    AST 8 06/14/2023    ALT 12 06/15/2023    ALT 10 06/14/2023    HGBA1C 11.4 (H) 04/13/2023        Hematology:  Lab Results   Component Value Date    WBC 14.77 (H) 06/15/2023    WBC 15.65 (H) 06/14/2023    HGB 11.3 (L) 06/15/2023    HGB 11.6 (L) 06/14/2023    HCT 33.7 (L) 06/15/2023    HCT 34.5 (L) 06/14/2023     06/15/2023     06/14/2023       Inflammatory Markers:  Lab Results   Component Value Date    SEDRATE 64 (H) 07/02/2020    SEDRATE 95 (H) 06/30/2020    SEDRATE 94 (H) 06/28/2020        Objective:        Physical Exam  Vitals reviewed.   Cardiovascular:      Pulses:           Dorsalis pedis pulses are detected w/ Doppler on the right side and detected w/ Doppler on the left side.        Posterior tibial pulses are detected w/ Doppler on the right side and detected w/ Doppler on the left side.   Musculoskeletal:      Left lower leg: Edema present.      Left foot: Charcot foot present.        Feet:    Feet:      Right foot:      Skin integrity: Skin integrity normal.      Toenail Condition: Right toenails are abnormally thick. Fungal disease present.     Left foot:      Skin integrity: Ulcer present.      Toenail Condition: Left toenails are abnormally thick. Fungal disease present.  Skin:     General: Skin is warm.      Capillary Refill: Capillary refill takes less than 2 seconds.      Findings: Wound present.             Comments: Red granulating wound bed with macerated jak wound moderate serosanguineous drainage.    Neurological:      Mental Status: She is alert.            Wound 06/13/24 0827 Pressure Injury Left lateral Ankle (Active)   06/13/24 0827   Present on Original Admission: Y   Primary Wound Type: Pressure inj   Side: Left   Orientation: lateral   Location: Ankle   Wound Approximate Age at First Assessment (Weeks):    Wound Number:     Is this injury device related?:    Incision Type:    Closure Method:    Wound Description (Comments):    Type:    Additional Comments:    Ankle-Brachial Index:    Pulses:    Removal Indication and Assessment:    Wound Outcome:    Wound Image   09/04/24 0856   Dressing Appearance Moist drainage 09/04/24 0856   Drainage Amount Moderate 09/04/24 0856   Drainage Characteristics/Odor Serosanguineous 09/04/24 0856   Appearance Pink 09/04/24 0856   Tissue loss description Full thickness 09/04/24 0856   Periwound Area Moist;Redness;Macerated 09/04/24 0856   Wound Edges Callused 09/04/24 0856   Wound Length (cm) 1.3 cm 09/04/24 0856   Wound Width (cm) 1.3 cm 09/04/24 0856   Wound Depth (cm) 0.3 cm 09/04/24 0856   Wound Volume (cm^3) 0.507 cm^3 09/04/24 0856   Wound Surface Area (cm^2) 1.69 cm^2 09/04/24 0856   Care Cleansed with:;Antimicrobial agent 09/04/24 0856   Dressing Silver 09/04/24 0856   Compression Unna's Boot 09/04/24 0856         Assessment:         ICD-10-CM ICD-9-CM   1. Diabetic ulcer of left foot associated with type 2 diabetes mellitus, with fat layer exposed, unspecified part of foot  E11.621 250.80    L97.522 707.15   2. Charcot's joint of left ankle  M14.672 094.0     713.5   3. Type 2 diabetes mellitus with other skin ulcer, without long-term current use of insulin  E11.622 250.80   4. Class 3 severe obesity with body mass index (BMI) of 40.0 to 44.9 in adult, unspecified obesity type, unspecified whether serious comorbidity present  E66.01 278.01    Z68.41 V85.41           Plan:   Tissue pathology and/or culture taken:  [] Yes [x] No   Sharp debridement performed:   [] Yes [x] No   Labs ordered this visit:   [] Yes [x] No   Imaging ordered this visit:   [] Yes [x] No         1. Diabetic ulcer of other part of left foot associated with type 2 diabetes mellitus, with fat layer exposed     Reapplied Unna boot.    Continue monitoring:  Watch for increased drainage or pain, fevers, chills, swelling and  report this to clinic.   2. Charcot's joint of left ankle     Will have bone scan today.    Reinforced offloading pressure at all times.  Using knee walker and wheelchair.    Wearing CAM walking boot at all times.     3. Type 2 diabetes mellitus with other skin ulcer, without long-term current use of insulin     Will continue to monitor.    Last A1c 9.7.    Co-factor in delayed wound healing.     Continued monitoring:  Must have a strict diabetic diet, take glucose lowering medications as prescribed and encourage lower HA1C.     4. Class 3 severe obesity with body mass index (BMI) of 40.0 to 44.9 in adult, unspecified obesity type, unspecified whether serious comorbidity present     Will continue to monitor and educate.   Instructed the importance of diet, and decrease caloric intake due to non-weight bearing to left foot at this time.     Patient Instructions   Pt seen today by: Martha Duncan NP, Leg wrapped by Edyta Fenton LPN    Home health and self care DRESSING INSTRUCTIONS:        Wound location: Left lateral ankle    Dressings to be changed at next clinic visit.    Cleanse wound with Vashe wound cleanser  Applied triad to periwound  Apply polymem max to the wound bed  Wrapped leg with unna boot  Applied 6 inch strip of drawtex edema to top of ankle  Cover wound with  two kerramax dressing, offloading foam and secure with kerlix and coban       Compression with: Unna boot    Return visit:  Martha Duncan the following Wednesday.    Nutrition:  The current daily value (%DV) for protein is 50 grams per day and is meant as a general goal for most people. Further increasing your dietary protein intake is very important for wound healing. Typically one needs over 100g of protein per day to help with wound healing needs.  If you are a dialysis patient or have problems with your kidneys, talk to your Nephrologist about how much protein you can take in with your condition.  Examples of high protein items that can  be added to your diet include: eggs, chicken, red meats, almonds, cottage cheese, Greek yogurt, beans, and peanut butter.  Fortified protein bars, shakes and drinks can add 15-30 additional grams of protein per serving.  Also add:   1 daily general multivitamin   Vitamin C : 500mg twice daily   Zinc 220 mg daily  Vit D : once daily    Offloading   Offload your wound. This means to reduce pressure on and around the wound that reduces blood flow to the wound and prevents healing. Your wound care team will discuss specific ways for you to offload your specific wound. Common offloading strategies include:    Turn or reposition every 2 hours or sooner  Use pillows, wedges, ROHO wheelchair cushions or other special devices like boots and shoes to lift the wound off of hard surfaces  Alternating Low Air-loss (ALAL) mattress may be ordered  Padded dressings can reduce wound pressure        Unna Boot:   It is important to move about with your unna boot on. Talk to your doctor about the right amount of activity for you. If the boot begins feeling tight, you may need to rest and prop your foot and leg on pillows. Try to get your foot higher than your heart.  Do not put things inside the boot to scratch your skin.  Keep your leg propped on pillows as much as you can during the day and at night.  Avoid sitting with your legs bent at the knees for a long time.       When do I need to call the doctor?   Your leg is numb or tingles  Toes are blue, grey, or cool  You have more swelling in your leg or foot  You have pain when you walk  Drainage that soaks through your dressing    Compression: You may be using a compressive type of dressings to control edema: If so, keep your compression wrap or tubigrip in place. Do not get them wet, they should feel snug. If they feel tight, or cause pain in any way,  elevate your legs above your heart for 15 minutes. If the wraps still cause pain or you can not tolerate compression,  please remove  and notify the clinic or your home health.         Call our [facility] wound clinic for questions/concerns a [phone] .      The time spent including preparing to see the patient, obtaining patient history and assessment, evaluation of the plan of care, patient/caregiver counseling and education, orders, documentation, coordination of care, and other professional medical management activities for today's encounter was 15 minute.    Time spent performing procedures during today's encounter was 20 minute.    Follow up in about 1 week (around 9/11/2024). Teaching provided on s/s to call wound clinic for promptly.  Unna boot and ER precautions taught for after hours and weekends.       Martha Duncan, JAYDEP

## 2024-09-05 ENCOUNTER — HOSPITAL ENCOUNTER (OUTPATIENT)
Dept: RADIOLOGY | Facility: HOSPITAL | Age: 59
Discharge: HOME | End: 2024-09-05
Attending: PODIATRIST
Payer: COMMERCIAL

## 2024-09-10 ENCOUNTER — HOSPITAL ENCOUNTER (OUTPATIENT)
Dept: WOUND CARE | Facility: HOSPITAL | Age: 59
Discharge: HOME | End: 2024-09-10
Attending: NURSE PRACTITIONER
Payer: COMMERCIAL

## 2024-09-10 VITALS
SYSTOLIC BLOOD PRESSURE: 155 MMHG | TEMPERATURE: 98 F | HEART RATE: 84 BPM | DIASTOLIC BLOOD PRESSURE: 83 MMHG | OXYGEN SATURATION: 95 %

## 2024-09-10 DIAGNOSIS — E11.622 TYPE 2 DIABETES MELLITUS WITH OTHER SKIN ULCER, WITHOUT LONG-TERM CURRENT USE OF INSULIN: ICD-10-CM

## 2024-09-10 DIAGNOSIS — E66.01 CLASS 3 SEVERE OBESITY WITH BODY MASS INDEX (BMI) OF 40.0 TO 44.9 IN ADULT, UNSPECIFIED OBESITY TYPE, UNSPECIFIED WHETHER SERIOUS COMORBIDITY PRESENT: ICD-10-CM

## 2024-09-10 DIAGNOSIS — F51.01 PRIMARY INSOMNIA: ICD-10-CM

## 2024-09-10 DIAGNOSIS — M14.672 CHARCOT'S JOINT OF LEFT ANKLE: ICD-10-CM

## 2024-09-10 DIAGNOSIS — E11.621 DIABETIC ULCER OF LEFT FOOT ASSOCIATED WITH TYPE 2 DIABETES MELLITUS, WITH FAT LAYER EXPOSED, UNSPECIFIED PART OF FOOT: Primary | ICD-10-CM

## 2024-09-10 DIAGNOSIS — L97.522 DIABETIC ULCER OF LEFT FOOT ASSOCIATED WITH TYPE 2 DIABETES MELLITUS, WITH FAT LAYER EXPOSED, UNSPECIFIED PART OF FOOT: Primary | ICD-10-CM

## 2024-09-10 PROCEDURE — 99211 OFF/OP EST MAY X REQ PHY/QHP: CPT | Mod: 25

## 2024-09-10 PROCEDURE — 27000999 HC MEDICAL RECORD PHOTO DOCUMENTATION

## 2024-09-10 PROCEDURE — 29580 STRAPPING UNNA BOOT: CPT

## 2024-09-10 PROCEDURE — 99214 OFFICE O/P EST MOD 30 MIN: CPT | Mod: ,,, | Performed by: NURSE PRACTITIONER

## 2024-09-10 NOTE — PATIENT INSTRUCTIONS
Pt seen today by: Martha Duncan NP    Self care DRESSING INSTRUCTIONS: In the event Unna Boot has to be removed, Apply Vashe/Dakin's moistened gauze to woundbed, cover with foam dressing qd, contact Woundcare clinic as soon as possible.         Wound location: left ankle      Return visit: Friday for Nurse Visit      Nutrition:  The current daily value (%DV) for protein is 50 grams per day and is meant as a general goal for most people. Further increasing your dietary protein intake is very important for wound healing. Typically one needs over 100g of protein per day to help with wound healing needs.  If you are a dialysis patient or have problems with your kidneys, talk to your Nephrologist about how much protein you can take in with your condition.  Examples of high protein items that can be added to your diet include: eggs, chicken, red meats, almonds, cottage cheese, Greek yogurt, beans, and peanut butter.  Fortified protein bars, shakes and drinks can add 15-30 additional grams of protein per serving.  Also add:   1 daily general multivitamin   Vitamin C : 500mg twice daily   Zinc 220 mg daily  Vit D : once daily    Offloading   Offload your wound. This means to reduce pressure on and around the wound that reduces blood flow to the wound and prevents healing. Your wound care team will discuss specific ways for you to offload your specific wound. Common offloading strategies include:    Turn or reposition every 2 hours or sooner  Use pillows, wedges, ROHO wheelchair cushions or other special devices like boots and shoes to lift the wound off of hard surfaces  Alternating Low Air-loss (ALAL) mattress may be ordered  Padded dressings can reduce wound pressure        Call our [facility] wound clinic for questions/concerns a [phone] .           Wound may have been debrided in clinic: if so, WHAT YOU NEED TO KNOW:      Unna Boot:   It is important to move about with your unna boot on. Talk to your doctor about  the right amount of activity for you. If the boot begins feeling tight, you may need to rest and prop your foot and leg on pillows. Try to get your foot higher than your heart.  Do not put things inside the boot to scratch your skin.  Keep your leg propped on pillows as much as you can during the day and at night.  Avoid sitting with your legs bent at the knees for a long time.       When do I need to call the doctor?   Your leg is numb or tingles  Toes are blue, grey, or cool  You have more swelling in your leg or foot  You have pain when you walk  Drainage that soaks through your dressing          Call our [facility] wound clinic for questions/concerns a [phone] .

## 2024-09-12 RX ORDER — TRAZODONE HYDROCHLORIDE 50 MG/1
50 TABLET ORAL NIGHTLY PRN
Qty: 30 TABLET | Refills: 0 | Status: SHIPPED | OUTPATIENT
Start: 2024-09-12 | End: 2024-10-12

## 2024-09-12 NOTE — PROGRESS NOTES
[facility]   Outpatient Wound Care     Subjective:   Patient ID: Suki Anton is a 58 y.o. female.    Chief Complaint: Wound Care (Left lower leg wound/unna)      History of Present Illness:   58 y.o. White female presents to wound care clinic today for right ankle ulcer evaluation, and Unna boot change.  Reviewed all previous medical history and progress notes since last visit if 9/4/2024.  Past medical history: Currently under the care of  for left ankle Charcot foot joint. Currently was instructed per Dr. Neumann to be non-weight bearing to left ankle/foot.  Patient voices she walked from her car to the front of the hospital and then obtained wheelchair.  Instructed the importance this was be non-weight bearing to left foot and ankle meeting no weight for the next 6 weeks per Dr. Neumann. Under the care of Dr. Neumann.  Voices saw podiatrist today but did not take self pay patients.  Voices awaiting insurance from work due to insurance was changed in his awaiting for reinstatement.  Saw orthopedics on 7/1/24 and was informed of CT scan.  She is waiting to see podiatry no appointment scheduled at this time.  Will continue our clinic until ulcer is healed. Wound has been present for approximately 2 months.  She is a referral from Orthopedics.  Patient is currently awaiting CT scan of left ankle, and referral to a foot ankle specialist.  Patient voices 2 months ago twisted ankle and purchased a boot from [employer] which created an ulcer to ankle area. Followed by Dick Gallagher DO for PCP last appt 3/11/24.    Today's visit 9/10/24:  Reviewed previous progress notes since last visit of dye/4/24.  Unna boot removed per nursing staff at bedside.  Left lower leg wash with Hibiclens soap and water pat dry.  Left ankle red granulating wound bed with macerated  jak wound moderate serosanguineous drainage.  Discussion with the patient today wound has significantly decreased in size since last visit.  Will continue same wound care.  Wound care performed per nursing staff at bedside.  Left ankle wound cleansed with Vashe applied Triad to jak wound, polymem max to wound bed, Unna boot, double super absorbent, offloading foam, Drawtex edema to dorsal foot, kerlix, and coban.  Tolerated well.  Wearing CAM walking boot.  Instructed only apply boot when transferring.  Instructed to stay off of foot as much as possible.  Reinforced offloading in unna boot precautions.  Will have her return to the clinic on Friday for a nurse visit.  Instructed to call the office with any questions, concerns, or new skin issues.  Verbalized understanding of all instructions.      9/4/24:  Reviewed previous progress notes last visit on 08/26/2024.  Presents to clinic in wheelchair eschar noted by radiology.  Unna boot to left lower extremity removed per nursing staff at bedside.  Left lower leg wash with Hibiclens soap and water.  Left ankle ulcer red granulating wound bed with macerated jak wound and moderate serosanguineous drainage.  Pared jak wound callus using #4 dermal curette.  Rationale for paring to decrease bioburden and increased granulation.  Tolerated well.  Left ankle cleansed with Vashe, applied Triad to jak wound, polymem max, Unna boot, double super absorbent, offloading foam, Drawtex edema to dorsal foot, Kerlix, and Coban.  Tolerated well.  Reinforced offloading and unna boot precautions.  Will return to the clinic in 1 week.  Escorted to radiology after appointment in wheelchair today for additional scans.  Instructed to call the office with any questions, concerns, or any reasons having to remove Unna boot.  Verbalized understanding of all instructions.      08/26/2024:  Reviewed all previous progress notes since last visit 08/19/2024.  Presents to clinic in wheelchair  escorted by staff member.  Unna boot to left lower extremity removed per nursing staff at bedside.  Left ankle ulcer red granulating wound bed with minimal maceration to edges moderate serosanguineous drainage, and significant decrease in size since last visit.  Discussion we will continue Unna boot at this time.  Patient voices as a bone scan on Thursday this week.  Instructed to have them call wound care clinic if we need to remove Unna boot prior to appointment.  All wound care performed per nursing staff at bedside.  Left ankle ulcer cleansed with Vashe, applied Triad to wound edge, apply polymem max, unna boot, double super abssorbent, offloading foam, drawtex edema to dorsal foot, Kerlix and Coban.  Reinforced unna boot precautions.  Reinforced offloading as much as possible.  Using wheelchair and knee walker at this time.  Will return to the clinic on Thursday for Unna boot change.  Instructed to call the office with any questions, concerns, or any reasons having to remove Unna boot.  Verbalized understanding of all instructions.    8/19/24:  Reviewed all previous progress since last visit of 08/12/2024.  Presents to clinic in wheelchair.  Left lower extremity unna boot removed per nursing staff at bedside.  Left lower extremity ankle ulcer red granulating wound bed , macerated skin edges with moderate serosanguineous drainage.  Decrease in size since last visit.  Patient voices she is staying off of foot as much as possible.  All wound care performed per nursing staff at bedside.  Left ankle ulcer cleansed with Vashe, applied Triad to jak wound, polymem to wound bed, unna boot, super absorbent dressing, offloading foam, Kerlix and Coban.  Will return to the clinic on Thursday for Unna boot change.  Instructed the importance of offloading pressure as much as possible.  Using knee walker as much as possible.  Unna boot precautions.  Instructed to call the office with any questions, concerns, or any reasons  having to remove Unna boot.  Verbalized understanding of all instructions.    8/12/24:  Reviewed all previous progress notes since last visit of 08/08/2024.  Presents to clinic in wheelchair.  Wearing left lower leg CAM walking boot.  All wound care to left lower extremity removed per nursing staff at bedside.  Left ankle ulcer red granulating wound bed minimal slough with macerated jak wound requiring debridement to decrease bio burden to increase granulation.  Selective debridement performed at bedside.  See quick procedure note.  All wound care performed per nursing staff at bedside.  Left ankle ulcer cleansed with Vashe, applied Triad to jak wound, silver polymem to wound bed, unna boot, convamax, offloading foam, Kerlix, and Coban.  Attempted to provide the patient with crutches patient unable to use crutches due to his shoulder injury.  Instructed the importance of her using knee walker at all times.  Reinforced unna boot precautions.  Will return to the clinic on Thursday for Unna boot change.  Instructed to call the office with any questions, concerns, or any reasons having to remove Unna boot.  Verbalized understanding of all instructions.    08/05/2024:  Reviewed all previous progress notes since last visit 07/29/2024.  Presents to clinic in wheelchair.  Left lower leg CAM walking boot.  Left lower extremity Unna boot removed per nursing at bedside.  Left ankle ulcer red granulating wound bed with moderate serosanguineous drainage and macerated jak wound.  Instructed the patient today the importance of offloading pressure to left foot.  All wound care performed per nursing staff at bedside.  Left lower extremity cleansed with Hibiclens soap and water pat dry left ulcer cleansed with Vashe, applied Triad to jak wound, silver polymem, Convamax, unna boot, kerlix, and coban.  Unna boot precautions given.  Will return to the clinic on Thursday after appt with Dr. Neumann.  Instructed to call the office  with any questions, concerns, or any reasons having to remove Unna boot.  Verbalized understanding of all instructions.      7/29/24:  Reviewed all previous progress since last visit 07/22/2024.  Presents to clinic alone. Ambulating with CAM walking boot.  Left lower extremity Unna boot removed per nursing staff at bedside.  Left lower leg washed Hibiclens soap and water.  Wound red granulating wound bed, slight macerated jak wound with moderate serosanguineous drainage.  Discussion with the patient today will continue same wound care orders.  All wound care performed per nursing staff at bedside.  Left lower extremity ankle cleansed with Vashe, applied Triad to jak wound, silver polymem to  wound bed, cover with convamax, Unna boot, Kerlix, and Coban.  Tolerated well.  Will return to our clinic on Thursday for a nurse visit for Unna boot change.  Instructed to call the office with any questions, concerns, or any reasons removed Unna boot.  Instructed the importance to call and reschedule appointment with Dr. Neumann regarding left Charcot ankle.  Reinforced offloading pressure to areas much as possible.  Verbalized understanding of all instructions.    07/22/2024:  Reviewed all previous progress notes 07/15/2024.  Presents to the clinic with uma.  Ambulates with CAM walking boot to left lower leg.  Left lower extremity Unna boot removed per nursing staff at bedside.  Left ankle wound red granulating wound bed macerated jak wound moderate serosanguineous drainage.  Discussion with the patient today significant decrease in size since last visit will continue Unna boot.  New wound care orders for left lower extremity ankle cleansed with Vashe apply, Triad to jak wound, silver polymem to wound bed, cover with covamax, Unna boot, Kerlix, and Coban.  All wound care performed per nursing staff at bedside.  Will return to the clinic on Thursday for a nurse visit for Unna boot change.  Will follow up in wound  clinic with me next Monday.  Instructed to call the office with any questions, concerns, or any reasons having to remove Unna boot.  Verbalized understanding of all instructions.      07/15/2024:  Reviewed all previous progress notes.  Left lower leg unna boot removed per nursing staff at bedside.  Left ankle wound red granulating wound bed with moderate serosanguineous drainage and macerated jak wound.  Significant decrease in size since last visit, and now wound has created 2 separate wounds.  Discussion with the patient today we will continue Unna boot to assist in decreasing edema to ankle, and increase in granulation of wounds.  All wound care performed per nursing staff at bedside.  Left ankle wound cleansed with Vashe, applied Triad to periwound, silver polymem to wound bed, Unna boot, Drawtex wrap, Kerlix and Coban.  Currently wearing left CAM boot.  Will have her return to the clinic on Thursday for a nurse visit for Unna boot change.  Reinforced unna boot precautions.  Reinforced offloading pressure to left foot as much as possible.  Instructed to call the office with any questions, concerns, or any reasons having to remove Unna boot.  Verbalized understanding of all instructions.    07/08/2024:  Reviewed previous progress notes.  Left lower leg unna boot removed per nursing staff at bedside.  Left ankle ulcer red granulating wound bed with moderate serosanguineous drainage with macerated jak wound.  Will continue same wound care.  All wound care performed per nursing staff at bedside.  Left ankle wound cleansed with Vashe, apply Triad to periwound edge, Silver polymem to wound bed, cover with unna boot, Drawtex 4 x 4, Kerlix, and Coban. Tolerated well.  Reinforced unna boot precautions.  Will return to clinic on Thursday for a nurse visit for Unna boot change.  Will follow up with me next Monday. Instructed the importance of calling the office with any questions, concerns, or any reasons having to  remove Unna boot.  Verbalized understanding of all instructions.    07/01/2024:  Reviewed previous progress notes.  Left lower leg unna boot removed per nursing staff at bedside.  Left ankle ulcer red granulating wound bed with minimal slough and moderate serosanguineous drainage.  Patient voices  will have virtual visit with orthopedic talk to discuss CT scan today.  All wound care performed per nursing staff at bedside.  Will continue Unna boot. Left ankle wound cleansed with 0.5% Dakins, apply Triad to periwound edge, Silver polymem to wound bed, cover with unna boot, Drawtex 4 x 4, Kerlix, and Coban. Tolerated well.  Reinforced unna boot precautions.  Will return to the clinic on Wednesday for a nurse visit, and will return the following Monday to re-evaluate left ankle ulcer.  Instructed the importance of calling the office with any questions, concerns, or any reasons having to remove Unna boot.  Verbalized understanding of all instructions.    06/26/2024:  Presents to clinic alone.  Left lower leg unna boot removed per nursing staff at bedside.  Left ankle ulcer red granulating wound bed with moderate serosanguineous drainage minimal slough.  Macerated jak wound.  Discussion with the patient today will continue Unna boot.  Patient is taking all oral antibiotics as directed.  All wound care performed per nursing staff at bedside. Left ankle wound cleansed with 0.5% Dakins, apply Triad to periwound edge, Silver polymem to wound bed, cover with unna boot, Drawtex wrap, Kerlix, and Coban. Tolerated well.  Reinforced unna boot precautions.  Will return to the clinic on Monday to re-evaluate left ankle ulcer.  Instructed the importance of calling the office with any questions, concerns, or any reasons having to remove Unna boot.  Doctors excuse given.  Verbalized understanding of all instructions.      06/13/2024:  Presents to the clinic with grandchildren.  Ambulates with left foot orthopedic walking boot.   Walking boot and left ankle dressing removed per nursing staff at bedside.  Left ankle ulcer red granulating wound bed with moderate amount of slough and serosanguineous drainage with the jak wound erythema and nonpitting edema.  Discussion with the patient today will need to perform selective debridement and obtain tissue culture.  Written consent obtained.  See quick procedure note.  Following debridement red granulating wound bed with moderate serosanguineous drainage discussion with the patient today will benefit from applying Unna boot to increased granulation to area and decrease edema.  All wound care performed per nursing staff at bedside.  Left ankle wound cleansed with 0.5% Dakins, apply Silver polymem to wound bed, cover with unna boot, Drawtex wrap, Kerlix, and Coban.  Tolerated well.  Instructed on unna boot precautions.  Will return to the clinic on Monday for a nurse visit for Unna boot change.  Will follow up with me next Thursday.       History includes:      Past Medical History:   Diagnosis Date    Diabetes mellitus     Diabetes mellitus, type 2     GERD (gastroesophageal reflux disease)     Hypertension     History reviewed. No pertinent surgical history.   Social History     Socioeconomic History    Marital status:     Number of children: 0   Occupational History    Occupation: Manager     Comment: [employer]   Tobacco Use    Smoking status: Never    Smokeless tobacco: Never   Substance and Sexual Activity    Alcohol use: Never    Drug use: Never    Sexual activity: Yes     Partners: Male     Birth control/protection: None     Social Determinants of Health     Financial Resource Strain: Medium Risk (4/4/2024)    Overall Financial Resource Strain (CARDIA)     Difficulty of Paying Living Expenses: Somewhat hard   Food Insecurity: Food Insecurity Present (3/11/2024)    Hunger Vital Sign     Worried About Running Out of Food in the Last Year: Sometimes true     Ran Out of Food in the  Last Year: Sometimes true   Transportation Needs: Unmet Transportation Needs (4/4/2024)    PRAPARE - Transportation     Lack of Transportation (Medical): Yes     Lack of Transportation (Non-Medical): Yes   Physical Activity: Inactive (4/4/2024)    Exercise Vital Sign     Days of Exercise per Week: 0 days     Minutes of Exercise per Session: 0 min   Stress: Stress Concern Present (4/4/2024)    [employer] - Occupational Stress Questionnaire     Feeling of Stress : Rather much   Housing Stability: High Risk (3/11/2024)    Housing Stability Vital Sign     Unable to Pay for Housing in the Last Year: Yes     Number of Places Lived in the Last Year: 1     Unstable Housing in the Last Year: No   .      Current Outpatient Medications   Medication Sig Dispense Refill    acetaminophen (TYLENOL) 500 MG tablet Take 500 mg by mouth every 6 (six) hours as needed for Pain.      ALPRAZolam (XANAX) 0.25 MG tablet Take 1 tablet (0.25 mg total) by mouth as needed for Anxiety (Take 30 min prior to getting scanned). 2 tablet 0    atorvastatin (LIPITOR) 40 MG tablet Take 1 tablet (40 mg total) by mouth once daily. 90 tablet 0    blood sugar diagnostic Strp Test strips to check CBG's 200 each 11    blood-glucose meter kit 1 each by Other route 2 (two) times daily. Use as instructed 1 each 0    EASY TOUCH TWIST LANCETS 33 gauge Misc USE TO CHECK BLOOD SUAGR LEVELS TWO TIMES A  each 3    gabapentin (NEURONTIN) 300 MG capsule Take 2 capsules (600 mg total) by mouth 3 (three) times daily. 180 capsule 2    glipiZIDE (GLUCOTROL) 10 MG tablet Take 1 tablet (10 mg total) by mouth 2 (two) times daily with meals. 60 tablet 5    insulin degludec (TRESIBA FLEXTOUCH U-100) 100 unit/mL (3 mL) insulin pen Inject 25 Units into the skin every evening. 7.5 mL 2    insulin lispro (HUMALOG KWIKPEN INSULIN) 100 unit/mL pen Inject 7 Units into the skin 3 (three) times daily with meals. 6.3 mL 2    lancing device Misc 1 each  by Misc.(Non-Drug; Combo Route) route 4 (four) times daily with meals and nightly. 200 each 5    lisinopriL (PRINIVIL,ZESTRIL) 40 MG tablet Take 1 tablet (40 mg total) by mouth once daily. 90 tablet 3    omeprazole (PRILOSEC) 20 MG capsule Take 2 capsules (40 mg total) by mouth once daily. 60 capsule 2    rOPINIRole (REQUIP) 0.5 MG tablet Take 1 tablet (0.5 mg total) by mouth 3 (three) times daily. 90 tablet 2    traMADoL (ULTRAM) 50 mg tablet Take 1 tablet (50 mg total) by mouth every 6 (six) hours. 20 tablet 0    traZODone (DESYREL) 50 MG tablet Take 1 tablet (50 mg total) by mouth nightly as needed for Insomnia. 30 tablet 0    TRUE METRIX GLUCOSE METER Misc Inject 1 each into the skin 4 (four) times daily with meals and nightly. use as directed 1 each 0     No current facility-administered medications for this encounter.       Review of Systems   Skin:  Positive for wound.   All other systems reviewed and are negative.         Labs Reviewed:   Chemistry:  Lab Results   Component Value Date    BUN 14.5 06/15/2023    BUN 14.0 06/14/2023    CREATININE 0.75 06/15/2023    CREATININE 0.69 06/14/2023    EGFRNORACEVR >60 06/15/2023    EGFRNORACEVR >60 06/14/2023    GLUCOSE 267 (H) 06/15/2023    AST 8 06/15/2023    AST 8 06/14/2023    ALT 12 06/15/2023    ALT 10 06/14/2023    HGBA1C 11.4 (H) 04/13/2023        Hematology:  Lab Results   Component Value Date    WBC 14.77 (H) 06/15/2023    WBC 15.65 (H) 06/14/2023    HGB 11.3 (L) 06/15/2023    HGB 11.6 (L) 06/14/2023    HCT 33.7 (L) 06/15/2023    HCT 34.5 (L) 06/14/2023     06/15/2023     06/14/2023       Inflammatory Markers:  Lab Results   Component Value Date    SEDRATE 64 (H) 07/02/2020    SEDRATE 95 (H) 06/30/2020    SEDRATE 94 (H) 06/28/2020        Objective:        Physical Exam  Vitals reviewed.   Cardiovascular:      Pulses:           Dorsalis pedis pulses are detected w/ Doppler on the right side and detected w/ Doppler on the left side.         Posterior tibial pulses are detected w/ Doppler on the right side and detected w/ Doppler on the left side.   Musculoskeletal:      Left lower leg: Edema present.      Left foot: Charcot foot present.        Feet:    Feet:      Right foot:      Skin integrity: Skin integrity normal.      Toenail Condition: Right toenails are abnormally thick. Fungal disease present.     Left foot:      Skin integrity: Ulcer present.      Toenail Condition: Left toenails are abnormally thick. Fungal disease present.  Skin:     General: Skin is warm.      Capillary Refill: Capillary refill takes less than 2 seconds.      Findings: Wound present.             Comments: Left ulcer red granulating wound bed with moderate serosanguineous drainage and macerated jak wound.   Neurological:      Mental Status: She is alert.            Wound 06/13/24 0827 Pressure Injury Left lateral Ankle (Active)   06/13/24 0827   Present on Original Admission: Y   Primary Wound Type: Pressure inj   Side: Left   Orientation: lateral   Location: Ankle   Wound Approximate Age at First Assessment (Weeks):    Wound Number:    Is this injury device related?:    Incision Type:    Closure Method:    Wound Description (Comments):    Type:    Additional Comments:    Ankle-Brachial Index:    Pulses:    Removal Indication and Assessment:    Wound Outcome:    Wound Image   09/10/24 0855   Dressing Appearance Moist drainage 09/10/24 0855   Drainage Amount Moderate 09/10/24 0855   Drainage Characteristics/Odor Serosanguineous 09/10/24 0855   Appearance [place] 09/10/24 0855   Periwound Area Moist 09/10/24 0855   Wound Length (cm) 1.6 cm 09/10/24 0855   Wound Width (cm) 1.3 cm 09/10/24 0855   Wound Depth (cm) 0.3 cm 09/10/24 0855   Wound Volume (cm^3) 0.624 cm^3 09/10/24 0855   Wound Surface Area (cm^2) 2.08 cm^2 09/10/24 0855         Assessment:         ICD-10-CM ICD-9-CM   1. Diabetic ulcer of left foot associated with type 2 diabetes mellitus, with fat layer exposed,  unspecified part of foot  E11.621 250.80    L97.522 707.15   2. Charcot's joint of left ankle  M14.672 094.0     713.5   3. Type 2 diabetes mellitus with other skin ulcer, without long-term current use of insulin  E11.622 250.80   4. Class 3 severe obesity with body mass index (BMI) of 40.0 to 44.9 in adult, unspecified obesity type, unspecified whether serious comorbidity present  E66.01 278.01    Z68.41 V85.41           Plan:   Tissue pathology and/or culture taken:  [] Yes [x] No   Sharp debridement performed:   [] Yes [x] No   Labs ordered this visit:   [] Yes [x] No   Imaging ordered this visit:   [] Yes [x] No         1. Diabetic ulcer of other part of left foot associated with type 2 diabetes mellitus, with fat layer exposed     Will continue Unna boot.  Left ulcer cleansed with Vashe, applied Triad to periwound, polymem max, Unna boot, double super absorbent, offloading foam, Drawtex to dorsal foot, Kerlix and Coban.    Continue monitoring:  Watch for increased drainage or pain, fevers, chills, swelling and report this to clinic.   2. Charcot's joint of left ankle      Will continue non-weight bearing to left ankle.    Using knee walker.    Wearing CAM walking boot at all times with transfers.     3. Type 2 diabetes mellitus with other skin ulcer, without long-term current use of insulin     Continue monitoring education.    Co-actor in delayed wound healing.    Last A1c 9.7.    Continued monitoring:  Must have a strict diabetic diet, take glucose lowering medications as prescribed and encourage lower HA1C.     4. Class 3 severe obesity with body mass index (BMI) of 40.0 to 44.9 in adult, unspecified obesity type, unspecified whether serious comorbidity present     Will offload pressure to left ankle at all times.    Instructed the importance of diet, and decrease caloric intake due to non-weight bearing to left foot at this time.     Patient Instructions   Pt seen today by: Martha Duncan NP    Self care  DRESSING INSTRUCTIONS: In the event Unna Boot has to be removed, Apply Vashe/Dakin's moistened gauze to woundbed, cover with foam dressing qd, contact Woundcare clinic as soon as possible.         Wound location: left ankle      Return visit: Friday for Nurse Visit      Nutrition:  The current daily value (%DV) for protein is 50 grams per day and is meant as a general goal for most people. Further increasing your dietary protein intake is very important for wound healing. Typically one needs over 100g of protein per day to help with wound healing needs.  If you are a dialysis patient or have problems with your kidneys, talk to your Nephrologist about how much protein you can take in with your condition.  Examples of high protein items that can be added to your diet include: eggs, chicken, red meats, almonds, cottage cheese, Greek yogurt, beans, and peanut butter.  Fortified protein bars, shakes and drinks can add 15-30 additional grams of protein per serving.  Also add:   1 daily general multivitamin   Vitamin C : 500mg twice daily   Zinc 220 mg daily  Vit D : once daily    Offloading   Offload your wound. This means to reduce pressure on and around the wound that reduces blood flow to the wound and prevents healing. Your wound care team will discuss specific ways for you to offload your specific wound. Common offloading strategies include:    Turn or reposition every 2 hours or sooner  Use pillows, wedges, ROHO wheelchair cushions or other special devices like boots and shoes to lift the wound off of hard surfaces  Alternating Low Air-loss (ALAL) mattress may be ordered  Padded dressings can reduce wound pressure        Call our [facility] wound clinic for questions/concerns a [phone] .           Wound may have been debrided in clinic: if so, WHAT YOU NEED TO KNOW:      Unna Boot:   It is important to move about with your unna boot on. Talk to your doctor about the right amount of activity for you. If the boot  begins feeling tight, you may need to rest and prop your foot and leg on pillows. Try to get your foot higher than your heart.  Do not put things inside the boot to scratch your skin.  Keep your leg propped on pillows as much as you can during the day and at night.  Avoid sitting with your legs bent at the knees for a long time.       When do I need to call the doctor?   Your leg is numb or tingles  Toes are blue, grey, or cool  You have more swelling in your leg or foot  You have pain when you walk  Drainage that soaks through your dressing          Call our [facility] wound clinic for questions/concerns a [phone] .      The time spent including preparing to see the patient, obtaining patient history and assessment, evaluation of the plan of care, patient/caregiver counseling and education, orders, documentation, coordination of care, and other professional medical management activities for today's encounter was 15 minute.    Time spent performing procedures during today's encounter was 20 minute.    Follow up in about 3 days (around 9/13/2024) for nurse visit and with Martha 9/18/24. Teaching provided on s/s to call wound clinic for promptly.  Unna boot and ER precautions taught for after hours and weekends.       JANE Vieyra

## 2024-09-13 ENCOUNTER — HOSPITAL ENCOUNTER (OUTPATIENT)
Dept: WOUND CARE | Facility: HOSPITAL | Age: 59
Discharge: HOME | End: 2024-09-13
Attending: NURSE PRACTITIONER
Payer: COMMERCIAL

## 2024-09-13 VITALS
SYSTOLIC BLOOD PRESSURE: 174 MMHG | HEART RATE: 91 BPM | TEMPERATURE: 98 F | RESPIRATION RATE: 20 BRPM | DIASTOLIC BLOOD PRESSURE: 84 MMHG | OXYGEN SATURATION: 99 %

## 2024-09-13 DIAGNOSIS — L97.522 DIABETIC ULCER OF LEFT FOOT ASSOCIATED WITH TYPE 2 DIABETES MELLITUS, WITH FAT LAYER EXPOSED, UNSPECIFIED PART OF FOOT: ICD-10-CM

## 2024-09-13 DIAGNOSIS — E11.621 DIABETIC ULCER OF LEFT FOOT ASSOCIATED WITH TYPE 2 DIABETES MELLITUS, WITH FAT LAYER EXPOSED, UNSPECIFIED PART OF FOOT: ICD-10-CM

## 2024-09-13 DIAGNOSIS — M14.672 CHARCOT'S JOINT OF LEFT ANKLE: ICD-10-CM

## 2024-09-13 PROCEDURE — 27000999 HC MEDICAL RECORD PHOTO DOCUMENTATION

## 2024-09-13 PROCEDURE — 99211 OFF/OP EST MAY X REQ PHY/QHP: CPT | Mod: 25

## 2024-09-13 PROCEDURE — 29580 STRAPPING UNNA BOOT: CPT

## 2024-09-13 NOTE — PROGRESS NOTES
Patient arrives for nurse visit.  Removed unna boot and washed leg with hibiclens and water, rinsed, and dried.  Cleaned with vashe and soaked x 10 minutes.  Picture taken.  No signs of infection noted.  Applied polymem max to wound bed and triad to periwound then unna boot wrap.  Applied drawtex edema to ankle, 2 extreme absorbent pads, off loading foam pad then kerlix and co-ban.  Patient mica well.  Patient will rtc on Wednesday to see margie khoury.

## 2024-09-13 NOTE — PATIENT INSTRUCTIONS
Pt seen today by: Sharon Tate Lpn and wrapped by Edyta Fenton LPN    Home health and self care DRESSING INSTRUCTIONS:        Wound location: Left lateral ankle    Dressings to be changed at next clinic visit.    Cleanse wound with Vashe wound cleanser  Applied triad to periwound  Apply polymem max to the wound bed  Wrapped leg with unna boot  Applied 6 inch strip of drawtex edema to top of ankle  Cover wound with  two kerramax dressing, offloading foam and secure with kerlix and coban       Compression with: Unna boot    Return visit:  Martha Duncan the following Wednesday.    Nutrition:  The current daily value (%DV) for protein is 50 grams per day and is meant as a general goal for most people. Further increasing your dietary protein intake is very important for wound healing. Typically one needs over 100g of protein per day to help with wound healing needs.  If you are a dialysis patient or have problems with your kidneys, talk to your Nephrologist about how much protein you can take in with your condition.  Examples of high protein items that can be added to your diet include: eggs, chicken, red meats, almonds, cottage cheese, Greek yogurt, beans, and peanut butter.  Fortified protein bars, shakes and drinks can add 15-30 additional grams of protein per serving.  Also add:   1 daily general multivitamin   Vitamin C : 500mg twice daily   Zinc 220 mg daily  Vit D : once daily    Offloading   Offload your wound. This means to reduce pressure on and around the wound that reduces blood flow to the wound and prevents healing. Your wound care team will discuss specific ways for you to offload your specific wound. Common offloading strategies include:    Turn or reposition every 2 hours or sooner  Use pillows, wedges, ROHO wheelchair cushions or other special devices like boots and shoes to lift the wound off of hard surfaces  Alternating Low Air-loss (ALAL) mattress may be ordered  Padded dressings can  reduce wound pressure        Unna Boot:   It is important to move about with your unna boot on. Talk to your doctor about the right amount of activity for you. If the boot begins feeling tight, you may need to rest and prop your foot and leg on pillows. Try to get your foot higher than your heart.  Do not put things inside the boot to scratch your skin.  Keep your leg propped on pillows as much as you can during the day and at night.  Avoid sitting with your legs bent at the knees for a long time.       When do I need to call the doctor?   Your leg is numb or tingles  Toes are blue, grey, or cool  You have more swelling in your leg or foot  You have pain when you walk  Drainage that soaks through your dressing    Compression: You may be using a compressive type of dressings to control edema: If so, keep your compression wrap or tubigrip in place. Do not get them wet, they should feel snug. If they feel tight, or cause pain in any way,  elevate your legs above your heart for 15 minutes. If the wraps still cause pain or you can not tolerate compression,  please remove and notify the clinic or your home health.         Call our [facility] wound clinic for questions/concerns a [phone] .

## 2024-09-18 ENCOUNTER — HOSPITAL ENCOUNTER (OUTPATIENT)
Dept: WOUND CARE | Facility: HOSPITAL | Age: 59
Discharge: HOME | End: 2024-09-18
Attending: NURSE PRACTITIONER
Payer: COMMERCIAL

## 2024-09-18 ENCOUNTER — TELEPHONE (OUTPATIENT)
Dept: INTERNAL MEDICINE | Facility: CLINIC | Age: 59
End: 2024-09-18
Payer: COMMERCIAL

## 2024-09-18 VITALS
DIASTOLIC BLOOD PRESSURE: 78 MMHG | HEART RATE: 74 BPM | OXYGEN SATURATION: 92 % | SYSTOLIC BLOOD PRESSURE: 124 MMHG | TEMPERATURE: 98 F

## 2024-09-18 DIAGNOSIS — I10 HYPERTENSION, UNSPECIFIED TYPE: Primary | ICD-10-CM

## 2024-09-18 DIAGNOSIS — M21.962 LEFT ANKLE JOINT DEFORMITY: ICD-10-CM

## 2024-09-18 DIAGNOSIS — E11.621 DIABETIC ULCER OF LEFT FOOT ASSOCIATED WITH TYPE 2 DIABETES MELLITUS, WITH FAT LAYER EXPOSED, UNSPECIFIED PART OF FOOT: Primary | ICD-10-CM

## 2024-09-18 DIAGNOSIS — L97.522 DIABETIC ULCER OF LEFT FOOT ASSOCIATED WITH TYPE 2 DIABETES MELLITUS, WITH FAT LAYER EXPOSED, UNSPECIFIED PART OF FOOT: Primary | ICD-10-CM

## 2024-09-18 DIAGNOSIS — G25.81 RESTLESS LEG SYNDROME: ICD-10-CM

## 2024-09-18 DIAGNOSIS — E66.01 CLASS 3 SEVERE OBESITY WITH BODY MASS INDEX (BMI) OF 40.0 TO 44.9 IN ADULT, UNSPECIFIED OBESITY TYPE, UNSPECIFIED WHETHER SERIOUS COMORBIDITY PRESENT: ICD-10-CM

## 2024-09-18 DIAGNOSIS — M14.672 CHARCOT'S JOINT OF LEFT ANKLE: ICD-10-CM

## 2024-09-18 DIAGNOSIS — E11.622 TYPE 2 DIABETES MELLITUS WITH OTHER SKIN ULCER, WITHOUT LONG-TERM CURRENT USE OF INSULIN: ICD-10-CM

## 2024-09-18 PROCEDURE — 99211 OFF/OP EST MAY X REQ PHY/QHP: CPT

## 2024-09-18 PROCEDURE — 99214 OFFICE O/P EST MOD 30 MIN: CPT | Mod: ,,, | Performed by: NURSE PRACTITIONER

## 2024-09-18 PROCEDURE — 27000999 HC MEDICAL RECORD PHOTO DOCUMENTATION

## 2024-09-18 NOTE — PROGRESS NOTES
[facility]   Outpatient Wound Care     Subjective:   Patient ID: Suki Anton is a 58 y.o. female.    Chief Complaint: Wound Care (Left lateral ankle wound/unna)      History of Present Illness:   58 y.o. White female presents to wound care clinic today for re-evaluation of right ankle ulcer and Unna boot change.  Jennifer has a appointment with Dr. Hawkins tomorrow.  Reviewed all previous medical history and progress notes since last visit if 9/10/2024.  Past medical history: Currently under the care of  for left ankle Charcot foot joint. Currently was instructed per Dr. Neumann to be non-weight bearing to left ankle/foot.  Patient voices she walked from her car to the front of the hospital and then obtained wheelchair.  Instructed the importance this was be non-weight bearing to left foot and ankle meeting no weight for the next 6 weeks per Dr. Neumann. Under the care of Dr. Neumann.  Voices saw podiatrist today but did not take self pay patients.  Voices awaiting insurance from work due to insurance was changed in his awaiting for reinstatement.  Saw orthopedics on 7/1/24 and was informed of CT scan.  She is waiting to see podiatry no appointment scheduled at this time.  Will continue our clinic until ulcer is healed. Wound has been present for approximately 2 months.  She is a referral from Orthopedics.  Patient is currently awaiting CT scan of left ankle, and referral to a foot ankle specialist.  Patient voices 2 months ago twisted ankle and purchased a boot from [employer] which created an ulcer to ankle area. Followed by Dick Gallagher DO for PCP last appt 3/11/24.    Today's visit 09/18/2024:  Reviewed all previous progress notes since last visit of 09/10/24.  Unna boot to left lower leg removed per nursing staff at bedside.  Left ankle wound red  granulating wound bed with moderate serosanguineous drainage with slight maceration to jak wound.  All wound care performed per nursing staff at bedside.  Left lower leg wash with Hibiclens soap and water pat dry.  Discussion with the patient today since will see Dr. Hawkins tomorrow will hold Unna boot at this time.  Wound care performed at bedside today cleansed left ulcer with Vashe, applied Traid to jak wound, polymem max to wound bed, 4x4, offloading foam, wrapped with Kerlix secure with tape.  Applied CAM walking boot.  Tolerated well.  Reinforced offloading pressure.  Instructed may take shower but perform wound care immediately after shower.  Instructed wound care can be performed every other day.  Will have her follow up with our clinic on Friday.  Instructed to call the office with any questions, concerns, or new skin issues.  Verbalized understanding of all instructions.    9/10/24:  Reviewed previous progress notes since last visit of 9/4/24.  Unna boot removed per nursing staff at bedside.  Left lower leg wash with Hibiclens soap and water pat dry.  Left ankle red granulating wound bed with macerated jak wound moderate serosanguineous drainage.  Discussion with the patient today wound has significantly decreased in size since last visit.  Will continue same wound care.  Wound care performed per nursing staff at bedside.  Left ankle wound cleansed with Vashe applied Triad to jak wound, polymem max to wound bed, Unna boot, double super absorbent, offloading foam, Drawtex edema to dorsal foot, kerlix, and coban.  Tolerated well.  Wearing CAM walking boot.  Instructed only apply boot when transferring.  Instructed to stay off of foot as much as possible.  Reinforced offloading in unna boot precautions.  Will have her return to the clinic on Friday for a nurse visit.  Instructed to call the office with any questions, concerns, or new skin issues.  Verbalized understanding of all  instructions.      9/4/24:  Reviewed previous progress notes last visit on 08/26/2024.  Presents to clinic in wheelchair eschar noted by radiology.  Unna boot to left lower extremity removed per nursing staff at bedside.  Left lower leg wash with Hibiclens soap and water.  Left ankle ulcer red granulating wound bed with macerated jak wound and moderate serosanguineous drainage.  Pared jak wound callus using #4 dermal curette.  Rationale for paring to decrease bioburden and increased granulation.  Tolerated well.  Left ankle cleansed with Vashe, applied Triad to jak wound, polymem max, Unna boot, double super absorbent, offloading foam, Drawtex edema to dorsal foot, Kerlix, and Coban.  Tolerated well.  Reinforced offloading and unna boot precautions.  Will return to the clinic in 1 week.  Escorted to radiology after appointment in wheelchair today for additional scans.  Instructed to call the office with any questions, concerns, or any reasons having to remove Unna boot.  Verbalized understanding of all instructions.      08/26/2024:  Reviewed all previous progress notes since last visit 08/19/2024.  Presents to clinic in wheelchair escorted by staff member.  Unna boot to left lower extremity removed per nursing staff at bedside.  Left ankle ulcer red granulating wound bed with minimal maceration to edges moderate serosanguineous drainage, and significant decrease in size since last visit.  Discussion we will continue Unna boot at this time.  Patient voices as a bone scan on Thursday this week.  Instructed to have them call wound care clinic if we need to remove Unna boot prior to appointment.  All wound care performed per nursing staff at bedside.  Left ankle ulcer cleansed with Vashe, applied Triad to wound edge, apply polymem max, unna boot, double super abssorbent, offloading foam, drawtex edema to dorsal foot, Kerlix and Coban.  Reinforced unna boot precautions.  Reinforced offloading as much as possible.   Using wheelchair and knee walker at this time.  Will return to the clinic on Thursday for Unna boot change.  Instructed to call the office with any questions, concerns, or any reasons having to remove Unna boot.  Verbalized understanding of all instructions.    8/19/24:  Reviewed all previous progress since last visit of 08/12/2024.  Presents to clinic in wheelchair.  Left lower extremity unna boot removed per nursing staff at bedside.  Left lower extremity ankle ulcer red granulating wound bed , macerated skin edges with moderate serosanguineous drainage.  Decrease in size since last visit.  Patient voices she is staying off of foot as much as possible.  All wound care performed per nursing staff at bedside.  Left ankle ulcer cleansed with Vashe, applied Triad to jak wound, polymem to wound bed, unna boot, super absorbent dressing, offloading foam, Kerlix and Coban.  Will return to the clinic on Thursday for Unna boot change.  Instructed the importance of offloading pressure as much as possible.  Using knee walker as much as possible.  Unna boot precautions.  Instructed to call the office with any questions, concerns, or any reasons having to remove Unna boot.  Verbalized understanding of all instructions.    8/12/24:  Reviewed all previous progress notes since last visit of 08/08/2024.  Presents to clinic in wheelchair.  Wearing left lower leg CAM walking boot.  All wound care to left lower extremity removed per nursing staff at bedside.  Left ankle ulcer red granulating wound bed minimal slough with macerated jak wound requiring debridement to decrease bio burden to increase granulation.  Selective debridement performed at bedside.  See quick procedure note.  All wound care performed per nursing staff at bedside.  Left ankle ulcer cleansed with Vashe, applied Triad to jak wound, silver polymem to wound bed, unna boot, convamax, offloading foam, Kerlix, and Coban.  Attempted to provide the patient with crutches  patient unable to use crutches due to his shoulder injury.  Instructed the importance of her using knee walker at all times.  Reinforced unna boot precautions.  Will return to the clinic on Thursday for Unna boot change.  Instructed to call the office with any questions, concerns, or any reasons having to remove Unna boot.  Verbalized understanding of all instructions.    08/05/2024:  Reviewed all previous progress notes since last visit 07/29/2024.  Presents to clinic in wheelchair.  Left lower leg CAM walking boot.  Left lower extremity Unna boot removed per nursing at bedside.  Left ankle ulcer red granulating wound bed with moderate serosanguineous drainage and macerated jak wound.  Instructed the patient today the importance of offloading pressure to left foot.  All wound care performed per nursing staff at bedside.  Left lower extremity cleansed with Hibiclens soap and water pat dry left ulcer cleansed with Vashe, applied Triad to jak wound, silver polymem, Convamax, unna boot, kerlix, and coban.  Unna boot precautions given.  Will return to the clinic on Thursday after appt with Dr. Neumann.  Instructed to call the office with any questions, concerns, or any reasons having to remove Unna boot.  Verbalized understanding of all instructions.      7/29/24:  Reviewed all previous progress since last visit 07/22/2024.  Presents to clinic alone. Ambulating with CAM walking boot.  Left lower extremity Unna boot removed per nursing staff at bedside.  Left lower leg washed Hibiclens soap and water.  Wound red granulating wound bed, slight macerated jak wound with moderate serosanguineous drainage.  Discussion with the patient today will continue same wound care orders.  All wound care performed per nursing staff at bedside.  Left lower extremity ankle cleansed with Vashe, applied Triad to jak wound, silver polymem to  wound bed, cover with convamax, Unna boot, Kerlix, and Coban.  Tolerated well.  Will return to  our clinic on Thursday for a nurse visit for Unna boot change.  Instructed to call the office with any questions, concerns, or any reasons removed Unna boot.  Instructed the importance to call and reschedule appointment with Dr. Neumann regarding left Charcot ankle.  Reinforced offloading pressure to areas much as possible.  Verbalized understanding of all instructions.    07/22/2024:  Reviewed all previous progress notes 07/15/2024.  Presents to the clinic with uma.  Ambulates with CAM walking boot to left lower leg.  Left lower extremity Unna boot removed per nursing staff at bedside.  Left ankle wound red granulating wound bed macerated jak wound moderate serosanguineous drainage.  Discussion with the patient today significant decrease in size since last visit will continue Unna boot.  New wound care orders for left lower extremity ankle cleansed with Vashe apply, Triad to jak wound, silver polymem to wound bed, cover with covamax, Unna boot, Kerlix, and Coban.  All wound care performed per nursing staff at bedside.  Will return to the clinic on Thursday for a nurse visit for Unna boot change.  Will follow up in wound clinic with me next Monday.  Instructed to call the office with any questions, concerns, or any reasons having to remove Unna boot.  Verbalized understanding of all instructions.      07/15/2024:  Reviewed all previous progress notes.  Left lower leg unna boot removed per nursing staff at bedside.  Left ankle wound red granulating wound bed with moderate serosanguineous drainage and macerated jak wound.  Significant decrease in size since last visit, and now wound has created 2 separate wounds.  Discussion with the patient today we will continue Unna boot to assist in decreasing edema to ankle, and increase in granulation of wounds.  All wound care performed per nursing staff at bedside.  Left ankle wound cleansed with Vashe, applied Triad to periwound, silver polymem to wound bed, Unna  boot, Drawtex wrap, Kerlix and Coban.  Currently wearing left CAM boot.  Will have her return to the clinic on Thursday for a nurse visit for Unna boot change.  Reinforced unna boot precautions.  Reinforced offloading pressure to left foot as much as possible.  Instructed to call the office with any questions, concerns, or any reasons having to remove Unna boot.  Verbalized understanding of all instructions.    07/08/2024:  Reviewed previous progress notes.  Left lower leg unna boot removed per nursing staff at bedside.  Left ankle ulcer red granulating wound bed with moderate serosanguineous drainage with macerated jak wound.  Will continue same wound care.  All wound care performed per nursing staff at bedside.  Left ankle wound cleansed with Vashe, apply Triad to periwound edge, Silver polymem to wound bed, cover with unna boot, Drawtex 4 x 4, Kerlix, and Coban. Tolerated well.  Reinforced unna boot precautions.  Will return to clinic on Thursday for a nurse visit for Unna boot change.  Will follow up with me next Monday. Instructed the importance of calling the office with any questions, concerns, or any reasons having to remove Unna boot.  Verbalized understanding of all instructions.    07/01/2024:  Reviewed previous progress notes.  Left lower leg unna boot removed per nursing staff at bedside.  Left ankle ulcer red granulating wound bed with minimal slough and moderate serosanguineous drainage.  Patient voices  will have virtual visit with orthopedic talk to discuss CT scan today.  All wound care performed per nursing staff at bedside.  Will continue Unna boot. Left ankle wound cleansed with 0.5% Dakins, apply Triad to periwound edge, Silver polymem to wound bed, cover with unna boot, Drawtex 4 x 4, Kerlix, and Coban. Tolerated well.  Reinforced unna boot precautions.  Will return to the clinic on Wednesday for a nurse visit, and will return the following Monday to re-evaluate left ankle ulcer.  Instructed  the importance of calling the office with any questions, concerns, or any reasons having to remove Unna boot.  Verbalized understanding of all instructions.    06/26/2024:  Presents to clinic alone.  Left lower leg unna boot removed per nursing staff at bedside.  Left ankle ulcer red granulating wound bed with moderate serosanguineous drainage minimal slough.  Macerated jak wound.  Discussion with the patient today will continue Unna boot.  Patient is taking all oral antibiotics as directed.  All wound care performed per nursing staff at bedside. Left ankle wound cleansed with 0.5% Dakins, apply Triad to periwound edge, Silver polymem to wound bed, cover with unna boot, Drawtex wrap, Kerlix, and Coban. Tolerated well.  Reinforced unna boot precautions.  Will return to the clinic on Monday to re-evaluate left ankle ulcer.  Instructed the importance of calling the office with any questions, concerns, or any reasons having to remove Unna boot.  Doctors excuse given.  Verbalized understanding of all instructions.      06/13/2024:  Presents to the clinic with grandchildren.  Ambulates with left foot orthopedic walking boot.  Walking boot and left ankle dressing removed per nursing staff at bedside.  Left ankle ulcer red granulating wound bed with moderate amount of slough and serosanguineous drainage with the jak wound erythema and nonpitting edema.  Discussion with the patient today will need to perform selective debridement and obtain tissue culture.  Written consent obtained.  See quick procedure note.  Following debridement red granulating wound bed with moderate serosanguineous drainage discussion with the patient today will benefit from applying Unna boot to increased granulation to area and decrease edema.  All wound care performed per nursing staff at bedside.  Left ankle wound cleansed with 0.5% Dakins, apply Silver polymem to wound bed, cover with unna boot, Drawtex wrap, Kerlix, and Coban.  Tolerated well.   Instructed on unna boot precautions.  Will return to the clinic on Monday for a nurse visit for Unna boot change.  Will follow up with me next Thursday.       History includes:      Past Medical History:   Diagnosis Date    Diabetes mellitus     Diabetes mellitus, type 2     GERD (gastroesophageal reflux disease)     Hypertension     History reviewed. No pertinent surgical history.   Social History     Socioeconomic History    Marital status:     Number of children: 0   Occupational History    Occupation: Manager     Comment: [employer]   Tobacco Use    Smoking status: Never    Smokeless tobacco: Never   Substance and Sexual Activity    Alcohol use: Never    Drug use: Never    Sexual activity: Yes     Partners: Male     Birth control/protection: None     Social Determinants of Health     Financial Resource Strain: Medium Risk (4/4/2024)    Overall Financial Resource Strain (CARDIA)     Difficulty of Paying Living Expenses: Somewhat hard   Food Insecurity: Food Insecurity Present (3/11/2024)    Hunger Vital Sign     Worried About Running Out of Food in the Last Year: Sometimes true     Ran Out of Food in the Last Year: Sometimes true   Transportation Needs: Unmet Transportation Needs (4/4/2024)    PRAPARE - Transportation     Lack of Transportation (Medical): Yes     Lack of Transportation (Non-Medical): Yes   Physical Activity: Inactive (4/4/2024)    Exercise Vital Sign     Days of Exercise per Week: 0 days     Minutes of Exercise per Session: 0 min   Stress: Stress Concern Present (4/4/2024)    [employer] - Occupational Stress Questionnaire     Feeling of Stress : Rather much   Housing Stability: High Risk (3/11/2024)    Housing Stability Vital Sign     Unable to Pay for Housing in the Last Year: Yes     Number of Places Lived in the Last Year: 1     Unstable Housing in the Last Year: No   .      Current Outpatient Medications   Medication Sig Dispense Refill     acetaminophen (TYLENOL) 500 MG tablet Take 500 mg by mouth every 6 (six) hours as needed for Pain.      ALPRAZolam (XANAX) 0.25 MG tablet Take 1 tablet (0.25 mg total) by mouth as needed for Anxiety (Take 30 min prior to getting scanned). 2 tablet 0    atorvastatin (LIPITOR) 40 MG tablet Take 1 tablet (40 mg total) by mouth once daily. 90 tablet 0    blood sugar diagnostic Strp Test strips to check CBG's 200 each 11    blood-glucose meter kit 1 each by Other route 2 (two) times daily. Use as instructed 1 each 0    EASY TOUCH TWIST LANCETS 33 gauge Misc USE TO CHECK BLOOD SUAGR LEVELS TWO TIMES A  each 3    gabapentin (NEURONTIN) 300 MG capsule Take 2 capsules (600 mg total) by mouth 3 (three) times daily. 180 capsule 2    glipiZIDE (GLUCOTROL) 10 MG tablet Take 1 tablet (10 mg total) by mouth 2 (two) times daily with meals. 60 tablet 5    insulin degludec (TRESIBA FLEXTOUCH U-100) 100 unit/mL (3 mL) insulin pen Inject 25 Units into the skin every evening. 7.5 mL 2    insulin lispro (HUMALOG KWIKPEN INSULIN) 100 unit/mL pen Inject 7 Units into the skin 3 (three) times daily with meals. 6.3 mL 2    lancing device Misc 1 each by Misc.(Non-Drug; Combo Route) route 4 (four) times daily with meals and nightly. 200 each 5    lisinopriL (PRINIVIL,ZESTRIL) 40 MG tablet Take 1 tablet (40 mg total) by mouth once daily. 90 tablet 3    omeprazole (PRILOSEC) 20 MG capsule Take 2 capsules (40 mg total) by mouth once daily. 60 capsule 2    rOPINIRole (REQUIP) 0.5 MG tablet Take 1 tablet (0.5 mg total) by mouth 3 (three) times daily. 90 tablet 2    traMADoL (ULTRAM) 50 mg tablet Take 1 tablet (50 mg total) by mouth every 6 (six) hours. 20 tablet 0    traZODone (DESYREL) 50 MG tablet Take 1 tablet (50 mg total) by mouth nightly as needed for Insomnia. 30 tablet 0    TRUE METRIX GLUCOSE METER Misc Inject 1 each into the skin 4 (four) times daily with meals and nightly. use as directed 1 each 0     No current  facility-administered medications for this encounter.       Review of Systems   Skin:  Positive for wound.   All other systems reviewed and are negative.         Labs Reviewed:   Chemistry:  Lab Results   Component Value Date    BUN 14.5 06/15/2023    BUN 14.0 06/14/2023    CREATININE 0.75 06/15/2023    CREATININE 0.69 06/14/2023    EGFRNORACEVR >60 06/15/2023    EGFRNORACEVR >60 06/14/2023    GLUCOSE 267 (H) 06/15/2023    AST 8 06/15/2023    AST 8 06/14/2023    ALT 12 06/15/2023    ALT 10 06/14/2023    HGBA1C 11.4 (H) 04/13/2023        Hematology:  Lab Results   Component Value Date    WBC 14.77 (H) 06/15/2023    WBC 15.65 (H) 06/14/2023    HGB 11.3 (L) 06/15/2023    HGB 11.6 (L) 06/14/2023    HCT 33.7 (L) 06/15/2023    HCT 34.5 (L) 06/14/2023     06/15/2023     06/14/2023       Inflammatory Markers:  Lab Results   Component Value Date    SEDRATE 64 (H) 07/02/2020    SEDRATE 95 (H) 06/30/2020    SEDRATE 94 (H) 06/28/2020        Objective:        Physical Exam  Vitals reviewed.   Cardiovascular:      Pulses:           Dorsalis pedis pulses are detected w/ Doppler on the right side and detected w/ Doppler on the left side.        Posterior tibial pulses are detected w/ Doppler on the right side and detected w/ Doppler on the left side.   Musculoskeletal:      Left lower leg: Edema present.      Left foot: Charcot foot present.        Feet:    Feet:      Right foot:      Skin integrity: Skin integrity normal.      Toenail Condition: Right toenails are abnormally thick. Fungal disease present.     Left foot:      Skin integrity: Ulcer present.      Toenail Condition: Left toenails are abnormally thick. Fungal disease present.  Skin:     General: Skin is warm.      Capillary Refill: Capillary refill takes less than 2 seconds.      Findings: Wound present.             Comments: Left ulcer red granulating wound bed with moderate serosanguineous drainage and slight macerated jak wound.   Neurological:       Mental Status: She is alert.            Wound 06/13/24 0827 Pressure Injury Left lateral Ankle (Active)   06/13/24 0827   Present on Original Admission: Y   Primary Wound Type: Pressure inj   Side: Left   Orientation: lateral   Location: Ankle   Wound Approximate Age at First Assessment (Weeks):    Wound Number:    Is this injury device related?:    Incision Type:    Closure Method:    Wound Description (Comments):    Type:    Additional Comments:    Ankle-Brachial Index:    Pulses:    Removal Indication and Assessment:    Wound Outcome:    Wound Image   09/18/24 0911   Dressing Appearance Moist drainage 09/18/24 0911   Drainage Amount Moderate 09/18/24 0911   Drainage Characteristics/Odor Serosanguineous 09/18/24 0911   Appearance Pink 09/18/24 0911   Wound Length (cm) 1 cm 09/18/24 0911   Wound Width (cm) 1.3 cm 09/18/24 0911   Wound Depth (cm) 0.3 cm 09/18/24 0911   Wound Volume (cm^3) 0.39 cm^3 09/18/24 0911   Wound Surface Area (cm^2) 1.3 cm^2 09/18/24 0911         Assessment:         ICD-10-CM ICD-9-CM   1. Diabetic ulcer of left foot associated with type 2 diabetes mellitus, with fat layer exposed, unspecified part of foot  E11.621 250.80    L97.522 707.15   2. Charcot's joint of left ankle  M14.672 094.0     713.5   3. Type 2 diabetes mellitus with other skin ulcer, without long-term current use of insulin  E11.622 250.80   4. Class 3 severe obesity with body mass index (BMI) of 40.0 to 44.9 in adult, unspecified obesity type, unspecified whether serious comorbidity present  E66.01 278.01    Z68.41 V85.41           Plan:   Tissue pathology and/or culture taken:  [] Yes [x] No   Sharp debridement performed:   [] Yes [x] No   Labs ordered this visit:   [] Yes [x] No   Imaging ordered this visit:   [] Yes [x] No         1. Diabetic ulcer of other part of left foot associated with type 2 diabetes mellitus, with fat layer exposed     New wound care:  Unna boot held.  Left ankle ulcer cleanse with Vashe, apply  Triad to jak wound, polymem max to wound bed, for before, offloading foam, wrap with Kerlix secure with tape.  To be performed every other day.    Continue monitoring:  Watch for increased drainage or pain, fevers, chills, swelling and report this to clinic.   2. Charcot's joint of left ankle      Will see Dr. Neumann tomorrow.    CAM walking boot intact.    Using knee walker for short distances.    Using wheelchair for longer distances.   3. Type 2 diabetes mellitus with other skin ulcer, without long-term current use of insulin     Voices compliance of diet and medication.    Will continue to monitor.    Co-actor in delayed wound healing.    Last A1c 9.7.    Continued monitoring:  Must have a strict diabetic diet, take glucose lowering medications as prescribed and encourage lower HA1C.     4. Class 3 severe obesity with body mass index (BMI) of 40.0 to 44.9 in adult, unspecified obesity type, unspecified whether serious comorbidity present     Will continue to monitor.    Instructed the importance of diet, and decrease caloric intake due to non-weight bearing to left foot at this time.     Patient Instructions   Pt seen today by: Martha Duncan NP    Home health and self care DRESSING INSTRUCTIONS:        Wound location: left lateral ankle    Dressings to be changed every other day and as needed if soiled or not intact.  Cleanse wound with Vashe or Dakins wound cleanser  Apply  triad  to the skin around the wound  Apply polymem Max  to the wound bed  Cover with gauze, then place off loading foam, then wrap with Kerlix       Compression with: N/A    Return visit: Friday 9/20/24    Nutrition:  The current daily value (%DV) for protein is 50 grams per day and is meant as a general goal for most people. Further increasing your dietary protein intake is very important for wound healing. Typically one needs over 100g of protein per day to help with wound healing needs.  If you are a dialysis patient or have problems  with your kidneys, talk to your Nephrologist about how much protein you can take in with your condition.  Examples of high protein items that can be added to your diet include: eggs, chicken, red meats, almonds, cottage cheese, Greek yogurt, beans, and peanut butter.  Fortified protein bars, shakes and drinks can add 15-30 additional grams of protein per serving.  Also add:   1 daily general multivitamin   Vitamin C : 500mg twice daily   Zinc 220 mg daily  Vit D : once daily    Offloading   Offload your wound. This means to reduce pressure on and around the wound that reduces blood flow to the wound and prevents healing. Your wound care team will discuss specific ways for you to offload your specific wound. Common offloading strategies include:    Turn or reposition every 2 hours or sooner  Use pillows, wedges, ROHO wheelchair cushions or other special devices like boots and shoes to lift the wound off of hard surfaces  Alternating Low Air-loss (ALAL) mattress may be ordered  Padded dressings can reduce wound pressure        Call our [facility] wound clinic for questions/concerns a [phone] .                       The time spent including preparing to see the patient, obtaining patient history and assessment, evaluation of the plan of care, patient/caregiver counseling and education, orders, documentation, coordination of care, and other professional medical management activities for today's encounter was 20 minute.    Time spent performing procedures during today's encounter was 20 minute.    Follow up in about 2 days (around 9/20/2024) for Nurse visit for unna boot. Teaching provided on s/s to call wound clinic for promptly.  ER precautions taught for after hours and weekends.       JANE Vieyra

## 2024-09-18 NOTE — TELEPHONE ENCOUNTER
MS. BAJWA,          MSRhonda BIRGIT ASKED THAT YOU GIVE HER A CALL REGARDING HER MEDICATION(S).  WHEN TIME PERMITS, PLEASE GIVE HER A CALL.

## 2024-09-18 NOTE — PATIENT INSTRUCTIONS
Pt seen today by: Martha Duncan NP    Home health and self care DRESSING INSTRUCTIONS:        Wound location: left lateral ankle    Dressings to be changed every other day and as needed if soiled or not intact.  Cleanse wound with Vashe or Dakins wound cleanser  Apply  triad  to the skin around the wound  Apply polymem Max  to the wound bed  Cover with gauze, then place off loading foam, then wrap with Kerlix       Compression with: N/A    Return visit: Friday 9/20/24    Nutrition:  The current daily value (%DV) for protein is 50 grams per day and is meant as a general goal for most people. Further increasing your dietary protein intake is very important for wound healing. Typically one needs over 100g of protein per day to help with wound healing needs.  If you are a dialysis patient or have problems with your kidneys, talk to your Nephrologist about how much protein you can take in with your condition.  Examples of high protein items that can be added to your diet include: eggs, chicken, red meats, almonds, cottage cheese, Greek yogurt, beans, and peanut butter.  Fortified protein bars, shakes and drinks can add 15-30 additional grams of protein per serving.  Also add:   1 daily general multivitamin   Vitamin C : 500mg twice daily   Zinc 220 mg daily  Vit D : once daily    Offloading   Offload your wound. This means to reduce pressure on and around the wound that reduces blood flow to the wound and prevents healing. Your wound care team will discuss specific ways for you to offload your specific wound. Common offloading strategies include:    Turn or reposition every 2 hours or sooner  Use pillows, wedges, ROHO wheelchair cushions or other special devices like boots and shoes to lift the wound off of hard surfaces  Alternating Low Air-loss (ALAL) mattress may be ordered  Padded dressings can reduce wound pressure        Call our [facility] wound clinic for questions/concerns a [phone] .

## 2024-09-18 NOTE — TELEPHONE ENCOUNTER
"Spoke with patient regarding trazodone 50mg for insomnia, states with her restless leg keeping her up, she is requesting for and increase in trazodone to see if it helps, she states she took 1/2 tab of her sister Seroquel and it really helped("knocked me out"), informed of psych drug and is not for insomnia. Please review and advise on trazodone increase and send new rx to pharmacy.  "

## 2024-09-20 ENCOUNTER — HOSPITAL ENCOUNTER (OUTPATIENT)
Dept: WOUND CARE | Facility: HOSPITAL | Age: 59
Discharge: HOME | End: 2024-09-20
Attending: NURSE PRACTITIONER
Payer: COMMERCIAL

## 2024-09-20 VITALS
HEART RATE: 75 BPM | DIASTOLIC BLOOD PRESSURE: 73 MMHG | OXYGEN SATURATION: 95 % | TEMPERATURE: 98 F | SYSTOLIC BLOOD PRESSURE: 145 MMHG

## 2024-09-20 DIAGNOSIS — L97.522 DIABETIC ULCER OF LEFT FOOT ASSOCIATED WITH TYPE 2 DIABETES MELLITUS, WITH FAT LAYER EXPOSED, UNSPECIFIED PART OF FOOT: ICD-10-CM

## 2024-09-20 DIAGNOSIS — E11.621 DIABETIC ULCER OF LEFT FOOT ASSOCIATED WITH TYPE 2 DIABETES MELLITUS, WITH FAT LAYER EXPOSED, UNSPECIFIED PART OF FOOT: ICD-10-CM

## 2024-09-20 DIAGNOSIS — M14.672 CHARCOT'S JOINT OF LEFT ANKLE: ICD-10-CM

## 2024-09-20 PROCEDURE — 27000999 HC MEDICAL RECORD PHOTO DOCUMENTATION

## 2024-09-20 PROCEDURE — 99211 OFF/OP EST MAY X REQ PHY/QHP: CPT

## 2024-09-20 PROCEDURE — 29580 STRAPPING UNNA BOOT: CPT

## 2024-09-20 RX ORDER — SULFAMETHOXAZOLE AND TRIMETHOPRIM 400; 80 MG/1; MG/1
1 TABLET ORAL 2 TIMES DAILY
COMMUNITY
Start: 2024-09-19

## 2024-09-20 NOTE — PROGRESS NOTES
[facility]                Wound Care Services    Subjective:       Patient ID: Suki Anton is a 58 y.o. female.    Chief Complaint: Wound Care (Left lateral ankle wound)    HPI  Review of Systems      Objective:     Vitals:    09/20/24 0848   BP: (!) 145/73   Pulse: 75   Temp: 97.8 °F (36.6 °C)         Physical Exam         Assessment/Plan:         ICD-10-CM ICD-9-CM   1. Charcot's joint of left ankle  M14.672 094.0     713.5   2. Diabetic ulcer of left foot associated with type 2 diabetes mellitus, with fat layer exposed, unspecified part of foot  E11.621 250.80    L97.522 707.15           Tissue pathology and/or culture taken:  [] Yes [] No   Sharp debridement performed:   [] Yes [] No   Labs ordered this visit:   [] Yes [] No   Imaging ordered this visit:   [] Yes [] No           Orders Placed This Encounter   Procedures    Change dressing     Unna boot see last progress note     Standing Status:   Standing     Number of Occurrences:   1        No follow-ups on file.              Washed leg with hibiclense soap and water. Cleaned wound with vashe. Apply triad to periwound. Apply polymem max to wound. Wrap leg with unna boot. Apply strip of drawtex edema to top of foot at ankle. Apply two kerramax superabsorbant pad and offloading foam to wound. Wrap leg with kerlix followed by coban. Patient tolerated well. Applied blue shoe covering to foot followed by CAM boot. Brought patient via wheelchair to vehicle. Return next Wednesday for visit with Martha Duncan NP.

## 2024-09-20 NOTE — PATIENT INSTRUCTIONS
Pt seen today by: Harish Ramos LPN    Home health and self care DRESSING INSTRUCTIONS:        Wound location: Left lateral ankle    Dressings to be changed at next clinic visit.    Cleanse wound with Vashe wound cleanser  Applied triad to periwound  Apply polymem max to the wound bed  Wrapped leg with unna boot  Applied 6 inch strip of drawtex edema to top of ankle  Cover wound with  two kerramax dressing, offloading foam and secure with kerlix and coban       Compression with: Unna boot    Return visit:  Martha Duncan the following Wednesday.    Nutrition:  The current daily value (%DV) for protein is 50 grams per day and is meant as a general goal for most people. Further increasing your dietary protein intake is very important for wound healing. Typically one needs over 100g of protein per day to help with wound healing needs.  If you are a dialysis patient or have problems with your kidneys, talk to your Nephrologist about how much protein you can take in with your condition.  Examples of high protein items that can be added to your diet include: eggs, chicken, red meats, almonds, cottage cheese, Greek yogurt, beans, and peanut butter.  Fortified protein bars, shakes and drinks can add 15-30 additional grams of protein per serving.  Also add:   1 daily general multivitamin   Vitamin C : 500mg twice daily   Zinc 220 mg daily  Vit D : once daily    Offloading   Offload your wound. This means to reduce pressure on and around the wound that reduces blood flow to the wound and prevents healing. Your wound care team will discuss specific ways for you to offload your specific wound. Common offloading strategies include:    Turn or reposition every 2 hours or sooner  Use pillows, wedges, ROHO wheelchair cushions or other special devices like boots and shoes to lift the wound off of hard surfaces  Alternating Low Air-loss (ALAL) mattress may be ordered  Padded dressings can reduce wound pressure        Shirley Boot:   It is  important to move about with your unna boot on. Talk to your doctor about the right amount of activity for you. If the boot begins feeling tight, you may need to rest and prop your foot and leg on pillows. Try to get your foot higher than your heart.  Do not put things inside the boot to scratch your skin.  Keep your leg propped on pillows as much as you can during the day and at night.  Avoid sitting with your legs bent at the knees for a long time.       When do I need to call the doctor?   Your leg is numb or tingles  Toes are blue, grey, or cool  You have more swelling in your leg or foot  You have pain when you walk  Drainage that soaks through your dressing    Compression: You may be using a compressive type of dressings to control edema: If so, keep your compression wrap or tubigrip in place. Do not get them wet, they should feel snug. If they feel tight, or cause pain in any way,  elevate your legs above your heart for 15 minutes. If the wraps still cause pain or you can not tolerate compression,  please remove and notify the clinic or your home health.         Call our [facility] wound clinic for questions/concerns a [phone] .

## 2024-09-23 RX ORDER — ROPINIROLE 0.5 MG/1
0.5 TABLET, FILM COATED ORAL 3 TIMES DAILY
Qty: 90 TABLET | Refills: 2 | Status: SHIPPED | OUTPATIENT
Start: 2024-09-23 | End: 2024-12-22

## 2024-09-23 RX ORDER — AMLODIPINE BESYLATE 5 MG/1
5 TABLET ORAL DAILY
Qty: 90 TABLET | Refills: 0 | Status: SHIPPED | OUTPATIENT
Start: 2024-09-23 | End: 2024-12-22

## 2024-09-23 RX ORDER — TRAMADOL HYDROCHLORIDE 50 MG/1
50 TABLET ORAL EVERY 6 HOURS
Qty: 20 TABLET | Refills: 0 | Status: SHIPPED | OUTPATIENT
Start: 2024-09-23

## 2024-09-23 NOTE — TELEPHONE ENCOUNTER
Called patient, verified . Recommended following up with labs. If legs get uncomfortable, can try ropinirole 1 mg qhs. Her leg issues likely from both restless leg and ongoing infection in her foot.     Did not recommend trying Seroquel from other family members. Will follow up in clinic.     Dick Gallagher DO  [facility] Internal Medicine, PGY-III

## 2024-09-25 ENCOUNTER — HOSPITAL ENCOUNTER (OUTPATIENT)
Dept: WOUND CARE | Facility: HOSPITAL | Age: 59
Discharge: HOME | End: 2024-09-25
Attending: NURSE PRACTITIONER
Payer: COMMERCIAL

## 2024-09-25 VITALS
HEART RATE: 90 BPM | TEMPERATURE: 98 F | SYSTOLIC BLOOD PRESSURE: 156 MMHG | DIASTOLIC BLOOD PRESSURE: 74 MMHG | OXYGEN SATURATION: 95 %

## 2024-09-25 DIAGNOSIS — E11.622 TYPE 2 DIABETES MELLITUS WITH OTHER SKIN ULCER, WITHOUT LONG-TERM CURRENT USE OF INSULIN: ICD-10-CM

## 2024-09-25 DIAGNOSIS — E11.621 DIABETIC ULCER OF LEFT FOOT ASSOCIATED WITH TYPE 2 DIABETES MELLITUS, WITH FAT LAYER EXPOSED, UNSPECIFIED PART OF FOOT: Primary | ICD-10-CM

## 2024-09-25 DIAGNOSIS — M86.172 ACUTE OSTEOMYELITIS OF LEFT CALCANEUS: ICD-10-CM

## 2024-09-25 DIAGNOSIS — E66.01 CLASS 3 SEVERE OBESITY WITH BODY MASS INDEX (BMI) OF 40.0 TO 44.9 IN ADULT, UNSPECIFIED OBESITY TYPE, UNSPECIFIED WHETHER SERIOUS COMORBIDITY PRESENT: ICD-10-CM

## 2024-09-25 DIAGNOSIS — M14.672 CHARCOT'S JOINT OF LEFT ANKLE: ICD-10-CM

## 2024-09-25 DIAGNOSIS — L97.522 DIABETIC ULCER OF LEFT FOOT ASSOCIATED WITH TYPE 2 DIABETES MELLITUS, WITH FAT LAYER EXPOSED, UNSPECIFIED PART OF FOOT: Primary | ICD-10-CM

## 2024-09-25 PROCEDURE — 29580 STRAPPING UNNA BOOT: CPT

## 2024-09-25 PROCEDURE — 27000999 HC MEDICAL RECORD PHOTO DOCUMENTATION

## 2024-09-25 PROCEDURE — 99211 OFF/OP EST MAY X REQ PHY/QHP: CPT

## 2024-09-25 PROCEDURE — 99214 OFFICE O/P EST MOD 30 MIN: CPT | Mod: ,,, | Performed by: NURSE PRACTITIONER

## 2024-09-25 NOTE — PATIENT INSTRUCTIONS
Pt seen today by: ANAM DUNCAN P    Home health and self care DRESSING INSTRUCTIONS:        Wound location: Left lateral ankle    Dressings to be changed at next clinic visit.    Cleanse wound with Vashe wound cleanser  Applied triad to periwound  Apply polymem max to the wound bed  Wrapped leg with unna boot  Applied 6 inch strip of drawtex edema to top of ankle  Cover wound with  two kerramax dressing, offloading foam and secure with kerlix and coban       Compression with: Unna boot    Return visit:  Anam Duncan the following Wednesday.    Nutrition:  The current daily value (%DV) for protein is 50 grams per day and is meant as a general goal for most people. Further increasing your dietary protein intake is very important for wound healing. Typically one needs over 100g of protein per day to help with wound healing needs.  If you are a dialysis patient or have problems with your kidneys, talk to your Nephrologist about how much protein you can take in with your condition.  Examples of high protein items that can be added to your diet include: eggs, chicken, red meats, almonds, cottage cheese, Greek yogurt, beans, and peanut butter.  Fortified protein bars, shakes and drinks can add 15-30 additional grams of protein per serving.  Also add:   1 daily general multivitamin   Vitamin C : 500mg twice daily   Zinc 220 mg daily  Vit D : once daily    Offloading   Offload your wound. This means to reduce pressure on and around the wound that reduces blood flow to the wound and prevents healing. Your wound care team will discuss specific ways for you to offload your specific wound. Common offloading strategies include:    Turn or reposition every 2 hours or sooner  Use pillows, wedges, ROHO wheelchair cushions or other special devices like boots and shoes to lift the wound off of hard surfaces  Alternating Low Air-loss (ALAL) mattress may be ordered  Padded dressings can reduce wound pressure        Unna Boot:   It  is important to move about with your unna boot on. Talk to your doctor about the right amount of activity for you. If the boot begins feeling tight, you may need to rest and prop your foot and leg on pillows. Try to get your foot higher than your heart.  Do not put things inside the boot to scratch your skin.  Keep your leg propped on pillows as much as you can during the day and at night.  Avoid sitting with your legs bent at the knees for a long time.       When do I need to call the doctor?   Your leg is numb or tingles  Toes are blue, grey, or cool  You have more swelling in your leg or foot  You have pain when you walk  Drainage that soaks through your dressing    Compression: You may be using a compressive type of dressings to control edema: If so, keep your compression wrap or tubigrip in place. Do not get them wet, they should feel snug. If they feel tight, or cause pain in any way,  elevate your legs above your heart for 15 minutes. If the wraps still cause pain or you can not tolerate compression,  please remove and notify the clinic or your home health.         Call our [facility] wound clinic for questions/concerns a [phone] .

## 2024-09-25 NOTE — PROGRESS NOTES
[facility]   Outpatient Wound Care     Subjective:   Patient ID: Suki Anton is a 58 y.o. female.    Chief Complaint: Wound Care (Left lateral ankle wound/unna)      History of Present Illness:   58 y.o. White female presents to wound care clinic today for right ankle ulcer and Unna boot change.  Since last visit she saw Dr. Caal with Dr. Hawkins's office to discuss the bone skin with her and was aware of osteomyelitis in her left posterior calcaneus.  She was placed on Bactrim. Currently awaiting a referral to [facility] for a Dr. Weir.  Multiple options were given to her at that visit possible resection of the calcaneus, IV antibiotics, placement of absorbable antibiotic beads, and possible below-the-knee amputation.  Reviewed all previous medical history and progress notes since last visit if 9/18/2024.  Past medical history: Currently under the care of  for left ankle Charcot foot joint. Currently was instructed per Dr. Neumann to be non-weight bearing to left ankle/foot.  Patient voices she walked from her car to the front of the hospital and then obtained wheelchair.  Instructed the importance this was be non-weight bearing to left foot and ankle meeting no weight for the next 6 weeks per Dr. Neumann. Under the care of Dr. Neumann.  Voices saw podiatrist today but did not take self pay patients.  Voices awaiting insurance from work due to insurance was changed in his awaiting for reinstatement.  Saw orthopedics on 7/1/24 and was informed of CT scan.  She is waiting to see podiatry no appointment scheduled at this time.  Will continue our clinic until ulcer is healed. Wound has been present for approximately 2 months.  She is a referral from Orthopedics.  Patient is currently awaiting CT scan of left ankle, and referral to a foot ankle specialist.   Patient voices 2 months ago twisted ankle and purchased a boot from [employer] which created an ulcer to ankle area. Followed by Dick Gallagher DO for PCP last appt 7/29/24.    Today's visit 09/25/2024:  Reviewed all previous progress notes since last visit of 09/18/2024.  Unna boot to left lower extremity removed per nursing staff at bedside.  Left lower leg wash with Hibiclens soap and water rinse pat dry.  Left ankle red granulating wound bed with macerated jak wound moderate serosanguineous drainage.  Decrease in size since last visit.  Patient has seen Dr. Caal since last visit currently taking oral antibiotics.  Awaiting referral to [facility].  Discussion will continue Unna boots for closure of left ankle ulcer.  Reinforced the importance of offloading pressure to left lower leg as much as possible.  Reinforced the importance of following diabetic diet and taking diabetic medications as directed.  All wound care performed per nursing staff at bedside.  Left ankle ulcer cleansed with wash, applied Triad to jak wound, polymem max to woundbed, Kerramax, Drawtex to to dorsal foot, offloading foam,kerlix, and secure with Coban.  Tolerated well.  Will return to the clinic weekly for wound care.  Instructed to call the office with any questions, concerns, or any reasons having to remove Unna boot.  Verbalized understanding of all instructions.      09/18/2024:  Reviewed all previous progress notes since last visit of 09/10/24.  Unna boot to left lower leg removed per nursing staff at bedside.  Left ankle wound red granulating wound bed with moderate serosanguineous drainage with slight maceration to jak wound.  All wound care performed per nursing staff at bedside.  Left lower leg wash with Hibiclens soap and water pat dry.  Discussion with the patient today since will see Dr. Hawkins tomorrow will hold Unna boot at this time.  Wound care performed at bedside today cleansed left ulcer with Vashe, applied Traid to jak  wound, polymem max to wound bed,unna boot, 4x4, offloading foam, wrapped with Kerlix secure with tape.  Applied CAM walking boot.  Tolerated well.  Reinforced offloading pressure.  Instructed may take shower but perform wound care immediately after shower.  Instructed wound care can be performed every other day.  Will have her follow up with our clinic on Friday.  Instructed to call the office with any questions, concerns, or new skin issues.  Verbalized understanding of all instructions.    9/10/24:  Reviewed previous progress notes since last visit of 9/4/24.  Unna boot removed per nursing staff at bedside.  Left lower leg wash with Hibiclens soap and water pat dry.  Left ankle red granulating wound bed with macerated jak wound moderate serosanguineous drainage.  Discussion with the patient today wound has significantly decreased in size since last visit.  Will continue same wound care.  Wound care performed per nursing staff at bedside.  Left ankle wound cleansed with Vashe applied Triad to jak wound, polymem max to wound bed, Unna boot, double super absorbent, offloading foam, Drawtex edema to dorsal foot, kerlix, and coban.  Tolerated well.  Wearing CAM walking boot.  Instructed only apply boot when transferring.  Instructed to stay off of foot as much as possible.  Reinforced offloading in unna boot precautions.  Will have her return to the clinic on Friday for a nurse visit.  Instructed to call the office with any questions, concerns, or new skin issues.  Verbalized understanding of all instructions.      9/4/24:  Reviewed previous progress notes last visit on 08/26/2024.  Presents to clinic in wheelchair eschar noted by radiology.  Unna boot to left lower extremity removed per nursing staff at bedside.  Left lower leg wash with Hibiclens soap and water.  Left ankle ulcer red granulating wound bed with macerated jak wound and moderate serosanguineous drainage.  Pared jak wound callus using #4 dermal  curette.  Rationale for paring to decrease bioburden and increased granulation.  Tolerated well.  Left ankle cleansed with Vashe, applied Triad to jak wound, polymem max, Unna boot, double super absorbent, offloading foam, Drawtex edema to dorsal foot, Kerlix, and Coban.  Tolerated well.  Reinforced offloading and unna boot precautions.  Will return to the clinic in 1 week.  Escorted to radiology after appointment in wheelchair today for additional scans.  Instructed to call the office with any questions, concerns, or any reasons having to remove Unna boot.  Verbalized understanding of all instructions.      08/26/2024:  Reviewed all previous progress notes since last visit 08/19/2024.  Presents to clinic in wheelchair escorted by staff member.  Unna boot to left lower extremity removed per nursing staff at bedside.  Left ankle ulcer red granulating wound bed with minimal maceration to edges moderate serosanguineous drainage, and significant decrease in size since last visit.  Discussion we will continue Unna boot at this time.  Patient voices as a bone scan on Thursday this week.  Instructed to have them call wound care clinic if we need to remove Unna boot prior to appointment.  All wound care performed per nursing staff at bedside.  Left ankle ulcer cleansed with Vashe, applied Triad to wound edge, apply polymem max, unna boot, double super abssorbent, offloading foam, drawtex edema to dorsal foot, Kerlix and Coban.  Reinforced unna boot precautions.  Reinforced offloading as much as possible.  Using wheelchair and knee walker at this time.  Will return to the clinic on Thursday for Unna boot change.  Instructed to call the office with any questions, concerns, or any reasons having to remove Unna boot.  Verbalized understanding of all instructions.    8/19/24:  Reviewed all previous progress since last visit of 08/12/2024.  Presents to clinic in wheelchair.  Left lower extremity unna boot removed per nursing  staff at bedside.  Left lower extremity ankle ulcer red granulating wound bed , macerated skin edges with moderate serosanguineous drainage.  Decrease in size since last visit.  Patient voices she is staying off of foot as much as possible.  All wound care performed per nursing staff at bedside.  Left ankle ulcer cleansed with Vashe, applied Triad to jak wound, polymem to wound bed, unna boot, super absorbent dressing, offloading foam, Kerlix and Coban.  Will return to the clinic on Thursday for Unna boot change.  Instructed the importance of offloading pressure as much as possible.  Using knee walker as much as possible.  Unna boot precautions.  Instructed to call the office with any questions, concerns, or any reasons having to remove Unna boot.  Verbalized understanding of all instructions.    8/12/24:  Reviewed all previous progress notes since last visit of 08/08/2024.  Presents to clinic in wheelchair.  Wearing left lower leg CAM walking boot.  All wound care to left lower extremity removed per nursing staff at bedside.  Left ankle ulcer red granulating wound bed minimal slough with macerated jak wound requiring debridement to decrease bio burden to increase granulation.  Selective debridement performed at bedside.  See quick procedure note.  All wound care performed per nursing staff at bedside.  Left ankle ulcer cleansed with Vashe, applied Triad to jak wound, silver polymem to wound bed, unna boot, convamax, offloading foam, Kerlix, and Coban.  Attempted to provide the patient with crutches patient unable to use crutches due to his shoulder injury.  Instructed the importance of her using knee walker at all times.  Reinforced unna boot precautions.  Will return to the clinic on Thursday for Unna boot change.  Instructed to call the office with any questions, concerns, or any reasons having to remove Unna boot.  Verbalized understanding of all instructions.    08/05/2024:  Reviewed all previous progress  notes since last visit 07/29/2024.  Presents to clinic in wheelchair.  Left lower leg CAM walking boot.  Left lower extremity Unna boot removed per nursing at bedside.  Left ankle ulcer red granulating wound bed with moderate serosanguineous drainage and macerated jak wound.  Instructed the patient today the importance of offloading pressure to left foot.  All wound care performed per nursing staff at bedside.  Left lower extremity cleansed with Hibiclens soap and water pat dry left ulcer cleansed with Vashe, applied Triad to jak wound, silver polymem, Convamax, unna boot, kerlix, and coban.  Unna boot precautions given.  Will return to the clinic on Thursday after appt with Dr. Neumann.  Instructed to call the office with any questions, concerns, or any reasons having to remove Unna boot.  Verbalized understanding of all instructions.      7/29/24:  Reviewed all previous progress since last visit 07/22/2024.  Presents to clinic alone. Ambulating with CAM walking boot.  Left lower extremity Unna boot removed per nursing staff at bedside.  Left lower leg washed Hibiclens soap and water.  Wound red granulating wound bed, slight macerated jak wound with moderate serosanguineous drainage.  Discussion with the patient today will continue same wound care orders.  All wound care performed per nursing staff at bedside.  Left lower extremity ankle cleansed with Vashe, applied Triad to jak wound, silver polymem to  wound bed, cover with convamax, Unna boot, Kerlix, and Coban.  Tolerated well.  Will return to our clinic on Thursday for a nurse visit for Unna boot change.  Instructed to call the office with any questions, concerns, or any reasons removed Unna boot.  Instructed the importance to call and reschedule appointment with Dr. Neumann regarding left Charcot ankle.  Reinforced offloading pressure to areas much as possible.  Verbalized understanding of all instructions.    07/22/2024:  Reviewed all previous progress  notes 07/15/2024.  Presents to the clinic with uma.  Ambulates with CAM walking boot to left lower leg.  Left lower extremity Unna boot removed per nursing staff at bedside.  Left ankle wound red granulating wound bed macerated jak wound moderate serosanguineous drainage.  Discussion with the patient today significant decrease in size since last visit will continue Unna boot.  New wound care orders for left lower extremity ankle cleansed with Vashe apply, Triad to jak wound, silver polymem to wound bed, cover with covamax, Unna boot, Kerlix, and Coban.  All wound care performed per nursing staff at bedside.  Will return to the clinic on Thursday for a nurse visit for Unna boot change.  Will follow up in wound clinic with me next Monday.  Instructed to call the office with any questions, concerns, or any reasons having to remove Unna boot.  Verbalized understanding of all instructions.      07/15/2024:  Reviewed all previous progress notes.  Left lower leg unna boot removed per nursing staff at bedside.  Left ankle wound red granulating wound bed with moderate serosanguineous drainage and macerated jak wound.  Significant decrease in size since last visit, and now wound has created 2 separate wounds.  Discussion with the patient today we will continue Unna boot to assist in decreasing edema to ankle, and increase in granulation of wounds.  All wound care performed per nursing staff at bedside.  Left ankle wound cleansed with Vashe, applied Triad to periwound, silver polymem to wound bed, Unna boot, Drawtex wrap, Kerlix and Coban.  Currently wearing left CAM boot.  Will have her return to the clinic on Thursday for a nurse visit for Unna boot change.  Reinforced unna boot precautions.  Reinforced offloading pressure to left foot as much as possible.  Instructed to call the office with any questions, concerns, or any reasons having to remove Unna boot.  Verbalized understanding of all  instructions.    07/08/2024:  Reviewed previous progress notes.  Left lower leg unna boot removed per nursing staff at bedside.  Left ankle ulcer red granulating wound bed with moderate serosanguineous drainage with macerated jak wound.  Will continue same wound care.  All wound care performed per nursing staff at bedside.  Left ankle wound cleansed with Vashe, apply Triad to periwound edge, Silver polymem to wound bed, cover with unna boot, Drawtex 4 x 4, Kerlix, and Coban. Tolerated well.  Reinforced unna boot precautions.  Will return to clinic on Thursday for a nurse visit for Unna boot change.  Will follow up with me next Monday. Instructed the importance of calling the office with any questions, concerns, or any reasons having to remove Unna boot.  Verbalized understanding of all instructions.    07/01/2024:  Reviewed previous progress notes.  Left lower leg unna boot removed per nursing staff at bedside.  Left ankle ulcer red granulating wound bed with minimal slough and moderate serosanguineous drainage.  Patient voices  will have virtual visit with orthopedic talk to discuss CT scan today.  All wound care performed per nursing staff at bedside.  Will continue Unna boot. Left ankle wound cleansed with 0.5% Dakins, apply Triad to periwound edge, Silver polymem to wound bed, cover with unna boot, Drawtex 4 x 4, Kerlix, and Coban. Tolerated well.  Reinforced unna boot precautions.  Will return to the clinic on Wednesday for a nurse visit, and will return the following Monday to re-evaluate left ankle ulcer.  Instructed the importance of calling the office with any questions, concerns, or any reasons having to remove Unna boot.  Verbalized understanding of all instructions.    06/26/2024:  Presents to clinic alone.  Left lower leg unna boot removed per nursing staff at bedside.  Left ankle ulcer red granulating wound bed with moderate serosanguineous drainage minimal slough.  Macerated jak wound.  Discussion  with the patient today will continue Unna boot.  Patient is taking all oral antibiotics as directed.  All wound care performed per nursing staff at bedside. Left ankle wound cleansed with 0.5% Dakins, apply Triad to periwound edge, Silver polymem to wound bed, cover with unna boot, Drawtex wrap, Kerlix, and Coban. Tolerated well.  Reinforced unna boot precautions.  Will return to the clinic on Monday to re-evaluate left ankle ulcer.  Instructed the importance of calling the office with any questions, concerns, or any reasons having to remove Unna boot.  Doctors excuse given.  Verbalized understanding of all instructions.      06/13/2024:  Presents to the clinic with grandchildren.  Ambulates with left foot orthopedic walking boot.  Walking boot and left ankle dressing removed per nursing staff at bedside.  Left ankle ulcer red granulating wound bed with moderate amount of slough and serosanguineous drainage with the jak wound erythema and nonpitting edema.  Discussion with the patient today will need to perform selective debridement and obtain tissue culture.  Written consent obtained.  See quick procedure note.  Following debridement red granulating wound bed with moderate serosanguineous drainage discussion with the patient today will benefit from applying Unna boot to increased granulation to area and decrease edema.  All wound care performed per nursing staff at bedside.  Left ankle wound cleansed with 0.5% Dakins, apply Silver polymem to wound bed, cover with unna boot, Drawtex wrap, Kerlix, and Coban.  Tolerated well.  Instructed on unna boot precautions.  Will return to the clinic on Monday for a nurse visit for Unna boot change.  Will follow up with me next Thursday.       History includes:      Past Medical History:   Diagnosis Date    Diabetes mellitus     Diabetes mellitus, type 2     GERD (gastroesophageal reflux disease)     Hypertension     History reviewed. No pertinent surgical history.   Social  History     Socioeconomic History    Marital status:     Number of children: 0   Occupational History    Occupation: Manager     Comment: [employer]   Tobacco Use    Smoking status: Never    Smokeless tobacco: Never   Substance and Sexual Activity    Alcohol use: Never    Drug use: Never    Sexual activity: Yes     Partners: Male     Birth control/protection: None     Social Determinants of Health     Financial Resource Strain: Medium Risk (4/4/2024)    Overall Financial Resource Strain (CARDIA)     Difficulty of Paying Living Expenses: Somewhat hard   Food Insecurity: Food Insecurity Present (3/11/2024)    Hunger Vital Sign     Worried About Running Out of Food in the Last Year: Sometimes true     Ran Out of Food in the Last Year: Sometimes true   Transportation Needs: Unmet Transportation Needs (4/4/2024)    PRAPARE - Transportation     Lack of Transportation (Medical): Yes     Lack of Transportation (Non-Medical): Yes   Physical Activity: Inactive (4/4/2024)    Exercise Vital Sign     Days of Exercise per Week: 0 days     Minutes of Exercise per Session: 0 min   Stress: Stress Concern Present (4/4/2024)    [employer] - Occupational Stress Questionnaire     Feeling of Stress : Rather much   Housing Stability: High Risk (3/11/2024)    Housing Stability Vital Sign     Unable to Pay for Housing in the Last Year: Yes     Number of Places Lived in the Last Year: 1     Unstable Housing in the Last Year: No   .      Current Outpatient Medications   Medication Sig Dispense Refill    acetaminophen (TYLENOL) 500 MG tablet Take 500 mg by mouth every 6 (six) hours as needed for Pain.      ALPRAZolam (XANAX) 0.25 MG tablet Take 1 tablet (0.25 mg total) by mouth as needed for Anxiety (Take 30 min prior to getting scanned). 2 tablet 0    amLODIPine (NORVASC) 5 MG tablet Take 1 tablet (5 mg total) by mouth once daily. 90 tablet 0    atorvastatin (LIPITOR) 40 MG tablet Take 1 tablet (40  mg total) by mouth once daily. 90 tablet 0    BACTRIM 400-80 mg per tablet Take 1 tablet by mouth 2 (two) times daily.      blood sugar diagnostic Strp Test strips to check CBG's 200 each 11    blood-glucose meter kit 1 each by Other route 2 (two) times daily. Use as instructed 1 each 0    EASY TOUCH TWIST LANCETS 33 gauge Misc USE TO CHECK BLOOD SUAGR LEVELS TWO TIMES A  each 3    gabapentin (NEURONTIN) 300 MG capsule Take 2 capsules (600 mg total) by mouth 3 (three) times daily. 180 capsule 2    glipiZIDE (GLUCOTROL) 10 MG tablet Take 1 tablet (10 mg total) by mouth 2 (two) times daily with meals. 60 tablet 5    insulin degludec (TRESIBA FLEXTOUCH U-100) 100 unit/mL (3 mL) insulin pen Inject 25 Units into the skin every evening. 7.5 mL 2    insulin lispro (HUMALOG KWIKPEN INSULIN) 100 unit/mL pen Inject 7 Units into the skin 3 (three) times daily with meals. 6.3 mL 2    lancing device Misc 1 each by Misc.(Non-Drug; Combo Route) route 4 (four) times daily with meals and nightly. 200 each 5    lisinopriL (PRINIVIL,ZESTRIL) 40 MG tablet Take 1 tablet (40 mg total) by mouth once daily. 90 tablet 3    omeprazole (PRILOSEC) 20 MG capsule Take 2 capsules (40 mg total) by mouth once daily. 60 capsule 2    rOPINIRole (REQUIP) 0.5 MG tablet Take 1 tablet (0.5 mg total) by mouth 3 (three) times daily. 90 tablet 2    traMADoL (ULTRAM) 50 mg tablet Take 1 tablet (50 mg total) by mouth every 6 (six) hours. 20 tablet 0    traZODone (DESYREL) 50 MG tablet Take 1 tablet (50 mg total) by mouth nightly as needed for Insomnia. 30 tablet 0    TRUE METRIX GLUCOSE METER Misc Inject 1 each into the skin 4 (four) times daily with meals and nightly. use as directed 1 each 0     No current facility-administered medications for this encounter.       Review of Systems   Skin:  Positive for wound.   All other systems reviewed and are negative.         Labs Reviewed:   Chemistry:  Lab Results   Component Value Date    BUN 14.5 06/15/2023     BUN 14.0 06/14/2023    CREATININE 0.75 06/15/2023    CREATININE 0.69 06/14/2023    EGFRNORACEVR >60 06/15/2023    EGFRNORACEVR >60 06/14/2023    GLUCOSE 267 (H) 06/15/2023    AST 8 06/15/2023    AST 8 06/14/2023    ALT 12 06/15/2023    ALT 10 06/14/2023    HGBA1C 11.4 (H) 04/13/2023        Hematology:  Lab Results   Component Value Date    WBC 14.77 (H) 06/15/2023    WBC 15.65 (H) 06/14/2023    HGB 11.3 (L) 06/15/2023    HGB 11.6 (L) 06/14/2023    HCT 33.7 (L) 06/15/2023    HCT 34.5 (L) 06/14/2023     06/15/2023     06/14/2023       Inflammatory Markers:  Lab Results   Component Value Date    SEDRATE 64 (H) 07/02/2020    SEDRATE 95 (H) 06/30/2020    SEDRATE 94 (H) 06/28/2020        Objective:        Physical Exam  Vitals reviewed.   Cardiovascular:      Pulses:           Dorsalis pedis pulses are detected w/ Doppler on the right side and detected w/ Doppler on the left side.        Posterior tibial pulses are detected w/ Doppler on the right side and detected w/ Doppler on the left side.   Musculoskeletal:      Left lower leg: Edema present.      Left foot: Charcot foot present.        Feet:    Feet:      Right foot:      Skin integrity: Skin integrity normal.      Toenail Condition: Right toenails are abnormally thick. Fungal disease present.     Left foot:      Skin integrity: Ulcer present.      Toenail Condition: Left toenails are abnormally thick. Fungal disease present.  Skin:     General: Skin is warm.      Capillary Refill: Capillary refill takes less than 2 seconds.      Findings: Wound present.             Comments: Left ulcer red granulating wound bed with moderate serosanguineous drainage and macerated jak wound.   Neurological:      Mental Status: She is alert.   Psychiatric:         Behavior: Behavior is cooperative.            Wound 06/13/24 0827 Pressure Injury Left lateral Ankle (Active)   06/13/24 0827   Present on Original Admission: Y   Primary Wound Type: Pressure inj   Side:  Left   Orientation: lateral   Location: Ankle   Wound Approximate Age at First Assessment (Weeks):    Wound Number:    Is this injury device related?:    Incision Type:    Closure Method:    Wound Description (Comments):    Type:    Additional Comments:    Ankle-Brachial Index:    Pulses:    Removal Indication and Assessment:    Wound Outcome:    Wound Image   09/25/24 0914   Dressing Appearance Moist drainage 09/25/24 0914   Drainage Amount Moderate 09/25/24 0914   Drainage Characteristics/Odor Serosanguineous 09/25/24 0914   Appearance Aldine 09/25/24 0914   Periwound Area Pale white 09/25/24 0914   Wound Length (cm) 0.6 cm 09/25/24 0914   Wound Width (cm) 1.4 cm 09/25/24 0914   Wound Depth (cm) 0.3 cm 09/25/24 0914   Wound Volume (cm^3) 0.252 cm^3 09/25/24 0914   Wound Surface Area (cm^2) 0.84 cm^2 09/25/24 0914         Assessment:         ICD-10-CM ICD-9-CM   1. Diabetic ulcer of left foot associated with type 2 diabetes mellitus, with fat layer exposed, unspecified part of foot  E11.621 250.80    L97.522 707.15   2. Acute osteomyelitis of left calcaneus  M86.172 730.07   3. Charcot's joint of left ankle  M14.672 094.0     713.5   4. Type 2 diabetes mellitus with other skin ulcer, without long-term current use of insulin  E11.622 250.80   5. Class 3 severe obesity with body mass index (BMI) of 40.0 to 44.9 in adult, unspecified obesity type, unspecified whether serious comorbidity present  E66.01 278.01    Z68.41 V85.41           Plan:   Tissue pathology and/or culture taken:  [] Yes [x] No   Sharp debridement performed:   [] Yes [x] No   Labs ordered this visit:   [] Yes [x] No   Imaging ordered this visit:   [] Yes [x] No         1. Diabetic ulcer of left foot associated with type 2 diabetes mellitus, with fat layer exposed, unspecified part of foot     Wound care:  Applied Unna boot.    Will offload pressure as much as possible to left lower leg.    Wearing CAM walking boot.   2. Acute osteomyelitis of left  calcaneus     Taking Bactrim.    Under the care of Dr. Caal.     Awaiting referral  to [facility] (Dr. Weir)   3. Charcot's joint of left ankle     Awaiting referral to [facility] (Dr. Weir)    Using knee walker for short distances.    Using wheelchair for longer distances.      4. Type 2 diabetes mellitus with other skin ulcer, without long-term current use of insulin     Co-factor in delayed wound healing and development.    Last A1c 9.7.    Reinforced the importance of diet and medication compliance.   5. Class 3 severe obesity with body mass index (BMI) of 40.0 to 44.9 in adult, unspecified obesity type, unspecified whether serious comorbidity present     Instructed on the importance of diet, decreased caloric intake due to non weight bearing to left foot at this time.          Patient Instructions   Pt seen today by: ANAM DUNCAN AHSAN    Home health and self care DRESSING INSTRUCTIONS:        Wound location: Left lateral ankle    Dressings to be changed at next clinic visit.    Cleanse wound with Vashe wound cleanser  Applied triad to periwound  Apply polymem max to the wound bed  Wrapped leg with unna boot  Applied 6 inch strip of drawtex edema to top of ankle  Cover wound with  two kerramax dressing, offloading foam and secure with kerlix and coban       Compression with: Unna boot    Return visit:  Anam Duncan the following Wednesday.    Nutrition:  The current daily value (%DV) for protein is 50 grams per day and is meant as a general goal for most people. Further increasing your dietary protein intake is very important for wound healing. Typically one needs over 100g of protein per day to help with wound healing needs.  If you are a dialysis patient or have problems with your kidneys, talk to your Nephrologist about how much protein you can take in with your condition.  Examples of high protein items that can be added to your diet include: eggs, chicken, red meats, almonds, cottage cheese, Greek yogurt, beans, and  peanut butter.  Fortified protein bars, shakes and drinks can add 15-30 additional grams of protein per serving.  Also add:   1 daily general multivitamin   Vitamin C : 500mg twice daily   Zinc 220 mg daily  Vit D : once daily    Offloading   Offload your wound. This means to reduce pressure on and around the wound that reduces blood flow to the wound and prevents healing. Your wound care team will discuss specific ways for you to offload your specific wound. Common offloading strategies include:    Turn or reposition every 2 hours or sooner  Use pillows, wedges, ROHO wheelchair cushions or other special devices like boots and shoes to lift the wound off of hard surfaces  Alternating Low Air-loss (ALAL) mattress may be ordered  Padded dressings can reduce wound pressure        Unna Boot:   It is important to move about with your unna boot on. Talk to your doctor about the right amount of activity for you. If the boot begins feeling tight, you may need to rest and prop your foot and leg on pillows. Try to get your foot higher than your heart.  Do not put things inside the boot to scratch your skin.  Keep your leg propped on pillows as much as you can during the day and at night.  Avoid sitting with your legs bent at the knees for a long time.       When do I need to call the doctor?   Your leg is numb or tingles  Toes are blue, grey, or cool  You have more swelling in your leg or foot  You have pain when you walk  Drainage that soaks through your dressing    Compression: You may be using a compressive type of dressings to control edema: If so, keep your compression wrap or tubigrip in place. Do not get them wet, they should feel snug. If they feel tight, or cause pain in any way,  elevate your legs above your heart for 15 minutes. If the wraps still cause pain or you can not tolerate compression,  please remove and notify the clinic or your home health.         Call our [facility] wound clinic for questions/concerns a [phone] .      The time spent including preparing to see the patient, obtaining patient history and assessment, evaluation of the plan of care, patient/caregiver counseling and education, orders, documentation, coordination of care, and other professional medical management activities for today's encounter was 30 minute.    Time spent performing procedures during today's encounter was 25 minute.    Follow up in about 1 week (around 10/2/2024). Teaching provided on s/s to call wound clinic for promptly.  ER precautions taught for after hours and weekends.       JANE Vieyra

## 2024-09-26 ENCOUNTER — TELEPHONE (OUTPATIENT)
Dept: WOUND CARE | Facility: HOSPITAL | Age: 59
End: 2024-09-26
Payer: COMMERCIAL

## 2024-09-26 NOTE — TELEPHONE ENCOUNTER
Informed spoke with Dr. Caal office regarding plan of care for IV antibiotics or referral to [facility]. Spoke with Annemarie and Dr. Caal stated it was Ok for us to place referral for IV antibiotics.  Spoke Vanessa with ID clinic [facility].  Patient will have to go through ED to be set up for IV antibiotics for left calcaneus osteomyelitis.  Patient requesting to come through ER on Monday due to has an appointment with  that is scheduled on Friday.  Instructed to call the office once is contacted by [facility] for referral and when will be in route to [facility] ER on Monday.  Verbalized understanding of all instructions.   Allergy;

## 2024-09-30 ENCOUNTER — TELEPHONE (OUTPATIENT)
Dept: WOUND CARE | Facility: HOSPITAL | Age: 59
End: 2024-09-30
Payer: COMMERCIAL

## 2024-09-30 ENCOUNTER — HOSPITAL ENCOUNTER (INPATIENT)
Facility: HOSPITAL | Age: 59
LOS: 24 days | Discharge: NURSING, REHAB OR LONG-TERM CARE | DRG: 638 | End: 2024-10-24
Attending: EMERGENCY MEDICINE | Admitting: STUDENT IN AN ORGANIZED HEALTH CARE EDUCATION/TRAINING PROGRAM
Payer: COMMERCIAL

## 2024-09-30 DIAGNOSIS — M86.9 OSTEOMYELITIS OF LEFT FOOT, UNSPECIFIED TYPE: Primary | ICD-10-CM

## 2024-09-30 DIAGNOSIS — M86.672 OTHER CHRONIC OSTEOMYELITIS OF LEFT FOOT: ICD-10-CM

## 2024-09-30 DIAGNOSIS — I73.9 PAD (PERIPHERAL ARTERY DISEASE): ICD-10-CM

## 2024-09-30 DIAGNOSIS — R07.9 CHEST PAIN: ICD-10-CM

## 2024-09-30 DIAGNOSIS — R94.31 QT PROLONGATION: ICD-10-CM

## 2024-09-30 LAB
ALBUMIN SERPL-MCNC: 3.3 G/DL (ref 3.5–5)
ALBUMIN/GLOB SERPL: 0.9 RATIO (ref 1.1–2)
ALP SERPL-CCNC: 102 UNIT/L (ref 40–150)
ALT SERPL-CCNC: 9 UNIT/L (ref 0–55)
ANION GAP SERPL CALC-SCNC: 9 MEQ/L
AST SERPL-CCNC: 9 UNIT/L (ref 5–34)
BASOPHILS # BLD AUTO: 0.06 X10(3)/MCL
BASOPHILS NFR BLD AUTO: 0.5 %
BILIRUB SERPL-MCNC: 0.3 MG/DL
BUN SERPL-MCNC: 19.7 MG/DL (ref 9.8–20.1)
CALCIUM SERPL-MCNC: 9 MG/DL (ref 8.4–10.2)
CHLORIDE SERPL-SCNC: 103 MMOL/L (ref 98–107)
CO2 SERPL-SCNC: 25 MMOL/L (ref 22–29)
CREAT SERPL-MCNC: 0.86 MG/DL (ref 0.55–1.02)
CREAT/UREA NIT SERPL: 23
CRP SERPL-MCNC: 12.1 MG/L
EOSINOPHIL # BLD AUTO: 0.4 X10(3)/MCL (ref 0–0.9)
EOSINOPHIL NFR BLD AUTO: 3.3 %
ERYTHROCYTE [DISTWIDTH] IN BLOOD BY AUTOMATED COUNT: 12.4 % (ref 11.5–17)
ERYTHROCYTE [SEDIMENTATION RATE] IN BLOOD: 17 MM/HR (ref 0–20)
EST. AVERAGE GLUCOSE BLD GHB EST-MCNC: 246 MG/DL
GFR SERPLBLD CREATININE-BSD FMLA CKD-EPI: >60 ML/MIN/1.73/M2
GLOBULIN SER-MCNC: 3.6 GM/DL (ref 2.4–3.5)
GLUCOSE SERPL-MCNC: 322 MG/DL (ref 74–100)
HBA1C MFR BLD: 10.2 %
HCT VFR BLD AUTO: 39.1 % (ref 37–47)
HGB BLD-MCNC: 13.4 G/DL (ref 12–16)
HOLD SPECIMEN: NORMAL
IMM GRANULOCYTES # BLD AUTO: 0.09 X10(3)/MCL (ref 0–0.04)
IMM GRANULOCYTES NFR BLD AUTO: 0.8 %
LACTATE SERPL-SCNC: 2.6 MMOL/L (ref 0.5–2.2)
LACTATE SERPL-SCNC: 2.7 MMOL/L (ref 0.5–2.2)
LYMPHOCYTES # BLD AUTO: 2.69 X10(3)/MCL (ref 0.6–4.6)
LYMPHOCYTES NFR BLD AUTO: 22.5 %
MCH RBC QN AUTO: 28.9 PG (ref 27–31)
MCHC RBC AUTO-ENTMCNC: 34.3 G/DL (ref 33–36)
MCV RBC AUTO: 84.4 FL (ref 80–94)
MONOCYTES # BLD AUTO: 0.75 X10(3)/MCL (ref 0.1–1.3)
MONOCYTES NFR BLD AUTO: 6.3 %
NEUTROPHILS # BLD AUTO: 7.96 X10(3)/MCL (ref 2.1–9.2)
NEUTROPHILS NFR BLD AUTO: 66.6 %
NRBC BLD AUTO-RTO: 0 %
PLATELET # BLD AUTO: 291 X10(3)/MCL (ref 130–400)
PMV BLD AUTO: 11 FL (ref 7.4–10.4)
POCT GLUCOSE: 158 MG/DL (ref 70–110)
POCT GLUCOSE: 207 MG/DL (ref 70–110)
POCT GLUCOSE: 248 MG/DL (ref 70–110)
POCT GLUCOSE: 262 MG/DL (ref 70–110)
POTASSIUM SERPL-SCNC: 4.2 MMOL/L (ref 3.5–5.1)
PROT SERPL-MCNC: 6.9 GM/DL (ref 6.4–8.3)
RBC # BLD AUTO: 4.63 X10(6)/MCL (ref 4.2–5.4)
SODIUM SERPL-SCNC: 137 MMOL/L (ref 136–145)
WBC # BLD AUTO: 11.95 X10(3)/MCL (ref 4.5–11.5)

## 2024-09-30 PROCEDURE — 21400001 HC TELEMETRY ROOM

## 2024-09-30 PROCEDURE — 85652 RBC SED RATE AUTOMATED: CPT | Performed by: NURSE PRACTITIONER

## 2024-09-30 PROCEDURE — 25500020 PHARM REV CODE 255

## 2024-09-30 PROCEDURE — 87040 BLOOD CULTURE FOR BACTERIA: CPT | Performed by: NURSE PRACTITIONER

## 2024-09-30 PROCEDURE — 85025 COMPLETE CBC W/AUTO DIFF WBC: CPT | Performed by: NURSE PRACTITIONER

## 2024-09-30 PROCEDURE — 63600175 PHARM REV CODE 636 W HCPCS

## 2024-09-30 PROCEDURE — 25000003 PHARM REV CODE 250: Performed by: NURSE PRACTITIONER

## 2024-09-30 PROCEDURE — 96365 THER/PROPH/DIAG IV INF INIT: CPT

## 2024-09-30 PROCEDURE — 86140 C-REACTIVE PROTEIN: CPT | Performed by: NURSE PRACTITIONER

## 2024-09-30 PROCEDURE — 99285 EMERGENCY DEPT VISIT HI MDM: CPT | Mod: 25

## 2024-09-30 PROCEDURE — 83605 ASSAY OF LACTIC ACID: CPT | Performed by: NURSE PRACTITIONER

## 2024-09-30 PROCEDURE — 63600175 PHARM REV CODE 636 W HCPCS: Performed by: NURSE PRACTITIONER

## 2024-09-30 PROCEDURE — 25000003 PHARM REV CODE 250: Performed by: EMERGENCY MEDICINE

## 2024-09-30 PROCEDURE — A9577 INJ MULTIHANCE: HCPCS

## 2024-09-30 PROCEDURE — 83036 HEMOGLOBIN GLYCOSYLATED A1C: CPT | Performed by: STUDENT IN AN ORGANIZED HEALTH CARE EDUCATION/TRAINING PROGRAM

## 2024-09-30 PROCEDURE — 25000003 PHARM REV CODE 250: Performed by: STUDENT IN AN ORGANIZED HEALTH CARE EDUCATION/TRAINING PROGRAM

## 2024-09-30 PROCEDURE — 96367 TX/PROPH/DG ADDL SEQ IV INF: CPT

## 2024-09-30 PROCEDURE — 63600175 PHARM REV CODE 636 W HCPCS: Performed by: STUDENT IN AN ORGANIZED HEALTH CARE EDUCATION/TRAINING PROGRAM

## 2024-09-30 PROCEDURE — 11000001 HC ACUTE MED/SURG PRIVATE ROOM

## 2024-09-30 PROCEDURE — 82962 GLUCOSE BLOOD TEST: CPT

## 2024-09-30 PROCEDURE — 80053 COMPREHEN METABOLIC PANEL: CPT | Performed by: NURSE PRACTITIONER

## 2024-09-30 PROCEDURE — 87077 CULTURE AEROBIC IDENTIFY: CPT | Performed by: STUDENT IN AN ORGANIZED HEALTH CARE EDUCATION/TRAINING PROGRAM

## 2024-09-30 RX ORDER — HYDRALAZINE HYDROCHLORIDE 25 MG/1
25 TABLET, FILM COATED ORAL EVERY 8 HOURS
Status: DISCONTINUED | OUTPATIENT
Start: 2024-09-30 | End: 2024-10-20

## 2024-09-30 RX ORDER — IBUPROFEN 200 MG
24 TABLET ORAL
Status: DISCONTINUED | OUTPATIENT
Start: 2024-09-30 | End: 2024-10-24 | Stop reason: HOSPADM

## 2024-09-30 RX ORDER — INSULIN GLARGINE 100 [IU]/ML
18 INJECTION, SOLUTION SUBCUTANEOUS NIGHTLY
Status: DISCONTINUED | OUTPATIENT
Start: 2024-09-30 | End: 2024-10-01

## 2024-09-30 RX ORDER — TRAZODONE HYDROCHLORIDE 50 MG/1
50 TABLET ORAL NIGHTLY PRN
Status: DISCONTINUED | OUTPATIENT
Start: 2024-09-30 | End: 2024-10-24 | Stop reason: HOSPADM

## 2024-09-30 RX ORDER — ENOXAPARIN SODIUM 100 MG/ML
40 INJECTION SUBCUTANEOUS EVERY 24 HOURS
Status: DISCONTINUED | OUTPATIENT
Start: 2024-10-01 | End: 2024-10-14

## 2024-09-30 RX ORDER — SODIUM CHLORIDE, SODIUM LACTATE, POTASSIUM CHLORIDE, CALCIUM CHLORIDE 600; 310; 30; 20 MG/100ML; MG/100ML; MG/100ML; MG/100ML
INJECTION, SOLUTION INTRAVENOUS CONTINUOUS
Status: ACTIVE | OUTPATIENT
Start: 2024-09-30 | End: 2024-10-01

## 2024-09-30 RX ORDER — INSULIN ASPART 100 [IU]/ML
5 INJECTION, SOLUTION INTRAVENOUS; SUBCUTANEOUS
Status: DISCONTINUED | OUTPATIENT
Start: 2024-09-30 | End: 2024-10-04

## 2024-09-30 RX ORDER — MUPIROCIN 20 MG/G
OINTMENT TOPICAL 2 TIMES DAILY
Status: COMPLETED | OUTPATIENT
Start: 2024-09-30 | End: 2024-10-04

## 2024-09-30 RX ORDER — GLUCAGON 1 MG
1 KIT INJECTION
Status: DISCONTINUED | OUTPATIENT
Start: 2024-09-30 | End: 2024-09-30

## 2024-09-30 RX ORDER — INSULIN ASPART 100 [IU]/ML
0-10 INJECTION, SOLUTION INTRAVENOUS; SUBCUTANEOUS
Status: DISCONTINUED | OUTPATIENT
Start: 2024-09-30 | End: 2024-10-05

## 2024-09-30 RX ORDER — NALOXONE HCL 0.4 MG/ML
0.02 VIAL (ML) INJECTION
Status: DISCONTINUED | OUTPATIENT
Start: 2024-09-30 | End: 2024-10-24 | Stop reason: HOSPADM

## 2024-09-30 RX ORDER — ATORVASTATIN CALCIUM 40 MG/1
40 TABLET, FILM COATED ORAL DAILY
Status: DISCONTINUED | OUTPATIENT
Start: 2024-09-30 | End: 2024-10-20

## 2024-09-30 RX ORDER — GLUCAGON 1 MG
1 KIT INJECTION
Status: DISCONTINUED | OUTPATIENT
Start: 2024-09-30 | End: 2024-10-24 | Stop reason: HOSPADM

## 2024-09-30 RX ORDER — IBUPROFEN 200 MG
16 TABLET ORAL
Status: DISCONTINUED | OUTPATIENT
Start: 2024-09-30 | End: 2024-10-24 | Stop reason: HOSPADM

## 2024-09-30 RX ORDER — NIFEDIPINE 30 MG/1
60 TABLET, EXTENDED RELEASE ORAL DAILY
Status: DISCONTINUED | OUTPATIENT
Start: 2024-09-30 | End: 2024-10-24 | Stop reason: HOSPADM

## 2024-09-30 RX ORDER — HYDRALAZINE HYDROCHLORIDE 20 MG/ML
10 INJECTION INTRAMUSCULAR; INTRAVENOUS EVERY 4 HOURS PRN
Status: DISCONTINUED | OUTPATIENT
Start: 2024-09-30 | End: 2024-10-24 | Stop reason: HOSPADM

## 2024-09-30 RX ORDER — GABAPENTIN 300 MG/1
600 CAPSULE ORAL 3 TIMES DAILY
Status: DISCONTINUED | OUTPATIENT
Start: 2024-09-30 | End: 2024-10-24 | Stop reason: HOSPADM

## 2024-09-30 RX ORDER — SODIUM CHLORIDE 0.9 % (FLUSH) 0.9 %
10 SYRINGE (ML) INJECTION EVERY 12 HOURS PRN
Status: DISCONTINUED | OUTPATIENT
Start: 2024-09-30 | End: 2024-10-24 | Stop reason: HOSPADM

## 2024-09-30 RX ORDER — ROPINIROLE 0.25 MG/1
0.5 TABLET, FILM COATED ORAL 3 TIMES DAILY
Status: DISCONTINUED | OUTPATIENT
Start: 2024-09-30 | End: 2024-10-24 | Stop reason: HOSPADM

## 2024-09-30 RX ORDER — INSULIN ASPART 100 [IU]/ML
0-10 INJECTION, SOLUTION INTRAVENOUS; SUBCUTANEOUS EVERY 6 HOURS PRN
Status: DISCONTINUED | OUTPATIENT
Start: 2024-09-30 | End: 2024-09-30

## 2024-09-30 RX ORDER — LABETALOL HCL 20 MG/4 ML
10 SYRINGE (ML) INTRAVENOUS
Status: DISCONTINUED | OUTPATIENT
Start: 2024-09-30 | End: 2024-10-24 | Stop reason: HOSPADM

## 2024-09-30 RX ADMIN — GABAPENTIN 600 MG: 300 CAPSULE ORAL at 08:09

## 2024-09-30 RX ADMIN — PIPERACILLIN SODIUM AND TAZOBACTAM SODIUM 4.5 G: 4; .5 INJECTION, POWDER, LYOPHILIZED, FOR SOLUTION INTRAVENOUS at 10:09

## 2024-09-30 RX ADMIN — ROPINIROLE HYDROCHLORIDE 0.5 MG: 0.25 TABLET, FILM COATED ORAL at 02:09

## 2024-09-30 RX ADMIN — SODIUM CHLORIDE, POTASSIUM CHLORIDE, SODIUM LACTATE AND CALCIUM CHLORIDE 1000 ML: 600; 310; 30; 20 INJECTION, SOLUTION INTRAVENOUS at 06:09

## 2024-09-30 RX ADMIN — MUPIROCIN: 20 OINTMENT TOPICAL at 02:09

## 2024-09-30 RX ADMIN — HYDRALAZINE HYDROCHLORIDE 25 MG: 25 TABLET ORAL at 09:09

## 2024-09-30 RX ADMIN — ROPINIROLE HYDROCHLORIDE 0.5 MG: 0.25 TABLET, FILM COATED ORAL at 08:09

## 2024-09-30 RX ADMIN — VANCOMYCIN HYDROCHLORIDE 2000 MG: 2 INJECTION, POWDER, LYOPHILIZED, FOR SOLUTION INTRAVENOUS at 11:09

## 2024-09-30 RX ADMIN — ATORVASTATIN CALCIUM 40 MG: 40 TABLET, FILM COATED ORAL at 02:09

## 2024-09-30 RX ADMIN — PIPERACILLIN SODIUM AND TAZOBACTAM SODIUM 4.5 G: 4; .5 INJECTION, POWDER, LYOPHILIZED, FOR SOLUTION INTRAVENOUS at 05:09

## 2024-09-30 RX ADMIN — TRAZODONE HYDROCHLORIDE 50 MG: 50 TABLET ORAL at 09:09

## 2024-09-30 RX ADMIN — INSULIN GLARGINE 18 UNITS: 100 INJECTION, SOLUTION SUBCUTANEOUS at 08:09

## 2024-09-30 RX ADMIN — HYDRALAZINE HYDROCHLORIDE 25 MG: 25 TABLET ORAL at 02:09

## 2024-09-30 RX ADMIN — NIFEDIPINE 60 MG: 30 TABLET, FILM COATED, EXTENDED RELEASE ORAL at 02:09

## 2024-09-30 RX ADMIN — INSULIN ASPART 4 UNITS: 100 INJECTION, SOLUTION INTRAVENOUS; SUBCUTANEOUS at 05:09

## 2024-09-30 RX ADMIN — GABAPENTIN 600 MG: 300 CAPSULE ORAL at 02:09

## 2024-09-30 RX ADMIN — GADOBENATE DIMEGLUMINE 20 ML: 529 INJECTION, SOLUTION INTRAVENOUS at 01:09

## 2024-09-30 RX ADMIN — SODIUM CHLORIDE, POTASSIUM CHLORIDE, SODIUM LACTATE AND CALCIUM CHLORIDE: 600; 310; 30; 20 INJECTION, SOLUTION INTRAVENOUS at 08:09

## 2024-09-30 RX ADMIN — INSULIN ASPART 5 UNITS: 100 INJECTION, SOLUTION INTRAVENOUS; SUBCUTANEOUS at 06:09

## 2024-09-30 RX ADMIN — VANCOMYCIN HYDROCHLORIDE 1250 MG: 1.25 INJECTION, POWDER, LYOPHILIZED, FOR SOLUTION INTRAVENOUS at 10:09

## 2024-09-30 RX ADMIN — MUPIROCIN: 20 OINTMENT TOPICAL at 09:09

## 2024-09-30 NOTE — TELEPHONE ENCOUNTER
Called ER spoke with Dr. Wilson regarding need to consult ID for IV antibiotics for left calcaneous osteomyelitis.

## 2024-10-01 LAB
ALBUMIN SERPL-MCNC: 3 G/DL (ref 3.5–5)
ALBUMIN/GLOB SERPL: 0.9 RATIO (ref 1.1–2)
ALP SERPL-CCNC: 93 UNIT/L (ref 40–150)
ALT SERPL-CCNC: 9 UNIT/L (ref 0–55)
ANION GAP SERPL CALC-SCNC: 9 MEQ/L
AST SERPL-CCNC: 8 UNIT/L (ref 5–34)
BILIRUB SERPL-MCNC: 0.4 MG/DL
BUN SERPL-MCNC: 14.1 MG/DL (ref 9.8–20.1)
CALCIUM SERPL-MCNC: 9.3 MG/DL (ref 8.4–10.2)
CHLORIDE SERPL-SCNC: 102 MMOL/L (ref 98–107)
CO2 SERPL-SCNC: 24 MMOL/L (ref 22–29)
CREAT SERPL-MCNC: 0.74 MG/DL (ref 0.55–1.02)
CREAT/UREA NIT SERPL: 19
GFR SERPLBLD CREATININE-BSD FMLA CKD-EPI: >60 ML/MIN/1.73/M2
GLOBULIN SER-MCNC: 3.3 GM/DL (ref 2.4–3.5)
GLUCOSE SERPL-MCNC: 250 MG/DL (ref 74–100)
HOLD SPECIMEN: NORMAL
LEFT ABI: 1.18
LEFT CFA PSV: 128 CM/S
LEFT DORSALIS PEDIS PSV: 92 CM/S
LEFT DORSALIS PEDIS: 178 MMHG
LEFT POPLITEAL PSV: 88 CM/S
LEFT POST TIBIAL SYS PSV: 94 CM/S
LEFT POSTERIOR TIBIAL: 186 MMHG
LEFT PROFUNDA SYS PSV: 92 CM/S
LEFT SUPER FEMORAL DIST SYS PSV: 100 CM/S
LEFT SUPER FEMORAL MID SYS PSV: 121 CM/S
LEFT SUPER FEMORAL PROX SYS PSV: 156 CM/S
OHS CV LEFT LOWER EXTREMITY ABI (NO CALC): 1.18
POCT GLUCOSE: 196 MG/DL (ref 70–110)
POCT GLUCOSE: 223 MG/DL (ref 70–110)
POCT GLUCOSE: 246 MG/DL (ref 70–110)
POCT GLUCOSE: 261 MG/DL (ref 70–110)
POTASSIUM SERPL-SCNC: 4.1 MMOL/L (ref 3.5–5.1)
PROT SERPL-MCNC: 6.3 GM/DL (ref 6.4–8.3)
RIGHT ABI: 1.2
RIGHT ARM BP: 157 MMHG
RIGHT CFA PSV: 149 CM/S
RIGHT DORSALIS PEDIS: 179 MMHG
RIGHT POSTERIOR TIBIAL: 188 MMHG
SODIUM SERPL-SCNC: 135 MMOL/L (ref 136–145)
VANCOMYCIN TROUGH SERPL-MCNC: 10.5 UG/ML (ref 15–20)

## 2024-10-01 PROCEDURE — 36415 COLL VENOUS BLD VENIPUNCTURE: CPT | Performed by: STUDENT IN AN ORGANIZED HEALTH CARE EDUCATION/TRAINING PROGRAM

## 2024-10-01 PROCEDURE — 25000003 PHARM REV CODE 250: Performed by: NURSE PRACTITIONER

## 2024-10-01 PROCEDURE — 36569 INSJ PICC 5 YR+ W/O IMAGING: CPT

## 2024-10-01 PROCEDURE — 97535 SELF CARE MNGMENT TRAINING: CPT

## 2024-10-01 PROCEDURE — 25000003 PHARM REV CODE 250

## 2024-10-01 PROCEDURE — 80202 ASSAY OF VANCOMYCIN: CPT | Performed by: NURSE PRACTITIONER

## 2024-10-01 PROCEDURE — 63600175 PHARM REV CODE 636 W HCPCS

## 2024-10-01 PROCEDURE — 36415 COLL VENOUS BLD VENIPUNCTURE: CPT | Performed by: NURSE PRACTITIONER

## 2024-10-01 PROCEDURE — 97166 OT EVAL MOD COMPLEX 45 MIN: CPT

## 2024-10-01 PROCEDURE — 80053 COMPREHEN METABOLIC PANEL: CPT

## 2024-10-01 PROCEDURE — 97162 PT EVAL MOD COMPLEX 30 MIN: CPT

## 2024-10-01 PROCEDURE — A4216 STERILE WATER/SALINE, 10 ML: HCPCS

## 2024-10-01 PROCEDURE — 63600175 PHARM REV CODE 636 W HCPCS: Performed by: STUDENT IN AN ORGANIZED HEALTH CARE EDUCATION/TRAINING PROGRAM

## 2024-10-01 PROCEDURE — 36415 COLL VENOUS BLD VENIPUNCTURE: CPT

## 2024-10-01 PROCEDURE — 25000003 PHARM REV CODE 250: Performed by: STUDENT IN AN ORGANIZED HEALTH CARE EDUCATION/TRAINING PROGRAM

## 2024-10-01 PROCEDURE — 63600175 PHARM REV CODE 636 W HCPCS: Performed by: NURSE PRACTITIONER

## 2024-10-01 PROCEDURE — 87075 CULTR BACTERIA EXCEPT BLOOD: CPT

## 2024-10-01 PROCEDURE — 87070 CULTURE OTHR SPECIMN AEROBIC: CPT

## 2024-10-01 PROCEDURE — C1751 CATH, INF, PER/CENT/MIDLINE: HCPCS

## 2024-10-01 PROCEDURE — 21400001 HC TELEMETRY ROOM

## 2024-10-01 PROCEDURE — 87077 CULTURE AEROBIC IDENTIFY: CPT

## 2024-10-01 PROCEDURE — 02HV33Z INSERTION OF INFUSION DEVICE INTO SUPERIOR VENA CAVA, PERCUTANEOUS APPROACH: ICD-10-PCS | Performed by: STUDENT IN AN ORGANIZED HEALTH CARE EDUCATION/TRAINING PROGRAM

## 2024-10-01 PROCEDURE — 87184 SC STD DISK METHOD PER PLATE: CPT

## 2024-10-01 RX ORDER — SODIUM CHLORIDE 0.9 % (FLUSH) 0.9 %
10 SYRINGE (ML) INJECTION
Status: DISCONTINUED | OUTPATIENT
Start: 2024-10-01 | End: 2024-10-24 | Stop reason: HOSPADM

## 2024-10-01 RX ORDER — INSULIN GLARGINE 100 [IU]/ML
25 INJECTION, SOLUTION SUBCUTANEOUS NIGHTLY
Status: DISCONTINUED | OUTPATIENT
Start: 2024-10-01 | End: 2024-10-02

## 2024-10-01 RX ORDER — HYDROCODONE BITARTRATE AND ACETAMINOPHEN 5; 325 MG/1; MG/1
1 TABLET ORAL EVERY 6 HOURS PRN
Status: DISCONTINUED | OUTPATIENT
Start: 2024-10-01 | End: 2024-10-24 | Stop reason: HOSPADM

## 2024-10-01 RX ORDER — SODIUM CHLORIDE 0.9 % (FLUSH) 0.9 %
10 SYRINGE (ML) INJECTION EVERY 6 HOURS
Status: DISCONTINUED | OUTPATIENT
Start: 2024-10-01 | End: 2024-10-24 | Stop reason: HOSPADM

## 2024-10-01 RX ADMIN — INSULIN ASPART 5 UNITS: 100 INJECTION, SOLUTION INTRAVENOUS; SUBCUTANEOUS at 08:10

## 2024-10-01 RX ADMIN — PIPERACILLIN SODIUM AND TAZOBACTAM SODIUM 4.5 G: 4; .5 INJECTION, POWDER, LYOPHILIZED, FOR SOLUTION INTRAVENOUS at 12:10

## 2024-10-01 RX ADMIN — HYDROCODONE BITARTRATE AND ACETAMINOPHEN 1 TABLET: 5; 325 TABLET ORAL at 12:10

## 2024-10-01 RX ADMIN — GABAPENTIN 600 MG: 300 CAPSULE ORAL at 02:10

## 2024-10-01 RX ADMIN — ROPINIROLE HYDROCHLORIDE 0.5 MG: 0.25 TABLET, FILM COATED ORAL at 08:10

## 2024-10-01 RX ADMIN — GABAPENTIN 600 MG: 300 CAPSULE ORAL at 08:10

## 2024-10-01 RX ADMIN — SODIUM CHLORIDE, POTASSIUM CHLORIDE, SODIUM LACTATE AND CALCIUM CHLORIDE: 600; 310; 30; 20 INJECTION, SOLUTION INTRAVENOUS at 06:10

## 2024-10-01 RX ADMIN — TRAZODONE HYDROCHLORIDE 50 MG: 50 TABLET ORAL at 08:10

## 2024-10-01 RX ADMIN — SODIUM CHLORIDE, PRESERVATIVE FREE 10 ML: 5 INJECTION INTRAVENOUS at 05:10

## 2024-10-01 RX ADMIN — PIPERACILLIN SODIUM AND TAZOBACTAM SODIUM 4.5 G: 4; .5 INJECTION, POWDER, LYOPHILIZED, FOR SOLUTION INTRAVENOUS at 05:10

## 2024-10-01 RX ADMIN — HYDRALAZINE HYDROCHLORIDE 25 MG: 25 TABLET ORAL at 02:10

## 2024-10-01 RX ADMIN — ROPINIROLE HYDROCHLORIDE 0.5 MG: 0.25 TABLET, FILM COATED ORAL at 02:10

## 2024-10-01 RX ADMIN — MUPIROCIN: 20 OINTMENT TOPICAL at 08:10

## 2024-10-01 RX ADMIN — PIPERACILLIN SODIUM AND TAZOBACTAM SODIUM 4.5 G: 4; .5 INJECTION, POWDER, LYOPHILIZED, FOR SOLUTION INTRAVENOUS at 08:10

## 2024-10-01 RX ADMIN — INSULIN ASPART 6 UNITS: 100 INJECTION, SOLUTION INTRAVENOUS; SUBCUTANEOUS at 01:10

## 2024-10-01 RX ADMIN — ENOXAPARIN SODIUM 40 MG: 40 INJECTION SUBCUTANEOUS at 05:10

## 2024-10-01 RX ADMIN — INSULIN ASPART 5 UNITS: 100 INJECTION, SOLUTION INTRAVENOUS; SUBCUTANEOUS at 05:10

## 2024-10-01 RX ADMIN — MUPIROCIN: 20 OINTMENT TOPICAL at 12:10

## 2024-10-01 RX ADMIN — VANCOMYCIN HYDROCHLORIDE 1500 MG: 1.5 INJECTION, POWDER, LYOPHILIZED, FOR SOLUTION INTRAVENOUS at 12:10

## 2024-10-01 RX ADMIN — VANCOMYCIN HYDROCHLORIDE 1500 MG: 1.5 INJECTION, POWDER, LYOPHILIZED, FOR SOLUTION INTRAVENOUS at 11:10

## 2024-10-01 RX ADMIN — INSULIN ASPART 5 UNITS: 100 INJECTION, SOLUTION INTRAVENOUS; SUBCUTANEOUS at 12:10

## 2024-10-01 RX ADMIN — HYDRALAZINE HYDROCHLORIDE 25 MG: 25 TABLET ORAL at 06:10

## 2024-10-01 RX ADMIN — ATORVASTATIN CALCIUM 40 MG: 40 TABLET, FILM COATED ORAL at 08:10

## 2024-10-01 RX ADMIN — INSULIN ASPART 4 UNITS: 100 INJECTION, SOLUTION INTRAVENOUS; SUBCUTANEOUS at 08:10

## 2024-10-01 RX ADMIN — INSULIN GLARGINE 25 UNITS: 100 INJECTION, SOLUTION SUBCUTANEOUS at 08:10

## 2024-10-01 RX ADMIN — HYDRALAZINE HYDROCHLORIDE 25 MG: 25 TABLET ORAL at 09:10

## 2024-10-01 RX ADMIN — NIFEDIPINE 60 MG: 30 TABLET, FILM COATED, EXTENDED RELEASE ORAL at 08:10

## 2024-10-01 RX ADMIN — SODIUM CHLORIDE, PRESERVATIVE FREE 10 ML: 5 INJECTION INTRAVENOUS at 11:10

## 2024-10-01 RX ADMIN — PIPERACILLIN SODIUM AND TAZOBACTAM SODIUM 4.5 G: 4; .5 INJECTION, POWDER, LYOPHILIZED, FOR SOLUTION INTRAVENOUS at 11:10

## 2024-10-01 RX ADMIN — INSULIN ASPART 4 UNITS: 100 INJECTION, SOLUTION INTRAVENOUS; SUBCUTANEOUS at 05:10

## 2024-10-01 RX ADMIN — INSULIN ASPART 1 UNITS: 100 INJECTION, SOLUTION INTRAVENOUS; SUBCUTANEOUS at 08:10

## 2024-10-02 LAB
BASOPHILS # BLD AUTO: 0.05 X10(3)/MCL
BASOPHILS NFR BLD AUTO: 0.4 %
EOSINOPHIL # BLD AUTO: 0.42 X10(3)/MCL (ref 0–0.9)
EOSINOPHIL NFR BLD AUTO: 3.1 %
ERYTHROCYTE [DISTWIDTH] IN BLOOD BY AUTOMATED COUNT: 12.3 % (ref 11.5–17)
HCT VFR BLD AUTO: 34.9 % (ref 37–47)
HGB BLD-MCNC: 11.8 G/DL (ref 12–16)
HOLD SPECIMEN: NORMAL
IMM GRANULOCYTES # BLD AUTO: 0.05 X10(3)/MCL (ref 0–0.04)
IMM GRANULOCYTES NFR BLD AUTO: 0.4 %
LYMPHOCYTES # BLD AUTO: 3.04 X10(3)/MCL (ref 0.6–4.6)
LYMPHOCYTES NFR BLD AUTO: 22.3 %
MCH RBC QN AUTO: 28.5 PG (ref 27–31)
MCHC RBC AUTO-ENTMCNC: 33.8 G/DL (ref 33–36)
MCV RBC AUTO: 84.3 FL (ref 80–94)
MONOCYTES # BLD AUTO: 0.82 X10(3)/MCL (ref 0.1–1.3)
MONOCYTES NFR BLD AUTO: 6 %
NEUTROPHILS # BLD AUTO: 9.26 X10(3)/MCL (ref 2.1–9.2)
NEUTROPHILS NFR BLD AUTO: 67.8 %
NRBC BLD AUTO-RTO: 0 %
PLATELET # BLD AUTO: 246 X10(3)/MCL (ref 130–400)
PMV BLD AUTO: 10.9 FL (ref 7.4–10.4)
POCT GLUCOSE: 202 MG/DL (ref 70–110)
POCT GLUCOSE: 205 MG/DL (ref 70–110)
POCT GLUCOSE: 209 MG/DL (ref 70–110)
POCT GLUCOSE: 219 MG/DL (ref 70–110)
POCT GLUCOSE: 223 MG/DL (ref 70–110)
POCT GLUCOSE: 235 MG/DL (ref 70–110)
RBC # BLD AUTO: 4.14 X10(6)/MCL (ref 4.2–5.4)
VANCOMYCIN TROUGH SERPL-MCNC: 10.5 UG/ML (ref 15–20)
WBC # BLD AUTO: 13.64 X10(3)/MCL (ref 4.5–11.5)

## 2024-10-02 PROCEDURE — A4216 STERILE WATER/SALINE, 10 ML: HCPCS

## 2024-10-02 PROCEDURE — 63600175 PHARM REV CODE 636 W HCPCS

## 2024-10-02 PROCEDURE — 36415 COLL VENOUS BLD VENIPUNCTURE: CPT | Performed by: STUDENT IN AN ORGANIZED HEALTH CARE EDUCATION/TRAINING PROGRAM

## 2024-10-02 PROCEDURE — 21400001 HC TELEMETRY ROOM

## 2024-10-02 PROCEDURE — 85025 COMPLETE CBC W/AUTO DIFF WBC: CPT

## 2024-10-02 PROCEDURE — 97110 THERAPEUTIC EXERCISES: CPT

## 2024-10-02 PROCEDURE — 97535 SELF CARE MNGMENT TRAINING: CPT

## 2024-10-02 PROCEDURE — 25000003 PHARM REV CODE 250

## 2024-10-02 PROCEDURE — 80202 ASSAY OF VANCOMYCIN: CPT | Performed by: NURSE PRACTITIONER

## 2024-10-02 PROCEDURE — 25000003 PHARM REV CODE 250: Performed by: STUDENT IN AN ORGANIZED HEALTH CARE EDUCATION/TRAINING PROGRAM

## 2024-10-02 PROCEDURE — 25000003 PHARM REV CODE 250: Performed by: NURSE PRACTITIONER

## 2024-10-02 PROCEDURE — 63600175 PHARM REV CODE 636 W HCPCS: Performed by: STUDENT IN AN ORGANIZED HEALTH CARE EDUCATION/TRAINING PROGRAM

## 2024-10-02 PROCEDURE — 36415 COLL VENOUS BLD VENIPUNCTURE: CPT | Performed by: NURSE PRACTITIONER

## 2024-10-02 PROCEDURE — 97530 THERAPEUTIC ACTIVITIES: CPT

## 2024-10-02 PROCEDURE — 36415 COLL VENOUS BLD VENIPUNCTURE: CPT

## 2024-10-02 PROCEDURE — 63600175 PHARM REV CODE 636 W HCPCS: Performed by: NURSE PRACTITIONER

## 2024-10-02 PROCEDURE — 25000003 PHARM REV CODE 250: Performed by: EMERGENCY MEDICINE

## 2024-10-02 RX ORDER — INSULIN GLARGINE 100 [IU]/ML
30 INJECTION, SOLUTION SUBCUTANEOUS NIGHTLY
Status: DISCONTINUED | OUTPATIENT
Start: 2024-10-02 | End: 2024-10-03

## 2024-10-02 RX ADMIN — GABAPENTIN 600 MG: 300 CAPSULE ORAL at 08:10

## 2024-10-02 RX ADMIN — INSULIN ASPART 4 UNITS: 100 INJECTION, SOLUTION INTRAVENOUS; SUBCUTANEOUS at 08:10

## 2024-10-02 RX ADMIN — HYDRALAZINE HYDROCHLORIDE 25 MG: 25 TABLET ORAL at 08:10

## 2024-10-02 RX ADMIN — GABAPENTIN 600 MG: 300 CAPSULE ORAL at 04:10

## 2024-10-02 RX ADMIN — ENOXAPARIN SODIUM 40 MG: 40 INJECTION SUBCUTANEOUS at 05:10

## 2024-10-02 RX ADMIN — ROPINIROLE HYDROCHLORIDE 0.5 MG: 0.25 TABLET, FILM COATED ORAL at 08:10

## 2024-10-02 RX ADMIN — INSULIN ASPART 5 UNITS: 100 INJECTION, SOLUTION INTRAVENOUS; SUBCUTANEOUS at 08:10

## 2024-10-02 RX ADMIN — INSULIN ASPART 5 UNITS: 100 INJECTION, SOLUTION INTRAVENOUS; SUBCUTANEOUS at 12:10

## 2024-10-02 RX ADMIN — PIPERACILLIN SODIUM AND TAZOBACTAM SODIUM 4.5 G: 4; .5 INJECTION, POWDER, LYOPHILIZED, FOR SOLUTION INTRAVENOUS at 08:10

## 2024-10-02 RX ADMIN — INSULIN ASPART 5 UNITS: 100 INJECTION, SOLUTION INTRAVENOUS; SUBCUTANEOUS at 03:10

## 2024-10-02 RX ADMIN — INSULIN GLARGINE 30 UNITS: 100 INJECTION, SOLUTION SUBCUTANEOUS at 08:10

## 2024-10-02 RX ADMIN — INSULIN ASPART 4 UNITS: 100 INJECTION, SOLUTION INTRAVENOUS; SUBCUTANEOUS at 01:10

## 2024-10-02 RX ADMIN — MUPIROCIN: 20 OINTMENT TOPICAL at 08:10

## 2024-10-02 RX ADMIN — SODIUM CHLORIDE, PRESERVATIVE FREE 10 ML: 5 INJECTION INTRAVENOUS at 05:10

## 2024-10-02 RX ADMIN — INSULIN ASPART 4 UNITS: 100 INJECTION, SOLUTION INTRAVENOUS; SUBCUTANEOUS at 03:10

## 2024-10-02 RX ADMIN — ROPINIROLE HYDROCHLORIDE 0.5 MG: 0.25 TABLET, FILM COATED ORAL at 04:10

## 2024-10-02 RX ADMIN — NIFEDIPINE 60 MG: 30 TABLET, FILM COATED, EXTENDED RELEASE ORAL at 08:10

## 2024-10-02 RX ADMIN — SODIUM CHLORIDE, PRESERVATIVE FREE 10 ML: 5 INJECTION INTRAVENOUS at 01:10

## 2024-10-02 RX ADMIN — ATORVASTATIN CALCIUM 40 MG: 40 TABLET, FILM COATED ORAL at 08:10

## 2024-10-02 RX ADMIN — VANCOMYCIN HYDROCHLORIDE 750 MG: 750 INJECTION, POWDER, LYOPHILIZED, FOR SOLUTION INTRAVENOUS at 12:10

## 2024-10-02 RX ADMIN — VANCOMYCIN HYDROCHLORIDE 750 MG: 750 INJECTION, POWDER, LYOPHILIZED, FOR SOLUTION INTRAVENOUS at 09:10

## 2024-10-02 RX ADMIN — HYDRALAZINE HYDROCHLORIDE 25 MG: 25 TABLET ORAL at 12:10

## 2024-10-02 RX ADMIN — PIPERACILLIN SODIUM AND TAZOBACTAM SODIUM 4.5 G: 4; .5 INJECTION, POWDER, LYOPHILIZED, FOR SOLUTION INTRAVENOUS at 04:10

## 2024-10-02 RX ADMIN — HYDRALAZINE HYDROCHLORIDE 25 MG: 25 TABLET ORAL at 05:10

## 2024-10-02 RX ADMIN — INSULIN ASPART 2 UNITS: 100 INJECTION, SOLUTION INTRAVENOUS; SUBCUTANEOUS at 08:10

## 2024-10-03 LAB
ALBUMIN SERPL-MCNC: 3.2 G/DL (ref 3.5–5)
ALBUMIN/GLOB SERPL: 0.9 RATIO (ref 1.1–2)
ALP SERPL-CCNC: 91 UNIT/L (ref 40–150)
ALT SERPL-CCNC: 11 UNIT/L (ref 0–55)
ANION GAP SERPL CALC-SCNC: 11 MEQ/L
AST SERPL-CCNC: 12 UNIT/L (ref 5–34)
BACTERIA WND CULT: ABNORMAL
BASOPHILS # BLD AUTO: 0.07 X10(3)/MCL
BASOPHILS NFR BLD AUTO: 0.5 %
BILIRUB SERPL-MCNC: 0.5 MG/DL
BUN SERPL-MCNC: 22.7 MG/DL (ref 9.8–20.1)
CALCIUM SERPL-MCNC: 9.4 MG/DL (ref 8.4–10.2)
CHLORIDE SERPL-SCNC: 102 MMOL/L (ref 98–107)
CO2 SERPL-SCNC: 25 MMOL/L (ref 22–29)
CREAT SERPL-MCNC: 0.91 MG/DL (ref 0.55–1.02)
CREAT/UREA NIT SERPL: 25
EOSINOPHIL # BLD AUTO: 0.42 X10(3)/MCL (ref 0–0.9)
EOSINOPHIL NFR BLD AUTO: 3.1 %
ERYTHROCYTE [DISTWIDTH] IN BLOOD BY AUTOMATED COUNT: 12.7 % (ref 11.5–17)
GFR SERPLBLD CREATININE-BSD FMLA CKD-EPI: >60 ML/MIN/1.73/M2
GLOBULIN SER-MCNC: 3.6 GM/DL (ref 2.4–3.5)
GLUCOSE SERPL-MCNC: 246 MG/DL (ref 74–100)
HCT VFR BLD AUTO: 36.9 % (ref 37–47)
HGB BLD-MCNC: 12.4 G/DL (ref 12–16)
HOLD SPECIMEN: NORMAL
IMM GRANULOCYTES # BLD AUTO: 0.06 X10(3)/MCL (ref 0–0.04)
IMM GRANULOCYTES NFR BLD AUTO: 0.4 %
LYMPHOCYTES # BLD AUTO: 2.73 X10(3)/MCL (ref 0.6–4.6)
LYMPHOCYTES NFR BLD AUTO: 20.1 %
MCH RBC QN AUTO: 28.9 PG (ref 27–31)
MCHC RBC AUTO-ENTMCNC: 33.6 G/DL (ref 33–36)
MCV RBC AUTO: 86 FL (ref 80–94)
MONOCYTES # BLD AUTO: 0.76 X10(3)/MCL (ref 0.1–1.3)
MONOCYTES NFR BLD AUTO: 5.6 %
NEUTROPHILS # BLD AUTO: 9.53 X10(3)/MCL (ref 2.1–9.2)
NEUTROPHILS NFR BLD AUTO: 70.3 %
NRBC BLD AUTO-RTO: 0 %
PLATELET # BLD AUTO: 274 X10(3)/MCL (ref 130–400)
PMV BLD AUTO: 11 FL (ref 7.4–10.4)
POCT GLUCOSE: 217 MG/DL (ref 70–110)
POCT GLUCOSE: 251 MG/DL (ref 70–110)
POCT GLUCOSE: 253 MG/DL (ref 70–110)
POCT GLUCOSE: 255 MG/DL (ref 70–110)
POCT GLUCOSE: 264 MG/DL (ref 70–110)
POTASSIUM SERPL-SCNC: 4.7 MMOL/L (ref 3.5–5.1)
PROT SERPL-MCNC: 6.8 GM/DL (ref 6.4–8.3)
RBC # BLD AUTO: 4.29 X10(6)/MCL (ref 4.2–5.4)
SODIUM SERPL-SCNC: 138 MMOL/L (ref 136–145)
VANCOMYCIN TROUGH SERPL-MCNC: 12.5 UG/ML (ref 15–20)
WBC # BLD AUTO: 13.57 X10(3)/MCL (ref 4.5–11.5)

## 2024-10-03 PROCEDURE — 25000003 PHARM REV CODE 250: Performed by: STUDENT IN AN ORGANIZED HEALTH CARE EDUCATION/TRAINING PROGRAM

## 2024-10-03 PROCEDURE — 25000003 PHARM REV CODE 250

## 2024-10-03 PROCEDURE — 80053 COMPREHEN METABOLIC PANEL: CPT

## 2024-10-03 PROCEDURE — 36415 COLL VENOUS BLD VENIPUNCTURE: CPT

## 2024-10-03 PROCEDURE — 63600175 PHARM REV CODE 636 W HCPCS: Performed by: NURSE PRACTITIONER

## 2024-10-03 PROCEDURE — 80202 ASSAY OF VANCOMYCIN: CPT | Performed by: NURSE PRACTITIONER

## 2024-10-03 PROCEDURE — 21400001 HC TELEMETRY ROOM

## 2024-10-03 PROCEDURE — 63600175 PHARM REV CODE 636 W HCPCS

## 2024-10-03 PROCEDURE — A4216 STERILE WATER/SALINE, 10 ML: HCPCS

## 2024-10-03 PROCEDURE — 25000003 PHARM REV CODE 250: Performed by: NURSE PRACTITIONER

## 2024-10-03 PROCEDURE — 85025 COMPLETE CBC W/AUTO DIFF WBC: CPT

## 2024-10-03 PROCEDURE — 63600175 PHARM REV CODE 636 W HCPCS: Performed by: STUDENT IN AN ORGANIZED HEALTH CARE EDUCATION/TRAINING PROGRAM

## 2024-10-03 PROCEDURE — 36415 COLL VENOUS BLD VENIPUNCTURE: CPT | Performed by: NURSE PRACTITIONER

## 2024-10-03 RX ORDER — GUAIFENESIN 600 MG/1
600 TABLET, EXTENDED RELEASE ORAL 2 TIMES DAILY PRN
Status: DISCONTINUED | OUTPATIENT
Start: 2024-10-03 | End: 2024-10-24 | Stop reason: HOSPADM

## 2024-10-03 RX ORDER — INSULIN GLARGINE 100 [IU]/ML
36 INJECTION, SOLUTION SUBCUTANEOUS NIGHTLY
Status: DISCONTINUED | OUTPATIENT
Start: 2024-10-03 | End: 2024-10-04

## 2024-10-03 RX ORDER — TALC
3 POWDER (GRAM) TOPICAL NIGHTLY PRN
Status: DISCONTINUED | OUTPATIENT
Start: 2024-10-03 | End: 2024-10-24 | Stop reason: HOSPADM

## 2024-10-03 RX ADMIN — INSULIN ASPART 6 UNITS: 100 INJECTION, SOLUTION INTRAVENOUS; SUBCUTANEOUS at 08:10

## 2024-10-03 RX ADMIN — PIPERACILLIN SODIUM AND TAZOBACTAM SODIUM 4.5 G: 4; .5 INJECTION, POWDER, LYOPHILIZED, FOR SOLUTION INTRAVENOUS at 11:10

## 2024-10-03 RX ADMIN — INSULIN ASPART 5 UNITS: 100 INJECTION, SOLUTION INTRAVENOUS; SUBCUTANEOUS at 11:10

## 2024-10-03 RX ADMIN — HYDRALAZINE HYDROCHLORIDE 25 MG: 25 TABLET ORAL at 02:10

## 2024-10-03 RX ADMIN — SODIUM CHLORIDE, PRESERVATIVE FREE 10 ML: 5 INJECTION INTRAVENOUS at 12:10

## 2024-10-03 RX ADMIN — INSULIN ASPART 3 UNITS: 100 INJECTION, SOLUTION INTRAVENOUS; SUBCUTANEOUS at 08:10

## 2024-10-03 RX ADMIN — ROPINIROLE HYDROCHLORIDE 0.5 MG: 0.25 TABLET, FILM COATED ORAL at 08:10

## 2024-10-03 RX ADMIN — HYDRALAZINE HYDROCHLORIDE 25 MG: 25 TABLET ORAL at 06:10

## 2024-10-03 RX ADMIN — INSULIN ASPART 5 UNITS: 100 INJECTION, SOLUTION INTRAVENOUS; SUBCUTANEOUS at 04:10

## 2024-10-03 RX ADMIN — Medication 10 ML: at 11:10

## 2024-10-03 RX ADMIN — MUPIROCIN: 20 OINTMENT TOPICAL at 08:10

## 2024-10-03 RX ADMIN — INSULIN ASPART 4 UNITS: 100 INJECTION, SOLUTION INTRAVENOUS; SUBCUTANEOUS at 04:10

## 2024-10-03 RX ADMIN — GABAPENTIN 600 MG: 300 CAPSULE ORAL at 08:10

## 2024-10-03 RX ADMIN — ROPINIROLE HYDROCHLORIDE 0.5 MG: 0.25 TABLET, FILM COATED ORAL at 02:10

## 2024-10-03 RX ADMIN — VANCOMYCIN HYDROCHLORIDE 750 MG: 750 INJECTION, POWDER, LYOPHILIZED, FOR SOLUTION INTRAVENOUS at 02:10

## 2024-10-03 RX ADMIN — MELATONIN TAB 3 MG 3 MG: 3 TAB at 08:10

## 2024-10-03 RX ADMIN — INSULIN ASPART 6 UNITS: 100 INJECTION, SOLUTION INTRAVENOUS; SUBCUTANEOUS at 11:10

## 2024-10-03 RX ADMIN — SODIUM CHLORIDE, PRESERVATIVE FREE 10 ML: 5 INJECTION INTRAVENOUS at 04:10

## 2024-10-03 RX ADMIN — INSULIN ASPART 5 UNITS: 100 INJECTION, SOLUTION INTRAVENOUS; SUBCUTANEOUS at 08:10

## 2024-10-03 RX ADMIN — SODIUM CHLORIDE, PRESERVATIVE FREE 10 ML: 5 INJECTION INTRAVENOUS at 11:10

## 2024-10-03 RX ADMIN — SODIUM CHLORIDE, PRESERVATIVE FREE 10 ML: 5 INJECTION INTRAVENOUS at 06:10

## 2024-10-03 RX ADMIN — GABAPENTIN 600 MG: 300 CAPSULE ORAL at 02:10

## 2024-10-03 RX ADMIN — HYDRALAZINE HYDROCHLORIDE 25 MG: 25 TABLET ORAL at 09:10

## 2024-10-03 RX ADMIN — PIPERACILLIN SODIUM AND TAZOBACTAM SODIUM 4.5 G: 4; .5 INJECTION, POWDER, LYOPHILIZED, FOR SOLUTION INTRAVENOUS at 12:10

## 2024-10-03 RX ADMIN — PIPERACILLIN SODIUM AND TAZOBACTAM SODIUM 4.5 G: 4; .5 INJECTION, POWDER, LYOPHILIZED, FOR SOLUTION INTRAVENOUS at 08:10

## 2024-10-03 RX ADMIN — INSULIN GLARGINE 36 UNITS: 100 INJECTION, SOLUTION SUBCUTANEOUS at 08:10

## 2024-10-03 RX ADMIN — ATORVASTATIN CALCIUM 40 MG: 40 TABLET, FILM COATED ORAL at 08:10

## 2024-10-03 RX ADMIN — GUAIFENESIN 600 MG: 600 TABLET, EXTENDED RELEASE ORAL at 04:10

## 2024-10-03 RX ADMIN — ENOXAPARIN SODIUM 40 MG: 40 INJECTION SUBCUTANEOUS at 04:10

## 2024-10-03 RX ADMIN — VANCOMYCIN HYDROCHLORIDE 750 MG: 750 INJECTION, POWDER, LYOPHILIZED, FOR SOLUTION INTRAVENOUS at 04:10

## 2024-10-03 RX ADMIN — NIFEDIPINE 60 MG: 30 TABLET, FILM COATED, EXTENDED RELEASE ORAL at 08:10

## 2024-10-03 RX ADMIN — VANCOMYCIN HYDROCHLORIDE 750 MG: 750 INJECTION, POWDER, LYOPHILIZED, FOR SOLUTION INTRAVENOUS at 09:10

## 2024-10-03 RX ADMIN — TRAZODONE HYDROCHLORIDE 50 MG: 50 TABLET ORAL at 08:10

## 2024-10-03 RX ADMIN — PIPERACILLIN SODIUM AND TAZOBACTAM SODIUM 4.5 G: 4; .5 INJECTION, POWDER, LYOPHILIZED, FOR SOLUTION INTRAVENOUS at 04:10

## 2024-10-04 LAB
ALBUMIN SERPL-MCNC: 3.1 G/DL (ref 3.5–5)
ALBUMIN/GLOB SERPL: 0.9 RATIO (ref 1.1–2)
ALP SERPL-CCNC: 93 UNIT/L (ref 40–150)
ALT SERPL-CCNC: 17 UNIT/L (ref 0–55)
ANION GAP SERPL CALC-SCNC: 8 MEQ/L
AST SERPL-CCNC: 18 UNIT/L (ref 5–34)
BACTERIA SPEC ANAEROBE CULT: NORMAL
BASOPHILS # BLD AUTO: 0.08 X10(3)/MCL
BASOPHILS NFR BLD AUTO: 0.5 %
BILIRUB SERPL-MCNC: 0.5 MG/DL
BUN SERPL-MCNC: 29 MG/DL (ref 9.8–20.1)
CALCIUM SERPL-MCNC: 9.4 MG/DL (ref 8.4–10.2)
CHLORIDE SERPL-SCNC: 102 MMOL/L (ref 98–107)
CO2 SERPL-SCNC: 28 MMOL/L (ref 22–29)
CREAT SERPL-MCNC: 0.98 MG/DL (ref 0.55–1.02)
CREAT/UREA NIT SERPL: 30
EOSINOPHIL # BLD AUTO: 0.52 X10(3)/MCL (ref 0–0.9)
EOSINOPHIL NFR BLD AUTO: 3.4 %
ERYTHROCYTE [DISTWIDTH] IN BLOOD BY AUTOMATED COUNT: 12.5 % (ref 11.5–17)
GFR SERPLBLD CREATININE-BSD FMLA CKD-EPI: >60 ML/MIN/1.73/M2
GLOBULIN SER-MCNC: 3.4 GM/DL (ref 2.4–3.5)
GLUCOSE SERPL-MCNC: 211 MG/DL (ref 74–100)
HCT VFR BLD AUTO: 35.2 % (ref 37–47)
HGB BLD-MCNC: 12 G/DL (ref 12–16)
HOLD SPECIMEN: NORMAL
IMM GRANULOCYTES # BLD AUTO: 0.06 X10(3)/MCL (ref 0–0.04)
IMM GRANULOCYTES NFR BLD AUTO: 0.4 %
LYMPHOCYTES # BLD AUTO: 3.38 X10(3)/MCL (ref 0.6–4.6)
LYMPHOCYTES NFR BLD AUTO: 22.3 %
MCH RBC QN AUTO: 29.3 PG (ref 27–31)
MCHC RBC AUTO-ENTMCNC: 34.1 G/DL (ref 33–36)
MCV RBC AUTO: 86.1 FL (ref 80–94)
MONOCYTES # BLD AUTO: 0.89 X10(3)/MCL (ref 0.1–1.3)
MONOCYTES NFR BLD AUTO: 5.9 %
NEUTROPHILS # BLD AUTO: 10.21 X10(3)/MCL (ref 2.1–9.2)
NEUTROPHILS NFR BLD AUTO: 67.5 %
NRBC BLD AUTO-RTO: 0 %
PLATELET # BLD AUTO: 251 X10(3)/MCL (ref 130–400)
PMV BLD AUTO: 10.8 FL (ref 7.4–10.4)
POCT GLUCOSE: 213 MG/DL (ref 70–110)
POCT GLUCOSE: 249 MG/DL (ref 70–110)
POCT GLUCOSE: 265 MG/DL (ref 70–110)
POCT GLUCOSE: 334 MG/DL (ref 70–110)
POTASSIUM SERPL-SCNC: 4.6 MMOL/L (ref 3.5–5.1)
PROT SERPL-MCNC: 6.5 GM/DL (ref 6.4–8.3)
RBC # BLD AUTO: 4.09 X10(6)/MCL (ref 4.2–5.4)
SODIUM SERPL-SCNC: 138 MMOL/L (ref 136–145)
VANCOMYCIN TROUGH SERPL-MCNC: 13.9 UG/ML (ref 15–20)
WBC # BLD AUTO: 15.14 X10(3)/MCL (ref 4.5–11.5)

## 2024-10-04 PROCEDURE — 63600175 PHARM REV CODE 636 W HCPCS

## 2024-10-04 PROCEDURE — 63600175 PHARM REV CODE 636 W HCPCS: Performed by: NURSE PRACTITIONER

## 2024-10-04 PROCEDURE — 85025 COMPLETE CBC W/AUTO DIFF WBC: CPT

## 2024-10-04 PROCEDURE — 21400001 HC TELEMETRY ROOM

## 2024-10-04 PROCEDURE — 25000003 PHARM REV CODE 250: Performed by: NURSE PRACTITIONER

## 2024-10-04 PROCEDURE — 25000003 PHARM REV CODE 250

## 2024-10-04 PROCEDURE — 80053 COMPREHEN METABOLIC PANEL: CPT

## 2024-10-04 PROCEDURE — 25000003 PHARM REV CODE 250: Performed by: STUDENT IN AN ORGANIZED HEALTH CARE EDUCATION/TRAINING PROGRAM

## 2024-10-04 PROCEDURE — A4216 STERILE WATER/SALINE, 10 ML: HCPCS

## 2024-10-04 PROCEDURE — 97535 SELF CARE MNGMENT TRAINING: CPT

## 2024-10-04 PROCEDURE — 36415 COLL VENOUS BLD VENIPUNCTURE: CPT

## 2024-10-04 PROCEDURE — 36415 COLL VENOUS BLD VENIPUNCTURE: CPT | Performed by: NURSE PRACTITIONER

## 2024-10-04 PROCEDURE — 80202 ASSAY OF VANCOMYCIN: CPT | Performed by: NURSE PRACTITIONER

## 2024-10-04 PROCEDURE — 63600175 PHARM REV CODE 636 W HCPCS: Performed by: STUDENT IN AN ORGANIZED HEALTH CARE EDUCATION/TRAINING PROGRAM

## 2024-10-04 RX ORDER — OXYMETAZOLINE HCL 0.05 %
2 SPRAY, NON-AEROSOL (ML) NASAL 2 TIMES DAILY
Status: COMPLETED | OUTPATIENT
Start: 2024-10-04 | End: 2024-10-06

## 2024-10-04 RX ORDER — INSULIN ASPART 100 [IU]/ML
8 INJECTION, SOLUTION INTRAVENOUS; SUBCUTANEOUS
Status: DISCONTINUED | OUTPATIENT
Start: 2024-10-04 | End: 2024-10-05

## 2024-10-04 RX ORDER — INSULIN GLARGINE 100 [IU]/ML
40 INJECTION, SOLUTION SUBCUTANEOUS NIGHTLY
Status: DISCONTINUED | OUTPATIENT
Start: 2024-10-04 | End: 2024-10-05

## 2024-10-04 RX ADMIN — PIPERACILLIN SODIUM AND TAZOBACTAM SODIUM 4.5 G: 4; .5 INJECTION, POWDER, LYOPHILIZED, FOR SOLUTION INTRAVENOUS at 04:10

## 2024-10-04 RX ADMIN — Medication 2 SPRAY: at 11:10

## 2024-10-04 RX ADMIN — HYDRALAZINE HYDROCHLORIDE 25 MG: 25 TABLET ORAL at 09:10

## 2024-10-04 RX ADMIN — HYDRALAZINE HYDROCHLORIDE 25 MG: 25 TABLET ORAL at 05:10

## 2024-10-04 RX ADMIN — VANCOMYCIN HYDROCHLORIDE 750 MG: 750 INJECTION, POWDER, LYOPHILIZED, FOR SOLUTION INTRAVENOUS at 09:10

## 2024-10-04 RX ADMIN — Medication 2 SPRAY: at 08:10

## 2024-10-04 RX ADMIN — GABAPENTIN 600 MG: 300 CAPSULE ORAL at 08:10

## 2024-10-04 RX ADMIN — INSULIN ASPART 6 UNITS: 100 INJECTION, SOLUTION INTRAVENOUS; SUBCUTANEOUS at 08:10

## 2024-10-04 RX ADMIN — ROPINIROLE HYDROCHLORIDE 0.5 MG: 0.25 TABLET, FILM COATED ORAL at 02:10

## 2024-10-04 RX ADMIN — INSULIN ASPART 5 UNITS: 100 INJECTION, SOLUTION INTRAVENOUS; SUBCUTANEOUS at 08:10

## 2024-10-04 RX ADMIN — ATORVASTATIN CALCIUM 40 MG: 40 TABLET, FILM COATED ORAL at 08:10

## 2024-10-04 RX ADMIN — ROPINIROLE HYDROCHLORIDE 0.5 MG: 0.25 TABLET, FILM COATED ORAL at 08:10

## 2024-10-04 RX ADMIN — HYDRALAZINE HYDROCHLORIDE 25 MG: 25 TABLET ORAL at 02:10

## 2024-10-04 RX ADMIN — TRAZODONE HYDROCHLORIDE 50 MG: 50 TABLET ORAL at 08:10

## 2024-10-04 RX ADMIN — PIPERACILLIN SODIUM AND TAZOBACTAM SODIUM 4.5 G: 4; .5 INJECTION, POWDER, LYOPHILIZED, FOR SOLUTION INTRAVENOUS at 07:10

## 2024-10-04 RX ADMIN — MELATONIN TAB 3 MG 3 MG: 3 TAB at 08:10

## 2024-10-04 RX ADMIN — SODIUM CHLORIDE, PRESERVATIVE FREE 10 ML: 5 INJECTION INTRAVENOUS at 12:10

## 2024-10-04 RX ADMIN — INSULIN ASPART 4 UNITS: 100 INJECTION, SOLUTION INTRAVENOUS; SUBCUTANEOUS at 08:10

## 2024-10-04 RX ADMIN — INSULIN ASPART 6 UNITS: 100 INJECTION, SOLUTION INTRAVENOUS; SUBCUTANEOUS at 11:10

## 2024-10-04 RX ADMIN — INSULIN ASPART 5 UNITS: 100 INJECTION, SOLUTION INTRAVENOUS; SUBCUTANEOUS at 11:10

## 2024-10-04 RX ADMIN — INSULIN ASPART 8 UNITS: 100 INJECTION, SOLUTION INTRAVENOUS; SUBCUTANEOUS at 04:10

## 2024-10-04 RX ADMIN — NIFEDIPINE 60 MG: 30 TABLET, FILM COATED, EXTENDED RELEASE ORAL at 08:10

## 2024-10-04 RX ADMIN — ENOXAPARIN SODIUM 40 MG: 40 INJECTION SUBCUTANEOUS at 04:10

## 2024-10-04 RX ADMIN — MUPIROCIN: 20 OINTMENT TOPICAL at 09:10

## 2024-10-04 RX ADMIN — HYDROCODONE BITARTRATE AND ACETAMINOPHEN 1 TABLET: 5; 325 TABLET ORAL at 07:10

## 2024-10-04 RX ADMIN — SODIUM CHLORIDE, PRESERVATIVE FREE 10 ML: 5 INJECTION INTRAVENOUS at 05:10

## 2024-10-04 RX ADMIN — INSULIN GLARGINE 40 UNITS: 100 INJECTION, SOLUTION SUBCUTANEOUS at 08:10

## 2024-10-04 RX ADMIN — VANCOMYCIN HYDROCHLORIDE 750 MG: 750 INJECTION, POWDER, LYOPHILIZED, FOR SOLUTION INTRAVENOUS at 05:10

## 2024-10-04 RX ADMIN — VANCOMYCIN HYDROCHLORIDE 750 MG: 750 INJECTION, POWDER, LYOPHILIZED, FOR SOLUTION INTRAVENOUS at 02:10

## 2024-10-04 RX ADMIN — MUPIROCIN: 20 OINTMENT TOPICAL at 08:10

## 2024-10-04 RX ADMIN — GABAPENTIN 600 MG: 300 CAPSULE ORAL at 02:10

## 2024-10-05 LAB
ALBUMIN SERPL-MCNC: 3.1 G/DL (ref 3.5–5)
ALBUMIN/GLOB SERPL: 1 RATIO (ref 1.1–2)
ALP SERPL-CCNC: 89 UNIT/L (ref 40–150)
ALT SERPL-CCNC: 21 UNIT/L (ref 0–55)
ANION GAP SERPL CALC-SCNC: 11 MEQ/L
AST SERPL-CCNC: 18 UNIT/L (ref 5–34)
BACTERIA BLD CULT: NORMAL
BACTERIA BLD CULT: NORMAL
BACTERIA TISS AEROBE CULT: ABNORMAL
BASOPHILS # BLD AUTO: 0.06 X10(3)/MCL
BASOPHILS NFR BLD AUTO: 0.4 %
BILIRUB SERPL-MCNC: 0.3 MG/DL
BUN SERPL-MCNC: 28.4 MG/DL (ref 9.8–20.1)
CALCIUM SERPL-MCNC: 9.3 MG/DL (ref 8.4–10.2)
CHLORIDE SERPL-SCNC: 101 MMOL/L (ref 98–107)
CO2 SERPL-SCNC: 24 MMOL/L (ref 22–29)
CREAT SERPL-MCNC: 0.85 MG/DL (ref 0.55–1.02)
CREAT/UREA NIT SERPL: 33
EOSINOPHIL # BLD AUTO: 0.52 X10(3)/MCL (ref 0–0.9)
EOSINOPHIL NFR BLD AUTO: 3.6 %
ERYTHROCYTE [DISTWIDTH] IN BLOOD BY AUTOMATED COUNT: 12.4 % (ref 11.5–17)
GFR SERPLBLD CREATININE-BSD FMLA CKD-EPI: >60 ML/MIN/1.73/M2
GLOBULIN SER-MCNC: 3.2 GM/DL (ref 2.4–3.5)
GLUCOSE SERPL-MCNC: 265 MG/DL (ref 74–100)
HCT VFR BLD AUTO: 34.6 % (ref 37–47)
HGB BLD-MCNC: 11.7 G/DL (ref 12–16)
HOLD SPECIMEN: NORMAL
IMM GRANULOCYTES # BLD AUTO: 0.07 X10(3)/MCL (ref 0–0.04)
IMM GRANULOCYTES NFR BLD AUTO: 0.5 %
LYMPHOCYTES # BLD AUTO: 3.18 X10(3)/MCL (ref 0.6–4.6)
LYMPHOCYTES NFR BLD AUTO: 21.8 %
MCH RBC QN AUTO: 28.8 PG (ref 27–31)
MCHC RBC AUTO-ENTMCNC: 33.8 G/DL (ref 33–36)
MCV RBC AUTO: 85.2 FL (ref 80–94)
MONOCYTES # BLD AUTO: 0.8 X10(3)/MCL (ref 0.1–1.3)
MONOCYTES NFR BLD AUTO: 5.5 %
NEUTROPHILS # BLD AUTO: 9.99 X10(3)/MCL (ref 2.1–9.2)
NEUTROPHILS NFR BLD AUTO: 68.2 %
NRBC BLD AUTO-RTO: 0 %
OHS QRS DURATION: 66 MS
OHS QTC CALCULATION: 466 MS
PLATELET # BLD AUTO: 238 X10(3)/MCL (ref 130–400)
PMV BLD AUTO: 11.4 FL (ref 7.4–10.4)
POCT GLUCOSE: 207 MG/DL (ref 70–110)
POCT GLUCOSE: 259 MG/DL (ref 70–110)
POCT GLUCOSE: 277 MG/DL (ref 70–110)
POCT GLUCOSE: 291 MG/DL (ref 70–110)
POTASSIUM SERPL-SCNC: 4.1 MMOL/L (ref 3.5–5.1)
PROT SERPL-MCNC: 6.3 GM/DL (ref 6.4–8.3)
RBC # BLD AUTO: 4.06 X10(6)/MCL (ref 4.2–5.4)
SODIUM SERPL-SCNC: 136 MMOL/L (ref 136–145)
VANCOMYCIN TROUGH SERPL-MCNC: 14.7 UG/ML (ref 15–20)
WBC # BLD AUTO: 14.62 X10(3)/MCL (ref 4.5–11.5)

## 2024-10-05 PROCEDURE — 63600175 PHARM REV CODE 636 W HCPCS: Performed by: STUDENT IN AN ORGANIZED HEALTH CARE EDUCATION/TRAINING PROGRAM

## 2024-10-05 PROCEDURE — 25000003 PHARM REV CODE 250

## 2024-10-05 PROCEDURE — 21400001 HC TELEMETRY ROOM

## 2024-10-05 PROCEDURE — 63600175 PHARM REV CODE 636 W HCPCS

## 2024-10-05 PROCEDURE — 80053 COMPREHEN METABOLIC PANEL: CPT

## 2024-10-05 PROCEDURE — 25000003 PHARM REV CODE 250: Performed by: STUDENT IN AN ORGANIZED HEALTH CARE EDUCATION/TRAINING PROGRAM

## 2024-10-05 PROCEDURE — A4216 STERILE WATER/SALINE, 10 ML: HCPCS

## 2024-10-05 PROCEDURE — 36415 COLL VENOUS BLD VENIPUNCTURE: CPT

## 2024-10-05 PROCEDURE — 36415 COLL VENOUS BLD VENIPUNCTURE: CPT | Performed by: NURSE PRACTITIONER

## 2024-10-05 PROCEDURE — 85025 COMPLETE CBC W/AUTO DIFF WBC: CPT

## 2024-10-05 PROCEDURE — 93005 ELECTROCARDIOGRAM TRACING: CPT

## 2024-10-05 PROCEDURE — 97530 THERAPEUTIC ACTIVITIES: CPT

## 2024-10-05 PROCEDURE — 80202 ASSAY OF VANCOMYCIN: CPT | Performed by: NURSE PRACTITIONER

## 2024-10-05 PROCEDURE — 25000003 PHARM REV CODE 250: Performed by: NURSE PRACTITIONER

## 2024-10-05 PROCEDURE — 63600175 PHARM REV CODE 636 W HCPCS: Performed by: NURSE PRACTITIONER

## 2024-10-05 PROCEDURE — 97110 THERAPEUTIC EXERCISES: CPT

## 2024-10-05 RX ORDER — INSULIN GLARGINE 100 [IU]/ML
45 INJECTION, SOLUTION SUBCUTANEOUS NIGHTLY
Status: DISCONTINUED | OUTPATIENT
Start: 2024-10-05 | End: 2024-10-05

## 2024-10-05 RX ORDER — INSULIN ASPART 100 [IU]/ML
12 INJECTION, SOLUTION INTRAVENOUS; SUBCUTANEOUS
Status: DISCONTINUED | OUTPATIENT
Start: 2024-10-05 | End: 2024-10-05

## 2024-10-05 RX ORDER — INSULIN ASPART 100 [IU]/ML
15 INJECTION, SOLUTION INTRAVENOUS; SUBCUTANEOUS
Status: DISCONTINUED | OUTPATIENT
Start: 2024-10-05 | End: 2024-10-06

## 2024-10-05 RX ORDER — INSULIN ASPART 100 [IU]/ML
0-15 INJECTION, SOLUTION INTRAVENOUS; SUBCUTANEOUS
Status: DISCONTINUED | OUTPATIENT
Start: 2024-10-05 | End: 2024-10-24 | Stop reason: HOSPADM

## 2024-10-05 RX ORDER — INSULIN GLARGINE 100 [IU]/ML
50 INJECTION, SOLUTION SUBCUTANEOUS NIGHTLY
Status: DISCONTINUED | OUTPATIENT
Start: 2024-10-05 | End: 2024-10-06

## 2024-10-05 RX ADMIN — Medication 2 SPRAY: at 09:10

## 2024-10-05 RX ADMIN — INSULIN ASPART 15 UNITS: 100 INJECTION, SOLUTION INTRAVENOUS; SUBCUTANEOUS at 04:10

## 2024-10-05 RX ADMIN — NIFEDIPINE 60 MG: 30 TABLET, FILM COATED, EXTENDED RELEASE ORAL at 09:10

## 2024-10-05 RX ADMIN — SODIUM CHLORIDE, PRESERVATIVE FREE 10 ML: 5 INJECTION INTRAVENOUS at 01:10

## 2024-10-05 RX ADMIN — SODIUM CHLORIDE, PRESERVATIVE FREE 10 ML: 5 INJECTION INTRAVENOUS at 05:10

## 2024-10-05 RX ADMIN — VANCOMYCIN HYDROCHLORIDE 750 MG: 750 INJECTION, POWDER, LYOPHILIZED, FOR SOLUTION INTRAVENOUS at 02:10

## 2024-10-05 RX ADMIN — GABAPENTIN 600 MG: 300 CAPSULE ORAL at 09:10

## 2024-10-05 RX ADMIN — INSULIN GLARGINE 50 UNITS: 100 INJECTION, SOLUTION SUBCUTANEOUS at 08:10

## 2024-10-05 RX ADMIN — VANCOMYCIN HYDROCHLORIDE 750 MG: 750 INJECTION, POWDER, LYOPHILIZED, FOR SOLUTION INTRAVENOUS at 09:10

## 2024-10-05 RX ADMIN — INSULIN ASPART 15 UNITS: 100 INJECTION, SOLUTION INTRAVENOUS; SUBCUTANEOUS at 01:10

## 2024-10-05 RX ADMIN — ROPINIROLE HYDROCHLORIDE 0.5 MG: 0.25 TABLET, FILM COATED ORAL at 08:10

## 2024-10-05 RX ADMIN — ROPINIROLE HYDROCHLORIDE 0.5 MG: 0.25 TABLET, FILM COATED ORAL at 09:10

## 2024-10-05 RX ADMIN — ROPINIROLE HYDROCHLORIDE 0.5 MG: 0.25 TABLET, FILM COATED ORAL at 02:10

## 2024-10-05 RX ADMIN — INSULIN ASPART 6 UNITS: 100 INJECTION, SOLUTION INTRAVENOUS; SUBCUTANEOUS at 04:10

## 2024-10-05 RX ADMIN — HYDRALAZINE HYDROCHLORIDE 25 MG: 25 TABLET ORAL at 09:10

## 2024-10-05 RX ADMIN — MELATONIN TAB 3 MG 3 MG: 3 TAB at 08:10

## 2024-10-05 RX ADMIN — PIPERACILLIN SODIUM AND TAZOBACTAM SODIUM 4.5 G: 4; .5 INJECTION, POWDER, LYOPHILIZED, FOR SOLUTION INTRAVENOUS at 12:10

## 2024-10-05 RX ADMIN — HYDRALAZINE HYDROCHLORIDE 25 MG: 25 TABLET ORAL at 01:10

## 2024-10-05 RX ADMIN — INSULIN ASPART 4 UNITS: 100 INJECTION, SOLUTION INTRAVENOUS; SUBCUTANEOUS at 01:10

## 2024-10-05 RX ADMIN — GABAPENTIN 600 MG: 300 CAPSULE ORAL at 08:10

## 2024-10-05 RX ADMIN — SODIUM CHLORIDE, PRESERVATIVE FREE 10 ML: 5 INJECTION INTRAVENOUS at 12:10

## 2024-10-05 RX ADMIN — ENOXAPARIN SODIUM 40 MG: 40 INJECTION SUBCUTANEOUS at 04:10

## 2024-10-05 RX ADMIN — SODIUM CHLORIDE, PRESERVATIVE FREE 10 ML: 5 INJECTION INTRAVENOUS at 06:10

## 2024-10-05 RX ADMIN — VANCOMYCIN HYDROCHLORIDE 750 MG: 750 INJECTION, POWDER, LYOPHILIZED, FOR SOLUTION INTRAVENOUS at 06:10

## 2024-10-05 RX ADMIN — INSULIN ASPART 1 UNITS: 100 INJECTION, SOLUTION INTRAVENOUS; SUBCUTANEOUS at 08:10

## 2024-10-05 RX ADMIN — TRAZODONE HYDROCHLORIDE 50 MG: 50 TABLET ORAL at 08:10

## 2024-10-05 RX ADMIN — HYDRALAZINE HYDROCHLORIDE 25 MG: 25 TABLET ORAL at 06:10

## 2024-10-05 RX ADMIN — INSULIN ASPART 6 UNITS: 100 INJECTION, SOLUTION INTRAVENOUS; SUBCUTANEOUS at 09:10

## 2024-10-05 RX ADMIN — HYDROCODONE BITARTRATE AND ACETAMINOPHEN 1 TABLET: 5; 325 TABLET ORAL at 08:10

## 2024-10-05 RX ADMIN — PIPERACILLIN SODIUM AND TAZOBACTAM SODIUM 4.5 G: 4; .5 INJECTION, POWDER, LYOPHILIZED, FOR SOLUTION INTRAVENOUS at 09:10

## 2024-10-05 RX ADMIN — ATORVASTATIN CALCIUM 40 MG: 40 TABLET, FILM COATED ORAL at 09:10

## 2024-10-05 RX ADMIN — PIPERACILLIN SODIUM AND TAZOBACTAM SODIUM 4.5 G: 4; .5 INJECTION, POWDER, LYOPHILIZED, FOR SOLUTION INTRAVENOUS at 04:10

## 2024-10-05 RX ADMIN — GABAPENTIN 600 MG: 300 CAPSULE ORAL at 02:10

## 2024-10-06 LAB
POCT GLUCOSE: 217 MG/DL (ref 70–110)
POCT GLUCOSE: 219 MG/DL (ref 70–110)
POCT GLUCOSE: 239 MG/DL (ref 70–110)
POCT GLUCOSE: 255 MG/DL (ref 70–110)
POCT GLUCOSE: 272 MG/DL (ref 70–110)

## 2024-10-06 PROCEDURE — 63600175 PHARM REV CODE 636 W HCPCS: Performed by: NURSE PRACTITIONER

## 2024-10-06 PROCEDURE — 25000003 PHARM REV CODE 250

## 2024-10-06 PROCEDURE — A4216 STERILE WATER/SALINE, 10 ML: HCPCS

## 2024-10-06 PROCEDURE — 25000003 PHARM REV CODE 250: Performed by: NURSE PRACTITIONER

## 2024-10-06 PROCEDURE — 63600175 PHARM REV CODE 636 W HCPCS

## 2024-10-06 PROCEDURE — 25000003 PHARM REV CODE 250: Performed by: STUDENT IN AN ORGANIZED HEALTH CARE EDUCATION/TRAINING PROGRAM

## 2024-10-06 PROCEDURE — 63600175 PHARM REV CODE 636 W HCPCS: Performed by: STUDENT IN AN ORGANIZED HEALTH CARE EDUCATION/TRAINING PROGRAM

## 2024-10-06 PROCEDURE — 21400001 HC TELEMETRY ROOM

## 2024-10-06 RX ORDER — INSULIN GLARGINE 100 [IU]/ML
55 INJECTION, SOLUTION SUBCUTANEOUS NIGHTLY
Status: DISCONTINUED | OUTPATIENT
Start: 2024-10-06 | End: 2024-10-07

## 2024-10-06 RX ORDER — INSULIN ASPART 100 [IU]/ML
18 INJECTION, SOLUTION INTRAVENOUS; SUBCUTANEOUS
Status: DISCONTINUED | OUTPATIENT
Start: 2024-10-06 | End: 2024-10-07

## 2024-10-06 RX ADMIN — INSULIN ASPART 15 UNITS: 100 INJECTION, SOLUTION INTRAVENOUS; SUBCUTANEOUS at 12:10

## 2024-10-06 RX ADMIN — HYDRALAZINE HYDROCHLORIDE 25 MG: 25 TABLET ORAL at 02:10

## 2024-10-06 RX ADMIN — ATORVASTATIN CALCIUM 40 MG: 40 TABLET, FILM COATED ORAL at 08:10

## 2024-10-06 RX ADMIN — ENOXAPARIN SODIUM 40 MG: 40 INJECTION SUBCUTANEOUS at 05:10

## 2024-10-06 RX ADMIN — PIPERACILLIN SODIUM AND TAZOBACTAM SODIUM 4.5 G: 4; .5 INJECTION, POWDER, LYOPHILIZED, FOR SOLUTION INTRAVENOUS at 01:10

## 2024-10-06 RX ADMIN — AMPICILLIN AND SULBACTAM 3 G: 1; 2 INJECTION, POWDER, FOR SOLUTION INTRAMUSCULAR; INTRAVENOUS at 06:10

## 2024-10-06 RX ADMIN — MELATONIN TAB 3 MG 3 MG: 3 TAB at 08:10

## 2024-10-06 RX ADMIN — HYDRALAZINE HYDROCHLORIDE 25 MG: 25 TABLET ORAL at 09:10

## 2024-10-06 RX ADMIN — INSULIN GLARGINE 55 UNITS: 100 INJECTION, SOLUTION SUBCUTANEOUS at 08:10

## 2024-10-06 RX ADMIN — INSULIN ASPART 18 UNITS: 100 INJECTION, SOLUTION INTRAVENOUS; SUBCUTANEOUS at 05:10

## 2024-10-06 RX ADMIN — INSULIN ASPART 4 UNITS: 100 INJECTION, SOLUTION INTRAVENOUS; SUBCUTANEOUS at 05:10

## 2024-10-06 RX ADMIN — GABAPENTIN 600 MG: 300 CAPSULE ORAL at 08:10

## 2024-10-06 RX ADMIN — HYDRALAZINE HYDROCHLORIDE 25 MG: 25 TABLET ORAL at 06:10

## 2024-10-06 RX ADMIN — INSULIN ASPART 2 UNITS: 100 INJECTION, SOLUTION INTRAVENOUS; SUBCUTANEOUS at 09:10

## 2024-10-06 RX ADMIN — HYDROCODONE BITARTRATE AND ACETAMINOPHEN 1 TABLET: 5; 325 TABLET ORAL at 02:10

## 2024-10-06 RX ADMIN — NIFEDIPINE 60 MG: 30 TABLET, FILM COATED, EXTENDED RELEASE ORAL at 08:10

## 2024-10-06 RX ADMIN — ROPINIROLE HYDROCHLORIDE 0.5 MG: 0.25 TABLET, FILM COATED ORAL at 02:10

## 2024-10-06 RX ADMIN — ROPINIROLE HYDROCHLORIDE 0.5 MG: 0.25 TABLET, FILM COATED ORAL at 08:10

## 2024-10-06 RX ADMIN — SODIUM CHLORIDE, PRESERVATIVE FREE 10 ML: 5 INJECTION INTRAVENOUS at 06:10

## 2024-10-06 RX ADMIN — SODIUM CHLORIDE, PRESERVATIVE FREE 10 ML: 5 INJECTION INTRAVENOUS at 05:10

## 2024-10-06 RX ADMIN — Medication 2 SPRAY: at 08:10

## 2024-10-06 RX ADMIN — PIPERACILLIN SODIUM AND TAZOBACTAM SODIUM 4.5 G: 4; .5 INJECTION, POWDER, LYOPHILIZED, FOR SOLUTION INTRAVENOUS at 08:10

## 2024-10-06 RX ADMIN — GABAPENTIN 600 MG: 300 CAPSULE ORAL at 02:10

## 2024-10-06 RX ADMIN — INSULIN ASPART 15 UNITS: 100 INJECTION, SOLUTION INTRAVENOUS; SUBCUTANEOUS at 07:10

## 2024-10-06 RX ADMIN — VANCOMYCIN HYDROCHLORIDE 750 MG: 750 INJECTION, POWDER, LYOPHILIZED, FOR SOLUTION INTRAVENOUS at 06:10

## 2024-10-06 RX ADMIN — INSULIN ASPART 4 UNITS: 100 INJECTION, SOLUTION INTRAVENOUS; SUBCUTANEOUS at 12:10

## 2024-10-06 RX ADMIN — AMPICILLIN AND SULBACTAM 3 G: 1; 2 INJECTION, POWDER, FOR SOLUTION INTRAMUSCULAR; INTRAVENOUS at 12:10

## 2024-10-06 RX ADMIN — SODIUM CHLORIDE, PRESERVATIVE FREE 10 ML: 5 INJECTION INTRAVENOUS at 11:10

## 2024-10-06 RX ADMIN — TRAZODONE HYDROCHLORIDE 50 MG: 50 TABLET ORAL at 08:10

## 2024-10-06 RX ADMIN — SODIUM CHLORIDE, PRESERVATIVE FREE 10 ML: 5 INJECTION INTRAVENOUS at 12:10

## 2024-10-07 LAB
ALBUMIN SERPL-MCNC: 3.1 G/DL (ref 3.5–5)
ALBUMIN/GLOB SERPL: 1 RATIO (ref 1.1–2)
ALP SERPL-CCNC: 79 UNIT/L (ref 40–150)
ALT SERPL-CCNC: 23 UNIT/L (ref 0–55)
ANION GAP SERPL CALC-SCNC: 9 MEQ/L
AST SERPL-CCNC: 17 UNIT/L (ref 5–34)
BASOPHILS # BLD AUTO: 0.09 X10(3)/MCL
BASOPHILS NFR BLD AUTO: 0.6 %
BILIRUB SERPL-MCNC: 0.3 MG/DL
BUN SERPL-MCNC: 29.8 MG/DL (ref 9.8–20.1)
CALCIUM SERPL-MCNC: 9.5 MG/DL (ref 8.4–10.2)
CHLORIDE SERPL-SCNC: 105 MMOL/L (ref 98–107)
CO2 SERPL-SCNC: 23 MMOL/L (ref 22–29)
CREAT SERPL-MCNC: 0.71 MG/DL (ref 0.55–1.02)
CREAT/UREA NIT SERPL: 42
EOSINOPHIL # BLD AUTO: 0.61 X10(3)/MCL (ref 0–0.9)
EOSINOPHIL NFR BLD AUTO: 3.8 %
ERYTHROCYTE [DISTWIDTH] IN BLOOD BY AUTOMATED COUNT: 12.4 % (ref 11.5–17)
GFR SERPLBLD CREATININE-BSD FMLA CKD-EPI: >60 ML/MIN/1.73/M2
GLOBULIN SER-MCNC: 3.2 GM/DL (ref 2.4–3.5)
GLUCOSE SERPL-MCNC: 190 MG/DL (ref 74–100)
HCT VFR BLD AUTO: 34.7 % (ref 37–47)
HGB BLD-MCNC: 11.6 G/DL (ref 12–16)
IMM GRANULOCYTES # BLD AUTO: 0.09 X10(3)/MCL (ref 0–0.04)
IMM GRANULOCYTES NFR BLD AUTO: 0.6 %
LYMPHOCYTES # BLD AUTO: 4.48 X10(3)/MCL (ref 0.6–4.6)
LYMPHOCYTES NFR BLD AUTO: 27.9 %
MCH RBC QN AUTO: 28.6 PG (ref 27–31)
MCHC RBC AUTO-ENTMCNC: 33.4 G/DL (ref 33–36)
MCV RBC AUTO: 85.7 FL (ref 80–94)
MONOCYTES # BLD AUTO: 0.9 X10(3)/MCL (ref 0.1–1.3)
MONOCYTES NFR BLD AUTO: 5.6 %
NEUTROPHILS # BLD AUTO: 9.9 X10(3)/MCL (ref 2.1–9.2)
NEUTROPHILS NFR BLD AUTO: 61.5 %
NRBC BLD AUTO-RTO: 0 %
PLATELET # BLD AUTO: 246 X10(3)/MCL (ref 130–400)
PMV BLD AUTO: 11.3 FL (ref 7.4–10.4)
POCT GLUCOSE: 104 MG/DL (ref 70–110)
POCT GLUCOSE: 131 MG/DL (ref 70–110)
POCT GLUCOSE: 216 MG/DL (ref 70–110)
POCT GLUCOSE: 257 MG/DL (ref 70–110)
POTASSIUM SERPL-SCNC: 3.9 MMOL/L (ref 3.5–5.1)
PROT SERPL-MCNC: 6.3 GM/DL (ref 6.4–8.3)
RBC # BLD AUTO: 4.05 X10(6)/MCL (ref 4.2–5.4)
SODIUM SERPL-SCNC: 137 MMOL/L (ref 136–145)
WBC # BLD AUTO: 16.07 X10(3)/MCL (ref 4.5–11.5)

## 2024-10-07 PROCEDURE — 63600175 PHARM REV CODE 636 W HCPCS

## 2024-10-07 PROCEDURE — 25000003 PHARM REV CODE 250

## 2024-10-07 PROCEDURE — 80053 COMPREHEN METABOLIC PANEL: CPT

## 2024-10-07 PROCEDURE — 21400001 HC TELEMETRY ROOM

## 2024-10-07 PROCEDURE — A4216 STERILE WATER/SALINE, 10 ML: HCPCS

## 2024-10-07 PROCEDURE — 63600175 PHARM REV CODE 636 W HCPCS: Performed by: STUDENT IN AN ORGANIZED HEALTH CARE EDUCATION/TRAINING PROGRAM

## 2024-10-07 PROCEDURE — 36415 COLL VENOUS BLD VENIPUNCTURE: CPT

## 2024-10-07 PROCEDURE — 97110 THERAPEUTIC EXERCISES: CPT

## 2024-10-07 PROCEDURE — 97535 SELF CARE MNGMENT TRAINING: CPT

## 2024-10-07 PROCEDURE — 97530 THERAPEUTIC ACTIVITIES: CPT

## 2024-10-07 PROCEDURE — 25000003 PHARM REV CODE 250: Performed by: STUDENT IN AN ORGANIZED HEALTH CARE EDUCATION/TRAINING PROGRAM

## 2024-10-07 PROCEDURE — 97116 GAIT TRAINING THERAPY: CPT

## 2024-10-07 PROCEDURE — 85025 COMPLETE CBC W/AUTO DIFF WBC: CPT

## 2024-10-07 RX ORDER — INSULIN GLARGINE 100 [IU]/ML
60 INJECTION, SOLUTION SUBCUTANEOUS NIGHTLY
Status: DISCONTINUED | OUTPATIENT
Start: 2024-10-07 | End: 2024-10-24 | Stop reason: HOSPADM

## 2024-10-07 RX ORDER — INSULIN ASPART 100 [IU]/ML
20 INJECTION, SOLUTION INTRAVENOUS; SUBCUTANEOUS
Status: DISCONTINUED | OUTPATIENT
Start: 2024-10-07 | End: 2024-10-24 | Stop reason: HOSPADM

## 2024-10-07 RX ADMIN — HYDRALAZINE HYDROCHLORIDE 25 MG: 25 TABLET ORAL at 03:10

## 2024-10-07 RX ADMIN — GABAPENTIN 600 MG: 300 CAPSULE ORAL at 03:10

## 2024-10-07 RX ADMIN — INSULIN ASPART 20 UNITS: 100 INJECTION, SOLUTION INTRAVENOUS; SUBCUTANEOUS at 12:10

## 2024-10-07 RX ADMIN — INSULIN ASPART 18 UNITS: 100 INJECTION, SOLUTION INTRAVENOUS; SUBCUTANEOUS at 08:10

## 2024-10-07 RX ADMIN — SODIUM CHLORIDE, PRESERVATIVE FREE 10 ML: 5 INJECTION INTRAVENOUS at 12:10

## 2024-10-07 RX ADMIN — TRAZODONE HYDROCHLORIDE 50 MG: 50 TABLET ORAL at 09:10

## 2024-10-07 RX ADMIN — NIFEDIPINE 60 MG: 30 TABLET, FILM COATED, EXTENDED RELEASE ORAL at 08:10

## 2024-10-07 RX ADMIN — GABAPENTIN 600 MG: 300 CAPSULE ORAL at 08:10

## 2024-10-07 RX ADMIN — SODIUM CHLORIDE, PRESERVATIVE FREE 10 ML: 5 INJECTION INTRAVENOUS at 11:10

## 2024-10-07 RX ADMIN — SODIUM CHLORIDE, PRESERVATIVE FREE 10 ML: 5 INJECTION INTRAVENOUS at 05:10

## 2024-10-07 RX ADMIN — INSULIN GLARGINE 60 UNITS: 100 INJECTION, SOLUTION SUBCUTANEOUS at 08:10

## 2024-10-07 RX ADMIN — HYDRALAZINE HYDROCHLORIDE 25 MG: 25 TABLET ORAL at 05:10

## 2024-10-07 RX ADMIN — MELATONIN TAB 3 MG 3 MG: 3 TAB at 09:10

## 2024-10-07 RX ADMIN — INSULIN ASPART 6 UNITS: 100 INJECTION, SOLUTION INTRAVENOUS; SUBCUTANEOUS at 09:10

## 2024-10-07 RX ADMIN — ENOXAPARIN SODIUM 40 MG: 40 INJECTION SUBCUTANEOUS at 05:10

## 2024-10-07 RX ADMIN — INSULIN ASPART 20 UNITS: 100 INJECTION, SOLUTION INTRAVENOUS; SUBCUTANEOUS at 05:10

## 2024-10-07 RX ADMIN — SODIUM CHLORIDE, PRESERVATIVE FREE 10 ML: 5 INJECTION INTRAVENOUS at 06:10

## 2024-10-07 RX ADMIN — ROPINIROLE HYDROCHLORIDE 0.5 MG: 0.25 TABLET, FILM COATED ORAL at 08:10

## 2024-10-07 RX ADMIN — ATORVASTATIN CALCIUM 40 MG: 40 TABLET, FILM COATED ORAL at 08:10

## 2024-10-07 RX ADMIN — AMPICILLIN AND SULBACTAM 3 G: 1; 2 INJECTION, POWDER, FOR SOLUTION INTRAMUSCULAR; INTRAVENOUS at 06:10

## 2024-10-07 RX ADMIN — INSULIN ASPART 2 UNITS: 100 INJECTION, SOLUTION INTRAVENOUS; SUBCUTANEOUS at 08:10

## 2024-10-07 RX ADMIN — AMPICILLIN AND SULBACTAM 3 G: 1; 2 INJECTION, POWDER, FOR SOLUTION INTRAMUSCULAR; INTRAVENOUS at 11:10

## 2024-10-07 RX ADMIN — AMPICILLIN AND SULBACTAM 3 G: 1; 2 INJECTION, POWDER, FOR SOLUTION INTRAMUSCULAR; INTRAVENOUS at 12:10

## 2024-10-07 RX ADMIN — ROPINIROLE HYDROCHLORIDE 0.5 MG: 0.25 TABLET, FILM COATED ORAL at 03:10

## 2024-10-07 RX ADMIN — AMPICILLIN AND SULBACTAM 3 G: 1; 2 INJECTION, POWDER, FOR SOLUTION INTRAMUSCULAR; INTRAVENOUS at 05:10

## 2024-10-08 LAB
POCT GLUCOSE: 130 MG/DL (ref 70–110)
POCT GLUCOSE: 155 MG/DL (ref 70–110)
POCT GLUCOSE: 179 MG/DL (ref 70–110)
POCT GLUCOSE: 187 MG/DL (ref 70–110)

## 2024-10-08 PROCEDURE — 25000003 PHARM REV CODE 250

## 2024-10-08 PROCEDURE — A4216 STERILE WATER/SALINE, 10 ML: HCPCS

## 2024-10-08 PROCEDURE — 63600175 PHARM REV CODE 636 W HCPCS

## 2024-10-08 PROCEDURE — 21400001 HC TELEMETRY ROOM

## 2024-10-08 PROCEDURE — 97530 THERAPEUTIC ACTIVITIES: CPT

## 2024-10-08 PROCEDURE — 25000003 PHARM REV CODE 250: Performed by: STUDENT IN AN ORGANIZED HEALTH CARE EDUCATION/TRAINING PROGRAM

## 2024-10-08 PROCEDURE — 97168 OT RE-EVAL EST PLAN CARE: CPT

## 2024-10-08 PROCEDURE — 25000242 PHARM REV CODE 250 ALT 637 W/ HCPCS

## 2024-10-08 PROCEDURE — 97535 SELF CARE MNGMENT TRAINING: CPT

## 2024-10-08 PROCEDURE — 63600175 PHARM REV CODE 636 W HCPCS: Performed by: STUDENT IN AN ORGANIZED HEALTH CARE EDUCATION/TRAINING PROGRAM

## 2024-10-08 RX ORDER — FLUTICASONE PROPIONATE 50 MCG
2 SPRAY, SUSPENSION (ML) NASAL DAILY
Status: DISCONTINUED | OUTPATIENT
Start: 2024-10-08 | End: 2024-10-24 | Stop reason: HOSPADM

## 2024-10-08 RX ORDER — FAMOTIDINE 20 MG/1
20 TABLET, FILM COATED ORAL 2 TIMES DAILY
Status: DISCONTINUED | OUTPATIENT
Start: 2024-10-08 | End: 2024-10-18

## 2024-10-08 RX ADMIN — INSULIN ASPART 20 UNITS: 100 INJECTION, SOLUTION INTRAVENOUS; SUBCUTANEOUS at 08:10

## 2024-10-08 RX ADMIN — ROPINIROLE HYDROCHLORIDE 0.5 MG: 0.25 TABLET, FILM COATED ORAL at 02:10

## 2024-10-08 RX ADMIN — ROPINIROLE HYDROCHLORIDE 0.5 MG: 0.25 TABLET, FILM COATED ORAL at 08:10

## 2024-10-08 RX ADMIN — HYDRALAZINE HYDROCHLORIDE 25 MG: 25 TABLET ORAL at 05:10

## 2024-10-08 RX ADMIN — SODIUM CHLORIDE, PRESERVATIVE FREE 10 ML: 5 INJECTION INTRAVENOUS at 05:10

## 2024-10-08 RX ADMIN — TRAZODONE HYDROCHLORIDE 50 MG: 50 TABLET ORAL at 08:10

## 2024-10-08 RX ADMIN — GABAPENTIN 600 MG: 300 CAPSULE ORAL at 02:10

## 2024-10-08 RX ADMIN — AMPICILLIN AND SULBACTAM 3 G: 1; 2 INJECTION, POWDER, FOR SOLUTION INTRAMUSCULAR; INTRAVENOUS at 01:10

## 2024-10-08 RX ADMIN — INSULIN ASPART 1 UNITS: 100 INJECTION, SOLUTION INTRAVENOUS; SUBCUTANEOUS at 08:10

## 2024-10-08 RX ADMIN — ATORVASTATIN CALCIUM 40 MG: 40 TABLET, FILM COATED ORAL at 08:10

## 2024-10-08 RX ADMIN — INSULIN ASPART 10 UNITS: 100 INJECTION, SOLUTION INTRAVENOUS; SUBCUTANEOUS at 05:10

## 2024-10-08 RX ADMIN — AMPICILLIN AND SULBACTAM 3 G: 1; 2 INJECTION, POWDER, FOR SOLUTION INTRAMUSCULAR; INTRAVENOUS at 11:10

## 2024-10-08 RX ADMIN — AMPICILLIN AND SULBACTAM 3 G: 1; 2 INJECTION, POWDER, FOR SOLUTION INTRAMUSCULAR; INTRAVENOUS at 06:10

## 2024-10-08 RX ADMIN — GUAIFENESIN 600 MG: 600 TABLET, EXTENDED RELEASE ORAL at 05:10

## 2024-10-08 RX ADMIN — AMPICILLIN AND SULBACTAM 3 G: 1; 2 INJECTION, POWDER, FOR SOLUTION INTRAMUSCULAR; INTRAVENOUS at 05:10

## 2024-10-08 RX ADMIN — INSULIN GLARGINE 60 UNITS: 100 INJECTION, SOLUTION SUBCUTANEOUS at 08:10

## 2024-10-08 RX ADMIN — FAMOTIDINE 20 MG: 20 TABLET ORAL at 12:10

## 2024-10-08 RX ADMIN — SODIUM CHLORIDE, PRESERVATIVE FREE 10 ML: 5 INJECTION INTRAVENOUS at 12:10

## 2024-10-08 RX ADMIN — ENOXAPARIN SODIUM 40 MG: 40 INJECTION SUBCUTANEOUS at 05:10

## 2024-10-08 RX ADMIN — MELATONIN TAB 3 MG 3 MG: 3 TAB at 08:10

## 2024-10-08 RX ADMIN — FAMOTIDINE 20 MG: 20 TABLET ORAL at 08:10

## 2024-10-08 RX ADMIN — HYDRALAZINE HYDROCHLORIDE 25 MG: 25 TABLET ORAL at 12:10

## 2024-10-08 RX ADMIN — GABAPENTIN 600 MG: 300 CAPSULE ORAL at 08:10

## 2024-10-08 RX ADMIN — INSULIN ASPART 10 UNITS: 100 INJECTION, SOLUTION INTRAVENOUS; SUBCUTANEOUS at 12:10

## 2024-10-08 RX ADMIN — NIFEDIPINE 60 MG: 30 TABLET, FILM COATED, EXTENDED RELEASE ORAL at 08:10

## 2024-10-08 RX ADMIN — HYDRALAZINE HYDROCHLORIDE 25 MG: 25 TABLET ORAL at 10:10

## 2024-10-08 RX ADMIN — HYDROCODONE BITARTRATE AND ACETAMINOPHEN 1 TABLET: 5; 325 TABLET ORAL at 02:10

## 2024-10-08 RX ADMIN — FLUTICASONE PROPIONATE 100 MCG: 50 SPRAY, METERED NASAL at 01:10

## 2024-10-09 LAB
ALBUMIN SERPL-MCNC: 3.2 G/DL (ref 3.5–5)
ALBUMIN/GLOB SERPL: 1 RATIO (ref 1.1–2)
ALP SERPL-CCNC: 89 UNIT/L (ref 40–150)
ALT SERPL-CCNC: 24 UNIT/L (ref 0–55)
ANION GAP SERPL CALC-SCNC: 10 MEQ/L
AST SERPL-CCNC: 14 UNIT/L (ref 5–34)
BASOPHILS # BLD AUTO: 0.09 X10(3)/MCL
BASOPHILS NFR BLD AUTO: 0.7 %
BILIRUB SERPL-MCNC: 0.3 MG/DL
BUN SERPL-MCNC: 31.8 MG/DL (ref 9.8–20.1)
CALCIUM SERPL-MCNC: 9.4 MG/DL (ref 8.4–10.2)
CHLORIDE SERPL-SCNC: 104 MMOL/L (ref 98–107)
CO2 SERPL-SCNC: 26 MMOL/L (ref 22–29)
CREAT SERPL-MCNC: 0.79 MG/DL (ref 0.55–1.02)
CREAT/UREA NIT SERPL: 40
EOSINOPHIL # BLD AUTO: 0.45 X10(3)/MCL (ref 0–0.9)
EOSINOPHIL NFR BLD AUTO: 3.3 %
ERYTHROCYTE [DISTWIDTH] IN BLOOD BY AUTOMATED COUNT: 12.7 % (ref 11.5–17)
GFR SERPLBLD CREATININE-BSD FMLA CKD-EPI: >60 ML/MIN/1.73/M2
GLOBULIN SER-MCNC: 3.2 GM/DL (ref 2.4–3.5)
GLUCOSE SERPL-MCNC: 241 MG/DL (ref 74–100)
HCT VFR BLD AUTO: 33.8 % (ref 37–47)
HGB BLD-MCNC: 11.4 G/DL (ref 12–16)
HOLD SPECIMEN: NORMAL
IMM GRANULOCYTES # BLD AUTO: 0.07 X10(3)/MCL (ref 0–0.04)
IMM GRANULOCYTES NFR BLD AUTO: 0.5 %
LYMPHOCYTES # BLD AUTO: 3.29 X10(3)/MCL (ref 0.6–4.6)
LYMPHOCYTES NFR BLD AUTO: 24 %
MCH RBC QN AUTO: 29.3 PG (ref 27–31)
MCHC RBC AUTO-ENTMCNC: 33.7 G/DL (ref 33–36)
MCV RBC AUTO: 86.9 FL (ref 80–94)
MONOCYTES # BLD AUTO: 0.77 X10(3)/MCL (ref 0.1–1.3)
MONOCYTES NFR BLD AUTO: 5.6 %
NEUTROPHILS # BLD AUTO: 9.05 X10(3)/MCL (ref 2.1–9.2)
NEUTROPHILS NFR BLD AUTO: 65.9 %
NRBC BLD AUTO-RTO: 0 %
PLATELET # BLD AUTO: 273 X10(3)/MCL (ref 130–400)
PMV BLD AUTO: 11 FL (ref 7.4–10.4)
POCT GLUCOSE: 137 MG/DL (ref 70–110)
POCT GLUCOSE: 159 MG/DL (ref 70–110)
POCT GLUCOSE: 166 MG/DL (ref 70–110)
POCT GLUCOSE: 191 MG/DL (ref 70–110)
POTASSIUM SERPL-SCNC: 4.2 MMOL/L (ref 3.5–5.1)
PROT SERPL-MCNC: 6.4 GM/DL (ref 6.4–8.3)
RBC # BLD AUTO: 3.89 X10(6)/MCL (ref 4.2–5.4)
SODIUM SERPL-SCNC: 140 MMOL/L (ref 136–145)
WBC # BLD AUTO: 13.72 X10(3)/MCL (ref 4.5–11.5)

## 2024-10-09 PROCEDURE — 36415 COLL VENOUS BLD VENIPUNCTURE: CPT

## 2024-10-09 PROCEDURE — 63600175 PHARM REV CODE 636 W HCPCS: Performed by: STUDENT IN AN ORGANIZED HEALTH CARE EDUCATION/TRAINING PROGRAM

## 2024-10-09 PROCEDURE — 25000003 PHARM REV CODE 250

## 2024-10-09 PROCEDURE — A4216 STERILE WATER/SALINE, 10 ML: HCPCS

## 2024-10-09 PROCEDURE — 85025 COMPLETE CBC W/AUTO DIFF WBC: CPT

## 2024-10-09 PROCEDURE — 21400001 HC TELEMETRY ROOM

## 2024-10-09 PROCEDURE — 80053 COMPREHEN METABOLIC PANEL: CPT

## 2024-10-09 PROCEDURE — 63600175 PHARM REV CODE 636 W HCPCS

## 2024-10-09 PROCEDURE — 25000003 PHARM REV CODE 250: Performed by: STUDENT IN AN ORGANIZED HEALTH CARE EDUCATION/TRAINING PROGRAM

## 2024-10-09 RX ADMIN — GABAPENTIN 600 MG: 300 CAPSULE ORAL at 03:10

## 2024-10-09 RX ADMIN — GABAPENTIN 600 MG: 300 CAPSULE ORAL at 08:10

## 2024-10-09 RX ADMIN — NIFEDIPINE 60 MG: 30 TABLET, FILM COATED, EXTENDED RELEASE ORAL at 08:10

## 2024-10-09 RX ADMIN — AMPICILLIN AND SULBACTAM 3 G: 1; 2 INJECTION, POWDER, FOR SOLUTION INTRAMUSCULAR; INTRAVENOUS at 12:10

## 2024-10-09 RX ADMIN — SODIUM CHLORIDE, PRESERVATIVE FREE 10 ML: 5 INJECTION INTRAVENOUS at 07:10

## 2024-10-09 RX ADMIN — MELATONIN TAB 3 MG 3 MG: 3 TAB at 08:10

## 2024-10-09 RX ADMIN — HYDRALAZINE HYDROCHLORIDE 25 MG: 25 TABLET ORAL at 11:10

## 2024-10-09 RX ADMIN — FAMOTIDINE 20 MG: 20 TABLET ORAL at 08:10

## 2024-10-09 RX ADMIN — SODIUM CHLORIDE, PRESERVATIVE FREE 10 ML: 5 INJECTION INTRAVENOUS at 11:10

## 2024-10-09 RX ADMIN — HYDRALAZINE HYDROCHLORIDE 25 MG: 25 TABLET ORAL at 03:10

## 2024-10-09 RX ADMIN — ROPINIROLE HYDROCHLORIDE 0.5 MG: 0.25 TABLET, FILM COATED ORAL at 08:10

## 2024-10-09 RX ADMIN — AMPICILLIN AND SULBACTAM 3 G: 1; 2 INJECTION, POWDER, FOR SOLUTION INTRAMUSCULAR; INTRAVENOUS at 05:10

## 2024-10-09 RX ADMIN — SODIUM CHLORIDE, PRESERVATIVE FREE 10 ML: 5 INJECTION INTRAVENOUS at 05:10

## 2024-10-09 RX ADMIN — AMPICILLIN AND SULBACTAM 3 G: 1; 2 INJECTION, POWDER, FOR SOLUTION INTRAMUSCULAR; INTRAVENOUS at 11:10

## 2024-10-09 RX ADMIN — TRAZODONE HYDROCHLORIDE 50 MG: 50 TABLET ORAL at 08:10

## 2024-10-09 RX ADMIN — ENOXAPARIN SODIUM 40 MG: 40 INJECTION SUBCUTANEOUS at 05:10

## 2024-10-09 RX ADMIN — INSULIN ASPART 20 UNITS: 100 INJECTION, SOLUTION INTRAVENOUS; SUBCUTANEOUS at 07:10

## 2024-10-09 RX ADMIN — INSULIN ASPART 10 UNITS: 100 INJECTION, SOLUTION INTRAVENOUS; SUBCUTANEOUS at 12:10

## 2024-10-09 RX ADMIN — INSULIN GLARGINE 60 UNITS: 100 INJECTION, SOLUTION SUBCUTANEOUS at 08:10

## 2024-10-09 RX ADMIN — HYDRALAZINE HYDROCHLORIDE 25 MG: 25 TABLET ORAL at 05:10

## 2024-10-09 RX ADMIN — SODIUM CHLORIDE, PRESERVATIVE FREE 10 ML: 5 INJECTION INTRAVENOUS at 12:10

## 2024-10-09 RX ADMIN — ATORVASTATIN CALCIUM 40 MG: 40 TABLET, FILM COATED ORAL at 08:10

## 2024-10-09 RX ADMIN — FLUTICASONE PROPIONATE 100 MCG: 50 SPRAY, METERED NASAL at 08:10

## 2024-10-09 RX ADMIN — INSULIN ASPART 20 UNITS: 100 INJECTION, SOLUTION INTRAVENOUS; SUBCUTANEOUS at 05:10

## 2024-10-09 RX ADMIN — INSULIN ASPART 1 UNITS: 100 INJECTION, SOLUTION INTRAVENOUS; SUBCUTANEOUS at 08:10

## 2024-10-09 RX ADMIN — ROPINIROLE HYDROCHLORIDE 0.5 MG: 0.25 TABLET, FILM COATED ORAL at 03:10

## 2024-10-10 LAB
POCT GLUCOSE: 103 MG/DL (ref 70–110)
POCT GLUCOSE: 104 MG/DL (ref 70–110)
POCT GLUCOSE: 135 MG/DL (ref 70–110)
POCT GLUCOSE: 173 MG/DL (ref 70–110)
POCT GLUCOSE: 180 MG/DL (ref 70–110)
POCT GLUCOSE: 184 MG/DL (ref 70–110)
POCT GLUCOSE: 221 MG/DL (ref 70–110)

## 2024-10-10 PROCEDURE — 63600175 PHARM REV CODE 636 W HCPCS

## 2024-10-10 PROCEDURE — 25000003 PHARM REV CODE 250

## 2024-10-10 PROCEDURE — 21400001 HC TELEMETRY ROOM

## 2024-10-10 PROCEDURE — A4216 STERILE WATER/SALINE, 10 ML: HCPCS

## 2024-10-10 PROCEDURE — 63600175 PHARM REV CODE 636 W HCPCS: Performed by: STUDENT IN AN ORGANIZED HEALTH CARE EDUCATION/TRAINING PROGRAM

## 2024-10-10 PROCEDURE — 25000003 PHARM REV CODE 250: Performed by: STUDENT IN AN ORGANIZED HEALTH CARE EDUCATION/TRAINING PROGRAM

## 2024-10-10 RX ORDER — PANTOPRAZOLE SODIUM 40 MG/1
40 TABLET, DELAYED RELEASE ORAL DAILY
Status: DISCONTINUED | OUTPATIENT
Start: 2024-10-10 | End: 2024-10-24 | Stop reason: HOSPADM

## 2024-10-10 RX ADMIN — TRAZODONE HYDROCHLORIDE 50 MG: 50 TABLET ORAL at 08:10

## 2024-10-10 RX ADMIN — AMPICILLIN AND SULBACTAM 3 G: 1; 2 INJECTION, POWDER, FOR SOLUTION INTRAMUSCULAR; INTRAVENOUS at 12:10

## 2024-10-10 RX ADMIN — FAMOTIDINE 20 MG: 20 TABLET ORAL at 08:10

## 2024-10-10 RX ADMIN — INSULIN GLARGINE 60 UNITS: 100 INJECTION, SOLUTION SUBCUTANEOUS at 08:10

## 2024-10-10 RX ADMIN — GABAPENTIN 600 MG: 300 CAPSULE ORAL at 08:10

## 2024-10-10 RX ADMIN — HYDRALAZINE HYDROCHLORIDE 25 MG: 25 TABLET ORAL at 05:10

## 2024-10-10 RX ADMIN — MELATONIN TAB 3 MG 3 MG: 3 TAB at 08:10

## 2024-10-10 RX ADMIN — PANTOPRAZOLE SODIUM 40 MG: 40 TABLET, DELAYED RELEASE ORAL at 12:10

## 2024-10-10 RX ADMIN — AMPICILLIN AND SULBACTAM 3 G: 1; 2 INJECTION, POWDER, FOR SOLUTION INTRAMUSCULAR; INTRAVENOUS at 06:10

## 2024-10-10 RX ADMIN — SODIUM CHLORIDE, PRESERVATIVE FREE 10 ML: 5 INJECTION INTRAVENOUS at 12:10

## 2024-10-10 RX ADMIN — INSULIN ASPART 4 UNITS: 100 INJECTION, SOLUTION INTRAVENOUS; SUBCUTANEOUS at 08:10

## 2024-10-10 RX ADMIN — INSULIN ASPART 20 UNITS: 100 INJECTION, SOLUTION INTRAVENOUS; SUBCUTANEOUS at 08:10

## 2024-10-10 RX ADMIN — FLUTICASONE PROPIONATE 100 MCG: 50 SPRAY, METERED NASAL at 08:10

## 2024-10-10 RX ADMIN — INSULIN ASPART 20 UNITS: 100 INJECTION, SOLUTION INTRAVENOUS; SUBCUTANEOUS at 05:10

## 2024-10-10 RX ADMIN — HYDRALAZINE HYDROCHLORIDE 25 MG: 25 TABLET ORAL at 03:10

## 2024-10-10 RX ADMIN — ROPINIROLE HYDROCHLORIDE 0.5 MG: 0.25 TABLET, FILM COATED ORAL at 03:10

## 2024-10-10 RX ADMIN — ROPINIROLE HYDROCHLORIDE 0.5 MG: 0.25 TABLET, FILM COATED ORAL at 08:10

## 2024-10-10 RX ADMIN — HYDRALAZINE HYDROCHLORIDE 25 MG: 25 TABLET ORAL at 11:10

## 2024-10-10 RX ADMIN — AMPICILLIN AND SULBACTAM 3 G: 1; 2 INJECTION, POWDER, FOR SOLUTION INTRAMUSCULAR; INTRAVENOUS at 05:10

## 2024-10-10 RX ADMIN — NIFEDIPINE 60 MG: 30 TABLET, FILM COATED, EXTENDED RELEASE ORAL at 08:10

## 2024-10-10 RX ADMIN — SODIUM CHLORIDE, PRESERVATIVE FREE 10 ML: 5 INJECTION INTRAVENOUS at 11:10

## 2024-10-10 RX ADMIN — SODIUM CHLORIDE, PRESERVATIVE FREE 10 ML: 5 INJECTION INTRAVENOUS at 06:10

## 2024-10-10 RX ADMIN — AMPICILLIN AND SULBACTAM 3 G: 1; 2 INJECTION, POWDER, FOR SOLUTION INTRAMUSCULAR; INTRAVENOUS at 11:10

## 2024-10-10 RX ADMIN — ENOXAPARIN SODIUM 40 MG: 40 INJECTION SUBCUTANEOUS at 05:10

## 2024-10-10 RX ADMIN — GABAPENTIN 600 MG: 300 CAPSULE ORAL at 03:10

## 2024-10-10 RX ADMIN — ATORVASTATIN CALCIUM 40 MG: 40 TABLET, FILM COATED ORAL at 08:10

## 2024-10-11 LAB
ALBUMIN SERPL-MCNC: 3.2 G/DL (ref 3.5–5)
ALBUMIN/GLOB SERPL: 1 RATIO (ref 1.1–2)
ALP SERPL-CCNC: 85 UNIT/L (ref 40–150)
ALT SERPL-CCNC: 29 UNIT/L (ref 0–55)
ANION GAP SERPL CALC-SCNC: 10 MEQ/L
AST SERPL-CCNC: 19 UNIT/L (ref 5–34)
BASOPHILS # BLD AUTO: 0.02 X10(3)/MCL
BASOPHILS NFR BLD AUTO: 0.1 %
BILIRUB SERPL-MCNC: 0.4 MG/DL
BUN SERPL-MCNC: 31.5 MG/DL (ref 9.8–20.1)
CALCIUM SERPL-MCNC: 9 MG/DL (ref 8.4–10.2)
CHLORIDE SERPL-SCNC: 104 MMOL/L (ref 98–107)
CO2 SERPL-SCNC: 22 MMOL/L (ref 22–29)
CREAT SERPL-MCNC: 0.76 MG/DL (ref 0.55–1.02)
CREAT/UREA NIT SERPL: 41
EOSINOPHIL # BLD AUTO: 0.09 X10(3)/MCL (ref 0–0.9)
EOSINOPHIL NFR BLD AUTO: 0.5 %
ERYTHROCYTE [DISTWIDTH] IN BLOOD BY AUTOMATED COUNT: 12.5 % (ref 11.5–17)
GFR SERPLBLD CREATININE-BSD FMLA CKD-EPI: >60 ML/MIN/1.73/M2
GLOBULIN SER-MCNC: 3.1 GM/DL (ref 2.4–3.5)
GLUCOSE SERPL-MCNC: 206 MG/DL (ref 74–100)
HCT VFR BLD AUTO: 34.3 % (ref 37–47)
HGB BLD-MCNC: 11.7 G/DL (ref 12–16)
HOLD SPECIMEN: NORMAL
IMM GRANULOCYTES # BLD AUTO: 0.1 X10(3)/MCL (ref 0–0.04)
IMM GRANULOCYTES NFR BLD AUTO: 0.6 %
LYMPHOCYTES # BLD AUTO: 3.39 X10(3)/MCL (ref 0.6–4.6)
LYMPHOCYTES NFR BLD AUTO: 20.4 %
MCH RBC QN AUTO: 28.9 PG (ref 27–31)
MCHC RBC AUTO-ENTMCNC: 34.1 G/DL (ref 33–36)
MCV RBC AUTO: 84.7 FL (ref 80–94)
MONOCYTES # BLD AUTO: 0.82 X10(3)/MCL (ref 0.1–1.3)
MONOCYTES NFR BLD AUTO: 4.9 %
NEUTROPHILS # BLD AUTO: 12.2 X10(3)/MCL (ref 2.1–9.2)
NEUTROPHILS NFR BLD AUTO: 73.5 %
NRBC BLD AUTO-RTO: 0 %
PLATELET # BLD AUTO: 303 X10(3)/MCL (ref 130–400)
PMV BLD AUTO: 11 FL (ref 7.4–10.4)
POCT GLUCOSE: 160 MG/DL (ref 70–110)
POCT GLUCOSE: 167 MG/DL (ref 70–110)
POCT GLUCOSE: 180 MG/DL (ref 70–110)
POCT GLUCOSE: 217 MG/DL (ref 70–110)
POTASSIUM SERPL-SCNC: 4.1 MMOL/L (ref 3.5–5.1)
PROT SERPL-MCNC: 6.3 GM/DL (ref 6.4–8.3)
RBC # BLD AUTO: 4.05 X10(6)/MCL (ref 4.2–5.4)
SODIUM SERPL-SCNC: 136 MMOL/L (ref 136–145)
WBC # BLD AUTO: 16.62 X10(3)/MCL (ref 4.5–11.5)

## 2024-10-11 PROCEDURE — 21400001 HC TELEMETRY ROOM

## 2024-10-11 PROCEDURE — 25000003 PHARM REV CODE 250

## 2024-10-11 PROCEDURE — 63600175 PHARM REV CODE 636 W HCPCS

## 2024-10-11 PROCEDURE — 36415 COLL VENOUS BLD VENIPUNCTURE: CPT

## 2024-10-11 PROCEDURE — 25000003 PHARM REV CODE 250: Performed by: INTERNAL MEDICINE

## 2024-10-11 PROCEDURE — 63600175 PHARM REV CODE 636 W HCPCS: Performed by: STUDENT IN AN ORGANIZED HEALTH CARE EDUCATION/TRAINING PROGRAM

## 2024-10-11 PROCEDURE — A4216 STERILE WATER/SALINE, 10 ML: HCPCS

## 2024-10-11 PROCEDURE — 85025 COMPLETE CBC W/AUTO DIFF WBC: CPT

## 2024-10-11 PROCEDURE — 80053 COMPREHEN METABOLIC PANEL: CPT

## 2024-10-11 PROCEDURE — 25000003 PHARM REV CODE 250: Performed by: STUDENT IN AN ORGANIZED HEALTH CARE EDUCATION/TRAINING PROGRAM

## 2024-10-11 RX ORDER — SUCRALFATE 1 G/10ML
1 SUSPENSION ORAL ONCE
Status: COMPLETED | OUTPATIENT
Start: 2024-10-11 | End: 2024-10-11

## 2024-10-11 RX ADMIN — TRAZODONE HYDROCHLORIDE 50 MG: 50 TABLET ORAL at 09:10

## 2024-10-11 RX ADMIN — NIFEDIPINE 60 MG: 30 TABLET, FILM COATED, EXTENDED RELEASE ORAL at 08:10

## 2024-10-11 RX ADMIN — HYDRALAZINE HYDROCHLORIDE 25 MG: 25 TABLET ORAL at 06:10

## 2024-10-11 RX ADMIN — SODIUM CHLORIDE, PRESERVATIVE FREE 10 ML: 5 INJECTION INTRAVENOUS at 07:10

## 2024-10-11 RX ADMIN — AMPICILLIN AND SULBACTAM 3 G: 1; 2 INJECTION, POWDER, FOR SOLUTION INTRAMUSCULAR; INTRAVENOUS at 07:10

## 2024-10-11 RX ADMIN — GABAPENTIN 600 MG: 300 CAPSULE ORAL at 09:10

## 2024-10-11 RX ADMIN — GABAPENTIN 600 MG: 300 CAPSULE ORAL at 02:10

## 2024-10-11 RX ADMIN — SODIUM CHLORIDE, PRESERVATIVE FREE 10 ML: 5 INJECTION INTRAVENOUS at 12:10

## 2024-10-11 RX ADMIN — ROPINIROLE HYDROCHLORIDE 0.5 MG: 0.25 TABLET, FILM COATED ORAL at 09:10

## 2024-10-11 RX ADMIN — FAMOTIDINE 20 MG: 20 TABLET ORAL at 08:10

## 2024-10-11 RX ADMIN — FLUTICASONE PROPIONATE 100 MCG: 50 SPRAY, METERED NASAL at 08:10

## 2024-10-11 RX ADMIN — AMPICILLIN AND SULBACTAM 3 G: 1; 2 INJECTION, POWDER, FOR SOLUTION INTRAMUSCULAR; INTRAVENOUS at 12:10

## 2024-10-11 RX ADMIN — INSULIN ASPART 20 UNITS: 100 INJECTION, SOLUTION INTRAVENOUS; SUBCUTANEOUS at 08:10

## 2024-10-11 RX ADMIN — GABAPENTIN 600 MG: 300 CAPSULE ORAL at 08:10

## 2024-10-11 RX ADMIN — SUCRALFATE 1 G: 1 SUSPENSION ORAL at 02:10

## 2024-10-11 RX ADMIN — INSULIN GLARGINE 60 UNITS: 100 INJECTION, SOLUTION SUBCUTANEOUS at 09:10

## 2024-10-11 RX ADMIN — ROPINIROLE HYDROCHLORIDE 0.5 MG: 0.25 TABLET, FILM COATED ORAL at 08:10

## 2024-10-11 RX ADMIN — HYDRALAZINE HYDROCHLORIDE 25 MG: 25 TABLET ORAL at 09:10

## 2024-10-11 RX ADMIN — FAMOTIDINE 20 MG: 20 TABLET ORAL at 09:10

## 2024-10-11 RX ADMIN — INSULIN ASPART 4 UNITS: 100 INJECTION, SOLUTION INTRAVENOUS; SUBCUTANEOUS at 08:10

## 2024-10-11 RX ADMIN — ATORVASTATIN CALCIUM 40 MG: 40 TABLET, FILM COATED ORAL at 08:10

## 2024-10-11 RX ADMIN — ROPINIROLE HYDROCHLORIDE 0.5 MG: 0.25 TABLET, FILM COATED ORAL at 02:10

## 2024-10-11 RX ADMIN — MELATONIN TAB 3 MG 3 MG: 3 TAB at 09:10

## 2024-10-11 RX ADMIN — SODIUM CHLORIDE, PRESERVATIVE FREE 10 ML: 5 INJECTION INTRAVENOUS at 06:10

## 2024-10-11 RX ADMIN — AMPICILLIN AND SULBACTAM 3 G: 1; 2 INJECTION, POWDER, FOR SOLUTION INTRAMUSCULAR; INTRAVENOUS at 06:10

## 2024-10-11 RX ADMIN — INSULIN ASPART 20 UNITS: 100 INJECTION, SOLUTION INTRAVENOUS; SUBCUTANEOUS at 05:10

## 2024-10-11 RX ADMIN — ENOXAPARIN SODIUM 40 MG: 40 INJECTION SUBCUTANEOUS at 05:10

## 2024-10-11 RX ADMIN — INSULIN ASPART 20 UNITS: 100 INJECTION, SOLUTION INTRAVENOUS; SUBCUTANEOUS at 12:10

## 2024-10-11 RX ADMIN — HYDRALAZINE HYDROCHLORIDE 25 MG: 25 TABLET ORAL at 02:10

## 2024-10-11 RX ADMIN — PANTOPRAZOLE SODIUM 40 MG: 40 TABLET, DELAYED RELEASE ORAL at 08:10

## 2024-10-12 LAB
BASOPHILS # BLD AUTO: 0.04 X10(3)/MCL
BASOPHILS NFR BLD AUTO: 0.3 %
EOSINOPHIL # BLD AUTO: 0.33 X10(3)/MCL (ref 0–0.9)
EOSINOPHIL NFR BLD AUTO: 2.3 %
ERYTHROCYTE [DISTWIDTH] IN BLOOD BY AUTOMATED COUNT: 12.5 % (ref 11.5–17)
HCT VFR BLD AUTO: 32.9 % (ref 37–47)
HGB BLD-MCNC: 11.1 G/DL (ref 12–16)
HOLD SPECIMEN: NORMAL
IMM GRANULOCYTES # BLD AUTO: 0.08 X10(3)/MCL (ref 0–0.04)
IMM GRANULOCYTES NFR BLD AUTO: 0.6 %
LYMPHOCYTES # BLD AUTO: 3.98 X10(3)/MCL (ref 0.6–4.6)
LYMPHOCYTES NFR BLD AUTO: 28.1 %
MCH RBC QN AUTO: 28.8 PG (ref 27–31)
MCHC RBC AUTO-ENTMCNC: 33.7 G/DL (ref 33–36)
MCV RBC AUTO: 85.5 FL (ref 80–94)
MONOCYTES # BLD AUTO: 0.77 X10(3)/MCL (ref 0.1–1.3)
MONOCYTES NFR BLD AUTO: 5.4 %
NEUTROPHILS # BLD AUTO: 8.98 X10(3)/MCL (ref 2.1–9.2)
NEUTROPHILS NFR BLD AUTO: 63.3 %
NRBC BLD AUTO-RTO: 0 %
PLATELET # BLD AUTO: 273 X10(3)/MCL (ref 130–400)
PMV BLD AUTO: 10.6 FL (ref 7.4–10.4)
POCT GLUCOSE: 141 MG/DL (ref 70–110)
POCT GLUCOSE: 204 MG/DL (ref 70–110)
POCT GLUCOSE: 208 MG/DL (ref 70–110)
POCT GLUCOSE: 88 MG/DL (ref 70–110)
RBC # BLD AUTO: 3.85 X10(6)/MCL (ref 4.2–5.4)
WBC # BLD AUTO: 14.18 X10(3)/MCL (ref 4.5–11.5)

## 2024-10-12 PROCEDURE — 25000003 PHARM REV CODE 250

## 2024-10-12 PROCEDURE — 36415 COLL VENOUS BLD VENIPUNCTURE: CPT | Performed by: INTERNAL MEDICINE

## 2024-10-12 PROCEDURE — 36415 COLL VENOUS BLD VENIPUNCTURE: CPT

## 2024-10-12 PROCEDURE — 85025 COMPLETE CBC W/AUTO DIFF WBC: CPT

## 2024-10-12 PROCEDURE — 63600175 PHARM REV CODE 636 W HCPCS

## 2024-10-12 PROCEDURE — 11000001 HC ACUTE MED/SURG PRIVATE ROOM

## 2024-10-12 PROCEDURE — 25000003 PHARM REV CODE 250: Performed by: STUDENT IN AN ORGANIZED HEALTH CARE EDUCATION/TRAINING PROGRAM

## 2024-10-12 PROCEDURE — 63600175 PHARM REV CODE 636 W HCPCS: Performed by: STUDENT IN AN ORGANIZED HEALTH CARE EDUCATION/TRAINING PROGRAM

## 2024-10-12 PROCEDURE — A4216 STERILE WATER/SALINE, 10 ML: HCPCS

## 2024-10-12 PROCEDURE — 21400001 HC TELEMETRY ROOM

## 2024-10-12 RX ADMIN — GABAPENTIN 600 MG: 300 CAPSULE ORAL at 08:10

## 2024-10-12 RX ADMIN — AMPICILLIN AND SULBACTAM 3 G: 1; 2 INJECTION, POWDER, FOR SOLUTION INTRAMUSCULAR; INTRAVENOUS at 06:10

## 2024-10-12 RX ADMIN — SODIUM CHLORIDE, PRESERVATIVE FREE 10 ML: 5 INJECTION INTRAVENOUS at 06:10

## 2024-10-12 RX ADMIN — ENOXAPARIN SODIUM 40 MG: 40 INJECTION SUBCUTANEOUS at 05:10

## 2024-10-12 RX ADMIN — TRAZODONE HYDROCHLORIDE 50 MG: 50 TABLET ORAL at 09:10

## 2024-10-12 RX ADMIN — FLUTICASONE PROPIONATE 100 MCG: 50 SPRAY, METERED NASAL at 08:10

## 2024-10-12 RX ADMIN — AMPICILLIN AND SULBACTAM 3 G: 1; 2 INJECTION, POWDER, FOR SOLUTION INTRAMUSCULAR; INTRAVENOUS at 12:10

## 2024-10-12 RX ADMIN — PANTOPRAZOLE SODIUM 40 MG: 40 TABLET, DELAYED RELEASE ORAL at 08:10

## 2024-10-12 RX ADMIN — GABAPENTIN 600 MG: 300 CAPSULE ORAL at 03:10

## 2024-10-12 RX ADMIN — ROPINIROLE HYDROCHLORIDE 0.5 MG: 0.25 TABLET, FILM COATED ORAL at 03:10

## 2024-10-12 RX ADMIN — ATORVASTATIN CALCIUM 40 MG: 40 TABLET, FILM COATED ORAL at 08:10

## 2024-10-12 RX ADMIN — FAMOTIDINE 20 MG: 20 TABLET ORAL at 09:10

## 2024-10-12 RX ADMIN — ROPINIROLE HYDROCHLORIDE 0.5 MG: 0.25 TABLET, FILM COATED ORAL at 09:10

## 2024-10-12 RX ADMIN — SODIUM CHLORIDE, PRESERVATIVE FREE 10 ML: 5 INJECTION INTRAVENOUS at 12:10

## 2024-10-12 RX ADMIN — MELATONIN TAB 3 MG 3 MG: 3 TAB at 09:10

## 2024-10-12 RX ADMIN — HYDRALAZINE HYDROCHLORIDE 25 MG: 25 TABLET ORAL at 09:10

## 2024-10-12 RX ADMIN — AMPICILLIN AND SULBACTAM 3 G: 1; 2 INJECTION, POWDER, FOR SOLUTION INTRAMUSCULAR; INTRAVENOUS at 05:10

## 2024-10-12 RX ADMIN — INSULIN ASPART 20 UNITS: 100 INJECTION, SOLUTION INTRAVENOUS; SUBCUTANEOUS at 08:10

## 2024-10-12 RX ADMIN — HYDRALAZINE HYDROCHLORIDE 25 MG: 25 TABLET ORAL at 06:10

## 2024-10-12 RX ADMIN — INSULIN ASPART 20 UNITS: 100 INJECTION, SOLUTION INTRAVENOUS; SUBCUTANEOUS at 12:10

## 2024-10-12 RX ADMIN — HYDROCODONE BITARTRATE AND ACETAMINOPHEN 1 TABLET: 5; 325 TABLET ORAL at 09:10

## 2024-10-12 RX ADMIN — FAMOTIDINE 20 MG: 20 TABLET ORAL at 08:10

## 2024-10-12 RX ADMIN — ROPINIROLE HYDROCHLORIDE 0.5 MG: 0.25 TABLET, FILM COATED ORAL at 08:10

## 2024-10-12 RX ADMIN — NIFEDIPINE 60 MG: 30 TABLET, FILM COATED, EXTENDED RELEASE ORAL at 08:10

## 2024-10-12 RX ADMIN — HYDRALAZINE HYDROCHLORIDE 25 MG: 25 TABLET ORAL at 03:10

## 2024-10-12 RX ADMIN — INSULIN ASPART 20 UNITS: 100 INJECTION, SOLUTION INTRAVENOUS; SUBCUTANEOUS at 05:10

## 2024-10-12 RX ADMIN — INSULIN GLARGINE 60 UNITS: 100 INJECTION, SOLUTION SUBCUTANEOUS at 09:10

## 2024-10-13 LAB
ALBUMIN SERPL-MCNC: 3.2 G/DL (ref 3.5–5)
ALBUMIN/GLOB SERPL: 1.1 RATIO (ref 1.1–2)
ALP SERPL-CCNC: 83 UNIT/L (ref 40–150)
ALT SERPL-CCNC: 27 UNIT/L (ref 0–55)
ANION GAP SERPL CALC-SCNC: 10 MEQ/L
AST SERPL-CCNC: 18 UNIT/L (ref 5–34)
BASOPHILS # BLD AUTO: 0.04 X10(3)/MCL
BASOPHILS NFR BLD AUTO: 0.3 %
BILIRUB SERPL-MCNC: 0.2 MG/DL
BUN SERPL-MCNC: 32.5 MG/DL (ref 9.8–20.1)
CALCIUM SERPL-MCNC: 8.9 MG/DL (ref 8.4–10.2)
CHLORIDE SERPL-SCNC: 107 MMOL/L (ref 98–107)
CO2 SERPL-SCNC: 21 MMOL/L (ref 22–29)
CREAT SERPL-MCNC: 0.78 MG/DL (ref 0.55–1.02)
CREAT/UREA NIT SERPL: 42
EOSINOPHIL # BLD AUTO: 0.46 X10(3)/MCL (ref 0–0.9)
EOSINOPHIL NFR BLD AUTO: 3.1 %
ERYTHROCYTE [DISTWIDTH] IN BLOOD BY AUTOMATED COUNT: 12.5 % (ref 11.5–17)
GFR SERPLBLD CREATININE-BSD FMLA CKD-EPI: >60 ML/MIN/1.73/M2
GLOBULIN SER-MCNC: 3 GM/DL (ref 2.4–3.5)
GLUCOSE SERPL-MCNC: 182 MG/DL (ref 74–100)
HCT VFR BLD AUTO: 31.3 % (ref 37–47)
HGB BLD-MCNC: 10.5 G/DL (ref 12–16)
IMM GRANULOCYTES # BLD AUTO: 0.1 X10(3)/MCL (ref 0–0.04)
IMM GRANULOCYTES NFR BLD AUTO: 0.7 %
LYMPHOCYTES # BLD AUTO: 3.83 X10(3)/MCL (ref 0.6–4.6)
LYMPHOCYTES NFR BLD AUTO: 25.8 %
MCH RBC QN AUTO: 28.7 PG (ref 27–31)
MCHC RBC AUTO-ENTMCNC: 33.5 G/DL (ref 33–36)
MCV RBC AUTO: 85.5 FL (ref 80–94)
MONOCYTES # BLD AUTO: 0.85 X10(3)/MCL (ref 0.1–1.3)
MONOCYTES NFR BLD AUTO: 5.7 %
NEUTROPHILS # BLD AUTO: 9.56 X10(3)/MCL (ref 2.1–9.2)
NEUTROPHILS NFR BLD AUTO: 64.4 %
NRBC BLD AUTO-RTO: 0 %
PLATELET # BLD AUTO: 276 X10(3)/MCL (ref 130–400)
PMV BLD AUTO: 10.7 FL (ref 7.4–10.4)
POCT GLUCOSE: 141 MG/DL (ref 70–110)
POCT GLUCOSE: 163 MG/DL (ref 70–110)
POCT GLUCOSE: 175 MG/DL (ref 70–110)
POCT GLUCOSE: 178 MG/DL (ref 70–110)
POTASSIUM SERPL-SCNC: 4.1 MMOL/L (ref 3.5–5.1)
PROT SERPL-MCNC: 6.2 GM/DL (ref 6.4–8.3)
RBC # BLD AUTO: 3.66 X10(6)/MCL (ref 4.2–5.4)
SODIUM SERPL-SCNC: 138 MMOL/L (ref 136–145)
WBC # BLD AUTO: 14.84 X10(3)/MCL (ref 4.5–11.5)

## 2024-10-13 PROCEDURE — 63600175 PHARM REV CODE 636 W HCPCS

## 2024-10-13 PROCEDURE — 25000003 PHARM REV CODE 250

## 2024-10-13 PROCEDURE — 80053 COMPREHEN METABOLIC PANEL: CPT

## 2024-10-13 PROCEDURE — 63600175 PHARM REV CODE 636 W HCPCS: Performed by: STUDENT IN AN ORGANIZED HEALTH CARE EDUCATION/TRAINING PROGRAM

## 2024-10-13 PROCEDURE — 36415 COLL VENOUS BLD VENIPUNCTURE: CPT

## 2024-10-13 PROCEDURE — 21400001 HC TELEMETRY ROOM

## 2024-10-13 PROCEDURE — A4216 STERILE WATER/SALINE, 10 ML: HCPCS

## 2024-10-13 PROCEDURE — 25000003 PHARM REV CODE 250: Performed by: STUDENT IN AN ORGANIZED HEALTH CARE EDUCATION/TRAINING PROGRAM

## 2024-10-13 PROCEDURE — 85025 COMPLETE CBC W/AUTO DIFF WBC: CPT

## 2024-10-13 PROCEDURE — 11000001 HC ACUTE MED/SURG PRIVATE ROOM

## 2024-10-13 RX ADMIN — ROPINIROLE HYDROCHLORIDE 0.5 MG: 0.25 TABLET, FILM COATED ORAL at 08:10

## 2024-10-13 RX ADMIN — NIFEDIPINE 60 MG: 30 TABLET, FILM COATED, EXTENDED RELEASE ORAL at 08:10

## 2024-10-13 RX ADMIN — HYDRALAZINE HYDROCHLORIDE 25 MG: 25 TABLET ORAL at 02:10

## 2024-10-13 RX ADMIN — INSULIN ASPART 2 UNITS: 100 INJECTION, SOLUTION INTRAVENOUS; SUBCUTANEOUS at 05:10

## 2024-10-13 RX ADMIN — AMPICILLIN AND SULBACTAM 3 G: 1; 2 INJECTION, POWDER, FOR SOLUTION INTRAMUSCULAR; INTRAVENOUS at 12:10

## 2024-10-13 RX ADMIN — ENOXAPARIN SODIUM 40 MG: 40 INJECTION SUBCUTANEOUS at 05:10

## 2024-10-13 RX ADMIN — INSULIN ASPART 20 UNITS: 100 INJECTION, SOLUTION INTRAVENOUS; SUBCUTANEOUS at 12:10

## 2024-10-13 RX ADMIN — MELATONIN TAB 3 MG 3 MG: 3 TAB at 10:10

## 2024-10-13 RX ADMIN — FAMOTIDINE 20 MG: 20 TABLET ORAL at 08:10

## 2024-10-13 RX ADMIN — INSULIN GLARGINE 60 UNITS: 100 INJECTION, SOLUTION SUBCUTANEOUS at 08:10

## 2024-10-13 RX ADMIN — INSULIN ASPART 20 UNITS: 100 INJECTION, SOLUTION INTRAVENOUS; SUBCUTANEOUS at 05:10

## 2024-10-13 RX ADMIN — AMPICILLIN AND SULBACTAM 3 G: 1; 2 INJECTION, POWDER, FOR SOLUTION INTRAMUSCULAR; INTRAVENOUS at 05:10

## 2024-10-13 RX ADMIN — SODIUM CHLORIDE, PRESERVATIVE FREE 10 ML: 5 INJECTION INTRAVENOUS at 06:10

## 2024-10-13 RX ADMIN — ATORVASTATIN CALCIUM 40 MG: 40 TABLET, FILM COATED ORAL at 08:10

## 2024-10-13 RX ADMIN — HYDRALAZINE HYDROCHLORIDE 25 MG: 25 TABLET ORAL at 10:10

## 2024-10-13 RX ADMIN — GABAPENTIN 600 MG: 300 CAPSULE ORAL at 02:10

## 2024-10-13 RX ADMIN — INSULIN ASPART 20 UNITS: 100 INJECTION, SOLUTION INTRAVENOUS; SUBCUTANEOUS at 08:10

## 2024-10-13 RX ADMIN — FLUTICASONE PROPIONATE 100 MCG: 50 SPRAY, METERED NASAL at 10:10

## 2024-10-13 RX ADMIN — AMPICILLIN AND SULBACTAM 3 G: 1; 2 INJECTION, POWDER, FOR SOLUTION INTRAMUSCULAR; INTRAVENOUS at 06:10

## 2024-10-13 RX ADMIN — SODIUM CHLORIDE, PRESERVATIVE FREE 10 ML: 5 INJECTION INTRAVENOUS at 12:10

## 2024-10-13 RX ADMIN — GABAPENTIN 600 MG: 300 CAPSULE ORAL at 08:10

## 2024-10-13 RX ADMIN — SODIUM CHLORIDE, PRESERVATIVE FREE 10 ML: 5 INJECTION INTRAVENOUS at 05:10

## 2024-10-13 RX ADMIN — ROPINIROLE HYDROCHLORIDE 0.5 MG: 0.25 TABLET, FILM COATED ORAL at 02:10

## 2024-10-13 RX ADMIN — PANTOPRAZOLE SODIUM 40 MG: 40 TABLET, DELAYED RELEASE ORAL at 08:10

## 2024-10-13 RX ADMIN — TRAZODONE HYDROCHLORIDE 50 MG: 50 TABLET ORAL at 10:10

## 2024-10-13 RX ADMIN — HYDROCODONE BITARTRATE AND ACETAMINOPHEN 1 TABLET: 5; 325 TABLET ORAL at 04:10

## 2024-10-13 RX ADMIN — HYDRALAZINE HYDROCHLORIDE 25 MG: 25 TABLET ORAL at 05:10

## 2024-10-14 LAB
ANION GAP SERPL CALC-SCNC: 8 MEQ/L
BASOPHILS # BLD AUTO: 0.08 X10(3)/MCL
BASOPHILS NFR BLD AUTO: 0.5 %
BUN SERPL-MCNC: 30.8 MG/DL (ref 9.8–20.1)
CALCIUM SERPL-MCNC: 9.6 MG/DL (ref 8.4–10.2)
CHLORIDE SERPL-SCNC: 108 MMOL/L (ref 98–107)
CO2 SERPL-SCNC: 23 MMOL/L (ref 22–29)
CREAT SERPL-MCNC: 0.84 MG/DL (ref 0.55–1.02)
CREAT/UREA NIT SERPL: 37
EOSINOPHIL # BLD AUTO: 0.6 X10(3)/MCL (ref 0–0.9)
EOSINOPHIL NFR BLD AUTO: 3.9 %
ERYTHROCYTE [DISTWIDTH] IN BLOOD BY AUTOMATED COUNT: 12.5 % (ref 11.5–17)
GFR SERPLBLD CREATININE-BSD FMLA CKD-EPI: >60 ML/MIN/1.73/M2
GLUCOSE SERPL-MCNC: 171 MG/DL (ref 74–100)
HCT VFR BLD AUTO: 35 % (ref 37–47)
HGB BLD-MCNC: 11.8 G/DL (ref 12–16)
HOLD SPECIMEN: NORMAL
HOLD SPECIMEN: NORMAL
IMM GRANULOCYTES # BLD AUTO: 0.11 X10(3)/MCL (ref 0–0.04)
IMM GRANULOCYTES NFR BLD AUTO: 0.7 %
LYMPHOCYTES # BLD AUTO: 3.13 X10(3)/MCL (ref 0.6–4.6)
LYMPHOCYTES NFR BLD AUTO: 20.5 %
MAGNESIUM SERPL-MCNC: 2.2 MG/DL (ref 1.6–2.6)
MCH RBC QN AUTO: 29.6 PG (ref 27–31)
MCHC RBC AUTO-ENTMCNC: 33.7 G/DL (ref 33–36)
MCV RBC AUTO: 87.7 FL (ref 80–94)
MONOCYTES # BLD AUTO: 0.81 X10(3)/MCL (ref 0.1–1.3)
MONOCYTES NFR BLD AUTO: 5.3 %
NEUTROPHILS # BLD AUTO: 10.54 X10(3)/MCL (ref 2.1–9.2)
NEUTROPHILS NFR BLD AUTO: 69.1 %
NRBC BLD AUTO-RTO: 0 %
PLATELET # BLD AUTO: 321 X10(3)/MCL (ref 130–400)
PMV BLD AUTO: 10.2 FL (ref 7.4–10.4)
POCT GLUCOSE: 113 MG/DL (ref 70–110)
POCT GLUCOSE: 145 MG/DL (ref 70–110)
POCT GLUCOSE: 162 MG/DL (ref 70–110)
POCT GLUCOSE: 164 MG/DL (ref 70–110)
POTASSIUM SERPL-SCNC: 4.9 MMOL/L (ref 3.5–5.1)
RBC # BLD AUTO: 3.99 X10(6)/MCL (ref 4.2–5.4)
SODIUM SERPL-SCNC: 139 MMOL/L (ref 136–145)
WBC # BLD AUTO: 15.27 X10(3)/MCL (ref 4.5–11.5)

## 2024-10-14 PROCEDURE — 63600175 PHARM REV CODE 636 W HCPCS

## 2024-10-14 PROCEDURE — 36415 COLL VENOUS BLD VENIPUNCTURE: CPT

## 2024-10-14 PROCEDURE — 83735 ASSAY OF MAGNESIUM: CPT

## 2024-10-14 PROCEDURE — 25000003 PHARM REV CODE 250: Performed by: STUDENT IN AN ORGANIZED HEALTH CARE EDUCATION/TRAINING PROGRAM

## 2024-10-14 PROCEDURE — 36415 COLL VENOUS BLD VENIPUNCTURE: CPT | Performed by: INTERNAL MEDICINE

## 2024-10-14 PROCEDURE — 85025 COMPLETE CBC W/AUTO DIFF WBC: CPT

## 2024-10-14 PROCEDURE — 11000001 HC ACUTE MED/SURG PRIVATE ROOM

## 2024-10-14 PROCEDURE — 93005 ELECTROCARDIOGRAM TRACING: CPT

## 2024-10-14 PROCEDURE — 25000003 PHARM REV CODE 250

## 2024-10-14 PROCEDURE — A4216 STERILE WATER/SALINE, 10 ML: HCPCS

## 2024-10-14 PROCEDURE — 21400001 HC TELEMETRY ROOM

## 2024-10-14 PROCEDURE — 80048 BASIC METABOLIC PNL TOTAL CA: CPT

## 2024-10-14 RX ORDER — ENOXAPARIN SODIUM 100 MG/ML
40 INJECTION SUBCUTANEOUS EVERY 12 HOURS
Status: DISCONTINUED | OUTPATIENT
Start: 2024-10-14 | End: 2024-10-24 | Stop reason: HOSPADM

## 2024-10-14 RX ADMIN — ATORVASTATIN CALCIUM 40 MG: 40 TABLET, FILM COATED ORAL at 09:10

## 2024-10-14 RX ADMIN — INSULIN ASPART 20 UNITS: 100 INJECTION, SOLUTION INTRAVENOUS; SUBCUTANEOUS at 09:10

## 2024-10-14 RX ADMIN — AMPICILLIN AND SULBACTAM 3 G: 1; 2 INJECTION, POWDER, FOR SOLUTION INTRAMUSCULAR; INTRAVENOUS at 05:10

## 2024-10-14 RX ADMIN — SODIUM CHLORIDE, PRESERVATIVE FREE 10 ML: 5 INJECTION INTRAVENOUS at 12:10

## 2024-10-14 RX ADMIN — PANTOPRAZOLE SODIUM 40 MG: 40 TABLET, DELAYED RELEASE ORAL at 09:10

## 2024-10-14 RX ADMIN — ROPINIROLE HYDROCHLORIDE 0.5 MG: 0.25 TABLET, FILM COATED ORAL at 09:10

## 2024-10-14 RX ADMIN — TRAZODONE HYDROCHLORIDE 50 MG: 50 TABLET ORAL at 08:10

## 2024-10-14 RX ADMIN — AMPICILLIN AND SULBACTAM 3 G: 1; 2 INJECTION, POWDER, FOR SOLUTION INTRAMUSCULAR; INTRAVENOUS at 12:10

## 2024-10-14 RX ADMIN — GABAPENTIN 600 MG: 300 CAPSULE ORAL at 02:10

## 2024-10-14 RX ADMIN — INSULIN GLARGINE 60 UNITS: 100 INJECTION, SOLUTION SUBCUTANEOUS at 08:10

## 2024-10-14 RX ADMIN — INSULIN ASPART 2 UNITS: 100 INJECTION, SOLUTION INTRAVENOUS; SUBCUTANEOUS at 09:10

## 2024-10-14 RX ADMIN — FLUTICASONE PROPIONATE 100 MCG: 50 SPRAY, METERED NASAL at 09:10

## 2024-10-14 RX ADMIN — HYDROCODONE BITARTRATE AND ACETAMINOPHEN 1 TABLET: 5; 325 TABLET ORAL at 02:10

## 2024-10-14 RX ADMIN — HYDROCODONE BITARTRATE AND ACETAMINOPHEN 1 TABLET: 5; 325 TABLET ORAL at 08:10

## 2024-10-14 RX ADMIN — MELATONIN TAB 3 MG 3 MG: 3 TAB at 08:10

## 2024-10-14 RX ADMIN — FAMOTIDINE 20 MG: 20 TABLET ORAL at 08:10

## 2024-10-14 RX ADMIN — SODIUM CHLORIDE, PRESERVATIVE FREE 10 ML: 5 INJECTION INTRAVENOUS at 05:10

## 2024-10-14 RX ADMIN — SODIUM CHLORIDE, PRESERVATIVE FREE 10 ML: 5 INJECTION INTRAVENOUS at 06:10

## 2024-10-14 RX ADMIN — FAMOTIDINE 20 MG: 20 TABLET ORAL at 09:10

## 2024-10-14 RX ADMIN — AMPICILLIN AND SULBACTAM 3 G: 1; 2 INJECTION, POWDER, FOR SOLUTION INTRAMUSCULAR; INTRAVENOUS at 11:10

## 2024-10-14 RX ADMIN — GABAPENTIN 600 MG: 300 CAPSULE ORAL at 08:10

## 2024-10-14 RX ADMIN — ROPINIROLE HYDROCHLORIDE 0.5 MG: 0.25 TABLET, FILM COATED ORAL at 08:10

## 2024-10-14 RX ADMIN — GABAPENTIN 600 MG: 300 CAPSULE ORAL at 09:10

## 2024-10-14 RX ADMIN — ROPINIROLE HYDROCHLORIDE 0.5 MG: 0.25 TABLET, FILM COATED ORAL at 02:10

## 2024-10-14 RX ADMIN — INSULIN ASPART 20 UNITS: 100 INJECTION, SOLUTION INTRAVENOUS; SUBCUTANEOUS at 12:10

## 2024-10-14 RX ADMIN — NIFEDIPINE 60 MG: 30 TABLET, FILM COATED, EXTENDED RELEASE ORAL at 09:10

## 2024-10-14 RX ADMIN — HYDRALAZINE HYDROCHLORIDE 25 MG: 25 TABLET ORAL at 06:10

## 2024-10-14 RX ADMIN — HYDRALAZINE HYDROCHLORIDE 25 MG: 25 TABLET ORAL at 02:10

## 2024-10-14 RX ADMIN — INSULIN ASPART 20 UNITS: 100 INJECTION, SOLUTION INTRAVENOUS; SUBCUTANEOUS at 05:10

## 2024-10-14 RX ADMIN — ENOXAPARIN SODIUM 40 MG: 40 INJECTION SUBCUTANEOUS at 08:10

## 2024-10-14 RX ADMIN — SODIUM CHLORIDE, PRESERVATIVE FREE 10 ML: 5 INJECTION INTRAVENOUS at 11:10

## 2024-10-14 RX ADMIN — HYDRALAZINE HYDROCHLORIDE 25 MG: 25 TABLET ORAL at 09:10

## 2024-10-14 RX ADMIN — AMPICILLIN AND SULBACTAM 3 G: 1; 2 INJECTION, POWDER, FOR SOLUTION INTRAMUSCULAR; INTRAVENOUS at 06:10

## 2024-10-15 LAB
ALBUMIN SERPL-MCNC: 3.2 G/DL (ref 3.5–5)
ALBUMIN/GLOB SERPL: 1 RATIO (ref 1.1–2)
ALP SERPL-CCNC: 83 UNIT/L (ref 40–150)
ALT SERPL-CCNC: 26 UNIT/L (ref 0–55)
ANION GAP SERPL CALC-SCNC: 9 MEQ/L
AST SERPL-CCNC: 14 UNIT/L (ref 5–34)
BASOPHILS # BLD AUTO: 0.07 X10(3)/MCL
BASOPHILS NFR BLD AUTO: 0.5 %
BILIRUB SERPL-MCNC: 0.2 MG/DL
BUN SERPL-MCNC: 27.8 MG/DL (ref 9.8–20.1)
CALCIUM SERPL-MCNC: 9.4 MG/DL (ref 8.4–10.2)
CHLORIDE SERPL-SCNC: 107 MMOL/L (ref 98–107)
CO2 SERPL-SCNC: 23 MMOL/L (ref 22–29)
CREAT SERPL-MCNC: 0.79 MG/DL (ref 0.55–1.02)
CREAT/UREA NIT SERPL: 35
CRP SERPL-MCNC: 12.9 MG/L
EOSINOPHIL # BLD AUTO: 0.67 X10(3)/MCL (ref 0–0.9)
EOSINOPHIL NFR BLD AUTO: 4.4 %
ERYTHROCYTE [DISTWIDTH] IN BLOOD BY AUTOMATED COUNT: 12.6 % (ref 11.5–17)
ERYTHROCYTE [SEDIMENTATION RATE] IN BLOOD: 32 MM/HR (ref 0–20)
GFR SERPLBLD CREATININE-BSD FMLA CKD-EPI: >60 ML/MIN/1.73/M2
GLOBULIN SER-MCNC: 3.1 GM/DL (ref 2.4–3.5)
GLUCOSE SERPL-MCNC: 165 MG/DL (ref 74–100)
HCT VFR BLD AUTO: 32.1 % (ref 37–47)
HGB BLD-MCNC: 10.8 G/DL (ref 12–16)
HOLD SPECIMEN: NORMAL
IMM GRANULOCYTES # BLD AUTO: 0.12 X10(3)/MCL (ref 0–0.04)
IMM GRANULOCYTES NFR BLD AUTO: 0.8 %
LYMPHOCYTES # BLD AUTO: 3.85 X10(3)/MCL (ref 0.6–4.6)
LYMPHOCYTES NFR BLD AUTO: 25.1 %
MCH RBC QN AUTO: 28.9 PG (ref 27–31)
MCHC RBC AUTO-ENTMCNC: 33.6 G/DL (ref 33–36)
MCV RBC AUTO: 85.8 FL (ref 80–94)
MONOCYTES # BLD AUTO: 0.87 X10(3)/MCL (ref 0.1–1.3)
MONOCYTES NFR BLD AUTO: 5.7 %
NEUTROPHILS # BLD AUTO: 9.74 X10(3)/MCL (ref 2.1–9.2)
NEUTROPHILS NFR BLD AUTO: 63.5 %
NRBC BLD AUTO-RTO: 0 %
OHS QRS DURATION: 80 MS
OHS QTC CALCULATION: 449 MS
PLATELET # BLD AUTO: 316 X10(3)/MCL (ref 130–400)
PMV BLD AUTO: 10.7 FL (ref 7.4–10.4)
POCT GLUCOSE: 192 MG/DL (ref 70–110)
POCT GLUCOSE: 267 MG/DL (ref 70–110)
POCT GLUCOSE: 267 MG/DL (ref 70–110)
POCT GLUCOSE: 79 MG/DL (ref 70–110)
POTASSIUM SERPL-SCNC: 4.5 MMOL/L (ref 3.5–5.1)
PROT SERPL-MCNC: 6.3 GM/DL (ref 6.4–8.3)
RBC # BLD AUTO: 3.74 X10(6)/MCL (ref 4.2–5.4)
SODIUM SERPL-SCNC: 139 MMOL/L (ref 136–145)
WBC # BLD AUTO: 15.32 X10(3)/MCL (ref 4.5–11.5)

## 2024-10-15 PROCEDURE — 25000003 PHARM REV CODE 250: Performed by: STUDENT IN AN ORGANIZED HEALTH CARE EDUCATION/TRAINING PROGRAM

## 2024-10-15 PROCEDURE — 85025 COMPLETE CBC W/AUTO DIFF WBC: CPT

## 2024-10-15 PROCEDURE — 63600175 PHARM REV CODE 636 W HCPCS

## 2024-10-15 PROCEDURE — 11000001 HC ACUTE MED/SURG PRIVATE ROOM

## 2024-10-15 PROCEDURE — 86140 C-REACTIVE PROTEIN: CPT

## 2024-10-15 PROCEDURE — A4216 STERILE WATER/SALINE, 10 ML: HCPCS

## 2024-10-15 PROCEDURE — A9577 INJ MULTIHANCE: HCPCS

## 2024-10-15 PROCEDURE — 25000003 PHARM REV CODE 250

## 2024-10-15 PROCEDURE — 80053 COMPREHEN METABOLIC PANEL: CPT

## 2024-10-15 PROCEDURE — 36415 COLL VENOUS BLD VENIPUNCTURE: CPT

## 2024-10-15 PROCEDURE — 85652 RBC SED RATE AUTOMATED: CPT

## 2024-10-15 PROCEDURE — 25500020 PHARM REV CODE 255

## 2024-10-15 PROCEDURE — 21400001 HC TELEMETRY ROOM

## 2024-10-15 RX ADMIN — MELATONIN TAB 3 MG 3 MG: 3 TAB at 08:10

## 2024-10-15 RX ADMIN — INSULIN ASPART 20 UNITS: 100 INJECTION, SOLUTION INTRAVENOUS; SUBCUTANEOUS at 04:10

## 2024-10-15 RX ADMIN — HYDRALAZINE HYDROCHLORIDE 25 MG: 25 TABLET ORAL at 06:10

## 2024-10-15 RX ADMIN — HYDRALAZINE HYDROCHLORIDE 25 MG: 25 TABLET ORAL at 02:10

## 2024-10-15 RX ADMIN — HYDRALAZINE HYDROCHLORIDE 25 MG: 25 TABLET ORAL at 09:10

## 2024-10-15 RX ADMIN — GADOBENATE DIMEGLUMINE 20 ML: 529 INJECTION, SOLUTION INTRAVENOUS at 12:10

## 2024-10-15 RX ADMIN — FLUTICASONE PROPIONATE 100 MCG: 50 SPRAY, METERED NASAL at 08:10

## 2024-10-15 RX ADMIN — ENOXAPARIN SODIUM 40 MG: 40 INJECTION SUBCUTANEOUS at 08:10

## 2024-10-15 RX ADMIN — GABAPENTIN 600 MG: 300 CAPSULE ORAL at 08:10

## 2024-10-15 RX ADMIN — INSULIN ASPART 3 UNITS: 100 INJECTION, SOLUTION INTRAVENOUS; SUBCUTANEOUS at 09:10

## 2024-10-15 RX ADMIN — SODIUM CHLORIDE, PRESERVATIVE FREE 10 ML: 5 INJECTION INTRAVENOUS at 06:10

## 2024-10-15 RX ADMIN — ROPINIROLE HYDROCHLORIDE 0.5 MG: 0.25 TABLET, FILM COATED ORAL at 08:10

## 2024-10-15 RX ADMIN — TRAZODONE HYDROCHLORIDE 50 MG: 50 TABLET ORAL at 08:10

## 2024-10-15 RX ADMIN — SODIUM CHLORIDE, PRESERVATIVE FREE 10 ML: 5 INJECTION INTRAVENOUS at 01:10

## 2024-10-15 RX ADMIN — ATORVASTATIN CALCIUM 40 MG: 40 TABLET, FILM COATED ORAL at 08:10

## 2024-10-15 RX ADMIN — AMPICILLIN AND SULBACTAM 3 G: 1; 2 INJECTION, POWDER, FOR SOLUTION INTRAMUSCULAR; INTRAVENOUS at 01:10

## 2024-10-15 RX ADMIN — ROPINIROLE HYDROCHLORIDE 0.5 MG: 0.25 TABLET, FILM COATED ORAL at 02:10

## 2024-10-15 RX ADMIN — AMPICILLIN AND SULBACTAM 3 G: 1; 2 INJECTION, POWDER, FOR SOLUTION INTRAMUSCULAR; INTRAVENOUS at 06:10

## 2024-10-15 RX ADMIN — SODIUM CHLORIDE, PRESERVATIVE FREE 10 ML: 5 INJECTION INTRAVENOUS at 11:10

## 2024-10-15 RX ADMIN — PANTOPRAZOLE SODIUM 40 MG: 40 TABLET, DELAYED RELEASE ORAL at 08:10

## 2024-10-15 RX ADMIN — INSULIN GLARGINE 60 UNITS: 100 INJECTION, SOLUTION SUBCUTANEOUS at 09:10

## 2024-10-15 RX ADMIN — HYDROCODONE BITARTRATE AND ACETAMINOPHEN 1 TABLET: 5; 325 TABLET ORAL at 06:10

## 2024-10-15 RX ADMIN — FAMOTIDINE 20 MG: 20 TABLET ORAL at 08:10

## 2024-10-15 RX ADMIN — INSULIN ASPART 20 UNITS: 100 INJECTION, SOLUTION INTRAVENOUS; SUBCUTANEOUS at 08:10

## 2024-10-15 RX ADMIN — AMPICILLIN AND SULBACTAM 3 G: 1; 2 INJECTION, POWDER, FOR SOLUTION INTRAMUSCULAR; INTRAVENOUS at 11:10

## 2024-10-15 RX ADMIN — NIFEDIPINE 60 MG: 30 TABLET, FILM COATED, EXTENDED RELEASE ORAL at 08:10

## 2024-10-15 RX ADMIN — INSULIN ASPART 6 UNITS: 100 INJECTION, SOLUTION INTRAVENOUS; SUBCUTANEOUS at 04:10

## 2024-10-15 RX ADMIN — GABAPENTIN 600 MG: 300 CAPSULE ORAL at 02:10

## 2024-10-15 RX ADMIN — INSULIN ASPART 2 UNITS: 100 INJECTION, SOLUTION INTRAVENOUS; SUBCUTANEOUS at 08:10

## 2024-10-16 LAB
POCT GLUCOSE: 103 MG/DL (ref 70–110)
POCT GLUCOSE: 128 MG/DL (ref 70–110)
POCT GLUCOSE: 158 MG/DL (ref 70–110)
POCT GLUCOSE: 181 MG/DL (ref 70–110)

## 2024-10-16 PROCEDURE — 25000003 PHARM REV CODE 250

## 2024-10-16 PROCEDURE — 25000003 PHARM REV CODE 250: Performed by: STUDENT IN AN ORGANIZED HEALTH CARE EDUCATION/TRAINING PROGRAM

## 2024-10-16 PROCEDURE — 11000001 HC ACUTE MED/SURG PRIVATE ROOM

## 2024-10-16 PROCEDURE — 21400001 HC TELEMETRY ROOM

## 2024-10-16 PROCEDURE — A4216 STERILE WATER/SALINE, 10 ML: HCPCS

## 2024-10-16 PROCEDURE — 63600175 PHARM REV CODE 636 W HCPCS

## 2024-10-16 RX ADMIN — HYDRALAZINE HYDROCHLORIDE 25 MG: 25 TABLET ORAL at 02:10

## 2024-10-16 RX ADMIN — INSULIN ASPART 20 UNITS: 100 INJECTION, SOLUTION INTRAVENOUS; SUBCUTANEOUS at 12:10

## 2024-10-16 RX ADMIN — INSULIN GLARGINE 60 UNITS: 100 INJECTION, SOLUTION SUBCUTANEOUS at 09:10

## 2024-10-16 RX ADMIN — AMPICILLIN AND SULBACTAM 3 G: 1; 2 INJECTION, POWDER, FOR SOLUTION INTRAMUSCULAR; INTRAVENOUS at 05:10

## 2024-10-16 RX ADMIN — ROPINIROLE HYDROCHLORIDE 0.5 MG: 0.25 TABLET, FILM COATED ORAL at 09:10

## 2024-10-16 RX ADMIN — ROPINIROLE HYDROCHLORIDE 0.5 MG: 0.25 TABLET, FILM COATED ORAL at 08:10

## 2024-10-16 RX ADMIN — ATORVASTATIN CALCIUM 40 MG: 40 TABLET, FILM COATED ORAL at 08:10

## 2024-10-16 RX ADMIN — ENOXAPARIN SODIUM 40 MG: 40 INJECTION SUBCUTANEOUS at 09:10

## 2024-10-16 RX ADMIN — SODIUM CHLORIDE, PRESERVATIVE FREE 10 ML: 5 INJECTION INTRAVENOUS at 05:10

## 2024-10-16 RX ADMIN — HYDROCODONE BITARTRATE AND ACETAMINOPHEN 1 TABLET: 5; 325 TABLET ORAL at 10:10

## 2024-10-16 RX ADMIN — HYDROCODONE BITARTRATE AND ACETAMINOPHEN 1 TABLET: 5; 325 TABLET ORAL at 11:10

## 2024-10-16 RX ADMIN — HYDRALAZINE HYDROCHLORIDE 25 MG: 25 TABLET ORAL at 09:10

## 2024-10-16 RX ADMIN — ROPINIROLE HYDROCHLORIDE 0.5 MG: 0.25 TABLET, FILM COATED ORAL at 02:10

## 2024-10-16 RX ADMIN — ENOXAPARIN SODIUM 40 MG: 40 INJECTION SUBCUTANEOUS at 08:10

## 2024-10-16 RX ADMIN — NIFEDIPINE 60 MG: 30 TABLET, FILM COATED, EXTENDED RELEASE ORAL at 08:10

## 2024-10-16 RX ADMIN — MELATONIN TAB 3 MG 3 MG: 3 TAB at 09:10

## 2024-10-16 RX ADMIN — GABAPENTIN 600 MG: 300 CAPSULE ORAL at 02:10

## 2024-10-16 RX ADMIN — TRAZODONE HYDROCHLORIDE 50 MG: 50 TABLET ORAL at 09:10

## 2024-10-16 RX ADMIN — AMPICILLIN AND SULBACTAM 3 G: 1; 2 INJECTION, POWDER, FOR SOLUTION INTRAMUSCULAR; INTRAVENOUS at 12:10

## 2024-10-16 RX ADMIN — AMPICILLIN AND SULBACTAM 3 G: 1; 2 INJECTION, POWDER, FOR SOLUTION INTRAMUSCULAR; INTRAVENOUS at 06:10

## 2024-10-16 RX ADMIN — GABAPENTIN 600 MG: 300 CAPSULE ORAL at 08:10

## 2024-10-16 RX ADMIN — FAMOTIDINE 20 MG: 20 TABLET ORAL at 08:10

## 2024-10-16 RX ADMIN — INSULIN ASPART 10 UNITS: 100 INJECTION, SOLUTION INTRAVENOUS; SUBCUTANEOUS at 05:10

## 2024-10-16 RX ADMIN — FAMOTIDINE 20 MG: 20 TABLET ORAL at 09:10

## 2024-10-16 RX ADMIN — GABAPENTIN 600 MG: 300 CAPSULE ORAL at 09:10

## 2024-10-16 RX ADMIN — PANTOPRAZOLE SODIUM 40 MG: 40 TABLET, DELAYED RELEASE ORAL at 08:10

## 2024-10-16 RX ADMIN — HYDRALAZINE HYDROCHLORIDE 25 MG: 25 TABLET ORAL at 05:10

## 2024-10-16 RX ADMIN — Medication 10 ML: at 11:10

## 2024-10-16 RX ADMIN — FLUTICASONE PROPIONATE 100 MCG: 50 SPRAY, METERED NASAL at 08:10

## 2024-10-16 RX ADMIN — INSULIN ASPART 20 UNITS: 100 INJECTION, SOLUTION INTRAVENOUS; SUBCUTANEOUS at 08:10

## 2024-10-16 RX ADMIN — SODIUM CHLORIDE, PRESERVATIVE FREE 10 ML: 5 INJECTION INTRAVENOUS at 06:10

## 2024-10-16 RX ADMIN — SODIUM CHLORIDE, PRESERVATIVE FREE 10 ML: 5 INJECTION INTRAVENOUS at 12:10

## 2024-10-17 LAB
ALBUMIN SERPL-MCNC: 3.3 G/DL (ref 3.5–5)
ALBUMIN/GLOB SERPL: 1 RATIO (ref 1.1–2)
ALP SERPL-CCNC: 84 UNIT/L (ref 40–150)
ALT SERPL-CCNC: 18 UNIT/L (ref 0–55)
ANION GAP SERPL CALC-SCNC: 9 MEQ/L
AST SERPL-CCNC: 11 UNIT/L (ref 5–34)
BASOPHILS # BLD AUTO: 0.08 X10(3)/MCL
BASOPHILS NFR BLD AUTO: 0.5 %
BILIRUB SERPL-MCNC: 0.2 MG/DL
BUN SERPL-MCNC: 29.5 MG/DL (ref 9.8–20.1)
CALCIUM SERPL-MCNC: 9.5 MG/DL (ref 8.4–10.2)
CHLORIDE SERPL-SCNC: 106 MMOL/L (ref 98–107)
CO2 SERPL-SCNC: 23 MMOL/L (ref 22–29)
CREAT SERPL-MCNC: 0.87 MG/DL (ref 0.55–1.02)
CREAT/UREA NIT SERPL: 34
EOSINOPHIL # BLD AUTO: 0.84 X10(3)/MCL (ref 0–0.9)
EOSINOPHIL NFR BLD AUTO: 5.3 %
ERYTHROCYTE [DISTWIDTH] IN BLOOD BY AUTOMATED COUNT: 12.6 % (ref 11.5–17)
GFR SERPLBLD CREATININE-BSD FMLA CKD-EPI: >60 ML/MIN/1.73/M2
GLOBULIN SER-MCNC: 3.3 GM/DL (ref 2.4–3.5)
GLUCOSE SERPL-MCNC: 236 MG/DL (ref 74–100)
HCT VFR BLD AUTO: 32.1 % (ref 37–47)
HGB BLD-MCNC: 10.9 G/DL (ref 12–16)
IMM GRANULOCYTES # BLD AUTO: 0.1 X10(3)/MCL (ref 0–0.04)
IMM GRANULOCYTES NFR BLD AUTO: 0.6 %
LYMPHOCYTES # BLD AUTO: 4.03 X10(3)/MCL (ref 0.6–4.6)
LYMPHOCYTES NFR BLD AUTO: 25.4 %
MCH RBC QN AUTO: 29.4 PG (ref 27–31)
MCHC RBC AUTO-ENTMCNC: 34 G/DL (ref 33–36)
MCV RBC AUTO: 86.5 FL (ref 80–94)
MONOCYTES # BLD AUTO: 0.86 X10(3)/MCL (ref 0.1–1.3)
MONOCYTES NFR BLD AUTO: 5.4 %
NEUTROPHILS # BLD AUTO: 9.94 X10(3)/MCL (ref 2.1–9.2)
NEUTROPHILS NFR BLD AUTO: 62.8 %
NRBC BLD AUTO-RTO: 0 %
PLATELET # BLD AUTO: 320 X10(3)/MCL (ref 130–400)
PMV BLD AUTO: 10.9 FL (ref 7.4–10.4)
POCT GLUCOSE: 205 MG/DL (ref 70–110)
POCT GLUCOSE: 239 MG/DL (ref 70–110)
POCT GLUCOSE: 95 MG/DL (ref 70–110)
POCT GLUCOSE: 97 MG/DL (ref 70–110)
POTASSIUM SERPL-SCNC: 4.6 MMOL/L (ref 3.5–5.1)
PROT SERPL-MCNC: 6.6 GM/DL (ref 6.4–8.3)
RBC # BLD AUTO: 3.71 X10(6)/MCL (ref 4.2–5.4)
SODIUM SERPL-SCNC: 138 MMOL/L (ref 136–145)
WBC # BLD AUTO: 15.85 X10(3)/MCL (ref 4.5–11.5)

## 2024-10-17 PROCEDURE — 63600175 PHARM REV CODE 636 W HCPCS

## 2024-10-17 PROCEDURE — 80053 COMPREHEN METABOLIC PANEL: CPT

## 2024-10-17 PROCEDURE — 25000003 PHARM REV CODE 250

## 2024-10-17 PROCEDURE — 85025 COMPLETE CBC W/AUTO DIFF WBC: CPT

## 2024-10-17 PROCEDURE — A4216 STERILE WATER/SALINE, 10 ML: HCPCS

## 2024-10-17 PROCEDURE — 11000001 HC ACUTE MED/SURG PRIVATE ROOM

## 2024-10-17 PROCEDURE — 21400001 HC TELEMETRY ROOM

## 2024-10-17 PROCEDURE — 25000003 PHARM REV CODE 250: Performed by: STUDENT IN AN ORGANIZED HEALTH CARE EDUCATION/TRAINING PROGRAM

## 2024-10-17 PROCEDURE — 36415 COLL VENOUS BLD VENIPUNCTURE: CPT

## 2024-10-17 RX ADMIN — AMPICILLIN AND SULBACTAM 3 G: 1; 2 INJECTION, POWDER, FOR SOLUTION INTRAMUSCULAR; INTRAVENOUS at 11:10

## 2024-10-17 RX ADMIN — AMPICILLIN AND SULBACTAM 3 G: 1; 2 INJECTION, POWDER, FOR SOLUTION INTRAMUSCULAR; INTRAVENOUS at 12:10

## 2024-10-17 RX ADMIN — INSULIN ASPART 20 UNITS: 100 INJECTION, SOLUTION INTRAVENOUS; SUBCUTANEOUS at 05:10

## 2024-10-17 RX ADMIN — GABAPENTIN 600 MG: 300 CAPSULE ORAL at 08:10

## 2024-10-17 RX ADMIN — Medication 10 ML: at 12:10

## 2024-10-17 RX ADMIN — ROPINIROLE HYDROCHLORIDE 0.5 MG: 0.25 TABLET, FILM COATED ORAL at 08:10

## 2024-10-17 RX ADMIN — MELATONIN TAB 3 MG 3 MG: 3 TAB at 08:10

## 2024-10-17 RX ADMIN — FAMOTIDINE 20 MG: 20 TABLET ORAL at 08:10

## 2024-10-17 RX ADMIN — Medication 10 ML: at 01:10

## 2024-10-17 RX ADMIN — SODIUM CHLORIDE, PRESERVATIVE FREE 10 ML: 5 INJECTION INTRAVENOUS at 11:10

## 2024-10-17 RX ADMIN — NIFEDIPINE 60 MG: 30 TABLET, FILM COATED, EXTENDED RELEASE ORAL at 08:10

## 2024-10-17 RX ADMIN — ENOXAPARIN SODIUM 40 MG: 40 INJECTION SUBCUTANEOUS at 08:10

## 2024-10-17 RX ADMIN — GABAPENTIN 600 MG: 300 CAPSULE ORAL at 03:10

## 2024-10-17 RX ADMIN — SODIUM CHLORIDE, PRESERVATIVE FREE 10 ML: 5 INJECTION INTRAVENOUS at 06:10

## 2024-10-17 RX ADMIN — SODIUM CHLORIDE, PRESERVATIVE FREE 10 ML: 5 INJECTION INTRAVENOUS at 12:10

## 2024-10-17 RX ADMIN — INSULIN ASPART 4 UNITS: 100 INJECTION, SOLUTION INTRAVENOUS; SUBCUTANEOUS at 11:10

## 2024-10-17 RX ADMIN — INSULIN ASPART 20 UNITS: 100 INJECTION, SOLUTION INTRAVENOUS; SUBCUTANEOUS at 08:10

## 2024-10-17 RX ADMIN — ATORVASTATIN CALCIUM 40 MG: 40 TABLET, FILM COATED ORAL at 08:10

## 2024-10-17 RX ADMIN — HYDROCODONE BITARTRATE AND ACETAMINOPHEN 1 TABLET: 5; 325 TABLET ORAL at 06:10

## 2024-10-17 RX ADMIN — INSULIN ASPART 20 UNITS: 100 INJECTION, SOLUTION INTRAVENOUS; SUBCUTANEOUS at 11:10

## 2024-10-17 RX ADMIN — AMPICILLIN AND SULBACTAM 3 G: 1; 2 INJECTION, POWDER, FOR SOLUTION INTRAMUSCULAR; INTRAVENOUS at 06:10

## 2024-10-17 RX ADMIN — PANTOPRAZOLE SODIUM 40 MG: 40 TABLET, DELAYED RELEASE ORAL at 08:10

## 2024-10-17 RX ADMIN — ROPINIROLE HYDROCHLORIDE 0.5 MG: 0.25 TABLET, FILM COATED ORAL at 03:10

## 2024-10-17 RX ADMIN — TRAZODONE HYDROCHLORIDE 50 MG: 50 TABLET ORAL at 08:10

## 2024-10-17 RX ADMIN — SODIUM CHLORIDE, PRESERVATIVE FREE 10 ML: 5 INJECTION INTRAVENOUS at 05:10

## 2024-10-17 RX ADMIN — HYDRALAZINE HYDROCHLORIDE 25 MG: 25 TABLET ORAL at 03:10

## 2024-10-17 RX ADMIN — INSULIN GLARGINE 60 UNITS: 100 INJECTION, SOLUTION SUBCUTANEOUS at 08:10

## 2024-10-17 RX ADMIN — FLUTICASONE PROPIONATE 100 MCG: 50 SPRAY, METERED NASAL at 08:10

## 2024-10-17 RX ADMIN — HYDROCODONE BITARTRATE AND ACETAMINOPHEN 1 TABLET: 5; 325 TABLET ORAL at 08:10

## 2024-10-17 RX ADMIN — HYDRALAZINE HYDROCHLORIDE 25 MG: 25 TABLET ORAL at 06:10

## 2024-10-17 RX ADMIN — INSULIN ASPART 4 UNITS: 100 INJECTION, SOLUTION INTRAVENOUS; SUBCUTANEOUS at 08:10

## 2024-10-17 RX ADMIN — HYDRALAZINE HYDROCHLORIDE 25 MG: 25 TABLET ORAL at 09:10

## 2024-10-18 LAB
POCT GLUCOSE: 150 MG/DL (ref 70–110)
POCT GLUCOSE: 186 MG/DL (ref 70–110)
POCT GLUCOSE: 254 MG/DL (ref 70–110)

## 2024-10-18 PROCEDURE — 25000003 PHARM REV CODE 250: Performed by: STUDENT IN AN ORGANIZED HEALTH CARE EDUCATION/TRAINING PROGRAM

## 2024-10-18 PROCEDURE — 21400001 HC TELEMETRY ROOM

## 2024-10-18 PROCEDURE — 63600175 PHARM REV CODE 636 W HCPCS

## 2024-10-18 PROCEDURE — A4216 STERILE WATER/SALINE, 10 ML: HCPCS

## 2024-10-18 PROCEDURE — 25000003 PHARM REV CODE 250

## 2024-10-18 RX ORDER — FAMOTIDINE 20 MG/1
20 TABLET, FILM COATED ORAL 2 TIMES DAILY PRN
Status: DISCONTINUED | OUTPATIENT
Start: 2024-10-18 | End: 2024-10-24 | Stop reason: HOSPADM

## 2024-10-18 RX ADMIN — MELATONIN TAB 3 MG 3 MG: 3 TAB at 09:10

## 2024-10-18 RX ADMIN — INSULIN ASPART 20 UNITS: 100 INJECTION, SOLUTION INTRAVENOUS; SUBCUTANEOUS at 06:10

## 2024-10-18 RX ADMIN — AMPICILLIN AND SULBACTAM 3 G: 1; 2 INJECTION, POWDER, FOR SOLUTION INTRAMUSCULAR; INTRAVENOUS at 06:10

## 2024-10-18 RX ADMIN — HYDROCODONE BITARTRATE AND ACETAMINOPHEN 1 TABLET: 5; 325 TABLET ORAL at 09:10

## 2024-10-18 RX ADMIN — INSULIN ASPART 20 UNITS: 100 INJECTION, SOLUTION INTRAVENOUS; SUBCUTANEOUS at 12:10

## 2024-10-18 RX ADMIN — HYDROCODONE BITARTRATE AND ACETAMINOPHEN 1 TABLET: 5; 325 TABLET ORAL at 12:10

## 2024-10-18 RX ADMIN — ROPINIROLE HYDROCHLORIDE 0.5 MG: 0.25 TABLET, FILM COATED ORAL at 09:10

## 2024-10-18 RX ADMIN — AMPICILLIN AND SULBACTAM 3 G: 1; 2 INJECTION, POWDER, FOR SOLUTION INTRAMUSCULAR; INTRAVENOUS at 11:10

## 2024-10-18 RX ADMIN — INSULIN ASPART 20 UNITS: 100 INJECTION, SOLUTION INTRAVENOUS; SUBCUTANEOUS at 08:10

## 2024-10-18 RX ADMIN — HYDRALAZINE HYDROCHLORIDE 25 MG: 25 TABLET ORAL at 05:10

## 2024-10-18 RX ADMIN — SODIUM CHLORIDE, PRESERVATIVE FREE 10 ML: 5 INJECTION INTRAVENOUS at 06:10

## 2024-10-18 RX ADMIN — SODIUM CHLORIDE, PRESERVATIVE FREE 10 ML: 5 INJECTION INTRAVENOUS at 11:10

## 2024-10-18 RX ADMIN — HYDRALAZINE HYDROCHLORIDE 25 MG: 25 TABLET ORAL at 09:10

## 2024-10-18 RX ADMIN — GABAPENTIN 600 MG: 300 CAPSULE ORAL at 10:10

## 2024-10-18 RX ADMIN — GABAPENTIN 600 MG: 300 CAPSULE ORAL at 09:10

## 2024-10-18 RX ADMIN — ROPINIROLE HYDROCHLORIDE 0.5 MG: 0.25 TABLET, FILM COATED ORAL at 10:10

## 2024-10-18 RX ADMIN — ENOXAPARIN SODIUM 40 MG: 40 INJECTION SUBCUTANEOUS at 10:10

## 2024-10-18 RX ADMIN — NIFEDIPINE 60 MG: 30 TABLET, FILM COATED, EXTENDED RELEASE ORAL at 10:10

## 2024-10-18 RX ADMIN — HYDRALAZINE HYDROCHLORIDE 25 MG: 25 TABLET ORAL at 02:10

## 2024-10-18 RX ADMIN — AMPICILLIN AND SULBACTAM 3 G: 1; 2 INJECTION, POWDER, FOR SOLUTION INTRAMUSCULAR; INTRAVENOUS at 05:10

## 2024-10-18 RX ADMIN — ATORVASTATIN CALCIUM 40 MG: 40 TABLET, FILM COATED ORAL at 10:10

## 2024-10-18 RX ADMIN — PANTOPRAZOLE SODIUM 40 MG: 40 TABLET, DELAYED RELEASE ORAL at 10:10

## 2024-10-18 RX ADMIN — ROPINIROLE HYDROCHLORIDE 0.5 MG: 0.25 TABLET, FILM COATED ORAL at 02:10

## 2024-10-18 RX ADMIN — SODIUM CHLORIDE, PRESERVATIVE FREE 10 ML: 5 INJECTION INTRAVENOUS at 05:10

## 2024-10-18 RX ADMIN — SODIUM CHLORIDE, PRESERVATIVE FREE 10 ML: 5 INJECTION INTRAVENOUS at 12:10

## 2024-10-18 RX ADMIN — TRAZODONE HYDROCHLORIDE 50 MG: 50 TABLET ORAL at 09:10

## 2024-10-18 RX ADMIN — AMPICILLIN AND SULBACTAM 3 G: 1; 2 INJECTION, POWDER, FOR SOLUTION INTRAMUSCULAR; INTRAVENOUS at 12:10

## 2024-10-18 RX ADMIN — GABAPENTIN 600 MG: 300 CAPSULE ORAL at 02:10

## 2024-10-18 RX ADMIN — ENOXAPARIN SODIUM 40 MG: 40 INJECTION SUBCUTANEOUS at 09:10

## 2024-10-18 RX ADMIN — INSULIN GLARGINE 60 UNITS: 100 INJECTION, SOLUTION SUBCUTANEOUS at 09:10

## 2024-10-19 LAB
ALBUMIN SERPL-MCNC: 3.7 G/DL (ref 3.5–5)
ALBUMIN/GLOB SERPL: 1.1 RATIO (ref 1.1–2)
ALP SERPL-CCNC: 91 UNIT/L (ref 40–150)
ALT SERPL-CCNC: 34 UNIT/L (ref 0–55)
ANION GAP SERPL CALC-SCNC: 9 MEQ/L
AST SERPL-CCNC: 23 UNIT/L (ref 5–34)
BASOPHILS # BLD AUTO: 0.07 X10(3)/MCL
BASOPHILS NFR BLD AUTO: 0.5 %
BILIRUB SERPL-MCNC: 0.3 MG/DL
BUN SERPL-MCNC: 22.9 MG/DL (ref 9.8–20.1)
CALCIUM SERPL-MCNC: 9.5 MG/DL (ref 8.4–10.2)
CHLORIDE SERPL-SCNC: 106 MMOL/L (ref 98–107)
CO2 SERPL-SCNC: 24 MMOL/L (ref 22–29)
CREAT SERPL-MCNC: 0.82 MG/DL (ref 0.55–1.02)
CREAT/UREA NIT SERPL: 28
EOSINOPHIL # BLD AUTO: 0.72 X10(3)/MCL (ref 0–0.9)
EOSINOPHIL NFR BLD AUTO: 5.1 %
ERYTHROCYTE [DISTWIDTH] IN BLOOD BY AUTOMATED COUNT: 12.7 % (ref 11.5–17)
GFR SERPLBLD CREATININE-BSD FMLA CKD-EPI: >60 ML/MIN/1.73/M2
GLOBULIN SER-MCNC: 3.5 GM/DL (ref 2.4–3.5)
GLUCOSE SERPL-MCNC: 172 MG/DL (ref 74–100)
HCT VFR BLD AUTO: 34.5 % (ref 37–47)
HGB BLD-MCNC: 11.8 G/DL (ref 12–16)
HOLD SPECIMEN: NORMAL
HOLD SPECIMEN: NORMAL
IMM GRANULOCYTES # BLD AUTO: 0.1 X10(3)/MCL (ref 0–0.04)
IMM GRANULOCYTES NFR BLD AUTO: 0.7 %
LYMPHOCYTES # BLD AUTO: 3.38 X10(3)/MCL (ref 0.6–4.6)
LYMPHOCYTES NFR BLD AUTO: 23.8 %
MCH RBC QN AUTO: 29.4 PG (ref 27–31)
MCHC RBC AUTO-ENTMCNC: 34.2 G/DL (ref 33–36)
MCV RBC AUTO: 86 FL (ref 80–94)
MONOCYTES # BLD AUTO: 0.84 X10(3)/MCL (ref 0.1–1.3)
MONOCYTES NFR BLD AUTO: 5.9 %
NEUTROPHILS # BLD AUTO: 9.09 X10(3)/MCL (ref 2.1–9.2)
NEUTROPHILS NFR BLD AUTO: 64 %
NRBC BLD AUTO-RTO: 0 %
PLATELET # BLD AUTO: 341 X10(3)/MCL (ref 130–400)
PMV BLD AUTO: 10.5 FL (ref 7.4–10.4)
POCT GLUCOSE: 159 MG/DL (ref 70–110)
POCT GLUCOSE: 168 MG/DL (ref 70–110)
POCT GLUCOSE: 172 MG/DL (ref 70–110)
POCT GLUCOSE: 187 MG/DL (ref 70–110)
POTASSIUM SERPL-SCNC: 4.4 MMOL/L (ref 3.5–5.1)
PROT SERPL-MCNC: 7.2 GM/DL (ref 6.4–8.3)
RBC # BLD AUTO: 4.01 X10(6)/MCL (ref 4.2–5.4)
SODIUM SERPL-SCNC: 139 MMOL/L (ref 136–145)
WBC # BLD AUTO: 14.2 X10(3)/MCL (ref 4.5–11.5)

## 2024-10-19 PROCEDURE — 63600175 PHARM REV CODE 636 W HCPCS

## 2024-10-19 PROCEDURE — 36415 COLL VENOUS BLD VENIPUNCTURE: CPT

## 2024-10-19 PROCEDURE — 80053 COMPREHEN METABOLIC PANEL: CPT

## 2024-10-19 PROCEDURE — 21400001 HC TELEMETRY ROOM

## 2024-10-19 PROCEDURE — 25000003 PHARM REV CODE 250: Performed by: STUDENT IN AN ORGANIZED HEALTH CARE EDUCATION/TRAINING PROGRAM

## 2024-10-19 PROCEDURE — A4216 STERILE WATER/SALINE, 10 ML: HCPCS

## 2024-10-19 PROCEDURE — 25000003 PHARM REV CODE 250

## 2024-10-19 PROCEDURE — 85025 COMPLETE CBC W/AUTO DIFF WBC: CPT

## 2024-10-19 RX ADMIN — HYDROCODONE BITARTRATE AND ACETAMINOPHEN 1 TABLET: 5; 325 TABLET ORAL at 08:10

## 2024-10-19 RX ADMIN — ROPINIROLE HYDROCHLORIDE 0.5 MG: 0.25 TABLET, FILM COATED ORAL at 02:10

## 2024-10-19 RX ADMIN — PANTOPRAZOLE SODIUM 40 MG: 40 TABLET, DELAYED RELEASE ORAL at 08:10

## 2024-10-19 RX ADMIN — ENOXAPARIN SODIUM 40 MG: 40 INJECTION SUBCUTANEOUS at 08:10

## 2024-10-19 RX ADMIN — SODIUM CHLORIDE, PRESERVATIVE FREE 10 ML: 5 INJECTION INTRAVENOUS at 05:10

## 2024-10-19 RX ADMIN — FLUTICASONE PROPIONATE 100 MCG: 50 SPRAY, METERED NASAL at 08:10

## 2024-10-19 RX ADMIN — INSULIN ASPART 20 UNITS: 100 INJECTION, SOLUTION INTRAVENOUS; SUBCUTANEOUS at 04:10

## 2024-10-19 RX ADMIN — ROPINIROLE HYDROCHLORIDE 0.5 MG: 0.25 TABLET, FILM COATED ORAL at 08:10

## 2024-10-19 RX ADMIN — AMPICILLIN AND SULBACTAM 3 G: 1; 2 INJECTION, POWDER, FOR SOLUTION INTRAMUSCULAR; INTRAVENOUS at 06:10

## 2024-10-19 RX ADMIN — HYDRALAZINE HYDROCHLORIDE 25 MG: 25 TABLET ORAL at 09:10

## 2024-10-19 RX ADMIN — INSULIN ASPART 20 UNITS: 100 INJECTION, SOLUTION INTRAVENOUS; SUBCUTANEOUS at 07:10

## 2024-10-19 RX ADMIN — INSULIN ASPART 1 UNITS: 100 INJECTION, SOLUTION INTRAVENOUS; SUBCUTANEOUS at 08:10

## 2024-10-19 RX ADMIN — MELATONIN TAB 3 MG 3 MG: 3 TAB at 08:10

## 2024-10-19 RX ADMIN — SODIUM CHLORIDE, PRESERVATIVE FREE 10 ML: 5 INJECTION INTRAVENOUS at 11:10

## 2024-10-19 RX ADMIN — ATORVASTATIN CALCIUM 40 MG: 40 TABLET, FILM COATED ORAL at 08:10

## 2024-10-19 RX ADMIN — HYDRALAZINE HYDROCHLORIDE 25 MG: 25 TABLET ORAL at 02:10

## 2024-10-19 RX ADMIN — NIFEDIPINE 60 MG: 30 TABLET, FILM COATED, EXTENDED RELEASE ORAL at 08:10

## 2024-10-19 RX ADMIN — INSULIN ASPART 2 UNITS: 100 INJECTION, SOLUTION INTRAVENOUS; SUBCUTANEOUS at 04:10

## 2024-10-19 RX ADMIN — AMPICILLIN AND SULBACTAM 3 G: 1; 2 INJECTION, POWDER, FOR SOLUTION INTRAMUSCULAR; INTRAVENOUS at 05:10

## 2024-10-19 RX ADMIN — AMPICILLIN AND SULBACTAM 3 G: 1; 2 INJECTION, POWDER, FOR SOLUTION INTRAMUSCULAR; INTRAVENOUS at 11:10

## 2024-10-19 RX ADMIN — INSULIN GLARGINE 60 UNITS: 100 INJECTION, SOLUTION SUBCUTANEOUS at 08:10

## 2024-10-19 RX ADMIN — INSULIN ASPART 2 UNITS: 100 INJECTION, SOLUTION INTRAVENOUS; SUBCUTANEOUS at 11:10

## 2024-10-19 RX ADMIN — INSULIN ASPART 20 UNITS: 100 INJECTION, SOLUTION INTRAVENOUS; SUBCUTANEOUS at 11:10

## 2024-10-19 RX ADMIN — SODIUM CHLORIDE, PRESERVATIVE FREE 10 ML: 5 INJECTION INTRAVENOUS at 06:10

## 2024-10-19 RX ADMIN — GABAPENTIN 600 MG: 300 CAPSULE ORAL at 08:10

## 2024-10-19 RX ADMIN — HYDRALAZINE HYDROCHLORIDE 25 MG: 25 TABLET ORAL at 06:10

## 2024-10-19 RX ADMIN — TRAZODONE HYDROCHLORIDE 50 MG: 50 TABLET ORAL at 08:10

## 2024-10-19 RX ADMIN — GABAPENTIN 600 MG: 300 CAPSULE ORAL at 02:10

## 2024-10-20 LAB
POCT GLUCOSE: 100 MG/DL (ref 70–110)
POCT GLUCOSE: 125 MG/DL (ref 70–110)
POCT GLUCOSE: 139 MG/DL (ref 70–110)
POCT GLUCOSE: 166 MG/DL (ref 70–110)
POCT GLUCOSE: 171 MG/DL (ref 70–110)
POCT GLUCOSE: 184 MG/DL (ref 70–110)

## 2024-10-20 PROCEDURE — A4216 STERILE WATER/SALINE, 10 ML: HCPCS

## 2024-10-20 PROCEDURE — 63600175 PHARM REV CODE 636 W HCPCS

## 2024-10-20 PROCEDURE — 25000003 PHARM REV CODE 250: Performed by: STUDENT IN AN ORGANIZED HEALTH CARE EDUCATION/TRAINING PROGRAM

## 2024-10-20 PROCEDURE — 25000003 PHARM REV CODE 250

## 2024-10-20 PROCEDURE — 21400001 HC TELEMETRY ROOM

## 2024-10-20 RX ORDER — ATORVASTATIN CALCIUM 40 MG/1
40 TABLET, FILM COATED ORAL NIGHTLY
Status: DISCONTINUED | OUTPATIENT
Start: 2024-10-20 | End: 2024-10-24 | Stop reason: HOSPADM

## 2024-10-20 RX ORDER — HYDRALAZINE HYDROCHLORIDE 25 MG/1
25 TABLET, FILM COATED ORAL
Status: DISCONTINUED | OUTPATIENT
Start: 2024-10-20 | End: 2024-10-24 | Stop reason: HOSPADM

## 2024-10-20 RX ADMIN — FLUTICASONE PROPIONATE 100 MCG: 50 SPRAY, METERED NASAL at 08:10

## 2024-10-20 RX ADMIN — PANTOPRAZOLE SODIUM 40 MG: 40 TABLET, DELAYED RELEASE ORAL at 08:10

## 2024-10-20 RX ADMIN — AMPICILLIN AND SULBACTAM 3 G: 1; 2 INJECTION, POWDER, FOR SOLUTION INTRAMUSCULAR; INTRAVENOUS at 12:10

## 2024-10-20 RX ADMIN — INSULIN ASPART 20 UNITS: 100 INJECTION, SOLUTION INTRAVENOUS; SUBCUTANEOUS at 08:10

## 2024-10-20 RX ADMIN — MELATONIN TAB 3 MG 3 MG: 3 TAB at 08:10

## 2024-10-20 RX ADMIN — ROPINIROLE HYDROCHLORIDE 0.5 MG: 0.25 TABLET, FILM COATED ORAL at 03:10

## 2024-10-20 RX ADMIN — GABAPENTIN 600 MG: 300 CAPSULE ORAL at 08:10

## 2024-10-20 RX ADMIN — ATORVASTATIN CALCIUM 40 MG: 40 TABLET, FILM COATED ORAL at 08:10

## 2024-10-20 RX ADMIN — SODIUM CHLORIDE, PRESERVATIVE FREE 10 ML: 5 INJECTION INTRAVENOUS at 06:10

## 2024-10-20 RX ADMIN — HYDRALAZINE HYDROCHLORIDE 25 MG: 25 TABLET ORAL at 06:10

## 2024-10-20 RX ADMIN — INSULIN GLARGINE 60 UNITS: 100 INJECTION, SOLUTION SUBCUTANEOUS at 08:10

## 2024-10-20 RX ADMIN — HYDROCODONE BITARTRATE AND ACETAMINOPHEN 1 TABLET: 5; 325 TABLET ORAL at 08:10

## 2024-10-20 RX ADMIN — NIFEDIPINE 60 MG: 30 TABLET, FILM COATED, EXTENDED RELEASE ORAL at 08:10

## 2024-10-20 RX ADMIN — AMPICILLIN AND SULBACTAM 3 G: 1; 2 INJECTION, POWDER, FOR SOLUTION INTRAMUSCULAR; INTRAVENOUS at 11:10

## 2024-10-20 RX ADMIN — ENOXAPARIN SODIUM 40 MG: 40 INJECTION SUBCUTANEOUS at 08:10

## 2024-10-20 RX ADMIN — INSULIN ASPART 20 UNITS: 100 INJECTION, SOLUTION INTRAVENOUS; SUBCUTANEOUS at 11:10

## 2024-10-20 RX ADMIN — INSULIN ASPART 2 UNITS: 100 INJECTION, SOLUTION INTRAVENOUS; SUBCUTANEOUS at 11:10

## 2024-10-20 RX ADMIN — AMPICILLIN AND SULBACTAM 3 G: 1; 2 INJECTION, POWDER, FOR SOLUTION INTRAMUSCULAR; INTRAVENOUS at 05:10

## 2024-10-20 RX ADMIN — SODIUM CHLORIDE, PRESERVATIVE FREE 10 ML: 5 INJECTION INTRAVENOUS at 05:10

## 2024-10-20 RX ADMIN — GABAPENTIN 600 MG: 300 CAPSULE ORAL at 03:10

## 2024-10-20 RX ADMIN — ROPINIROLE HYDROCHLORIDE 0.5 MG: 0.25 TABLET, FILM COATED ORAL at 08:10

## 2024-10-20 RX ADMIN — TRAZODONE HYDROCHLORIDE 50 MG: 50 TABLET ORAL at 08:10

## 2024-10-20 RX ADMIN — SODIUM CHLORIDE, PRESERVATIVE FREE 10 ML: 5 INJECTION INTRAVENOUS at 11:10

## 2024-10-20 RX ADMIN — INSULIN ASPART 20 UNITS: 100 INJECTION, SOLUTION INTRAVENOUS; SUBCUTANEOUS at 05:10

## 2024-10-20 RX ADMIN — AMPICILLIN AND SULBACTAM 3 G: 1; 2 INJECTION, POWDER, FOR SOLUTION INTRAMUSCULAR; INTRAVENOUS at 06:10

## 2024-10-20 RX ADMIN — HYDRALAZINE HYDROCHLORIDE 25 MG: 25 TABLET ORAL at 03:10

## 2024-10-20 RX ADMIN — SODIUM CHLORIDE, PRESERVATIVE FREE 10 ML: 5 INJECTION INTRAVENOUS at 12:10

## 2024-10-21 LAB
ALBUMIN SERPL-MCNC: 3.2 G/DL (ref 3.5–5)
ALBUMIN/GLOB SERPL: 1 RATIO (ref 1.1–2)
ALP SERPL-CCNC: 80 UNIT/L (ref 40–150)
ALT SERPL-CCNC: 34 UNIT/L (ref 0–55)
ANION GAP SERPL CALC-SCNC: 8 MEQ/L
AST SERPL-CCNC: 17 UNIT/L (ref 5–34)
BASOPHILS # BLD AUTO: 0.07 X10(3)/MCL
BASOPHILS NFR BLD AUTO: 0.5 %
BILIRUB SERPL-MCNC: 0.3 MG/DL
BUN SERPL-MCNC: 23.6 MG/DL (ref 9.8–20.1)
C TRACH DNA SPEC QL NAA+PROBE: NOT DETECTED
CALCIUM SERPL-MCNC: 8.8 MG/DL (ref 8.4–10.2)
CHLORIDE SERPL-SCNC: 109 MMOL/L (ref 98–107)
CO2 SERPL-SCNC: 23 MMOL/L (ref 22–29)
CREAT SERPL-MCNC: 0.83 MG/DL (ref 0.55–1.02)
CREAT/UREA NIT SERPL: 28
CRP SERPL-MCNC: 8.5 MG/L
EOSINOPHIL # BLD AUTO: 0.75 X10(3)/MCL (ref 0–0.9)
EOSINOPHIL NFR BLD AUTO: 5.6 %
ERYTHROCYTE [DISTWIDTH] IN BLOOD BY AUTOMATED COUNT: 12.6 % (ref 11.5–17)
ERYTHROCYTE [SEDIMENTATION RATE] IN BLOOD: 27 MM/HR (ref 0–20)
GFR SERPLBLD CREATININE-BSD FMLA CKD-EPI: >60 ML/MIN/1.73/M2
GLOBULIN SER-MCNC: 3.1 GM/DL (ref 2.4–3.5)
GLUCOSE SERPL-MCNC: 163 MG/DL (ref 74–100)
HAV IGM SERPL QL IA: NONREACTIVE
HBV CORE IGM SERPL QL IA: NONREACTIVE
HBV SURFACE AB SER-ACNC: 0.49 MIU/ML
HBV SURFACE AB SERPL IA-ACNC: NONREACTIVE M[IU]/ML
HBV SURFACE AG SERPL QL IA: NONREACTIVE
HCT VFR BLD AUTO: 31.6 % (ref 37–47)
HCV AB SERPL QL IA: NONREACTIVE
HGB BLD-MCNC: 10.9 G/DL (ref 12–16)
IMM GRANULOCYTES # BLD AUTO: 0.09 X10(3)/MCL (ref 0–0.04)
IMM GRANULOCYTES NFR BLD AUTO: 0.7 %
LYMPHOCYTES # BLD AUTO: 3.55 X10(3)/MCL (ref 0.6–4.6)
LYMPHOCYTES NFR BLD AUTO: 26.5 %
MCH RBC QN AUTO: 29.5 PG (ref 27–31)
MCHC RBC AUTO-ENTMCNC: 34.5 G/DL (ref 33–36)
MCV RBC AUTO: 85.4 FL (ref 80–94)
MONOCYTES # BLD AUTO: 0.79 X10(3)/MCL (ref 0.1–1.3)
MONOCYTES NFR BLD AUTO: 5.9 %
N GONORRHOEA DNA SPEC QL NAA+PROBE: NOT DETECTED
NEUTROPHILS # BLD AUTO: 8.16 X10(3)/MCL (ref 2.1–9.2)
NEUTROPHILS NFR BLD AUTO: 60.8 %
NRBC BLD AUTO-RTO: 0 %
PLATELET # BLD AUTO: 280 X10(3)/MCL (ref 130–400)
PMV BLD AUTO: 10.3 FL (ref 7.4–10.4)
POCT GLUCOSE: 114 MG/DL (ref 70–110)
POCT GLUCOSE: 118 MG/DL (ref 70–110)
POCT GLUCOSE: 166 MG/DL (ref 70–110)
POCT GLUCOSE: 172 MG/DL (ref 70–110)
POTASSIUM SERPL-SCNC: 4.1 MMOL/L (ref 3.5–5.1)
PROT SERPL-MCNC: 6.3 GM/DL (ref 6.4–8.3)
RBC # BLD AUTO: 3.7 X10(6)/MCL (ref 4.2–5.4)
SODIUM SERPL-SCNC: 140 MMOL/L (ref 136–145)
SOURCE (OHS): NORMAL
WBC # BLD AUTO: 13.41 X10(3)/MCL (ref 4.5–11.5)

## 2024-10-21 PROCEDURE — 63600175 PHARM REV CODE 636 W HCPCS

## 2024-10-21 PROCEDURE — 25000003 PHARM REV CODE 250: Performed by: STUDENT IN AN ORGANIZED HEALTH CARE EDUCATION/TRAINING PROGRAM

## 2024-10-21 PROCEDURE — 36415 COLL VENOUS BLD VENIPUNCTURE: CPT

## 2024-10-21 PROCEDURE — 80074 ACUTE HEPATITIS PANEL: CPT | Performed by: NURSE PRACTITIONER

## 2024-10-21 PROCEDURE — 25000003 PHARM REV CODE 250

## 2024-10-21 PROCEDURE — 86140 C-REACTIVE PROTEIN: CPT

## 2024-10-21 PROCEDURE — 86706 HEP B SURFACE ANTIBODY: CPT | Performed by: NURSE PRACTITIONER

## 2024-10-21 PROCEDURE — A4216 STERILE WATER/SALINE, 10 ML: HCPCS

## 2024-10-21 PROCEDURE — 85652 RBC SED RATE AUTOMATED: CPT

## 2024-10-21 PROCEDURE — 80053 COMPREHEN METABOLIC PANEL: CPT

## 2024-10-21 PROCEDURE — 21400001 HC TELEMETRY ROOM

## 2024-10-21 PROCEDURE — 87491 CHLMYD TRACH DNA AMP PROBE: CPT | Performed by: NURSE PRACTITIONER

## 2024-10-21 PROCEDURE — 85025 COMPLETE CBC W/AUTO DIFF WBC: CPT

## 2024-10-21 RX ORDER — OXYMETAZOLINE HCL 0.05 %
2 SPRAY, NON-AEROSOL (ML) NASAL 2 TIMES DAILY PRN
Status: DISCONTINUED | OUTPATIENT
Start: 2024-10-21 | End: 2024-10-24 | Stop reason: HOSPADM

## 2024-10-21 RX ADMIN — MELATONIN TAB 3 MG 3 MG: 3 TAB at 08:10

## 2024-10-21 RX ADMIN — SODIUM CHLORIDE, PRESERVATIVE FREE 10 ML: 5 INJECTION INTRAVENOUS at 06:10

## 2024-10-21 RX ADMIN — ENOXAPARIN SODIUM 40 MG: 40 INJECTION SUBCUTANEOUS at 08:10

## 2024-10-21 RX ADMIN — SODIUM CHLORIDE, PRESERVATIVE FREE 10 ML: 5 INJECTION INTRAVENOUS at 12:10

## 2024-10-21 RX ADMIN — ROPINIROLE HYDROCHLORIDE 0.5 MG: 0.25 TABLET, FILM COATED ORAL at 03:10

## 2024-10-21 RX ADMIN — HYDRALAZINE HYDROCHLORIDE 25 MG: 25 TABLET ORAL at 03:10

## 2024-10-21 RX ADMIN — INSULIN ASPART 2 UNITS: 100 INJECTION, SOLUTION INTRAVENOUS; SUBCUTANEOUS at 12:10

## 2024-10-21 RX ADMIN — TRAZODONE HYDROCHLORIDE 50 MG: 50 TABLET ORAL at 08:10

## 2024-10-21 RX ADMIN — INSULIN ASPART 2 UNITS: 100 INJECTION, SOLUTION INTRAVENOUS; SUBCUTANEOUS at 08:10

## 2024-10-21 RX ADMIN — INSULIN GLARGINE 60 UNITS: 100 INJECTION, SOLUTION SUBCUTANEOUS at 08:10

## 2024-10-21 RX ADMIN — NIFEDIPINE 60 MG: 30 TABLET, FILM COATED, EXTENDED RELEASE ORAL at 08:10

## 2024-10-21 RX ADMIN — AMPICILLIN AND SULBACTAM 3 G: 1; 2 INJECTION, POWDER, FOR SOLUTION INTRAMUSCULAR; INTRAVENOUS at 06:10

## 2024-10-21 RX ADMIN — INSULIN ASPART 20 UNITS: 100 INJECTION, SOLUTION INTRAVENOUS; SUBCUTANEOUS at 12:10

## 2024-10-21 RX ADMIN — HYDRALAZINE HYDROCHLORIDE 25 MG: 25 TABLET ORAL at 11:10

## 2024-10-21 RX ADMIN — SODIUM CHLORIDE, PRESERVATIVE FREE 10 ML: 5 INJECTION INTRAVENOUS at 11:10

## 2024-10-21 RX ADMIN — GABAPENTIN 600 MG: 300 CAPSULE ORAL at 08:10

## 2024-10-21 RX ADMIN — HYDRALAZINE HYDROCHLORIDE 25 MG: 25 TABLET ORAL at 12:10

## 2024-10-21 RX ADMIN — AMPICILLIN AND SULBACTAM 3 G: 1; 2 INJECTION, POWDER, FOR SOLUTION INTRAMUSCULAR; INTRAVENOUS at 05:10

## 2024-10-21 RX ADMIN — AMPICILLIN AND SULBACTAM 3 G: 1; 2 INJECTION, POWDER, FOR SOLUTION INTRAMUSCULAR; INTRAVENOUS at 12:10

## 2024-10-21 RX ADMIN — HYDROCODONE BITARTRATE AND ACETAMINOPHEN 1 TABLET: 5; 325 TABLET ORAL at 08:10

## 2024-10-21 RX ADMIN — HYDRALAZINE HYDROCHLORIDE 25 MG: 25 TABLET ORAL at 08:10

## 2024-10-21 RX ADMIN — AMPICILLIN AND SULBACTAM 3 G: 1; 2 INJECTION, POWDER, FOR SOLUTION INTRAMUSCULAR; INTRAVENOUS at 11:10

## 2024-10-21 RX ADMIN — INSULIN ASPART 20 UNITS: 100 INJECTION, SOLUTION INTRAVENOUS; SUBCUTANEOUS at 05:10

## 2024-10-21 RX ADMIN — INSULIN ASPART 20 UNITS: 100 INJECTION, SOLUTION INTRAVENOUS; SUBCUTANEOUS at 08:10

## 2024-10-21 RX ADMIN — ROPINIROLE HYDROCHLORIDE 0.5 MG: 0.25 TABLET, FILM COATED ORAL at 08:10

## 2024-10-21 RX ADMIN — PANTOPRAZOLE SODIUM 40 MG: 40 TABLET, DELAYED RELEASE ORAL at 08:10

## 2024-10-21 RX ADMIN — ATORVASTATIN CALCIUM 40 MG: 40 TABLET, FILM COATED ORAL at 08:10

## 2024-10-21 RX ADMIN — SODIUM CHLORIDE, PRESERVATIVE FREE 10 ML: 5 INJECTION INTRAVENOUS at 05:10

## 2024-10-21 RX ADMIN — GABAPENTIN 600 MG: 300 CAPSULE ORAL at 03:10

## 2024-10-22 LAB
CRP SERPL-MCNC: 8.1 MG/L
ERYTHROCYTE [SEDIMENTATION RATE] IN BLOOD: 26 MM/HR (ref 0–20)
HAV AB SER QL IA: NONREACTIVE
HIV 1+2 AB+HIV1 P24 AG SERPL QL IA: NONREACTIVE
HOLD SPECIMEN: NORMAL
POCT GLUCOSE: 114 MG/DL (ref 70–110)
POCT GLUCOSE: 122 MG/DL (ref 70–110)
POCT GLUCOSE: 143 MG/DL (ref 70–110)
POCT GLUCOSE: 183 MG/DL (ref 70–110)
T PALLIDUM AB SER QL: NONREACTIVE

## 2024-10-22 PROCEDURE — 25000003 PHARM REV CODE 250

## 2024-10-22 PROCEDURE — 86780 TREPONEMA PALLIDUM: CPT | Performed by: NURSE PRACTITIONER

## 2024-10-22 PROCEDURE — 36415 COLL VENOUS BLD VENIPUNCTURE: CPT | Performed by: INTERNAL MEDICINE

## 2024-10-22 PROCEDURE — 63600175 PHARM REV CODE 636 W HCPCS

## 2024-10-22 PROCEDURE — A4216 STERILE WATER/SALINE, 10 ML: HCPCS

## 2024-10-22 PROCEDURE — 21400001 HC TELEMETRY ROOM

## 2024-10-22 PROCEDURE — 25000003 PHARM REV CODE 250: Performed by: STUDENT IN AN ORGANIZED HEALTH CARE EDUCATION/TRAINING PROGRAM

## 2024-10-22 PROCEDURE — 85652 RBC SED RATE AUTOMATED: CPT | Performed by: NURSE PRACTITIONER

## 2024-10-22 PROCEDURE — 36415 COLL VENOUS BLD VENIPUNCTURE: CPT | Performed by: NURSE PRACTITIONER

## 2024-10-22 PROCEDURE — 87389 HIV-1 AG W/HIV-1&-2 AB AG IA: CPT | Performed by: NURSE PRACTITIONER

## 2024-10-22 PROCEDURE — 86708 HEPATITIS A ANTIBODY: CPT | Performed by: NURSE PRACTITIONER

## 2024-10-22 PROCEDURE — 86140 C-REACTIVE PROTEIN: CPT | Performed by: NURSE PRACTITIONER

## 2024-10-22 RX ADMIN — ATORVASTATIN CALCIUM 40 MG: 40 TABLET, FILM COATED ORAL at 09:10

## 2024-10-22 RX ADMIN — FAMOTIDINE 20 MG: 20 TABLET, FILM COATED ORAL at 06:10

## 2024-10-22 RX ADMIN — SODIUM CHLORIDE, PRESERVATIVE FREE 10 ML: 5 INJECTION INTRAVENOUS at 05:10

## 2024-10-22 RX ADMIN — FLUTICASONE PROPIONATE 100 MCG: 50 SPRAY, METERED NASAL at 09:10

## 2024-10-22 RX ADMIN — ROPINIROLE HYDROCHLORIDE 0.5 MG: 0.25 TABLET, FILM COATED ORAL at 04:10

## 2024-10-22 RX ADMIN — AMPICILLIN AND SULBACTAM 3 G: 1; 2 INJECTION, POWDER, FOR SOLUTION INTRAMUSCULAR; INTRAVENOUS at 05:10

## 2024-10-22 RX ADMIN — ROPINIROLE HYDROCHLORIDE 0.5 MG: 0.25 TABLET, FILM COATED ORAL at 09:10

## 2024-10-22 RX ADMIN — INSULIN ASPART 20 UNITS: 100 INJECTION, SOLUTION INTRAVENOUS; SUBCUTANEOUS at 12:10

## 2024-10-22 RX ADMIN — TRAZODONE HYDROCHLORIDE 50 MG: 50 TABLET ORAL at 09:10

## 2024-10-22 RX ADMIN — GABAPENTIN 600 MG: 300 CAPSULE ORAL at 09:10

## 2024-10-22 RX ADMIN — HYDROCODONE BITARTRATE AND ACETAMINOPHEN 1 TABLET: 5; 325 TABLET ORAL at 09:10

## 2024-10-22 RX ADMIN — INSULIN ASPART 2 UNITS: 100 INJECTION, SOLUTION INTRAVENOUS; SUBCUTANEOUS at 09:10

## 2024-10-22 RX ADMIN — SODIUM CHLORIDE, PRESERVATIVE FREE 10 ML: 5 INJECTION INTRAVENOUS at 12:10

## 2024-10-22 RX ADMIN — NIFEDIPINE 60 MG: 30 TABLET, FILM COATED, EXTENDED RELEASE ORAL at 09:10

## 2024-10-22 RX ADMIN — INSULIN GLARGINE 60 UNITS: 100 INJECTION, SOLUTION SUBCUTANEOUS at 09:10

## 2024-10-22 RX ADMIN — HYDRALAZINE HYDROCHLORIDE 25 MG: 25 TABLET ORAL at 04:10

## 2024-10-22 RX ADMIN — INSULIN ASPART 20 UNITS: 100 INJECTION, SOLUTION INTRAVENOUS; SUBCUTANEOUS at 04:10

## 2024-10-22 RX ADMIN — ENOXAPARIN SODIUM 40 MG: 40 INJECTION SUBCUTANEOUS at 09:10

## 2024-10-22 RX ADMIN — HYDRALAZINE HYDROCHLORIDE 25 MG: 25 TABLET ORAL at 09:10

## 2024-10-22 RX ADMIN — PANTOPRAZOLE SODIUM 40 MG: 40 TABLET, DELAYED RELEASE ORAL at 09:10

## 2024-10-22 RX ADMIN — INSULIN ASPART 20 UNITS: 100 INJECTION, SOLUTION INTRAVENOUS; SUBCUTANEOUS at 09:10

## 2024-10-22 RX ADMIN — GABAPENTIN 600 MG: 300 CAPSULE ORAL at 04:10

## 2024-10-22 RX ADMIN — AMPICILLIN AND SULBACTAM 3 G: 1; 2 INJECTION, POWDER, FOR SOLUTION INTRAMUSCULAR; INTRAVENOUS at 12:10

## 2024-10-22 RX ADMIN — MELATONIN TAB 3 MG 3 MG: 3 TAB at 09:10

## 2024-10-23 LAB
ALBUMIN SERPL-MCNC: 3.3 G/DL (ref 3.5–5)
ALBUMIN/GLOB SERPL: 1.1 RATIO (ref 1.1–2)
ALP SERPL-CCNC: 84 UNIT/L (ref 40–150)
ALT SERPL-CCNC: 32 UNIT/L (ref 0–55)
ANION GAP SERPL CALC-SCNC: 10 MEQ/L
AST SERPL-CCNC: 20 UNIT/L (ref 5–34)
BASOPHILS # BLD AUTO: 0.03 X10(3)/MCL
BASOPHILS NFR BLD AUTO: 0.2 %
BILIRUB SERPL-MCNC: 0.3 MG/DL
BUN SERPL-MCNC: 18.9 MG/DL (ref 9.8–20.1)
CALCIUM SERPL-MCNC: 8.8 MG/DL (ref 8.4–10.2)
CHLORIDE SERPL-SCNC: 111 MMOL/L (ref 98–107)
CO2 SERPL-SCNC: 19 MMOL/L (ref 22–29)
CREAT SERPL-MCNC: 0.75 MG/DL (ref 0.55–1.02)
CREAT/UREA NIT SERPL: 25
EOSINOPHIL # BLD AUTO: 0.49 X10(3)/MCL (ref 0–0.9)
EOSINOPHIL NFR BLD AUTO: 3.1 %
ERYTHROCYTE [DISTWIDTH] IN BLOOD BY AUTOMATED COUNT: 12.6 % (ref 11.5–17)
GFR SERPLBLD CREATININE-BSD FMLA CKD-EPI: >60 ML/MIN/1.73/M2
GLOBULIN SER-MCNC: 2.9 GM/DL (ref 2.4–3.5)
GLUCOSE SERPL-MCNC: 140 MG/DL (ref 74–100)
HCT VFR BLD AUTO: 32.5 % (ref 37–47)
HGB BLD-MCNC: 11.3 G/DL (ref 12–16)
HOLD SPECIMEN: NORMAL
IMM GRANULOCYTES # BLD AUTO: 0.07 X10(3)/MCL (ref 0–0.04)
IMM GRANULOCYTES NFR BLD AUTO: 0.4 %
LYMPHOCYTES # BLD AUTO: 3.37 X10(3)/MCL (ref 0.6–4.6)
LYMPHOCYTES NFR BLD AUTO: 21.2 %
MCH RBC QN AUTO: 29.6 PG (ref 27–31)
MCHC RBC AUTO-ENTMCNC: 34.8 G/DL (ref 33–36)
MCV RBC AUTO: 85.1 FL (ref 80–94)
MONOCYTES # BLD AUTO: 0.84 X10(3)/MCL (ref 0.1–1.3)
MONOCYTES NFR BLD AUTO: 5.3 %
NEUTROPHILS # BLD AUTO: 11.13 X10(3)/MCL (ref 2.1–9.2)
NEUTROPHILS NFR BLD AUTO: 69.8 %
NRBC BLD AUTO-RTO: 0 %
PLATELET # BLD AUTO: 264 X10(3)/MCL (ref 130–400)
PMV BLD AUTO: 10.3 FL (ref 7.4–10.4)
POCT GLUCOSE: 130 MG/DL (ref 70–110)
POCT GLUCOSE: 141 MG/DL (ref 70–110)
POCT GLUCOSE: 62 MG/DL (ref 70–110)
POCT GLUCOSE: 82 MG/DL (ref 70–110)
POTASSIUM SERPL-SCNC: 3.9 MMOL/L (ref 3.5–5.1)
PROT SERPL-MCNC: 6.2 GM/DL (ref 6.4–8.3)
RBC # BLD AUTO: 3.82 X10(6)/MCL (ref 4.2–5.4)
SODIUM SERPL-SCNC: 140 MMOL/L (ref 136–145)
WBC # BLD AUTO: 15.93 X10(3)/MCL (ref 4.5–11.5)

## 2024-10-23 PROCEDURE — 85025 COMPLETE CBC W/AUTO DIFF WBC: CPT

## 2024-10-23 PROCEDURE — 63600175 PHARM REV CODE 636 W HCPCS

## 2024-10-23 PROCEDURE — 36415 COLL VENOUS BLD VENIPUNCTURE: CPT

## 2024-10-23 PROCEDURE — 36415 COLL VENOUS BLD VENIPUNCTURE: CPT | Performed by: INTERNAL MEDICINE

## 2024-10-23 PROCEDURE — A4216 STERILE WATER/SALINE, 10 ML: HCPCS

## 2024-10-23 PROCEDURE — 80053 COMPREHEN METABOLIC PANEL: CPT

## 2024-10-23 PROCEDURE — 25000003 PHARM REV CODE 250

## 2024-10-23 PROCEDURE — 21400001 HC TELEMETRY ROOM

## 2024-10-23 PROCEDURE — 25000003 PHARM REV CODE 250: Performed by: STUDENT IN AN ORGANIZED HEALTH CARE EDUCATION/TRAINING PROGRAM

## 2024-10-23 RX ADMIN — SODIUM CHLORIDE, PRESERVATIVE FREE 10 ML: 5 INJECTION INTRAVENOUS at 12:10

## 2024-10-23 RX ADMIN — NIFEDIPINE 60 MG: 30 TABLET, FILM COATED, EXTENDED RELEASE ORAL at 09:10

## 2024-10-23 RX ADMIN — AMPICILLIN AND SULBACTAM 3 G: 1; 2 INJECTION, POWDER, FOR SOLUTION INTRAMUSCULAR; INTRAVENOUS at 01:10

## 2024-10-23 RX ADMIN — HYDRALAZINE HYDROCHLORIDE 25 MG: 25 TABLET ORAL at 12:10

## 2024-10-23 RX ADMIN — AMPICILLIN AND SULBACTAM 3 G: 1; 2 INJECTION, POWDER, FOR SOLUTION INTRAMUSCULAR; INTRAVENOUS at 05:10

## 2024-10-23 RX ADMIN — MELATONIN TAB 3 MG 3 MG: 3 TAB at 08:10

## 2024-10-23 RX ADMIN — SODIUM CHLORIDE, PRESERVATIVE FREE 10 ML: 5 INJECTION INTRAVENOUS at 05:10

## 2024-10-23 RX ADMIN — GABAPENTIN 600 MG: 300 CAPSULE ORAL at 05:10

## 2024-10-23 RX ADMIN — ROPINIROLE HYDROCHLORIDE 0.5 MG: 0.25 TABLET, FILM COATED ORAL at 08:10

## 2024-10-23 RX ADMIN — FLUTICASONE PROPIONATE 100 MCG: 50 SPRAY, METERED NASAL at 09:10

## 2024-10-23 RX ADMIN — INSULIN ASPART 20 UNITS: 100 INJECTION, SOLUTION INTRAVENOUS; SUBCUTANEOUS at 12:10

## 2024-10-23 RX ADMIN — AMPICILLIN AND SULBACTAM 3 G: 1; 2 INJECTION, POWDER, FOR SOLUTION INTRAMUSCULAR; INTRAVENOUS at 06:10

## 2024-10-23 RX ADMIN — ENOXAPARIN SODIUM 40 MG: 40 INJECTION SUBCUTANEOUS at 09:10

## 2024-10-23 RX ADMIN — PANTOPRAZOLE SODIUM 40 MG: 40 TABLET, DELAYED RELEASE ORAL at 09:10

## 2024-10-23 RX ADMIN — AMPICILLIN AND SULBACTAM 3 G: 1; 2 INJECTION, POWDER, FOR SOLUTION INTRAMUSCULAR; INTRAVENOUS at 11:10

## 2024-10-23 RX ADMIN — TRAZODONE HYDROCHLORIDE 50 MG: 50 TABLET ORAL at 08:10

## 2024-10-23 RX ADMIN — ROPINIROLE HYDROCHLORIDE 0.5 MG: 0.25 TABLET, FILM COATED ORAL at 05:10

## 2024-10-23 RX ADMIN — SODIUM CHLORIDE, PRESERVATIVE FREE 10 ML: 5 INJECTION INTRAVENOUS at 11:10

## 2024-10-23 RX ADMIN — AMPICILLIN AND SULBACTAM 3 G: 1; 2 INJECTION, POWDER, FOR SOLUTION INTRAMUSCULAR; INTRAVENOUS at 12:10

## 2024-10-23 RX ADMIN — GABAPENTIN 600 MG: 300 CAPSULE ORAL at 08:10

## 2024-10-23 RX ADMIN — INSULIN ASPART 20 UNITS: 100 INJECTION, SOLUTION INTRAVENOUS; SUBCUTANEOUS at 05:10

## 2024-10-23 RX ADMIN — ATORVASTATIN CALCIUM 40 MG: 40 TABLET, FILM COATED ORAL at 08:10

## 2024-10-23 RX ADMIN — HYDRALAZINE HYDROCHLORIDE 25 MG: 25 TABLET ORAL at 11:10

## 2024-10-23 RX ADMIN — HYDROCODONE BITARTRATE AND ACETAMINOPHEN 1 TABLET: 5; 325 TABLET ORAL at 08:10

## 2024-10-23 RX ADMIN — INSULIN ASPART 20 UNITS: 100 INJECTION, SOLUTION INTRAVENOUS; SUBCUTANEOUS at 08:10

## 2024-10-23 RX ADMIN — HYDRALAZINE HYDROCHLORIDE 25 MG: 25 TABLET ORAL at 09:10

## 2024-10-23 RX ADMIN — HYDRALAZINE HYDROCHLORIDE 25 MG: 25 TABLET ORAL at 04:10

## 2024-10-23 RX ADMIN — GABAPENTIN 600 MG: 300 CAPSULE ORAL at 09:10

## 2024-10-23 RX ADMIN — ROPINIROLE HYDROCHLORIDE 0.5 MG: 0.25 TABLET, FILM COATED ORAL at 09:10

## 2024-10-23 RX ADMIN — ENOXAPARIN SODIUM 40 MG: 40 INJECTION SUBCUTANEOUS at 08:10

## 2024-10-23 RX ADMIN — SODIUM CHLORIDE, PRESERVATIVE FREE 10 ML: 5 INJECTION INTRAVENOUS at 06:10

## 2024-10-24 VITALS
OXYGEN SATURATION: 95 % | BODY MASS INDEX: 44.41 KG/M2 | DIASTOLIC BLOOD PRESSURE: 81 MMHG | RESPIRATION RATE: 18 BRPM | TEMPERATURE: 98 F | WEIGHT: 250.63 LBS | HEART RATE: 97 BPM | HEIGHT: 63 IN | SYSTOLIC BLOOD PRESSURE: 159 MMHG

## 2024-10-24 PROBLEM — M86.9 OSTEOMYELITIS OF LEFT FOOT: Status: ACTIVE | Noted: 2024-10-24

## 2024-10-24 LAB
POCT GLUCOSE: 187 MG/DL (ref 70–110)
POCT GLUCOSE: 228 MG/DL (ref 70–110)

## 2024-10-24 PROCEDURE — 25000003 PHARM REV CODE 250

## 2024-10-24 PROCEDURE — 63600175 PHARM REV CODE 636 W HCPCS

## 2024-10-24 PROCEDURE — 25000003 PHARM REV CODE 250: Performed by: STUDENT IN AN ORGANIZED HEALTH CARE EDUCATION/TRAINING PROGRAM

## 2024-10-24 PROCEDURE — A4216 STERILE WATER/SALINE, 10 ML: HCPCS

## 2024-10-24 RX ORDER — INSULIN GLARGINE 100 [IU]/ML
60 INJECTION, SOLUTION SUBCUTANEOUS NIGHTLY
Status: ON HOLD
Start: 2024-10-24 | End: 2025-10-24

## 2024-10-24 RX ORDER — HYDRALAZINE HYDROCHLORIDE 25 MG/1
25 TABLET, FILM COATED ORAL EVERY 8 HOURS
Status: ON HOLD
Start: 2024-10-24 | End: 2025-10-24

## 2024-10-24 RX ORDER — INSULIN ASPART 100 [IU]/ML
20 INJECTION, SOLUTION INTRAVENOUS; SUBCUTANEOUS 3 TIMES DAILY
Status: ON HOLD
Start: 2024-10-24 | End: 2025-10-24

## 2024-10-24 RX ORDER — NIFEDIPINE 60 MG/1
60 TABLET, EXTENDED RELEASE ORAL DAILY
Status: ON HOLD
Start: 2024-10-25 | End: 2025-10-25

## 2024-10-24 RX ORDER — HYDROCODONE BITARTRATE AND ACETAMINOPHEN 5; 325 MG/1; MG/1
1 TABLET ORAL EVERY 6 HOURS PRN
Status: ON HOLD
Start: 2024-10-24

## 2024-10-24 RX ADMIN — PANTOPRAZOLE SODIUM 40 MG: 40 TABLET, DELAYED RELEASE ORAL at 08:10

## 2024-10-24 RX ADMIN — AMPICILLIN AND SULBACTAM 3 G: 1; 2 INJECTION, POWDER, FOR SOLUTION INTRAMUSCULAR; INTRAVENOUS at 11:10

## 2024-10-24 RX ADMIN — INSULIN ASPART 4 UNITS: 100 INJECTION, SOLUTION INTRAVENOUS; SUBCUTANEOUS at 11:10

## 2024-10-24 RX ADMIN — GABAPENTIN 600 MG: 300 CAPSULE ORAL at 08:10

## 2024-10-24 RX ADMIN — AMPICILLIN AND SULBACTAM 3 G: 1; 2 INJECTION, POWDER, FOR SOLUTION INTRAMUSCULAR; INTRAVENOUS at 06:10

## 2024-10-24 RX ADMIN — SODIUM CHLORIDE, PRESERVATIVE FREE 10 ML: 5 INJECTION INTRAVENOUS at 12:10

## 2024-10-24 RX ADMIN — HYDRALAZINE HYDROCHLORIDE 25 MG: 25 TABLET ORAL at 06:10

## 2024-10-24 RX ADMIN — INSULIN ASPART 2 UNITS: 100 INJECTION, SOLUTION INTRAVENOUS; SUBCUTANEOUS at 08:10

## 2024-10-24 RX ADMIN — INSULIN ASPART 20 UNITS: 100 INJECTION, SOLUTION INTRAVENOUS; SUBCUTANEOUS at 08:10

## 2024-10-24 RX ADMIN — NIFEDIPINE 60 MG: 30 TABLET, FILM COATED, EXTENDED RELEASE ORAL at 08:10

## 2024-10-24 RX ADMIN — ENOXAPARIN SODIUM 40 MG: 40 INJECTION SUBCUTANEOUS at 08:10

## 2024-10-24 RX ADMIN — SODIUM CHLORIDE, PRESERVATIVE FREE 10 ML: 5 INJECTION INTRAVENOUS at 06:10

## 2024-10-24 RX ADMIN — INSULIN ASPART 20 UNITS: 100 INJECTION, SOLUTION INTRAVENOUS; SUBCUTANEOUS at 11:10

## 2024-10-24 RX ADMIN — ROPINIROLE HYDROCHLORIDE 0.5 MG: 0.25 TABLET, FILM COATED ORAL at 08:10

## 2024-11-13 ENCOUNTER — TELEPHONE (OUTPATIENT)
Dept: WOUND CARE | Facility: HOSPITAL | Age: 59
End: 2024-11-13
Payer: MEDICAID

## 2024-11-13 NOTE — TELEPHONE ENCOUNTER
Patient called [facility] wound care clinic asking about a followup appointment that was made before her discharge to Dr. Hawkins office. Patient asked if she was seeing us Friday and then we were going to send her out to a surgeon. Informed patient that we are just seeing her for the wound and that she needs to followup with [facility] and Dr. Hawkins for further guidance on her next steps. Patient then asked if [facility] was inside [facility] hospital. Informed patient that [facility] is not in our hospital and offered to give patient the phone number to their clinic. Patient voiced that she did not have a pen but would look it up and give them a call.

## 2024-11-15 ENCOUNTER — HOSPITAL ENCOUNTER (OUTPATIENT)
Dept: WOUND CARE | Facility: HOSPITAL | Age: 59
Discharge: HOME | End: 2024-11-15
Attending: NURSE PRACTITIONER
Payer: MEDICAID

## 2024-11-15 VITALS
SYSTOLIC BLOOD PRESSURE: 152 MMHG | HEART RATE: 95 BPM | DIASTOLIC BLOOD PRESSURE: 73 MMHG | TEMPERATURE: 98 F | OXYGEN SATURATION: 96 %

## 2024-11-15 DIAGNOSIS — E66.01 CLASS 3 SEVERE OBESITY WITH BODY MASS INDEX (BMI) OF 40.0 TO 44.9 IN ADULT, UNSPECIFIED OBESITY TYPE, UNSPECIFIED WHETHER SERIOUS COMORBIDITY PRESENT: ICD-10-CM

## 2024-11-15 DIAGNOSIS — M86.172 ACUTE OSTEOMYELITIS OF LEFT CALCANEUS: ICD-10-CM

## 2024-11-15 DIAGNOSIS — E11.621 DIABETIC ULCER OF LEFT FOOT ASSOCIATED WITH TYPE 2 DIABETES MELLITUS, WITH FAT LAYER EXPOSED, UNSPECIFIED PART OF FOOT: Primary | ICD-10-CM

## 2024-11-15 DIAGNOSIS — E11.622 TYPE 2 DIABETES MELLITUS WITH OTHER SKIN ULCER, WITHOUT LONG-TERM CURRENT USE OF INSULIN: ICD-10-CM

## 2024-11-15 DIAGNOSIS — L97.522 DIABETIC ULCER OF LEFT FOOT ASSOCIATED WITH TYPE 2 DIABETES MELLITUS, WITH FAT LAYER EXPOSED, UNSPECIFIED PART OF FOOT: Primary | ICD-10-CM

## 2024-11-15 DIAGNOSIS — E66.813 CLASS 3 SEVERE OBESITY WITH BODY MASS INDEX (BMI) OF 40.0 TO 44.9 IN ADULT, UNSPECIFIED OBESITY TYPE, UNSPECIFIED WHETHER SERIOUS COMORBIDITY PRESENT: ICD-10-CM

## 2024-11-15 DIAGNOSIS — M14.672 CHARCOT'S JOINT OF LEFT ANKLE: ICD-10-CM

## 2024-11-15 PROCEDURE — 99213 OFFICE O/P EST LOW 20 MIN: CPT | Mod: ,,, | Performed by: NURSE PRACTITIONER

## 2024-11-15 PROCEDURE — 27000999 HC MEDICAL RECORD PHOTO DOCUMENTATION

## 2024-11-15 PROCEDURE — 99211 OFF/OP EST MAY X REQ PHY/QHP: CPT

## 2024-11-15 RX ORDER — DOXYCYCLINE HYCLATE 100 MG
100 TABLET ORAL EVERY 12 HOURS
Qty: 60 TABLET | Refills: 0 | Status: SHIPPED | OUTPATIENT
Start: 2024-11-15 | End: 2024-12-15

## 2024-11-15 NOTE — PROGRESS NOTES
[facility]   Outpatient Wound Care     Subjective:   Patient ID: Suki Anton is a 58 y.o. female.    Chief Complaint: Wound Care (Left lateral ankle wound)      History of Present Illness:   58 y.o. White female presents to wound care clinic today for follow up regarding right ankle ulcer, after hospitalization and rehab for IV antibiotics. Reviewed all previous medical history and progress notes since last visit if 9/25/2024.  Past medical history:  Since last visit she saw Dr. Caal with Dr. Hawkins's office to discuss the bone skin with her and was aware of osteomyelitis in her left posterior calcaneus.  She was placed on Bactrim. Currently awaiting a referral to [facility] for a Dr. Weir.  Multiple options were given to her at that visit possible resection of the calcaneus, IV antibiotics, placement of absorbable antibiotic beads, and possible below-the-knee amputation. Currently under the care of  for left ankle Charcot foot joint. Currently was instructed per Dr. Neumann to be non-weight bearing to left ankle/foot.  Patient voices she walked from her car to the front of the hospital and then obtained wheelchair.  Instructed the importance this was be non-weight bearing to left foot and ankle meeting no weight for the next 6 weeks per Dr. Neumann. Under the care of Dr. Neumann.  Voices saw podiatrist today but did not take self pay patients.  Voices awaiting insurance from work due to insurance was changed in his awaiting for reinstatement.  Saw orthopedics on 7/1/24 and was informed of CT scan.  She is waiting to see podiatry no appointment scheduled at this time.  Will continue our clinic until ulcer is healed. Wound has been present for approximately 2 months.  She is a referral from Orthopedics.  Patient is currently awaiting CT scan of left ankle,  and referral to a foot ankle specialist.  Patient voices 2 months ago twisted ankle and purchased a boot from [employer] which created an ulcer to ankle area. Followed by Dick Gallagher DO for PCP last appt 7/29/24.    Today's visit 11/15/24:  Reviewed all previous progress notes since last visit of 09/25/2024.  Presents to clinic today in wheelchair alone.  Patient voices today she is ready for surgery after the holidays for amputation of left foot.  Patient states she is not going to follow up back up with [facility] or Dr. Hawkins wants to have her surgery here at [facility]. Harish called surgery clinic informed of the patient's request.  Schedule the patient for an appointment for January 7 for follow up with surgery clinic.  Instructed the patient  will have her just cleansed ulcer daily with Vashe and cover with foam border dressing.  Applied Tubigrip size F.  Instructed to offload pressure as much as possible.  Will have her take oral doxycycline twice daily as a preventive due to chronic osteomyelitis and awaiting surgery.  Instructions and supplies given.  Will return to the clinic in 1 month.  Instructed to call office with any questions, concerns, or new skin issues.  Verbalized understanding of all instructions.    09/25/2024:  Reviewed all previous progress notes since last visit of 09/18/2024.  Unna boot to left lower extremity removed per nursing staff at bedside.  Left lower leg wash with Hibiclens soap and water rinse pat dry.  Left ankle red granulating wound bed with macerated jak wound moderate serosanguineous drainage.  Decrease in size since last visit.  Patient has seen Dr. Caal since last visit currently taking oral antibiotics.  Awaiting referral to [facility].  Discussion will continue Unna boots for closure of left ankle ulcer.  Reinforced the importance of offloading pressure to left lower leg as much as possible.  Reinforced the importance of following diabetic diet and taking diabetic medications as directed.   All wound care performed per nursing staff at bedside.  Left ankle ulcer cleansed with wash, applied Triad to jak wound, polymem max to woundbed, Kerramax, Drawtex to to dorsal foot, offloading foam,kerlix, and secure with Coban.  Tolerated well.  Will return to the clinic weekly for wound care.  Instructed to call the office with any questions, concerns, or any reasons having to remove Unna boot.  Verbalized understanding of all instructions.      09/18/2024:  Reviewed all previous progress notes since last visit of 09/10/24.  Unna boot to left lower leg removed per nursing staff at bedside.  Left ankle wound red granulating wound bed with moderate serosanguineous drainage with slight maceration to jak wound.  All wound care performed per nursing staff at bedside.  Left lower leg wash with Hibiclens soap and water pat dry.  Discussion with the patient today since will see Dr. Hawkins tomorrow will hold Unna boot at this time.  Wound care performed at bedside today cleansed left ulcer with Vashe, applied Traid to jak wound, polymem max to wound bed,unna boot, 4x4, offloading foam, wrapped with Kerlix secure with tape.  Applied CAM walking boot.  Tolerated well.  Reinforced offloading pressure.  Instructed may take shower but perform wound care immediately after shower.  Instructed wound care can be performed every other day.  Will have her follow up with our clinic on Friday.  Instructed to call the office with any questions, concerns, or new skin issues.  Verbalized understanding of all instructions.    9/10/24:  Reviewed previous progress notes since last visit of 9/4/24.  Unna boot removed per nursing staff at bedside.  Left lower leg wash with Hibiclens soap and water pat dry.  Left ankle red granulating wound bed with macerated jak wound moderate serosanguineous drainage.  Discussion with the patient today wound has significantly decreased in size since last visit.  Will continue same wound care.  Wound  care performed per nursing staff at bedside.  Left ankle wound cleansed with Vashe applied Triad to jak wound, polymem max to wound bed, Unna boot, double super absorbent, offloading foam, Drawtex edema to dorsal foot, kerlix, and coban.  Tolerated well.  Wearing CAM walking boot.  Instructed only apply boot when transferring.  Instructed to stay off of foot as much as possible.  Reinforced offloading in unna boot precautions.  Will have her return to the clinic on Friday for a nurse visit.  Instructed to call the office with any questions, concerns, or new skin issues.  Verbalized understanding of all instructions.      9/4/24:  Reviewed previous progress notes last visit on 08/26/2024.  Presents to clinic in wheelchair eschar noted by radiology.  Unna boot to left lower extremity removed per nursing staff at bedside.  Left lower leg wash with Hibiclens soap and water.  Left ankle ulcer red granulating wound bed with macerated jak wound and moderate serosanguineous drainage.  Pared jak wound callus using #4 dermal curette.  Rationale for paring to decrease bioburden and increased granulation.  Tolerated well.  Left ankle cleansed with Vashe, applied Triad to jak wound, polymem max, Unna boot, double super absorbent, offloading foam, Drawtex edema to dorsal foot, Kerlix, and Coban.  Tolerated well.  Reinforced offloading and unna boot precautions.  Will return to the clinic in 1 week.  Escorted to radiology after appointment in wheelchair today for additional scans.  Instructed to call the office with any questions, concerns, or any reasons having to remove Unna boot.  Verbalized understanding of all instructions.      08/26/2024:  Reviewed all previous progress notes since last visit 08/19/2024.  Presents to clinic in wheelchair escorted by staff member.  Unna boot to left lower extremity removed per nursing staff at bedside.  Left ankle ulcer red granulating wound bed with minimal maceration to edges moderate  serosanguineous drainage, and significant decrease in size since last visit.  Discussion we will continue Unna boot at this time.  Patient voices as a bone scan on Thursday this week.  Instructed to have them call wound care clinic if we need to remove Unna boot prior to appointment.  All wound care performed per nursing staff at bedside.  Left ankle ulcer cleansed with Vashe, applied Triad to wound edge, apply polymem max, unna boot, double super abssorbent, offloading foam, drawtex edema to dorsal foot, Kerlix and Coban.  Reinforced unna boot precautions.  Reinforced offloading as much as possible.  Using wheelchair and knee walker at this time.  Will return to the clinic on Thursday for Unna boot change.  Instructed to call the office with any questions, concerns, or any reasons having to remove Unna boot.  Verbalized understanding of all instructions.    8/19/24:  Reviewed all previous progress since last visit of 08/12/2024.  Presents to clinic in wheelchair.  Left lower extremity unna boot removed per nursing staff at bedside.  Left lower extremity ankle ulcer red granulating wound bed , macerated skin edges with moderate serosanguineous drainage.  Decrease in size since last visit.  Patient voices she is staying off of foot as much as possible.  All wound care performed per nursing staff at bedside.  Left ankle ulcer cleansed with Vashe, applied Triad to jka wound, polymem to wound bed, unna boot, super absorbent dressing, offloading foam, Kerlix and Coban.  Will return to the clinic on Thursday for Unna boot change.  Instructed the importance of offloading pressure as much as possible.  Using knee walker as much as possible.  Unna boot precautions.  Instructed to call the office with any questions, concerns, or any reasons having to remove Unna boot.  Verbalized understanding of all instructions.    8/12/24:  Reviewed all previous progress notes since last visit of 08/08/2024.  Presents to clinic in  wheelchair.  Wearing left lower leg CAM walking boot.  All wound care to left lower extremity removed per nursing staff at bedside.  Left ankle ulcer red granulating wound bed minimal slough with macerated jak wound requiring debridement to decrease bio burden to increase granulation.  Selective debridement performed at bedside.  See quick procedure note.  All wound care performed per nursing staff at bedside.  Left ankle ulcer cleansed with Vashe, applied Triad to jak wound, silver polymem to wound bed, unna boot, convamax, offloading foam, Kerlix, and Coban.  Attempted to provide the patient with crutches patient unable to use crutches due to his shoulder injury.  Instructed the importance of her using knee walker at all times.  Reinforced unna boot precautions.  Will return to the clinic on Thursday for Unna boot change.  Instructed to call the office with any questions, concerns, or any reasons having to remove Unna boot.  Verbalized understanding of all instructions.    08/05/2024:  Reviewed all previous progress notes since last visit 07/29/2024.  Presents to clinic in wheelchair.  Left lower leg CAM walking boot.  Left lower extremity Unna boot removed per nursing at bedside.  Left ankle ulcer red granulating wound bed with moderate serosanguineous drainage and macerated jak wound.  Instructed the patient today the importance of offloading pressure to left foot.  All wound care performed per nursing staff at bedside.  Left lower extremity cleansed with Hibiclens soap and water pat dry left ulcer cleansed with Vashe, applied Triad to jak wound, silver polymem, Convamax, unna boot, kerlix, and coban.  Unna boot precautions given.  Will return to the clinic on Thursday after appt with Dr. Neumann.  Instructed to call the office with any questions, concerns, or any reasons having to remove Unna boot.  Verbalized understanding of all instructions.      7/29/24:  Reviewed all previous progress since last visit  07/22/2024.  Presents to clinic alone. Ambulating with CAM walking boot.  Left lower extremity Unna boot removed per nursing staff at bedside.  Left lower leg washed Hibiclens soap and water.  Wound red granulating wound bed, slight macerated jak wound with moderate serosanguineous drainage.  Discussion with the patient today will continue same wound care orders.  All wound care performed per nursing staff at bedside.  Left lower extremity ankle cleansed with Vashe, applied Triad to jak wound, silver polymem to  wound bed, cover with convamax, Unna boot, Kerlix, and Coban.  Tolerated well.  Will return to our clinic on Thursday for a nurse visit for Unna boot change.  Instructed to call the office with any questions, concerns, or any reasons removed Unna boot.  Instructed the importance to call and reschedule appointment with Dr. Neumann regarding left Charcot ankle.  Reinforced offloading pressure to areas much as possible.  Verbalized understanding of all instructions.    07/22/2024:  Reviewed all previous progress notes 07/15/2024.  Presents to the clinic with uma.  Ambulates with CAM walking boot to left lower leg.  Left lower extremity Unna boot removed per nursing staff at bedside.  Left ankle wound red granulating wound bed macerated jak wound moderate serosanguineous drainage.  Discussion with the patient today significant decrease in size since last visit will continue Unna boot.  New wound care orders for left lower extremity ankle cleansed with Vashe apply, Triad to jak wound, silver polymem to wound bed, cover with covamax, Unna boot, Kerlix, and Coban.  All wound care performed per nursing staff at bedside.  Will return to the clinic on Thursday for a nurse visit for Unna boot change.  Will follow up in wound clinic with me next Monday.  Instructed to call the office with any questions, concerns, or any reasons having to remove Unna boot.  Verbalized understanding of all  instructions.      07/15/2024:  Reviewed all previous progress notes.  Left lower leg unna boot removed per nursing staff at bedside.  Left ankle wound red granulating wound bed with moderate serosanguineous drainage and macerated jak wound.  Significant decrease in size since last visit, and now wound has created 2 separate wounds.  Discussion with the patient today we will continue Unna boot to assist in decreasing edema to ankle, and increase in granulation of wounds.  All wound care performed per nursing staff at bedside.  Left ankle wound cleansed with Vashe, applied Triad to periwound, silver polymem to wound bed, Unna boot, Drawtex wrap, Kerlix and Coban.  Currently wearing left CAM boot.  Will have her return to the clinic on Thursday for a nurse visit for Unna boot change.  Reinforced unna boot precautions.  Reinforced offloading pressure to left foot as much as possible.  Instructed to call the office with any questions, concerns, or any reasons having to remove Unna boot.  Verbalized understanding of all instructions.    07/08/2024:  Reviewed previous progress notes.  Left lower leg unna boot removed per nursing staff at bedside.  Left ankle ulcer red granulating wound bed with moderate serosanguineous drainage with macerated jak wound.  Will continue same wound care.  All wound care performed per nursing staff at bedside.  Left ankle wound cleansed with Vashe, apply Triad to periwound edge, Silver polymem to wound bed, cover with unna boot, Drawtex 4 x 4, Kerlix, and Coban. Tolerated well.  Reinforced unna boot precautions.  Will return to clinic on Thursday for a nurse visit for Unna boot change.  Will follow up with me next Monday. Instructed the importance of calling the office with any questions, concerns, or any reasons having to remove Unna boot.  Verbalized understanding of all instructions.    07/01/2024:  Reviewed previous progress notes.  Left lower leg unna boot removed per nursing staff at  bedside.  Left ankle ulcer red granulating wound bed with minimal slough and moderate serosanguineous drainage.  Patient voices  will have virtual visit with orthopedic talk to discuss CT scan today.  All wound care performed per nursing staff at bedside.  Will continue Unna boot. Left ankle wound cleansed with 0.5% Dakins, apply Triad to periwound edge, Silver polymem to wound bed, cover with unna boot, Drawtex 4 x 4, Kerlix, and Coban. Tolerated well.  Reinforced unna boot precautions.  Will return to the clinic on Wednesday for a nurse visit, and will return the following Monday to re-evaluate left ankle ulcer.  Instructed the importance of calling the office with any questions, concerns, or any reasons having to remove Unna boot.  Verbalized understanding of all instructions.    06/26/2024:  Presents to clinic alone.  Left lower leg unna boot removed per nursing staff at bedside.  Left ankle ulcer red granulating wound bed with moderate serosanguineous drainage minimal slough.  Macerated jak wound.  Discussion with the patient today will continue Unna boot.  Patient is taking all oral antibiotics as directed.  All wound care performed per nursing staff at bedside. Left ankle wound cleansed with 0.5% Dakins, apply Triad to periwound edge, Silver polymem to wound bed, cover with unna boot, Drawtex wrap, Kerlix, and Coban. Tolerated well.  Reinforced unna boot precautions.  Will return to the clinic on Monday to re-evaluate left ankle ulcer.  Instructed the importance of calling the office with any questions, concerns, or any reasons having to remove Unna boot.  Doctors excuse given.  Verbalized understanding of all instructions.      06/13/2024:  Presents to the clinic with grandchildren.  Ambulates with left foot orthopedic walking boot.  Walking boot and left ankle dressing removed per nursing staff at bedside.  Left ankle ulcer red granulating wound bed with moderate amount of slough and serosanguineous  drainage with the jak wound erythema and nonpitting edema.  Discussion with the patient today will need to perform selective debridement and obtain tissue culture.  Written consent obtained.  See quick procedure note.  Following debridement red granulating wound bed with moderate serosanguineous drainage discussion with the patient today will benefit from applying Unna boot to increased granulation to area and decrease edema.  All wound care performed per nursing staff at bedside.  Left ankle wound cleansed with 0.5% Dakins, apply Silver polymem to wound bed, cover with unna boot, Drawtex wrap, Kerlix, and Coban.  Tolerated well.  Instructed on unna boot precautions.  Will return to the clinic on Monday for a nurse visit for Unna boot change.  Will follow up with me next Thursday.       History includes:      Past Medical History:   Diagnosis Date    Diabetes mellitus     Diabetes mellitus, type 2     GERD (gastroesophageal reflux disease)     Hypertension     History reviewed. No pertinent surgical history.   Social History     Socioeconomic History    Marital status:     Number of children: 0   Occupational History    Occupation: Manager     Comment: [employer]   Tobacco Use    Smoking status: Never    Smokeless tobacco: Never   Substance and Sexual Activity    Alcohol use: Never    Drug use: Never    Sexual activity: Yes     Partners: Male     Birth control/protection: None     Social Drivers of Health     Financial Resource Strain: Medium Risk (10/24/2024)    Overall Financial Resource Strain (CARDIA)     Difficulty of Paying Living Expenses: Somewhat hard   Food Insecurity: Food Insecurity Present (10/24/2024)    Hunger Vital Sign     Worried About Running Out of Food in the Last Year: Sometimes true     Ran Out of Food in the Last Year: Never true   Transportation Needs: No Transportation Needs (10/24/2024)    TRANSPORTATION NEEDS     Transportation : No   Physical Activity: Sufficiently Active  (10/24/2024)    Exercise Vital Sign     Days of Exercise per Week: 6 days     Minutes of Exercise per Session: 40 min   Stress: Patient Declined (9/30/2024)    [employer] - Occupational Stress Questionnaire     Feeling of Stress : Patient declined   Housing Stability: Low Risk  (10/24/2024)    Housing Stability Vital Sign     Unable to Pay for Housing in the Last Year: No     Homeless in the Last Year: No   .      Current Outpatient Medications   Medication Sig Dispense Refill    atorvastatin (LIPITOR) 40 MG tablet Take 1 tablet (40 mg total) by mouth every evening. 30 tablet 1    blood sugar diagnostic Strp Test strips to check CBG's 200 each 1    doxycycline (VIBRA-TABS) 100 MG tablet Take 1 tablet (100 mg total) by mouth every 12 (twelve) hours. 60 tablet 0    EASY TOUCH TWIST LANCETS 33 gauge Misc USE TO CHECK BLOOD SUAGR LEVELS TWO TIMES A  each 1    gabapentin (NEURONTIN) 300 MG capsule Take 2 capsules (600 mg total) by mouth 3 (three) times daily. 180 capsule 1    hydrALAZINE (APRESOLINE) 25 MG tablet Take 1 tablet (25 mg total) by mouth every 8 (eight) hours. 90 tablet 1    HYDROcodone-acetaminophen (NORCO) 5-325 mg per tablet Take 1 tablet by mouth every 6 (six) hours as needed for Pain. 28 tablet 0    insulin aspart U-100 (NOVOLOG) 100 unit/mL injection Inject 15 Units into the skin 3 (three) times daily. 13.5 mL 1    insulin glargine U-100, Lantus, 100 unit/mL injection Inject 60 Units into the skin every evening.      lancing device Misc 1 each by Misc.(Non-Drug; Combo Route) route 4 (four) times daily with meals and nightly. 200 each 5    NIFEdipine (PROCARDIA-XL) 60 MG (OSM) 24 hr tablet Take 1 tablet (60 mg total) by mouth once daily.      omeprazole (PRILOSEC) 20 MG capsule Take 2 capsules (40 mg total) by mouth once daily. 60 capsule 2    rOPINIRole (REQUIP) 0.5 MG tablet Take 1 tablet (0.5 mg total) by mouth 3 (three) times daily. 90 tablet 2    traZODone  (DESYREL) 50 MG tablet Take 0.5 tablets (25 mg total) by mouth nightly as needed for Insomnia. 15 tablet 0    TRUE METRIX GLUCOSE METER Misc Inject 1 each into the skin 4 (four) times daily with meals and nightly. use as directed 1 each 0     No current facility-administered medications for this encounter.       Review of Systems   Skin:  Positive for wound.   All other systems reviewed and are negative.         Labs Reviewed:   Chemistry:  Lab Results   Component Value Date    BUN 24.4 (H) 11/11/2024    BUN 22.6 (H) 11/04/2024    CREATININE 0.75 11/11/2024    CREATININE 0.80 11/04/2024    EGFRNORACEVR >60 11/11/2024    EGFRNORACEVR >60 11/04/2024    GLUCOSE 171 (H) 11/11/2024    PREALB 22.0 11/11/2024    AST 10 11/11/2024    AST 9 11/04/2024    ALT 13 11/11/2024    ALT 16 11/04/2024    HGBA1C 8.7 (H) 10/25/2024        Hematology:  Lab Results   Component Value Date    WBC 12.55 (H) 11/11/2024    WBC 16.80 (H) 11/04/2024    HGB 11.0 (L) 11/11/2024    HGB 11.1 (L) 11/04/2024    HCT 33.0 (L) 11/11/2024    HCT 32.8 (L) 11/04/2024     11/11/2024     11/04/2024       Inflammatory Markers:  Lab Results   Component Value Date    HSCRP 22.89 (H) 11/11/2024    HSCRP 23.79 (H) 11/04/2024    HSCRP 21.67 (H) 10/28/2024    SEDRATE 31 (H) 11/11/2024    SEDRATE 35 (H) 11/04/2024    SEDRATE 26 (H) 10/28/2024        Objective:        Physical Exam  Vitals reviewed.   Cardiovascular:      Pulses:           Dorsalis pedis pulses are detected w/ Doppler on the left side.        Posterior tibial pulses are detected w/ Doppler on the left side.   Musculoskeletal:      Left lower leg: Edema present.      Left foot: Charcot foot present.        Feet:    Feet:      Left foot:      Skin integrity: Ulcer present.      Toenail Condition: Left toenails are abnormally thick. Fungal disease present.  Skin:     General: Skin is warm.      Capillary Refill: Capillary refill takes less than 2 seconds.      Findings: Wound present.              Comments: Left ankle ulcer red granulating wound bed with minimal serosanguineous drainage.   Neurological:      Mental Status: She is alert.   Psychiatric:         Behavior: Behavior is cooperative.            Wound 06/13/24 0827 Pressure Injury Left lateral Ankle (Active)   06/13/24 0827 Ankle   Present on Original Admission: Y   Primary Wound Type: Pressure inj   Side: Left   Orientation: lateral   Wound Approximate Age at First Assessment (Weeks):    Wound Number:    Is this injury device related?:    Incision Type:    Closure Method:    Wound Description (Comments):    Type:    Additional Comments:    Ankle-Brachial Index:    Pulses:    Removal Indication and Assessment:    Wound Outcome:    Wound Image   11/15/24 1011   Dressing Appearance Moist drainage 11/15/24 1011   Drainage Amount Moderate 11/15/24 1011   Drainage Characteristics/Odor Serosanguineous 11/15/24 1011   Appearance [place] 11/15/24 1011   Periwound Area Macerated 11/15/24 1011   Wound Length (cm) 0.4 cm 11/15/24 1011   Wound Width (cm) 0.6 cm 11/15/24 1011   Wound Depth (cm) 0.2 cm 11/15/24 1011   Wound Volume (cm^3) 0.048 cm^3 11/15/24 1011   Wound Surface Area (cm^2) 0.24 cm^2 11/15/24 1011         Assessment:         ICD-10-CM ICD-9-CM   1. Diabetic ulcer of left foot associated with type 2 diabetes mellitus, with fat layer exposed, unspecified part of foot  E11.621 250.80    L97.522 707.15   2. Acute osteomyelitis of left calcaneus  M86.172 730.07   3. Charcot's joint of left ankle  M14.672 094.0     713.5   4. Type 2 diabetes mellitus with other skin ulcer, without long-term current use of insulin  E11.622 250.80   5. Class 3 severe obesity with body mass index (BMI) of 40.0 to 44.9 in adult, unspecified obesity type, unspecified whether serious comorbidity present  E66.813 278.01    E66.01 V85.41    Z68.41            Plan:   Tissue pathology and/or culture taken:  [] Yes [x] No   Sharp debridement performed:   [] Yes [x] No   Labs  ordered this visit:   [] Yes [x] No   Imaging ordered this visit:   [] Yes [x] No         1. Diabetic ulcer of left foot associated with type 2 diabetes mellitus, with fat layer exposed, unspecified part of foot     Wound care:    Cleansed daily with Vashe, and cover with foam border dressing.      Perform daily.   2. Acute osteomyelitis of left calcaneus     Prescribed oral doxycycline while awaiting amputation.   3. Charcot's joint of left ankle     Using wheelchair for long distances.    Using knee walker while in home.      4. Type 2 diabetes mellitus with other skin ulcer, without long-term current use of insulin     Co-factor in delayed wound healing and development.    Last A1c 8.7.    Reinforced the importance of diet and medication compliance.   5. Class 3 severe obesity with body mass index (BMI) of 40.0 to 44.9 in adult, unspecified obesity type, unspecified whether serious comorbidity present     Instructed on the importance of diet, decreased caloric intake due to non weight bearing to left foot at this time.          Patient Instructions   Pt seen today by: Martha Duncan NP     self care DRESSING INSTRUCTIONS: Wash wound with baby shampoo. Pat dry and put a foam border dressing on wound. Put Tubi  F        Wound location: left lateral ankle    Dressings to be changed   every other day  and as needed if soiled or not intact.  Home health to visit and assist with dressing changes :    x/week or on Mon/Tue/Wed/Thur/Fri . Patient will be seen in this clinic on Mondays,Tuesdays, Wednesdays, Thursdays, Fridays.  Patient and/or family may be asked to assist on other days.           Return visit: 1 month    Nutrition:  The current daily value (%DV) for protein is 50 grams per day and is meant as a general goal for most people. Further increasing your dietary protein intake is very important for wound healing. Typically one needs over 100g of protein per day to help with wound healing needs.  If you are a  dialysis patient or have problems with your kidneys, talk to your Nephrologist about how much protein you can take in with your condition.  Examples of high protein items that can be added to your diet include: eggs, chicken, red meats, almonds, cottage cheese, Greek yogurt, beans, and peanut butter.  Fortified protein bars, shakes and drinks can add 15-30 additional grams of protein per serving.  Also add:   1 daily general multivitamin   Vitamin C : 500mg twice daily   Zinc 220 mg daily  Vit D : once daily    Offloading   Offload your wound. This means to reduce pressure on and around the wound that reduces blood flow to the wound and prevents healing. Your wound care team will discuss specific ways for you to offload your specific wound. Common offloading strategies include:    Turn or reposition every 2 hours or sooner  Use pillows, wedges, ROHO wheelchair cushions or other special devices like boots and shoes to lift the wound off of hard surfaces  Alternating Low Air-loss (ALAL) mattress may be ordered  Padded dressings can reduce wound pressure        Call our [facility] wound clinic for questions/concerns a [phone] .           Wound may have been debrided in clinic: if so, WHAT YOU NEED TO KNOW:    Debridement is the removal of infected, damaged, or dead tissue so a wound can heal properly. Your wound may need more than one debridement. Debridement can cause bleeding, and a small amount of blood is expected.    AFTER A DEBRIDEMENT:    Keep your wound clean and dry. Do not remove the dressing unless instructed.  Follow the wound care orders provided to you or your home health care provider.  If you have pain, take over the counter pain relievers or pain medication if prescribed.  Elevate the wound and limit excessive activity to prevent bleeding and/or swelling in your wound.  If you see blood coming through the dressing, apply gauze and tape over the dressing and hold firm pressure to the wound with your  hand for 5-10 minutes continuously, without peeking, to help the bleeding stop.    Contact [facility] wound care team at [phone] or go to the nearest Emergency department if:    You have a fever greater than 101 taken by mouth.  Your pain gets worse or does not go away, even after taking your regular pain medicine.  Your skin around your wound is red, hot, swollen, or draining pus.  You have bleeding that continues to come through the dressing after holding pressure for 10 minutes     Unna Boot:   It is important to move about with your unna boot on. Talk to your doctor about the right amount of activity for you. If the boot begins feeling tight, you may need to rest and prop your foot and leg on pillows. Try to get your foot higher than your heart.  Do not put things inside the boot to scratch your skin.  Keep your leg propped on pillows as much as you can during the day and at night.  Avoid sitting with your legs bent at the knees for a long time.       When do I need to call the doctor?   Your leg is numb or tingles  Toes are blue, grey, or cool  You have more swelling in your leg or foot  You have pain when you walk  Drainage that soaks through your dressing    Compression: You may be using a compressive type of dressings to control edema: If so, keep your compression wrap or tubigrip in place. Do not get them wet, they should feel snug. If they feel tight, or cause pain in any way,  elevate your legs above your heart for 15 minutes. If the wraps still cause pain or you can not tolerate compression,  please remove and notify the clinic or your home health.         Call our [facility] wound clinic for questions/concerns a [phone] .       The time spent including preparing to see the patient, obtaining patient history and assessment, evaluation of the plan of care, patient/caregiver counseling and education, orders, documentation, coordination of care, and other professional medical management activities for  today's encounter was 20 minute.    Time spent performing procedures during today's encounter was 20 minute.    Follow up in about 1 month (around 12/15/2024). Teaching provided on s/s to call wound clinic for promptly.  ER precautions taught for after hours and weekends.       JANE Vieyra

## 2024-11-15 NOTE — PATIENT INSTRUCTIONS
Pt seen today by: Martha Duncan NP     self care DRESSING INSTRUCTIONS: Wash wound with baby shampoo. Pat dry and put a foam border dressing on wound. Put Tubi  F        Wound location: left lateral ankle    Dressings to be changed   every other day  and as needed if soiled or not intact.  Home health to visit and assist with dressing changes :    x/week or on Mon/Tue/Wed/Thur/Fri . Patient will be seen in this clinic on Mondays,Tuesdays, Wednesdays, Thursdays, Fridays.  Patient and/or family may be asked to assist on other days.           Return visit: 1 month    Nutrition:  The current daily value (%DV) for protein is 50 grams per day and is meant as a general goal for most people. Further increasing your dietary protein intake is very important for wound healing. Typically one needs over 100g of protein per day to help with wound healing needs.  If you are a dialysis patient or have problems with your kidneys, talk to your Nephrologist about how much protein you can take in with your condition.  Examples of high protein items that can be added to your diet include: eggs, chicken, red meats, almonds, cottage cheese, Greek yogurt, beans, and peanut butter.  Fortified protein bars, shakes and drinks can add 15-30 additional grams of protein per serving.  Also add:   1 daily general multivitamin   Vitamin C : 500mg twice daily   Zinc 220 mg daily  Vit D : once daily    Offloading   Offload your wound. This means to reduce pressure on and around the wound that reduces blood flow to the wound and prevents healing. Your wound care team will discuss specific ways for you to offload your specific wound. Common offloading strategies include:    Turn or reposition every 2 hours or sooner  Use pillows, wedges, ROHO wheelchair cushions or other special devices like boots and shoes to lift the wound off of hard surfaces  Alternating Low Air-loss (ALAL) mattress may be ordered  Padded dressings can reduce wound pressure         Call our [facility] wound clinic for questions/concerns a [phone] .           Wound may have been debrided in clinic: if so, WHAT YOU NEED TO KNOW:    Debridement is the removal of infected, damaged, or dead tissue so a wound can heal properly. Your wound may need more than one debridement. Debridement can cause bleeding, and a small amount of blood is expected.    AFTER A DEBRIDEMENT:    Keep your wound clean and dry. Do not remove the dressing unless instructed.  Follow the wound care orders provided to you or your home health care provider.  If you have pain, take over the counter pain relievers or pain medication if prescribed.  Elevate the wound and limit excessive activity to prevent bleeding and/or swelling in your wound.  If you see blood coming through the dressing, apply gauze and tape over the dressing and hold firm pressure to the wound with your hand for 5-10 minutes continuously, without peeking, to help the bleeding stop.    Contact [facility] wound care team at [phone] or go to the nearest Emergency department if:    You have a fever greater than 101 taken by mouth.  Your pain gets worse or does not go away, even after taking your regular pain medicine.  Your skin around your wound is red, hot, swollen, or draining pus.  You have bleeding that continues to come through the dressing after holding pressure for 10 minutes     Unna Boot:   It is important to move about with your unna boot on. Talk to your doctor about the right amount of activity for you. If the boot begins feeling tight, you may need to rest and prop your foot and leg on pillows. Try to get your foot higher than your heart.  Do not put things inside the boot to scratch your skin.  Keep your leg propped on pillows as much as you can during the day and at night.  Avoid sitting with your legs bent at the knees for a long time.       When do I need to call the doctor?   Your leg is numb or tingles  Toes are blue, grey, or cool  You  have more swelling in your leg or foot  You have pain when you walk  Drainage that soaks through your dressing    Compression: You may be using a compressive type of dressings to control edema: If so, keep your compression wrap or tubigrip in place. Do not get them wet, they should feel snug. If they feel tight, or cause pain in any way,  elevate your legs above your heart for 15 minutes. If the wraps still cause pain or you can not tolerate compression,  please remove and notify the clinic or your home health.         Call our [facility] wound clinic for questions/concerns a [phone] .

## 2024-11-18 ENCOUNTER — OFFICE VISIT (OUTPATIENT)
Dept: INTERNAL MEDICINE | Facility: CLINIC | Age: 59
End: 2024-11-18
Payer: MEDICAID

## 2024-11-18 VITALS
SYSTOLIC BLOOD PRESSURE: 138 MMHG | BODY MASS INDEX: 42.56 KG/M2 | TEMPERATURE: 98 F | RESPIRATION RATE: 18 BRPM | DIASTOLIC BLOOD PRESSURE: 64 MMHG | OXYGEN SATURATION: 93 % | WEIGHT: 240.19 LBS | HEART RATE: 82 BPM | HEIGHT: 63 IN

## 2024-11-18 DIAGNOSIS — Z79.4 UNCONTROLLED TYPE 2 DIABETES MELLITUS WITH HYPERGLYCEMIA, WITH LONG-TERM CURRENT USE OF INSULIN: ICD-10-CM

## 2024-11-18 DIAGNOSIS — E66.9 DIABETES MELLITUS TYPE 2 IN OBESE: ICD-10-CM

## 2024-11-18 DIAGNOSIS — G25.81 RESTLESS LEG SYNDROME: ICD-10-CM

## 2024-11-18 DIAGNOSIS — Z23 NEED FOR INFLUENZA VACCINATION: Primary | ICD-10-CM

## 2024-11-18 DIAGNOSIS — E11.69 DIABETES MELLITUS TYPE 2 IN OBESE: ICD-10-CM

## 2024-11-18 DIAGNOSIS — F51.01 PRIMARY INSOMNIA: ICD-10-CM

## 2024-11-18 DIAGNOSIS — M86.672 OTHER CHRONIC OSTEOMYELITIS OF LEFT FOOT: ICD-10-CM

## 2024-11-18 DIAGNOSIS — E11.65 UNCONTROLLED TYPE 2 DIABETES MELLITUS WITH HYPERGLYCEMIA, WITH LONG-TERM CURRENT USE OF INSULIN: ICD-10-CM

## 2024-11-18 PROCEDURE — 90656 IIV3 VACC NO PRSV 0.5 ML IM: CPT | Mod: PBBFAC

## 2024-11-18 PROCEDURE — 90471 IMMUNIZATION ADMIN: CPT | Mod: PBBFAC

## 2024-11-18 PROCEDURE — 99215 OFFICE O/P EST HI 40 MIN: CPT | Mod: PBBFAC

## 2024-11-18 RX ORDER — PEN NEEDLE, DIABETIC 32GX 5/32"
NEEDLE, DISPOSABLE MISCELLANEOUS
COMMUNITY
Start: 2024-08-02

## 2024-11-18 RX ORDER — INSULIN GLARGINE 100 [IU]/ML
60 INJECTION, SOLUTION SUBCUTANEOUS NIGHTLY
Qty: 54 ML | Refills: 1 | Status: SHIPPED | OUTPATIENT
Start: 2024-11-18 | End: 2025-05-17

## 2024-11-18 RX ORDER — ROPINIROLE 0.5 MG/1
0.5 TABLET, FILM COATED ORAL 3 TIMES DAILY
Qty: 90 TABLET | Refills: 2 | Status: SHIPPED | OUTPATIENT
Start: 2024-11-18 | End: 2025-02-16

## 2024-11-18 RX ORDER — ISOPROPYL ALCOHOL 70 ML/100ML
1 SWAB TOPICAL 4 TIMES DAILY
Qty: 400 EACH | Refills: 2 | Status: SHIPPED | OUTPATIENT
Start: 2024-11-18 | End: 2025-09-14

## 2024-11-18 RX ORDER — ACETAMINOPHEN, DIPHENHYDRAMINE HCL, PHENYLEPHRINE HCL 325; 25; 5 MG/1; MG/1; MG/1
10 TABLET ORAL NIGHTLY PRN
Qty: 180 TABLET | Refills: 0 | Status: SHIPPED | OUTPATIENT
Start: 2024-11-18 | End: 2025-05-17

## 2024-11-18 RX ORDER — ONDANSETRON 4 MG/1
4 TABLET, ORALLY DISINTEGRATING ORAL EVERY 12 HOURS PRN
Qty: 10 TABLET | Refills: 0 | Status: SHIPPED | OUTPATIENT
Start: 2024-11-18 | End: 2024-11-28

## 2024-11-18 RX ORDER — TIRZEPATIDE 2.5 MG/.5ML
2.5 INJECTION, SOLUTION SUBCUTANEOUS
Qty: 2 ML | Refills: 0 | Status: SHIPPED | OUTPATIENT
Start: 2024-11-18 | End: 2024-12-16

## 2024-11-18 RX ORDER — INSULIN ASPART 100 [IU]/ML
15 INJECTION, SOLUTION INTRAVENOUS; SUBCUTANEOUS
Qty: 40.5 ML | Refills: 1 | Status: SHIPPED | OUTPATIENT
Start: 2024-11-18 | End: 2025-05-17

## 2024-11-18 RX ADMIN — INFLUENZA VIRUS VACCINE 0.5 ML: 15; 15; 15 SUSPENSION INTRAMUSCULAR at 02:11

## 2024-11-18 NOTE — PROGRESS NOTES
[facility] INTERNAL MEDICINE  OUTPATIENT OFFICE VISIT NOTE    SUBJECTIVE:      Chief Complaint: Follow-up       HPI: Suki Anton is a 58 y.o. yo female w/ PMH of  has a past medical history of Diabetes mellitus, Diabetes mellitus, type 2, GERD (gastroesophageal reflux disease), and Hypertension., who presents for medication refills    Continues to have severe L foot pain. Difficulty with ambulation, has not been working since June. Open L ankle wound followed by wound care, and is nearly closed. Denies any fevers and chills. Currently requesting disability, but does not have paperwork available. Pending insurance activation from work.     Past Medical History:   has a past medical history of Diabetes mellitus, Diabetes mellitus, type 2, GERD (gastroesophageal reflux disease), and Hypertension.     Past Surgical History:   has no past surgical history on file.     Family History:  family history includes Cancer in her father; Cataracts in her mother.     Social History:   reports that she has never smoked. She has never used smokeless tobacco. She reports that she does not drink alcohol and does not use drugs.     Allergies:  is allergic to shellfish containing products.     Home Medications:  Prior to Admission medications    Medication Sig Start Date End Date Taking? Authorizing Provider   atorvastatin (LIPITOR) 20 MG tablet Take 1 tablet (20 mg total) by mouth once daily. 8/29/22 1/19/23 Yes Dick Gallagher, DO   blood sugar diagnostic Strp Test strips to check CBG's 8/29/22 1/19/23 Yes Dick Gallagher, DO   blood-glucose meter (TRUE METRIX GLUCOSE METER) kit Use as instructed 5/31/22 1/19/23 Yes Batsheva Ballesteros, NP   diclofenac sodium (VOLTAREN) 1 % Gel Apply 2 g topically 4 (four) times daily. 8/29/22 1/19/23 Yes Dick Gallagher, DO   gabapentin (NEURONTIN) 300 MG capsule Take 1 capsule (300 mg total) by mouth 3 (three) times daily. 12/1/22 1/19/23 Yes Dick Gallagher, DO   glipiZIDE (GLUCOTROL) 10 MG tablet Take 1 tablet (10 mg  "total) by mouth 2 (two) times daily with meals. 8/29/22 1/19/23 Yes Dick Gallagher DO   insulin detemir U-100 (LEVEMIR FLEXTOUCH U-100 INSULN) 100 unit/mL (3 mL) InPn pen Inject 30 Units into the skin every evening. 8/29/22 1/19/23 Yes Dick Gallagher DO   lisinopriL (PRINIVIL,ZESTRIL) 20 MG tablet Take 1 tablet (20 mg total) by mouth once daily. 12/1/22 1/19/23 Yes Dick Gallagher DO   meloxicam (MOBIC) 15 MG tablet Take 1 tablet (15 mg total) by mouth once daily. 8/29/22 1/19/23 Yes Dick Gallagher DO   pen needle, diabetic 32 gauge x 5/32" Ndle Use BD fany needle one times a day injections 8/29/22 1/19/23 Yes Dick Gallagher DO   rOPINIRole (REQUIP) 0.5 MG tablet Take 1 tablet (0.5 mg total) by mouth 3 (three) times daily. 12/1/22 1/19/23 Yes Dick Gallagher DO   atorvastatin (LIPITOR) 20 MG tablet Take 1 tablet (20 mg total) by mouth once daily. 1/19/23 1/19/24  Dick Gallagher DO   blood sugar diagnostic Strp Test strips to check CBG's 1/19/23   Dick Gallagher DO   blood-glucose meter (TRUE METRIX GLUCOSE METER) kit Use as instructed 1/19/23 1/19/24  Dick Gallagher DO   blood-glucose meter kit 1 each by Other route 2 (two) times daily. Use as instructed 1/19/23   Dick Gallagher DO   cetirizine (ZYRTEC) 10 MG tablet Take 1 tablet (10 mg total) by mouth once daily. 1/19/23 4/19/23  Dick Gallagher DO   clotrimazole (LOTRIMIN) 1 % cream Apply topically 2 (two) times daily. for 14 days 1/19/23 2/2/23  Dick Gallagher DO   diclofenac sodium (VOLTAREN) 1 % Gel Apply 2 g topically 4 (four) times daily. 1/19/23   Dick Gallagher DO   gabapentin (NEURONTIN) 300 MG capsule Take 1 capsule (300 mg total) by mouth 3 (three) times daily. 1/19/23 4/19/23  Dick Gallagher DO   glipiZIDE (GLUCOTROL) 10 MG tablet Take 1 tablet (10 mg total) by mouth 2 (two) times daily with meals. 1/19/23   Dick Gallagher DO   insulin detemir U-100 (LEVEMIR FLEXTOUCH U-100 INSULN) 100 unit/mL (3 mL) InPn pen Inject 30 Units into the skin every evening. 1/19/23 1/19/24  Dick Gallagher,  " "  lisinopriL (PRINIVIL,ZESTRIL) 20 MG tablet Take 1 tablet (20 mg total) by mouth once daily. 1/19/23 4/19/23  Dick Gallagher DO   meloxicam (MOBIC) 15 MG tablet Take 1 tablet (15 mg total) by mouth once daily. 1/19/23   Dick Gallagher DO   omeprazole (PRILOSEC) 20 MG capsule Take 2 capsules (40 mg total) by mouth once daily. 1/19/23 4/19/23  Dick Gallagher DO   pen needle, diabetic 32 gauge x 5/32" Ndle Use BD fany needle one times a day injections 1/19/23   Dick Gallagher DO   rOPINIRole (REQUIP) 0.5 MG tablet Take 1 tablet (0.5 mg total) by mouth 3 (three) times daily. 1/19/23 4/19/23  Dick Gallagher DO   blood-glucose meter kit 1 each by Other route 2 (two) times daily. Use as instructed  1/19/23  Historical Provider   cetirizine (ZYRTEC) 10 MG tablet Take 1 tablet (10 mg total) by mouth once daily. 8/29/22 1/19/23  Dick Gallagher DO   clotrimazole (LOTRIMIN) 1 % cream Apply topically 2 (two) times daily. for 14 days 9/23/22 1/19/23  Ashley Le, NP   famotidine (PEPCID) 20 MG tablet Take 1 tablet (20 mg total) by mouth once daily. for 7 days 8/29/22 1/19/23  Dick Gallagher DO   fluticasone propionate (FLONASE) 50 mcg/actuation nasal spray 1 spray (50 mcg total) by Each Nostril route once daily.  Patient not taking: Reported on 1/19/2023 8/29/22 1/19/23  Dick Gallagher DO   nystatin (MYCOSTATIN) ointment Apply topically 3 (three) times daily. for 14 days 10/6/22 1/19/23  Devi Alegre, JAYDEP   omeprazole (PRILOSEC) 20 MG capsule Take 2 capsules (40 mg total) by mouth once daily. 8/29/22 1/19/23  Dick Gallagher DO       ROS:  Review of Systems   All other systems reviewed and are negative.          OBJECTIVE:     Vital signs:   /64 (BP Location: Left arm, Patient Position: Sitting)   Pulse 82   Temp 98.2 °F (36.8 °C) (Oral)   Resp 18   Ht 5' 3" (1.6 m)   Wt 109 kg (240 lb 3.2 oz)   SpO2 (!) 93%   BMI 42.55 kg/m²      Physical Examination:  General: Patient resting comfortably in chair, in no acute distress "   Eye: PERRLA, EOMI, clear conjunctiva, eyelids normal  HENT: Head-normocephalic and atraumatic  Neck: full range of motion, no thyromegaly or lymphadenopathy, trachea midline  Respiratory: clear to auscultation bilaterally without wheezes, rales, rhonchi  Cardiovascular: regular rate and rhythm without murmurs.  No gallops or rubs no JVD.  Capillary refill within normal limits.  Musculoskeletal: full range of motion of all extremities/spine without limitation or discomfort.  Left ankle in brace, limited range of motion.  Integumentary: no rashes or skin lesions present  Neurologic: no signs of peripheral neurological deficit, motor/sensory function intact  Psychiatric:  alert and oriented, cognitive function intact, cooperative with exam, good eye contact, judgement and insight intact, mood and affect full range.     Protective Sensation (w/ 10 gram monofilament):  Right: Decreased  Left:  did not evaluate    Visual Inspection:  Masceration present R foot    Pedal Pulses:   Right: Present  Left:  did not evaluate    Posterior Tibialis Pulses:   Right:Present  Left:  did not evaluate      Labs:  CMP:   Lab Results   Component Value Date    GLUCOSE 171 (H) 11/11/2024    CALCIUM 9.1 11/11/2024    ALBUMIN 3.2 (L) 11/11/2024     11/11/2024    K 4.1 11/11/2024    CO2 25 11/11/2024     11/11/2024    BUN 24.4 (H) 11/11/2024    CREATININE 0.75 11/11/2024    ALKPHOS 78 11/11/2024    ALT 13 11/11/2024    AST 10 11/11/2024    BILITOT 0.3 11/11/2024          ASSESSMENT & PLAN:     Osteomyelitis of calcaneus  -Recently admitted to [facility] for osteomyelitis, and was in LTAC for 6 weeks for IV Antibiotics  -Now willing to undergo BKA  -Will optimize glucose control prior to BKA    Diabetes mellitus, Type 2  -Patient now able to afford medications, will continue previous insulin regimen as before  -Current regimen, Lantus 60U QHS and Novolog 15U TIDWM  -Educated on titrating insulin regimen, will consider starting oral  medications next visit  -Diabetic foot exam performed previous visit, Eye exam previous visit showed mild diabetic eye disease  -Unable to tolerate metformin due to constant diarrhea  -Will attempt to get modwayne T2DM with HLD    Hypertension  -Well controlled today, 138/64  -Nifedipine 60 mg, Hydralazine 25 mg TID     Hyperlipidemia  -triglycerides significantly uncontrolled likely due to diabetes  -continue atorvastatin to 40 mg     GERD  -No reported issues today  -Continue Omeprazole and Pepcid     Restless Leg Syndrome  -Ropinirole has been helpful, however ran out before visit today  -Refilled ropinirole 0.5 mg QHS     Sciatica, Right sided- Improved, no complaints today  -Continue meloxicam 15 mg qd  -Recommended patient to lose weight, stretch, and yoga to improve her sciatica pain  -Patient declined lumbar Xray due to being self pay  -Cautioned continuous usage of NSAIDs given PMHx Diabetes    Health Maintenance  Vaccinations  Immunization History   Administered Date(s) Administered    Influenza - Quadrivalent 12/10/2019, 2020    Influenza - Quadrivalent - PF (6-35 months) 01/15/2018, 11/15/2018    Influenza - Quadrivalent - PF *Preferred* (6 months and older) 2023, 2024    Influenza - Trivalent - Fluarix, Flulaval, Fluzone, Afluria - PF 2024    Pneumococcal Conjugate - 20 Valent 2023    Pneumococcal Polysaccharide - 23 Valent 2017    Tdap 2017    Zoster Recombinant 2020, 2021       -Flu: Done 3/11/24   -Pneumonia: Done 2023   -Shingles: done on 2021, two doses   -Tdap: done on 2017   -COVID:  Advised to obtain it pharmacy  Screening   -Pap Smear:  missed appointment needs to call to reschedule   -Mammogram:  Reordered   -Osteoporosis:  N/a   -Lung Ca. Screening:  Never smoker   -Colon Ca. Screenin2024 Cologuard sent   -Hep C, HIV: will address at next visit  Social   -EtOH:  reports no history of alcohol use.   -Tobacco:   reports that she has never smoked. She has never used smokeless tobacco.   -Drugs:  reports no history of drug use.    -Depression:   Depression: Low Risk  (8/29/2024)    Depression     Last PHQ-4: Flowsheet Data: 0        Return to clinic in 3 months    Dick Gallagher DO  [facility] Internal Medicine, PGY-III

## 2024-11-18 NOTE — PROGRESS NOTES
I have reveiwed and agree with the resident's findings, including all diagnostic interpretations and plans as written.    Will increase long acting insulin since fasting glucose is over 200. Her A1C is much improved from before, but since she is considering pursuing BKA and will need adequate wound healing, we will increase her insulin more. Has had vascular evaluation with normal NEMO's.

## 2024-12-05 RX ORDER — TRAZODONE HYDROCHLORIDE 50 MG/1
25 TABLET ORAL NIGHTLY PRN
Qty: 15 TABLET | Refills: 0 | Status: SHIPPED | OUTPATIENT
Start: 2024-12-05 | End: 2025-01-04

## 2024-12-05 NOTE — TELEPHONE ENCOUNTER
[facility] pharmacy also requesting a refill on Tramadol HCL 50mg tabs. Medication is not in pt med list. Also it hasn't been filled since 2023. Please adviise

## 2024-12-10 ENCOUNTER — OFFICE VISIT (OUTPATIENT)
Dept: SURGERY | Facility: CLINIC | Age: 59
End: 2024-12-10
Payer: MEDICAID

## 2024-12-10 ENCOUNTER — HOSPITAL ENCOUNTER (OUTPATIENT)
Dept: RADIOLOGY | Facility: HOSPITAL | Age: 59
Discharge: HOME | End: 2024-12-10
Payer: MEDICAID

## 2024-12-10 VITALS
RESPIRATION RATE: 19 BRPM | DIASTOLIC BLOOD PRESSURE: 78 MMHG | OXYGEN SATURATION: 95 % | WEIGHT: 247 LBS | HEART RATE: 88 BPM | SYSTOLIC BLOOD PRESSURE: 147 MMHG | HEIGHT: 63 IN | TEMPERATURE: 98 F | BODY MASS INDEX: 43.77 KG/M2

## 2024-12-10 DIAGNOSIS — M21.962 LEFT ANKLE JOINT DEFORMITY: ICD-10-CM

## 2024-12-10 DIAGNOSIS — E11.65 TYPE 2 DIABETES MELLITUS WITH HYPERGLYCEMIA, UNSPECIFIED WHETHER LONG TERM INSULIN USE: Primary | ICD-10-CM

## 2024-12-10 PROCEDURE — 73610 X-RAY EXAM OF ANKLE: CPT | Mod: TC,LT

## 2024-12-10 PROCEDURE — 99215 OFFICE O/P EST HI 40 MIN: CPT | Mod: PBBFAC,25

## 2024-12-10 PROCEDURE — 73630 X-RAY EXAM OF FOOT: CPT | Mod: TC,LT

## 2024-12-10 NOTE — PROGRESS NOTES
[facility] Note     HPI: Patient is a 57 y/o woman with PMHx of DM Type 2 and HTN who presents for BKA evaluation. She was admitted to the hospital in October of 2024 for osteomyelitis of the left ankle, and at that time did not want an amputation. She was discharged from her LT[facility] in November, and has been following with wound care since. She now wants a BKA after discussions with wound care, as she states she will never have meaningful function of this leg and it will not heal. She denies fevers, chills, chest pain, SOB, abdominal pain, diarrhea, and vomiting. She has never had a surgery. She saw her PCP one month ago and they adjusted her insulin regiment to get her DM under better control. Her most recent A1C in October was 8.7.     PMH:        Past Medical History:   Diagnosis Date    Diabetes mellitus      Diabetes mellitus, type 2      GERD (gastroesophageal reflux disease)      Hypertension        Meds:   Current Medications   Current Outpatient Medications:     alcohol swabs PadM, Apply 1 each topically 4 (four) times daily., Disp: 400 each, Rfl: 2    atorvastatin (LIPITOR) 40 MG tablet, Take 1 tablet (40 mg total) by mouth every evening., Disp: 30 tablet, Rfl: 1    blood sugar diagnostic Strp, Test strips to check CBG's, Disp: 200 each, Rfl: 1    doxycycline (VIBRA-TABS) 100 MG tablet, Take 1 tablet (100 mg total) by mouth every 12 (twelve) hours., Disp: 60 tablet, Rfl: 0    EASY TOUCH TWIST LANCETS 33 gauge Misc, USE TO CHECK BLOOD SUAGR LEVELS TWO TIMES A DAY, Disp: 100 each, Rfl: 1    gabapentin (NEURONTIN) 300 MG capsule, Take 2 capsules (600 mg total) by mouth 3 (three) times daily., Disp: 180 capsule, Rfl: 1    hydrALAZINE (APRESOLINE) 25 MG tablet, Take 1 tablet (25 mg total) by mouth every 8 (eight) hours., Disp: 90 tablet, Rfl: 1    insulin aspart U-100 (NOVOLOG FLEXPEN U-100 INSULIN) 100 unit/mL (3 mL) InPn pen, Inject 15 Units into the skin 3 (three) times daily with meals., Disp:  "40.5 mL, Rfl: 1    insulin glargine U-100, Lantus, (LANTUS SOLOSTAR U-100 INSULIN) 100 unit/mL (3 mL) InPn pen, Inject 60 Units into the skin every evening., Disp: 54 mL, Rfl: 1    lancing device Misc, 1 each by Misc.(Non-Drug; Combo Route) route 4 (four) times daily with meals and nightly., Disp: 200 each, Rfl: 5    melatonin 10 mg Tab, Take 1 tablet (10 mg total) by mouth nightly as needed (insomnia)., Disp: 180 tablet, Rfl: 0    NIFEdipine (PROCARDIA-XL) 60 MG (OSM) 24 hr tablet, Take 1 tablet (60 mg total) by mouth once daily., Disp: , Rfl:     rOPINIRole (REQUIP) 0.5 MG tablet, Take 1 tablet (0.5 mg total) by mouth 3 (three) times daily., Disp: 90 tablet, Rfl: 2    TECHLITE PEN NEEDLE 32 gauge x 5/32" Ndle, USE 1 NEEDLE DAILY, Disp: , Rfl:     traZODone (DESYREL) 50 MG tablet, Take 0.5 tablets (25 mg total) by mouth nightly as needed for Insomnia., Disp: 15 tablet, Rfl: 0    TRUE METRIX GLUCOSE METER Misc, Inject 1 each into the skin 4 (four) times daily with meals and nightly. use as directed, Disp: 1 each, Rfl: 0    omeprazole (PRILOSEC) 20 MG capsule, Take 2 capsules (40 mg total) by mouth once daily., Disp: 60 capsule, Rfl: 2    tirzepatide (MOUNJARO) 2.5 mg/0.5 mL PnIj, Inject 2.5 mg into the skin every 7 days. (Patient not taking: Reported on 12/10/2024), Disp: 2 mL, Rfl: 0     Allergies:         Review of patient's allergies indicates:   Allergen Reactions    Shellfish containing products Hives and Itching       Crawfish only      Social History:   Social History   Social History           Tobacco Use    Smoking status: Never    Smokeless tobacco: Never   Substance Use Topics    Alcohol use: Never    Drug use: Never         Family History:          Family History   Problem Relation Name Age of Onset    Cataracts Mother        Cancer Father          Surgical History: History reviewed. No pertinent surgical history.  Review of Systems:  Otherwise negative unless noted above     Objective:     Vitals:    "   Vitals:     12/10/24 1018   BP: (!) 147/78   Pulse:     Resp:     Temp:           Physical Exam:  Gen: NAD  Neuro: awake, alert, answering questions appropriately  CV: RRR  Resp: non-labored breathing, CASEY  Abd: soft, ND, NT  : not performed  Ext: moves all 4 spontaneously and purposefully; decreased sensation to the left foot below the ankle; 3 cm mass appreciated on the lateral surface of the left foot which is clean, with fibrinous material on the wound with no drainage or erythema.                 Biphasic doppler sounds to left DP and PT  Skin: warm, well perfused     Pertinent Labs:        Hemoglobin A1c   Date Value Ref Range Status   10/25/2024 8.7 (H) <=7.0 % Final   09/30/2024 10.2 (H) <=7.0 % Final   04/13/2023 11.4 (H) <=7.0 % Final               Assessment/Plan:  Patient is a 59 y/o woman with DM Type 2 who presents for evaluation of a BKA.      - patient's questions about DM Type 2, A1C, and surgery were answered  - A1C currently too high to undergo BKA  - follow up in 2 months after next A1C is taken     Buck Beckman, MS3  [facility]        Edited by Buck Beckman, 12/10/2024 11:12 AM       Buck Beckman   Medical Student     Progress Notes      Sign when Signing Visit     Encounter Date: 12/10/2024  Creation Time: 12/10/2024 11:06 AM          Expand All Collapse All    [facility]     HPI: Patient is a 59 y/o woman with PMHx of DM Type 2 and HTN who presents for BKA evaluation. She was admitted to the hospital in October of 2024 for osteomyelitis of the left ankle, and at that time did not want an amputation. She was discharged from her LTAC in November, and has been following with wound care since. She now wants a BKA after discussions with wound care, as she states she will never have meaningful function of this leg and it will not heal. She denies fevers, chills, chest pain, SOB, abdominal pain, diarrhea, and vomiting. She has never had a  surgery. She saw her PCP one month ago and they adjusted her insulin regiment to get her DM under better control. Her most recent A1C in October was 8.7.     PMH:        Past Medical History:   Diagnosis Date    Diabetes mellitus      Diabetes mellitus, type 2      GERD (gastroesophageal reflux disease)      Hypertension        Meds:   Current Medications   Current Outpatient Medications:     alcohol swabs PadM, Apply 1 each topically 4 (four) times daily., Disp: 400 each, Rfl: 2    atorvastatin (LIPITOR) 40 MG tablet, Take 1 tablet (40 mg total) by mouth every evening., Disp: 30 tablet, Rfl: 1    blood sugar diagnostic Strp, Test strips to check CBG's, Disp: 200 each, Rfl: 1    doxycycline (VIBRA-TABS) 100 MG tablet, Take 1 tablet (100 mg total) by mouth every 12 (twelve) hours., Disp: 60 tablet, Rfl: 0    EASY TOUCH TWIST LANCETS 33 gauge Misc, USE TO CHECK BLOOD SUAGR LEVELS TWO TIMES A DAY, Disp: 100 each, Rfl: 1    gabapentin (NEURONTIN) 300 MG capsule, Take 2 capsules (600 mg total) by mouth 3 (three) times daily., Disp: 180 capsule, Rfl: 1    hydrALAZINE (APRESOLINE) 25 MG tablet, Take 1 tablet (25 mg total) by mouth every 8 (eight) hours., Disp: 90 tablet, Rfl: 1    insulin aspart U-100 (NOVOLOG FLEXPEN U-100 INSULIN) 100 unit/mL (3 mL) InPn pen, Inject 15 Units into the skin 3 (three) times daily with meals., Disp: 40.5 mL, Rfl: 1    insulin glargine U-100, Lantus, (LANTUS SOLOSTAR U-100 INSULIN) 100 unit/mL (3 mL) InPn pen, Inject 60 Units into the skin every evening., Disp: 54 mL, Rfl: 1    lancing device Misc, 1 each by Misc.(Non-Drug; Combo Route) route 4 (four) times daily with meals and nightly., Disp: 200 each, Rfl: 5    melatonin 10 mg Tab, Take 1 tablet (10 mg total) by mouth nightly as needed (insomnia)., Disp: 180 tablet, Rfl: 0    NIFEdipine (PROCARDIA-XL) 60 MG (OSM) 24 hr tablet, Take 1 tablet (60 mg total) by mouth once daily., Disp: , Rfl:     rOPINIRole (REQUIP) 0.5 MG tablet, Take 1 tablet  "(0.5 mg total) by mouth 3 (three) times daily., Disp: 90 tablet, Rfl: 2    TECHLITE PEN NEEDLE 32 gauge x 5/32" Ndle, USE 1 NEEDLE DAILY, Disp: , Rfl:     traZODone (DESYREL) 50 MG tablet, Take 0.5 tablets (25 mg total) by mouth nightly as needed for Insomnia., Disp: 15 tablet, Rfl: 0    TRUE METRIX GLUCOSE METER Misc, Inject 1 each into the skin 4 (four) times daily with meals and nightly. use as directed, Disp: 1 each, Rfl: 0    omeprazole (PRILOSEC) 20 MG capsule, Take 2 capsules (40 mg total) by mouth once daily., Disp: 60 capsule, Rfl: 2    tirzepatide (MOUNJARO) 2.5 mg/0.5 mL PnIj, Inject 2.5 mg into the skin every 7 days. (Patient not taking: Reported on 12/10/2024), Disp: 2 mL, Rfl: 0     Allergies:         Review of patient's allergies indicates:   Allergen Reactions    Shellfish containing products Hives and Itching       Crawfish only      Social History:   Social History   Social History           Tobacco Use    Smoking status: Never    Smokeless tobacco: Never   Substance Use Topics    Alcohol use: Never    Drug use: Never         Family History:          Family History   Problem Relation Name Age of Onset    Cataracts Mother        Cancer Father          Surgical History: History reviewed. No pertinent surgical history.  Review of Systems:  Otherwise negative unless noted above     Objective:     Vitals:      Vitals:     12/10/24 1018   BP: (!) 147/78   Pulse:     Resp:     Temp:           Physical Exam:  Gen: NAD  Neuro: awake, alert, answering questions appropriately  CV: RRR  Resp: non-labored breathing, CASEY  Abd: soft, ND, NT  : not performed  Ext: moves all 4 spontaneously and purposefully; decreased sensation to the left foot below the ankle; 3 cm mass appreciated on the lateral surface of the left foot which is clean, with fibrinous material on the wound with no drainage or erythema.                 Biphasic doppler sounds to left DP and PT  Skin: warm, well perfused     Pertinent Labs:      "   Hemoglobin A1c   Date Value Ref Range Status   10/25/2024 8.7 (H) <=7.0 % Final   09/30/2024 10.2 (H) <=7.0 % Final   04/13/2023 11.4 (H) <=7.0 % Final               Assessment/Plan:  Patient is a 57 y/o woman with DM Type 2 who presents for evaluation of a BKA.      - patient's questions about DM Type 2, A1C, and surgery were answered  - A1C currently too high to undergo BKA  - follow up in 2 months after next A1C is taken     Buck Beckman, MS3  [facility] [employer]    Attestation:  I have seen and evaluated patient, agree with above assessment and plan. Have ordered repeat A1c, will follow up in clinic in two months for surgical planning.    Milagros Ladd MD   [facility] General Surgery, PGY-3

## 2024-12-10 NOTE — PROGRESS NOTES
[facility] Note    HPI: Patient is a 57 y/o woman with PMHx of DM Type 2 and HTN who presents for BKA evaluation. She was admitted to the hospital in October of 2024 for osteomyelitis of the left ankle, and at that time did not want an amputation. She was discharged from her LTAC in November, and has been following with wound care since. She now wants a BKA after discussions with wound care, as she states she will never have meaningful function of this leg and it will not heal. She denies fevers, chills, chest pain, SOB, abdominal pain, diarrhea, and vomiting. She has never had a surgery. She saw her PCP one month ago and they adjusted her insulin regiment to get her DM under better control. Her most recent A1C in October was 8.7.    PMH:   Past Medical History:   Diagnosis Date    Diabetes mellitus     Diabetes mellitus, type 2     GERD (gastroesophageal reflux disease)     Hypertension       Meds:   Current Outpatient Medications:     alcohol swabs PadM, Apply 1 each topically 4 (four) times daily., Disp: 400 each, Rfl: 2    atorvastatin (LIPITOR) 40 MG tablet, Take 1 tablet (40 mg total) by mouth every evening., Disp: 30 tablet, Rfl: 1    blood sugar diagnostic Strp, Test strips to check CBG's, Disp: 200 each, Rfl: 1    doxycycline (VIBRA-TABS) 100 MG tablet, Take 1 tablet (100 mg total) by mouth every 12 (twelve) hours., Disp: 60 tablet, Rfl: 0    EASY TOUCH TWIST LANCETS 33 gauge Misc, USE TO CHECK BLOOD SUAGR LEVELS TWO TIMES A DAY, Disp: 100 each, Rfl: 1    gabapentin (NEURONTIN) 300 MG capsule, Take 2 capsules (600 mg total) by mouth 3 (three) times daily., Disp: 180 capsule, Rfl: 1    hydrALAZINE (APRESOLINE) 25 MG tablet, Take 1 tablet (25 mg total) by mouth every 8 (eight) hours., Disp: 90 tablet, Rfl: 1    insulin aspart U-100 (NOVOLOG FLEXPEN U-100 INSULIN) 100 unit/mL (3 mL) InPn pen, Inject 15 Units into the skin 3 (three) times daily with meals., Disp: 40.5 mL, Rfl: 1    insulin  "glargine U-100, Lantus, (LANTUS SOLOSTAR U-100 INSULIN) 100 unit/mL (3 mL) InPn pen, Inject 60 Units into the skin every evening., Disp: 54 mL, Rfl: 1    lancing device Misc, 1 each by Misc.(Non-Drug; Combo Route) route 4 (four) times daily with meals and nightly., Disp: 200 each, Rfl: 5    melatonin 10 mg Tab, Take 1 tablet (10 mg total) by mouth nightly as needed (insomnia)., Disp: 180 tablet, Rfl: 0    NIFEdipine (PROCARDIA-XL) 60 MG (OSM) 24 hr tablet, Take 1 tablet (60 mg total) by mouth once daily., Disp: , Rfl:     rOPINIRole (REQUIP) 0.5 MG tablet, Take 1 tablet (0.5 mg total) by mouth 3 (three) times daily., Disp: 90 tablet, Rfl: 2    TECHLITE PEN NEEDLE 32 gauge x 5/32" Ndle, USE 1 NEEDLE DAILY, Disp: , Rfl:     traZODone (DESYREL) 50 MG tablet, Take 0.5 tablets (25 mg total) by mouth nightly as needed for Insomnia., Disp: 15 tablet, Rfl: 0    TRUE METRIX GLUCOSE METER Misc, Inject 1 each into the skin 4 (four) times daily with meals and nightly. use as directed, Disp: 1 each, Rfl: 0    omeprazole (PRILOSEC) 20 MG capsule, Take 2 capsules (40 mg total) by mouth once daily., Disp: 60 capsule, Rfl: 2    tirzepatide (MOUNJARO) 2.5 mg/0.5 mL PnIj, Inject 2.5 mg into the skin every 7 days. (Patient not taking: Reported on 12/10/2024), Disp: 2 mL, Rfl: 0  Allergies:   Review of patient's allergies indicates:   Allergen Reactions    Shellfish containing products Hives and Itching     Crawfish only     Social History:   Social History     Tobacco Use    Smoking status: Never    Smokeless tobacco: Never   Substance Use Topics    Alcohol use: Never    Drug use: Never     Family History:   Family History   Problem Relation Name Age of Onset    Cataracts Mother      Cancer Father       Surgical History: History reviewed. No pertinent surgical history.  Review of Systems:  Otherwise negative unless noted above    Objective:    Vitals:  Vitals:    12/10/24 1018   BP: (!) 147/78   Pulse:    Resp:    Temp:     "     Physical Exam:  Gen: NAD  Neuro: awake, alert, answering questions appropriately  CV: RRR  Resp: non-labored breathing, CASEY  Abd: soft, ND, NT  : not performed  Ext: moves all 4 spontaneously and purposefully; decreased sensation to the left foot below the ankle; 3 cm mass appreciated on the lateral surface of the left foot which is clean, with fibrinous material on the wound with no drainage or erythema.   Biphasic doppler sounds to left DP and PT  Skin: warm, well perfused    Pertinent Labs:  Hemoglobin A1c   Date Value Ref Range Status   10/25/2024 8.7 (H) <=7.0 % Final   09/30/2024 10.2 (H) <=7.0 % Final   04/13/2023 11.4 (H) <=7.0 % Final           Assessment/Plan:  Patient is a 57 y/o woman with DM Type 2 who presents for evaluation of a BKA.     - patient's questions about DM Type 2, A1C, and surgery were answered  - A1C currently too high to undergo BKA  - follow up in 2 months after next A1C is taken    Buck Beckman, MS3  [facility]

## 2024-12-12 DIAGNOSIS — M86.672 OTHER CHRONIC OSTEOMYELITIS OF LEFT FOOT: Primary | ICD-10-CM

## 2024-12-12 DIAGNOSIS — F51.01 PRIMARY INSOMNIA: ICD-10-CM

## 2024-12-12 NOTE — PROGRESS NOTES
I have reviewed the notes, assessments, and/or procedures performed by Dr Perez, I concur with her/his documentation of Suki Anton.  Date of Service: 12/10/2024    [facility]   SUBJECTIVE:     Yessy Currie is a 17 year old female, here for a routine health maintenance visit.    Patient was roomed by: Kami Silva CMA    Well Child    Social History  Patient accompanied by:  Mother  Questions or concerns?: YES    Forms to complete? No  Child lives with::  Mother  Languages spoken in the home:  English  Recent family changes/ special stressors?:  None noted    Safety / Health Risk    TB Exposure:     No TB exposure    Child always wear seatbelt?  Yes  Helmet worn for bicycle/roller blades/skateboard?  Yes    Home Safety Survey:      Firearms in the home?: No       Daily Activities    Diet     Child gets at least 4 servings fruit or vegetables daily: Yes    Servings of juice, non-diet soda, punch or sports drinks per day: water    Sleep       Sleep concerns: difficulty falling asleep     Bedtime: 00:00     Wake time on school day: 08:00     Sleep duration (hours): 8     Does your child have difficulty shutting off thoughts at night?: YES   Does your child take day time naps?: YES    Dental    Water source:  Bottled water and filtered water    Dental provider: patient has a dental home    Dental exam in last 6 months: Yes     Risks: child has or had a cavity and drinks juice or pop more than 3 times daily    Media    TV in child's room: YES    Types of media used: iPad    Daily use of media (hours): 5    School    Name of school: [employer]    Grade level: 10th    School performance: at grade level    Grades: c    Schooling concerns? No    Days missed current/ last year: none    Academic problems: problems in mathematics    Academic problems: no problems in reading, no problems in writing and no learning disabilities     Activities    Minimum of 60 minutes per day of physical activity: Yes    Activities: age appropriate activities    Organized/ Team sports: basketball, dance and other    Sports physical needed: YES    GENERAL QUESTIONS  1. Do you have any concerns that you  would like to discuss with a provider?: No  2. Has a provider ever denied or restricted your participation in sports for any reason?: No    3. Do you have any ongoing medical issues or recent illness?: No    HEART HEALTH QUESTIONS ABOUT YOU  4. Have you ever passed out or nearly passed out during or after exercise?: No  5. Have you ever had discomfort, pain, tightness, or pressure in your chest during exercise?: No    6. Does your heart ever race, flutter in your chest, or skip beats (irregular beats) during exercise?: No    7. Has a doctor ever told you that you have any heart problems?: No  8. Has a doctor ever requested a test for your heart? For example, electrocardiography (ECG) or echocardiography.: No    9. Do you ever get light-headed or feel shorter of breath than your friends during exercise?: No    10. Have you ever had a seizure?: No      HEART HEALTH QUESTIONS ABOUT YOUR FAMILY  11. Has any family member or relative  of heart problems or had an unexpected or unexplained sudden death before age 35 years (including drowning or unexplained car crash)?: No    12. Does anyone in your family have a genetic heart problem such as hypertrophic cardiomyopathy (HCM), Marfan syndrome, arrhythmogenic right ventricular cardiomyopathy (ARVC), long QT syndrome (LQTS), short QT syndrome (SQTS), Brugada syndrome, or catecholaminergic polymorphic ventricular tachycardia (CPVT)?  : No    13. Has anyone in your family had a pacemaker or an implanted defibrillator before age 35?: No      BONE AND JOINT QUESTIONS  14. Have you ever had a stress fracture or an injury to a bone, muscle, ligament, joint, or tendon that caused you to miss a practice or game?: No    15. Do you have a bone, muscle, ligament, or joint injury that bothers you?: No      MEDICAL QUESTIONS  16. Do you cough, wheeze, or have difficulty breathing during or after exercise?  : No   17. Are you missing a kidney, an eye, a testicle (males), your spleen,  or any other organ?: No    18. Do you have groin or testicle pain or a painful bulge or hernia in the groin area?: No    19. Do you have any recurring skin rashes or rashes that come and go, including herpes or methicillin-resistant Staphylococcus aureus (MRSA)?: No    20. Have you had a concussion or head injury that caused confusion, a prolonged headache, or memory problems?: No    21. Have you ever had numbness, tingling, weakness in your arms or legs, or been unable to move your arms or legs after being hit or falling?: No    22. Have you ever become ill while exercising in the heat?: No    23. Do you or does someone in your family have sickle cell trait or disease?: No    24. Have you ever had, or do you have any problems with your eyes or vision?: No    25. Do you worry about your weight?: No    26.  Are you trying to or has anyone recommended that you gain or lose weight?: No    27. Are you on a special diet or do you avoid certain types of foods or food groups?: No    28. Have you ever had an eating disorder?: No      FEMALES ONLY  29. Have you ever had a menstrual period? : Yes    30. How old were you when you had your first menstrual period?:  14  31. When was your most recent menstrual period?: 09/2020              Dental visit recommended: Dental home established, continue care every 6 months  Dental varnish declined by parent    Cardiac risk assessment:     Family history (males <55, females <65) of angina (chest pain), heart attack, heart surgery for clogged arteries, or stroke: no    Biological parent(s) with a total cholesterol over 240:  no  Dyslipidemia risk:          VISION    Corrective lenses: No corrective lenses (H Plus Lens Screening required)  Tool used: Raul  Right eye: 10/8 (20/16)  Left eye: 10/10 (20/20)  Two Line Difference: No  Visual Acuity: Pass  H Plus Lens Screening: Pass    Vision Assessment: normal        PSYCHO-SOCIAL/DEPRESSION  General screening:  She is doing much better  with her depression. School is going well. She is working at [employer]  Depression: No current symptoms  Family relationships: she has had difficulty with the relationship with her Mom. Presently going well    ACTIVITIES:  Working at [employer]    DRUGS  Smoking:  no  Passive smoke exposure:  no  Alcohol:  no  Drugs:  no    SEXUALITY  presently not sexually active. Has Nexplanon        PROBLEM LIST  Patient Active Problem List   Diagnosis     Depression with suicidal ideation     Keloid scar     Parent-child conflict     Major depressive disorder in partial remission, unspecified whether recurrent (H)     MEDICATIONS  Current Outpatient Medications   Medication Sig Dispense Refill     benzoyl peroxide-erythromycin (BENZAMYCIN) 5-3 % external gel Apply topically 2 times daily 23.6 g 3     cloNIDine (CATAPRES) 0.3 MG tablet        hydrOXYzine (ATARAX) 25 MG tablet        methylphenidate (CONCERTA) 27 MG CR tablet         ALLERGY  No Known Allergies    IMMUNIZATIONS  Immunization History   Administered Date(s) Administered     Comvax (HIB/HepB) 2005, 2005, 06/09/2006     DTAP (<7y) 2005, 2005, 2005, 11/30/2006     DTAP-IPV, <7Y 06/28/2010     HEPA 06/28/2010, 05/08/2013     HPV Quadrivalent 06/28/2019     HPV9 02/05/2020     Influenza Vaccine IM > 6 months Valent IIV4 01/15/2015, 12/01/2015, 02/05/2020, 10/29/2020     MMR 06/09/2006, 06/28/2010     Meningococcal (Menactra ) 06/29/2017     Pneumococcal (PCV 7) 2005, 2005, 2005, 11/30/2006     Poliovirus, inactivated (IPV) 2005, 2005, 2005     TDAP Vaccine (Boostrix) 12/01/2015     Varicella 06/09/2006, 02/04/2010       HEALTH HISTORY SINCE LAST VISIT  No surgery, major illness or injury since last physical exam    ROS  Constitutional, eye, ENT, skin, respiratory, cardiac, and GI are normal except as otherwise noted.      He breast pain has improved and appears cyclical. She did not get the  "Helen  OBJECTIVE:   EXAM  /78 (BP Location: Right arm, Patient Position: Sitting, Cuff Size: Adult Regular)   Pulse 64   Temp 98.1  F (36.7  C) (Oral)   Ht 1.74 m (5' 8.5\")   Wt 89.4 kg (197 lb)   SpO2 100%   BMI 29.52 kg/m    97 %ile (Z= 1.81) based on CDC (Girls, 2-20 Years) Stature-for-age data based on Stature recorded on 10/29/2020.  98 %ile (Z= 2.09) based on CDC (Girls, 2-20 Years) weight-for-age data using vitals from 10/29/2020.  96 %ile (Z= 1.76) based on CDC (Girls, 2-20 Years) BMI-for-age based on BMI available as of 10/29/2020.  Blood pressure reading is in the elevated blood pressure range (BP >= 120/80) based on the 2017 AAP Clinical Practice Guideline.  GENERAL: Active, alert, in no acute distress.  SKIN: Clear. No significant rash, abnormal pigmentation or lesions  HEAD: Normocephalic  EYES: Pupils equal, round, reactive, Extraocular muscles intact. Normal conjunctivae.  EARS: Normal canals. Tympanic membranes are normal; gray and translucent.  NOSE: Normal without discharge.  MOUTH/THROAT: Clear. No oral lesions. Teeth without obvious abnormalities.  NECK: Supple, no masses.  No thyromegaly.  LYMPH NODES: No adenopathy  LUNGS: Clear. No rales, rhonchi, wheezing or retractions  HEART: Regular rhythm. Normal S1/S2. No murmurs. Normal pulses.  ABDOMEN: Soft, non-tender, not distended, no masses or hepatosplenomegaly. Bowel sounds normal.   NEUROLOGIC: No focal findings. Cranial nerves grossly intact: DTR's normal. Normal gait, strength and tone  BACK: Spine is straight, no scoliosis.  EXTREMITIES: Full range of motion, no deformities  : Exam deferred.    ASSESSMENT/PLAN:   (Z00.129) Encounter for routine child health examination w/o abnormal findings  (primary encounter diagnosis)  Comment: doing well   Plan: PURE TONE HEARING TEST, AIR, SCREENING, VISUAL         ACUITY, QUANTITATIVE, BILAT, BEHAVIORAL /         EMOTIONAL ASSESSMENT [27010]            (Z62.820) Parent-child " conflict  Comment: impoved  Plan: continue with her present cares and working thought concerns with mari    (F32.4) Major depressive disorder in partial remission, unspecified whether recurrent (H)  Comment: improved  Plan:  Continue present medications and counseling    Anticipatory Guidance  The following topics were discussed:  SOCIAL/ FAMILY:    Peer pressure  NUTRITION:    Healthy food choices  HEALTH / SAFETY:    Adequate sleep/ exercise    Drugs, ETOH, smoking  SEXUALITY:    Dating/ relationships    Encourage abstinence    Contraception     Preventive Care Plan  Immunizations    I provided face to face vaccine counseling, answered questions, and explained the benefits and risks of the vaccine components ordered today including:  Influenza - Quadrivalent Preserve Free 3yrs+  Referrals/Ongoing Specialty care: No   See other orders in EpicCare.  Cleared for sports:  Yes  BMI at 96 %ile (Z= 1.76) based on CDC (Girls, 2-20 Years) BMI-for-age based on BMI available as of 10/29/2020.  No weight concerns.    FOLLOW-UP:    in 1 year for a Preventive Care visit    Resources  HPV and Cancer Prevention:  What Parents Should Know  What Kids Should Know About HPV and Cancer  Goal Tracker: Be More Active  Goal Tracker: Less Screen Time  Goal Tracker: Drink More Water  Goal Tracker: Eat More Fruits and Veggies  [place] Child and Teen Checkups (C&TC) Schedule of Age-Related Screening Standards    Jermaine Wyman MD, MD  [facility] [place]

## 2024-12-16 ENCOUNTER — HOSPITAL ENCOUNTER (OUTPATIENT)
Dept: WOUND CARE | Facility: HOSPITAL | Age: 59
Discharge: HOME | End: 2024-12-16
Attending: NURSE PRACTITIONER
Payer: MEDICAID

## 2024-12-16 VITALS
SYSTOLIC BLOOD PRESSURE: 137 MMHG | HEART RATE: 95 BPM | RESPIRATION RATE: 18 BRPM | WEIGHT: 246.94 LBS | TEMPERATURE: 98 F | OXYGEN SATURATION: 95 % | HEIGHT: 63 IN | BODY MASS INDEX: 43.75 KG/M2 | DIASTOLIC BLOOD PRESSURE: 75 MMHG

## 2024-12-16 DIAGNOSIS — E11.622 TYPE 2 DIABETES MELLITUS WITH OTHER SKIN ULCER, WITHOUT LONG-TERM CURRENT USE OF INSULIN: ICD-10-CM

## 2024-12-16 DIAGNOSIS — L97.522 DIABETIC ULCER OF LEFT FOOT ASSOCIATED WITH TYPE 2 DIABETES MELLITUS, WITH FAT LAYER EXPOSED, UNSPECIFIED PART OF FOOT: Primary | ICD-10-CM

## 2024-12-16 DIAGNOSIS — M14.672 CHARCOT'S JOINT OF LEFT ANKLE: ICD-10-CM

## 2024-12-16 DIAGNOSIS — E11.621 DIABETIC ULCER OF LEFT FOOT ASSOCIATED WITH TYPE 2 DIABETES MELLITUS, WITH FAT LAYER EXPOSED, UNSPECIFIED PART OF FOOT: Primary | ICD-10-CM

## 2024-12-16 DIAGNOSIS — M86.172 ACUTE OSTEOMYELITIS OF LEFT CALCANEUS: ICD-10-CM

## 2024-12-16 DIAGNOSIS — E66.01 CLASS 3 SEVERE OBESITY WITH BODY MASS INDEX (BMI) OF 40.0 TO 44.9 IN ADULT, UNSPECIFIED OBESITY TYPE, UNSPECIFIED WHETHER SERIOUS COMORBIDITY PRESENT: ICD-10-CM

## 2024-12-16 DIAGNOSIS — E66.813 CLASS 3 SEVERE OBESITY WITH BODY MASS INDEX (BMI) OF 40.0 TO 44.9 IN ADULT, UNSPECIFIED OBESITY TYPE, UNSPECIFIED WHETHER SERIOUS COMORBIDITY PRESENT: ICD-10-CM

## 2024-12-16 PROCEDURE — 99211 OFF/OP EST MAY X REQ PHY/QHP: CPT

## 2024-12-16 PROCEDURE — 27000999 HC MEDICAL RECORD PHOTO DOCUMENTATION

## 2024-12-16 PROCEDURE — 99214 OFFICE O/P EST MOD 30 MIN: CPT | Mod: ,,, | Performed by: NURSE PRACTITIONER

## 2024-12-16 NOTE — PROGRESS NOTES
[facility]   Outpatient Wound Care     Subjective:   Patient ID: Suki Anton is a 58 y.o. female.    Chief Complaint: Wound Care      History of Present Illness:   58 y.o. White female presents to wound care clinic today 1 month follow up regarding left heel ulcer awaiting surgery for left BKA.  Voices surgery wound not be until February of next year awaiting A1c <7.  Voices will be starting on Mounjauro.  Reviewed all previous medical history and progress notes since last visit if 11/152024.  Past medical history:  Since last visit she saw Dr. Caal with Dr. Hawkins's office to discuss the bone skin with her and was aware of osteomyelitis in her left posterior calcaneus.  She was placed on Bactrim. Currently awaiting a referral to [facility] for a Dr. Weir.  Multiple options were given to her at that visit possible resection of the calcaneus, IV antibiotics, placement of absorbable antibiotic beads, and possible below-the-knee amputation. Currently under the care of  for left ankle Charcot foot joint. Currently was instructed per Dr. Neumann to be non-weight bearing to left ankle/foot.  Patient voices she walked from her car to the front of the hospital and then obtained wheelchair.  Instructed the importance this was be non-weight bearing to left foot and ankle meeting no weight for the next 6 weeks per Dr. Neumann. Under the care of Dr. Neumann.  Voices saw podiatrist today but did not take self pay patients.  Voices awaiting insurance from work due to insurance was changed in his awaiting for reinstatement.  Saw orthopedics on 7/1/24 and was informed of CT scan.  She is waiting to see podiatry no appointment scheduled at this time.  Will continue our clinic until ulcer is healed. Wound has been present for approximately 2 months.  She is a referral from  Orthopedics.  Patient is currently awaiting CT scan of left ankle, and referral to a foot ankle specialist.  Patient voices 2 months ago twisted ankle and purchased a boot from [employer] which created an ulcer to ankle area. Followed by Dick Gallagher DO for PCP last appt 11/18/24.    Today's visit 12/16/2024:  Reviewed previous progress notes since last visit of 11/15/2024.  Dressing to laid ankle wound removed per nursing staff at bedside.  Left ankle ulcer red granulating wound bed with minimal serosanguineous drainage.  Instructed to continue cleansing every-other-day with Vashe, cover with foam border dressing, and secure with Tubigrip size F.  Using wheelchair for long distances and knee walker while in home.  Instructions and supplies given.  Will have her return to the clinic after seeing surgery clinic in February.  Instructed to call the office with any questions, concerns, or new skin issues.  Verbalized understanding of all instructions.    11/15/24:  Reviewed all previous progress notes since last visit of 09/25/2024.  Presents to clinic today in wheelchair alone.  Patient voices today she is ready for surgery after the holidays for amputation of left foot.  Patient states she is not going to follow up back up with [facility] or Dr. Hawkins wants to have her surgery here at [facility]. Harish called surgery clinic informed of the patient's request.  Schedule the patient for an appointment for January 7 for follow up with surgery clinic.  Instructed the patient  will have her just cleansed ulcer daily with Vashe and cover with foam border dressing.  Applied Tubigrip size F.  Instructed to offload pressure as much as possible.  Will have her take oral doxycycline twice daily as a preventive due to chronic osteomyelitis and awaiting surgery.  Instructions and supplies given.  Will return to the clinic in 1 month.  Instructed to call office with any questions, concerns, or new skin issues.  Verbalized understanding of all  instructions.    09/25/2024:  Reviewed all previous progress notes since last visit of 09/18/2024.  Unna boot to left lower extremity removed per nursing staff at bedside.  Left lower leg wash with Hibiclens soap and water rinse pat dry.  Left ankle red granulating wound bed with macerated jak wound moderate serosanguineous drainage.  Decrease in size since last visit.  Patient has seen Dr. Caal since last visit currently taking oral antibiotics.  Awaiting referral to [facility].  Discussion will continue Unna boots for closure of left ankle ulcer.  Reinforced the importance of offloading pressure to left lower leg as much as possible.  Reinforced the importance of following diabetic diet and taking diabetic medications as directed.  All wound care performed per nursing staff at bedside.  Left ankle ulcer cleansed with wash, applied Triad to jak wound, polymem max to woundbed, Kerramax, Drawtex to to dorsal foot, offloading foam,kerlix, and secure with Coban.  Tolerated well.  Will return to the clinic weekly for wound care.  Instructed to call the office with any questions, concerns, or any reasons having to remove Unna boot.  Verbalized understanding of all instructions.      09/18/2024:  Reviewed all previous progress notes since last visit of 09/10/24.  Unna boot to left lower leg removed per nursing staff at bedside.  Left ankle wound red granulating wound bed with moderate serosanguineous drainage with slight maceration to jak wound.  All wound care performed per nursing staff at bedside.  Left lower leg wash with Hibiclens soap and water pat dry.  Discussion with the patient today since will see Dr. Hawkins tomorrow will hold Unna boot at this time.  Wound care performed at bedside today cleansed left ulcer with Vashe, applied Traid to jak wound, polymem max to wound bed,unna boot, 4x4, offloading foam, wrapped with Kerlix secure with tape.  Applied CAM walking boot.  Tolerated well.  Reinforced offloading  pressure.  Instructed may take shower but perform wound care immediately after shower.  Instructed wound care can be performed every other day.  Will have her follow up with our clinic on Friday.  Instructed to call the office with any questions, concerns, or new skin issues.  Verbalized understanding of all instructions.    9/10/24:  Reviewed previous progress notes since last visit of 9/4/24.  Unna boot removed per nursing staff at bedside.  Left lower leg wash with Hibiclens soap and water pat dry.  Left ankle red granulating wound bed with macerated jak wound moderate serosanguineous drainage.  Discussion with the patient today wound has significantly decreased in size since last visit.  Will continue same wound care.  Wound care performed per nursing staff at bedside.  Left ankle wound cleansed with Vashe applied Triad to jak wound, polymem max to wound bed, Unna boot, double super absorbent, offloading foam, Drawtex edema to dorsal foot, kerlix, and coban.  Tolerated well.  Wearing CAM walking boot.  Instructed only apply boot when transferring.  Instructed to stay off of foot as much as possible.  Reinforced offloading in unna boot precautions.  Will have her return to the clinic on Friday for a nurse visit.  Instructed to call the office with any questions, concerns, or new skin issues.  Verbalized understanding of all instructions.      9/4/24:  Reviewed previous progress notes last visit on 08/26/2024.  Presents to clinic in wheelchair eschar noted by radiology.  Unna boot to left lower extremity removed per nursing staff at bedside.  Left lower leg wash with Hibiclens soap and water.  Left ankle ulcer red granulating wound bed with macerated jak wound and moderate serosanguineous drainage.  Pared jak wound callus using #4 dermal curette.  Rationale for paring to decrease bioburden and increased granulation.  Tolerated well.  Left ankle cleansed with Vashe, applied Triad to jak wound, polymem max,  Unna boot, double super absorbent, offloading foam, Drawtex edema to dorsal foot, Kerlix, and Coban.  Tolerated well.  Reinforced offloading and unna boot precautions.  Will return to the clinic in 1 week.  Escorted to radiology after appointment in wheelchair today for additional scans.  Instructed to call the office with any questions, concerns, or any reasons having to remove Unna boot.  Verbalized understanding of all instructions.      08/26/2024:  Reviewed all previous progress notes since last visit 08/19/2024.  Presents to clinic in wheelchair escorted by staff member.  Unna boot to left lower extremity removed per nursing staff at bedside.  Left ankle ulcer red granulating wound bed with minimal maceration to edges moderate serosanguineous drainage, and significant decrease in size since last visit.  Discussion we will continue Unna boot at this time.  Patient voices as a bone scan on Thursday this week.  Instructed to have them call wound care clinic if we need to remove Unna boot prior to appointment.  All wound care performed per nursing staff at bedside.  Left ankle ulcer cleansed with Vashe, applied Triad to wound edge, apply polymem max, unna boot, double super abssorbent, offloading foam, drawtex edema to dorsal foot, Kerlix and Coban.  Reinforced unna boot precautions.  Reinforced offloading as much as possible.  Using wheelchair and knee walker at this time.  Will return to the clinic on Thursday for Unna boot change.  Instructed to call the office with any questions, concerns, or any reasons having to remove Unna boot.  Verbalized understanding of all instructions.    8/19/24:  Reviewed all previous progress since last visit of 08/12/2024.  Presents to clinic in wheelchair.  Left lower extremity unna boot removed per nursing staff at bedside.  Left lower extremity ankle ulcer red granulating wound bed , macerated skin edges with moderate serosanguineous drainage.  Decrease in size since last  visit.  Patient voices she is staying off of foot as much as possible.  All wound care performed per nursing staff at bedside.  Left ankle ulcer cleansed with Vashe, applied Triad to jak wound, polymem to wound bed, unna boot, super absorbent dressing, offloading foam, Kerlix and Coban.  Will return to the clinic on Thursday for Unna boot change.  Instructed the importance of offloading pressure as much as possible.  Using knee walker as much as possible.  Unna boot precautions.  Instructed to call the office with any questions, concerns, or any reasons having to remove Unna boot.  Verbalized understanding of all instructions.    8/12/24:  Reviewed all previous progress notes since last visit of 08/08/2024.  Presents to clinic in wheelchair.  Wearing left lower leg CAM walking boot.  All wound care to left lower extremity removed per nursing staff at bedside.  Left ankle ulcer red granulating wound bed minimal slough with macerated jak wound requiring debridement to decrease bio burden to increase granulation.  Selective debridement performed at bedside.  See quick procedure note.  All wound care performed per nursing staff at bedside.  Left ankle ulcer cleansed with Vashe, applied Triad to jak wound, silver polymem to wound bed, unna boot, convamax, offloading foam, Kerlix, and Coban.  Attempted to provide the patient with crutches patient unable to use crutches due to his shoulder injury.  Instructed the importance of her using knee walker at all times.  Reinforced unna boot precautions.  Will return to the clinic on Thursday for Unna boot change.  Instructed to call the office with any questions, concerns, or any reasons having to remove Unna boot.  Verbalized understanding of all instructions.    08/05/2024:  Reviewed all previous progress notes since last visit 07/29/2024.  Presents to clinic in wheelchair.  Left lower leg CAM walking boot.  Left lower extremity Unna boot removed per nursing at bedside.   Left ankle ulcer red granulating wound bed with moderate serosanguineous drainage and macerated jak wound.  Instructed the patient today the importance of offloading pressure to left foot.  All wound care performed per nursing staff at bedside.  Left lower extremity cleansed with Hibiclens soap and water pat dry left ulcer cleansed with Vashe, applied Triad to jak wound, silver polymem, Convamax, unna boot, kerlix, and coban.  Unna boot precautions given.  Will return to the clinic on Thursday after appt with Dr. Neumann.  Instructed to call the office with any questions, concerns, or any reasons having to remove Unna boot.  Verbalized understanding of all instructions.      7/29/24:  Reviewed all previous progress since last visit 07/22/2024.  Presents to clinic alone. Ambulating with CAM walking boot.  Left lower extremity Unna boot removed per nursing staff at bedside.  Left lower leg washed Hibiclens soap and water.  Wound red granulating wound bed, slight macerated jak wound with moderate serosanguineous drainage.  Discussion with the patient today will continue same wound care orders.  All wound care performed per nursing staff at bedside.  Left lower extremity ankle cleansed with Vashe, applied Triad to jak wound, silver polymem to  wound bed, cover with convamax, Unna boot, Kerlix, and Coban.  Tolerated well.  Will return to our clinic on Thursday for a nurse visit for Unna boot change.  Instructed to call the office with any questions, concerns, or any reasons removed Unna boot.  Instructed the importance to call and reschedule appointment with Dr. Neumann regarding left Charcot ankle.  Reinforced offloading pressure to areas much as possible.  Verbalized understanding of all instructions.    07/22/2024:  Reviewed all previous progress notes 07/15/2024.  Presents to the clinic with uma.  Ambulates with CAM walking boot to left lower leg.  Left lower extremity Unna boot removed per nursing staff at  bedside.  Left ankle wound red granulating wound bed macerated jak wound moderate serosanguineous drainage.  Discussion with the patient today significant decrease in size since last visit will continue Unna boot.  New wound care orders for left lower extremity ankle cleansed with Vashe apply, Triad to jak wound, silver polymem to wound bed, cover with covamax, Unna boot, Kerlix, and Coban.  All wound care performed per nursing staff at bedside.  Will return to the clinic on Thursday for a nurse visit for Unna boot change.  Will follow up in wound clinic with me next Monday.  Instructed to call the office with any questions, concerns, or any reasons having to remove Unna boot.  Verbalized understanding of all instructions.      07/15/2024:  Reviewed all previous progress notes.  Left lower leg unna boot removed per nursing staff at bedside.  Left ankle wound red granulating wound bed with moderate serosanguineous drainage and macerated jak wound.  Significant decrease in size since last visit, and now wound has created 2 separate wounds.  Discussion with the patient today we will continue Unna boot to assist in decreasing edema to ankle, and increase in granulation of wounds.  All wound care performed per nursing staff at bedside.  Left ankle wound cleansed with Vashe, applied Triad to periwound, silver polymem to wound bed, Unna boot, Drawtex wrap, Kerlix and Coban.  Currently wearing left CAM boot.  Will have her return to the clinic on Thursday for a nurse visit for Unna boot change.  Reinforced unna boot precautions.  Reinforced offloading pressure to left foot as much as possible.  Instructed to call the office with any questions, concerns, or any reasons having to remove Unna boot.  Verbalized understanding of all instructions.    07/08/2024:  Reviewed previous progress notes.  Left lower leg unna boot removed per nursing staff at bedside.  Left ankle ulcer red granulating wound bed with moderate  serosanguineous drainage with macerated jak wound.  Will continue same wound care.  All wound care performed per nursing staff at bedside.  Left ankle wound cleansed with Vashe, apply Triad to periwound edge, Silver polymem to wound bed, cover with unna boot, Drawtex 4 x 4, Kerlix, and Coban. Tolerated well.  Reinforced unna boot precautions.  Will return to clinic on Thursday for a nurse visit for Unna boot change.  Will follow up with me next Monday. Instructed the importance of calling the office with any questions, concerns, or any reasons having to remove Unna boot.  Verbalized understanding of all instructions.    07/01/2024:  Reviewed previous progress notes.  Left lower leg unna boot removed per nursing staff at bedside.  Left ankle ulcer red granulating wound bed with minimal slough and moderate serosanguineous drainage.  Patient voices  will have virtual visit with orthopedic talk to discuss CT scan today.  All wound care performed per nursing staff at bedside.  Will continue Unna boot. Left ankle wound cleansed with 0.5% Dakins, apply Triad to periwound edge, Silver polymem to wound bed, cover with unna boot, Drawtex 4 x 4, Kerlix, and Coban. Tolerated well.  Reinforced unna boot precautions.  Will return to the clinic on Wednesday for a nurse visit, and will return the following Monday to re-evaluate left ankle ulcer.  Instructed the importance of calling the office with any questions, concerns, or any reasons having to remove Unna boot.  Verbalized understanding of all instructions.    06/26/2024:  Presents to clinic alone.  Left lower leg unna boot removed per nursing staff at bedside.  Left ankle ulcer red granulating wound bed with moderate serosanguineous drainage minimal slough.  Macerated jak wound.  Discussion with the patient today will continue Unna boot.  Patient is taking all oral antibiotics as directed.  All wound care performed per nursing staff at bedside. Left ankle wound cleansed with  0.5% Dakins, apply Triad to periwound edge, Silver polymem to wound bed, cover with unna boot, Drawtex wrap, Kerlix, and Coban. Tolerated well.  Reinforced unna boot precautions.  Will return to the clinic on Monday to re-evaluate left ankle ulcer.  Instructed the importance of calling the office with any questions, concerns, or any reasons having to remove Unna boot.  Doctors excuse given.  Verbalized understanding of all instructions.      06/13/2024:  Presents to the clinic with grandchildren.  Ambulates with left foot orthopedic walking boot.  Walking boot and left ankle dressing removed per nursing staff at bedside.  Left ankle ulcer red granulating wound bed with moderate amount of slough and serosanguineous drainage with the jak wound erythema and nonpitting edema.  Discussion with the patient today will need to perform selective debridement and obtain tissue culture.  Written consent obtained.  See quick procedure note.  Following debridement red granulating wound bed with moderate serosanguineous drainage discussion with the patient today will benefit from applying Unna boot to increased granulation to area and decrease edema.  All wound care performed per nursing staff at bedside.  Left ankle wound cleansed with 0.5% Dakins, apply Silver polymem to wound bed, cover with unna boot, Drawtex wrap, Kerlix, and Coban.  Tolerated well.  Instructed on unna boot precautions.  Will return to the clinic on Monday for a nurse visit for Unna boot change.  Will follow up with me next Thursday.       History includes:      Past Medical History:   Diagnosis Date    Diabetes mellitus     Diabetes mellitus, type 2     GERD (gastroesophageal reflux disease)     Hypertension     History reviewed. No pertinent surgical history.   Social History     Socioeconomic History    Marital status:     Number of children: 0   Occupational History    Occupation: Manager     Comment: [employer]   Tobacco Use    Smoking  status: Never    Smokeless tobacco: Never   Substance and Sexual Activity    Alcohol use: Never    Drug use: Never    Sexual activity: Yes     Partners: Male     Birth control/protection: None     Social Drivers of Health     Financial Resource Strain: Medium Risk (10/24/2024)    Overall Financial Resource Strain (CARDIA)     Difficulty of Paying Living Expenses: Somewhat hard   Food Insecurity: Food Insecurity Present (10/24/2024)    Hunger Vital Sign     Worried About Running Out of Food in the Last Year: Sometimes true     Ran Out of Food in the Last Year: Never true   Transportation Needs: No Transportation Needs (10/24/2024)    TRANSPORTATION NEEDS     Transportation : No   Physical Activity: Sufficiently Active (10/24/2024)    Exercise Vital Sign     Days of Exercise per Week: 6 days     Minutes of Exercise per Session: 40 min   Stress: Patient Declined (9/30/2024)    [employer] - Occupational Stress Questionnaire     Feeling of Stress : Patient declined   Housing Stability: Low Risk  (10/24/2024)    Housing Stability Vital Sign     Unable to Pay for Housing in the Last Year: No     Homeless in the Last Year: No   .      Current Outpatient Medications   Medication Sig Dispense Refill    alcohol swabs PadM Apply 1 each topically 4 (four) times daily. 400 each 2    atorvastatin (LIPITOR) 40 MG tablet Take 1 tablet (40 mg total) by mouth every evening. 30 tablet 1    blood sugar diagnostic Strp Test strips to check CBG's 200 each 1    EASY TOUCH TWIST LANCETS 33 gauge Misc USE TO CHECK BLOOD SUAGR LEVELS TWO TIMES A  each 1    gabapentin (NEURONTIN) 300 MG capsule Take 2 capsules (600 mg total) by mouth 3 (three) times daily. 180 capsule 1    hydrALAZINE (APRESOLINE) 25 MG tablet Take 1 tablet (25 mg total) by mouth every 8 (eight) hours. 90 tablet 1    insulin aspart U-100 (NOVOLOG FLEXPEN U-100 INSULIN) 100 unit/mL (3 mL) InPn pen Inject 15 Units into the skin 3 (three)  "times daily with meals. 40.5 mL 1    insulin glargine U-100, Lantus, (LANTUS SOLOSTAR U-100 INSULIN) 100 unit/mL (3 mL) InPn pen Inject 60 Units into the skin every evening. 54 mL 1    lancing device Misc 1 each by Misc.(Non-Drug; Combo Route) route 4 (four) times daily with meals and nightly. 200 each 5    melatonin 10 mg Tab Take 1 tablet (10 mg total) by mouth nightly as needed (insomnia). 180 tablet 0    NIFEdipine (PROCARDIA-XL) 60 MG (OSM) 24 hr tablet Take 1 tablet (60 mg total) by mouth once daily.      rOPINIRole (REQUIP) 0.5 MG tablet Take 1 tablet (0.5 mg total) by mouth 3 (three) times daily. 90 tablet 2    TECHLITE PEN NEEDLE 32 gauge x 5/32" Ndle USE 1 NEEDLE DAILY      tirzepatide (MOUNJARO) 2.5 mg/0.5 mL PnIj Inject 2.5 mg into the skin every 7 days. 2 mL 0    traZODone (DESYREL) 50 MG tablet Take 0.5 tablets (25 mg total) by mouth nightly as needed for Insomnia. 15 tablet 0    TRUE METRIX GLUCOSE METER Misc Inject 1 each into the skin 4 (four) times daily with meals and nightly. use as directed 1 each 0    omeprazole (PRILOSEC) 20 MG capsule Take 2 capsules (40 mg total) by mouth once daily. 60 capsule 2     No current facility-administered medications for this encounter.       Review of Systems   Skin:  Positive for wound.   All other systems reviewed and are negative.         Labs Reviewed:   Chemistry:  Lab Results   Component Value Date    BUN 15.1 12/10/2024    BUN 24.4 (H) 11/11/2024    CREATININE 0.63 12/10/2024    CREATININE 0.75 11/11/2024    EGFRNORACEVR >60 12/10/2024    EGFRNORACEVR >60 11/11/2024    GLUCOSE 103 (H) 12/10/2024    PREALB 22.0 11/11/2024    AST 12 12/10/2024    AST 10 11/11/2024    ALT 15 12/10/2024    ALT 13 11/11/2024    HGBA1C 7.3 (H) 12/10/2024        Hematology:  Lab Results   Component Value Date    WBC 16.75 (H) 12/10/2024    WBC 12.55 (H) 11/11/2024    HGB 12.4 12/10/2024    HGB 11.0 (L) 11/11/2024    HCT 36.8 (L) 12/10/2024    HCT 33.0 (L) 11/11/2024     " 12/10/2024     11/11/2024       Inflammatory Markers:  Lab Results   Component Value Date    HSCRP 22.89 (H) 11/11/2024    HSCRP 23.79 (H) 11/04/2024    HSCRP 21.67 (H) 10/28/2024    SEDRATE 21 (H) 12/10/2024    SEDRATE 31 (H) 11/11/2024    SEDRATE 35 (H) 11/04/2024        Objective:        Physical Exam  Vitals reviewed.   Cardiovascular:      Pulses:           Dorsalis pedis pulses are detected w/ Doppler on the left side.        Posterior tibial pulses are detected w/ Doppler on the left side.   Musculoskeletal:      Left lower leg: Edema present.      Left foot: Charcot foot present.        Feet:    Feet:      Left foot:      Skin integrity: Ulcer present.      Toenail Condition: Left toenails are abnormally thick. Fungal disease present.  Skin:     General: Skin is warm.      Capillary Refill: Capillary refill takes less than 2 seconds.      Findings: Wound present.             Comments: Left ankle wound red granulating wound bed minimal serosanguineous drainage.   Neurological:      Mental Status: She is alert.   Psychiatric:         Behavior: Behavior is cooperative.            Wound 06/13/24 0827 Pressure Injury Left lateral Ankle (Active)   06/13/24 0827 Ankle   Present on Original Admission: Y   Primary Wound Type: Pressure inj   Side: Left   Orientation: lateral   Wound Approximate Age at First Assessment (Weeks):    Wound Number:    Is this injury device related?:    Incision Type:    Closure Method:    Wound Description (Comments):    Type:    Additional Comments:    Ankle-Brachial Index:    Pulses:    Removal Indication and Assessment:    Wound Outcome:    Wound Image   12/16/24 1118   Dressing Appearance Moist drainage 12/16/24 1118   Drainage Amount Small 12/16/24 1118   Drainage Characteristics/Odor Serosanguineous 12/16/24 1118   Appearance Dressing in place, unable to visualize;Intact;Pink 12/16/24 1118   Tissue loss description Full thickness 12/16/24 1118   Periwound Area Intact 12/16/24  1118   Wound Edges Undefined 12/16/24 1118   Wound Length (cm) 0.2 cm 12/16/24 1118   Wound Width (cm) 0.3 cm 12/16/24 1118   Wound Depth (cm) 0.3 cm 12/16/24 1118   Wound Volume (cm^3) 0.018 cm^3 12/16/24 1118   Wound Surface Area (cm^2) 0.06 cm^2 12/16/24 1118   Care Cleansed with:;Antimicrobial agent 12/16/24 1118   Dressing Removed;Applied;Changed 12/16/24 1118         Assessment:         ICD-10-CM ICD-9-CM   1. Diabetic ulcer of left foot associated with type 2 diabetes mellitus, with fat layer exposed, unspecified part of foot  E11.621 250.80    L97.522 707.15   2. Acute osteomyelitis of left calcaneus  M86.172 730.07   3. Charcot's joint of left ankle  M14.672 094.0     713.5   4. Type 2 diabetes mellitus with other skin ulcer, without long-term current use of insulin  E11.622 250.80   5. Class 3 severe obesity with body mass index (BMI) of 40.0 to 44.9 in adult, unspecified obesity type, unspecified whether serious comorbidity present  E66.813 278.01    E66.01 V85.41    Z68.41            Plan:   Tissue pathology and/or culture taken:  [] Yes [x] No   Sharp debridement performed:   [] Yes [x] No   Labs ordered this visit:   [] Yes [x] No   Imaging ordered this visit:   [] Yes [x] No         1. Diabetic ulcer of left foot associated with type 2 diabetes mellitus, with fat layer exposed, unspecified part of foot     Wound care:    Cleanse every-other-day with Vashe, and cover with foam border dressing.      Wear Tubigrip size F.   2. Acute osteomyelitis of left calcaneus     Prescribed oral doxycycline while awaiting amputation.   3. Charcot's joint of left ankle     Using wheelchair for long distances.    Using knee walker while in home.      4. Type 2 diabetes mellitus with other skin ulcer, without long-term current use of insulin     Co-factor in delayed wound healing and development.    Last A1c 7.3.    Reinforced the importance of diet and medication compliance.   5. Class 3 severe obesity with body mass  index (BMI) of 40.0 to 44.9 in adult, unspecified obesity type, unspecified whether serious comorbidity present     Instructed on the importance of diet, decreased caloric intake due to non weight bearing to left foot at this time.          Patient Instructions   Pt seen today by: Martha Duncan NP    self care DRESSING INSTRUCTIONS: Wash wound with baby shampoo. Pat dry and put a foam border dressing on wound. Put Tubi  F        Wound location: left lateral ankle    Dressings to be changed every other day and as needed if soiled or not intact.    Patient and/or family may be asked to assist on other days.           Return visit: AFTER SURGERY APPT, FEBRUARY-PLEASE CONTACT CLINIC IF YOU NEED ANYTHING, SUPPLIES.    Nutrition:  The current daily value (%DV) for protein is 50 grams per day and is meant as a general goal for most people. Further increasing your dietary protein intake is very important for wound healing. Typically one needs over 100g of protein per day to help with wound healing needs.  If you are a dialysis patient or have problems with your kidneys, talk to your Nephrologist about how much protein you can take in with your condition.  Examples of high protein items that can be added to your diet include: eggs, chicken, red meats, almonds, cottage cheese, Greek yogurt, beans, and peanut butter.  Fortified protein bars, shakes and drinks can add 15-30 additional grams of protein per serving.  Also add:   1 daily general multivitamin   Vitamin C : 500mg twice daily   Zinc 220 mg daily  Vit D : once daily    Offloading   Offload your wound. This means to reduce pressure on and around the wound that reduces blood flow to the wound and prevents healing. Your wound care team will discuss specific ways for you to offload your specific wound. Common offloading strategies include:    Turn or reposition every 2 hours or sooner  Use pillows, wedges, ROHO wheelchair cushions or other special devices like boots  and shoes to lift the wound off of hard surfaces  Alternating Low Air-loss (ALAL) mattress may be ordered  Padded dressings can reduce wound pressure          Call our [facility] wound clinic for questions/concerns a [phone] .     The time spent including preparing to see the patient, obtaining patient history and assessment, evaluation of the plan of care, patient/caregiver counseling and education, orders, documentation, coordination of care, and other professional medical management activities for today's encounter was 20 minute.    Time spent performing procedures during today's encounter was 20 minute.    Follow up in about 8 weeks (around 2/11/2025). Teaching provided on s/s to call wound clinic for promptly.  ER precautions taught for after hours and weekends.       JANE Vieyra

## 2024-12-16 NOTE — PATIENT INSTRUCTIONS
Pt seen today by: Martha Duncan NP    self care DRESSING INSTRUCTIONS: Wash wound with baby shampoo. Pat dry and put a foam border dressing on wound. Put Tubi  F        Wound location: left lateral ankle    Dressings to be changed every other day and as needed if soiled or not intact.    Patient and/or family may be asked to assist on other days.           Return visit: AFTER SURGERY APPT, FEBRUARY-PLEASE CONTACT CLINIC IF YOU NEED ANYTHING, SUPPLIES.    Nutrition:  The current daily value (%DV) for protein is 50 grams per day and is meant as a general goal for most people. Further increasing your dietary protein intake is very important for wound healing. Typically one needs over 100g of protein per day to help with wound healing needs.  If you are a dialysis patient or have problems with your kidneys, talk to your Nephrologist about how much protein you can take in with your condition.  Examples of high protein items that can be added to your diet include: eggs, chicken, red meats, almonds, cottage cheese, Greek yogurt, beans, and peanut butter.  Fortified protein bars, shakes and drinks can add 15-30 additional grams of protein per serving.  Also add:   1 daily general multivitamin   Vitamin C : 500mg twice daily   Zinc 220 mg daily  Vit D : once daily    Offloading   Offload your wound. This means to reduce pressure on and around the wound that reduces blood flow to the wound and prevents healing. Your wound care team will discuss specific ways for you to offload your specific wound. Common offloading strategies include:    Turn or reposition every 2 hours or sooner  Use pillows, wedges, ROHO wheelchair cushions or other special devices like boots and shoes to lift the wound off of hard surfaces  Alternating Low Air-loss (ALAL) mattress may be ordered  Padded dressings can reduce wound pressure          Call our [facility] wound clinic for questions/concerns a [phone] .

## 2024-12-18 ENCOUNTER — TELEPHONE (OUTPATIENT)
Dept: WOUND CARE | Facility: HOSPITAL | Age: 59
End: 2024-12-18
Payer: MEDICAID

## 2024-12-18 RX ORDER — TRAZODONE HYDROCHLORIDE 50 MG/1
25 TABLET ORAL NIGHTLY PRN
Qty: 15 TABLET | Refills: 0 | Status: SHIPPED | OUTPATIENT
Start: 2024-12-18 | End: 2025-01-17

## 2024-12-18 RX ORDER — TRAMADOL HYDROCHLORIDE 50 MG/1
50 TABLET ORAL EVERY 6 HOURS
Qty: 20 TABLET | Refills: 0 | Status: SHIPPED | OUTPATIENT
Start: 2024-12-18

## 2024-12-18 RX ORDER — DOXYCYCLINE 100 MG/1
100 CAPSULE ORAL 2 TIMES DAILY
Qty: 60 CAPSULE | Refills: 1 | Status: SHIPPED | OUTPATIENT
Start: 2024-12-18 | End: 2025-02-16

## 2024-12-18 NOTE — TELEPHONE ENCOUNTER
Called informed will continue Doxycycline until surgery for osteomyelitis. Verbalized understanding.

## 2024-12-26 ENCOUNTER — TELEPHONE (OUTPATIENT)
Dept: INTERNAL MEDICINE | Facility: CLINIC | Age: 59
End: 2024-12-26
Payer: MEDICAID

## 2024-12-26 NOTE — TELEPHONE ENCOUNTER
Patient called to report that she has stopped taking Mounjaro due to the side effect of making her feel very ill for 2 days. She would like to try another medication, possibly Adipex. Would like to discuss with you. Her ph#  [phone]. Next appt 02/13/2025.

## 2024-12-27 DIAGNOSIS — F51.01 PRIMARY INSOMNIA: ICD-10-CM

## 2025-01-03 RX ORDER — TRAZODONE HYDROCHLORIDE 50 MG/1
25 TABLET ORAL NIGHTLY PRN
Qty: 15 TABLET | Refills: 0 | Status: SHIPPED | OUTPATIENT
Start: 2025-01-03 | End: 2025-02-02

## 2025-01-08 DIAGNOSIS — J06.9 UPPER RESPIRATORY TRACT INFECTION, UNSPECIFIED TYPE: Primary | ICD-10-CM

## 2025-01-08 DIAGNOSIS — F51.01 PRIMARY INSOMNIA: ICD-10-CM

## 2025-01-08 DIAGNOSIS — E66.9 DIABETES MELLITUS TYPE 2 IN OBESE: ICD-10-CM

## 2025-01-08 DIAGNOSIS — G25.81 RESTLESS LEG SYNDROME: ICD-10-CM

## 2025-01-08 DIAGNOSIS — E11.69 DIABETES MELLITUS TYPE 2 IN OBESE: ICD-10-CM

## 2025-01-08 RX ORDER — GABAPENTIN 300 MG/1
900 CAPSULE ORAL 3 TIMES DAILY
Qty: 270 CAPSULE | Refills: 2 | Status: SHIPPED | OUTPATIENT
Start: 2025-01-08 | End: 2025-04-08

## 2025-01-08 RX ORDER — CHLORPHENIRAMINE MALEATE 4 MG
2 TABLET ORAL EVERY 6 HOURS PRN
Qty: 3 TABLET | Refills: 0 | Status: SHIPPED | OUTPATIENT
Start: 2025-01-08 | End: 2025-01-08

## 2025-01-08 RX ORDER — ROPINIROLE 1 MG/1
1 TABLET, FILM COATED ORAL NIGHTLY
Qty: 30 TABLET | Refills: 2 | Status: SHIPPED | OUTPATIENT
Start: 2025-01-08 | End: 2025-04-08

## 2025-01-08 RX ORDER — CHLORPHENIRAMINE MALEATE 4 MG
2 TABLET ORAL EVERY 6 HOURS PRN
Qty: 8 TABLET | Refills: 0 | Status: SHIPPED | OUTPATIENT
Start: 2025-01-08 | End: 2025-01-15

## 2025-01-08 RX ORDER — TRAZODONE HYDROCHLORIDE 50 MG/1
25 TABLET ORAL NIGHTLY PRN
Qty: 20 TABLET | Refills: 0 | Status: SHIPPED | OUTPATIENT
Start: 2025-01-08 | End: 2025-02-17

## 2025-01-08 NOTE — PROGRESS NOTES
Called patient and discussed weight loss options. Patient unable to tolerate mounjaro, caused nausea/vomiting for 3 days  after shot. Discussed Adipex, but patient would not be a good candidate due to hx of hypertension.    Patient unable to sleep at night due to pain in foot from osteomyelitis. Will have appointment soon with surgeon who will perform BKA. For the time being, will uptitrate gabapentin from 600 mg TID to 900 mg TID. Also refilling ropinirole 1 mg qhs (up from 0.5 mg qhs)    Lastly, patient continues to have nasal congestion following URI back right before [place]. She denies any fevers/chills, and did not report any productive sputum. She has not tried any OTC medications. Will start on chlorpheniramine 2 mg q6h for 4 days.    Dick Gallagher,   [facility] Medicine, PGY-III

## 2025-01-28 ENCOUNTER — TELEPHONE (OUTPATIENT)
Dept: OPHTHALMOLOGY | Facility: CLINIC | Age: 60
End: 2025-01-28
Payer: MEDICAID

## 2025-01-28 NOTE — TELEPHONE ENCOUNTER
Called patient back at [phone]  to discuss recent blurriness in her eye. Patient is a new patient, never seen by us prior, but referred to us for an diabetic eye exam (last HgbA1c 7.3). Patient appointment on 2/7/25 cancelled, and she was calling to see if she could have an earlier appointment than in 6/2025. Patient endorsing feeling as if there is a gray haze in her left eye. She reports no vision issues with the right eye, but stated she gradually noted a haze in her left eye ~ 1 week ago. Unable to say if the haze appears to have worsened since then. Patient states she is able to read her phone with her left eye still, but bothered by how blurry her left eye is compared to her right eye. States blinking does not make it better. Patient also trialed Visine without improvement, and was instructed to stop Visine.Patient denies any eye pain, denies any eye pain with movement. Patient denies any flashes/floaters. Patient denies previous history of eye issues except wearing reading glasses. She states she had a normal eye exam last year. Given persistent, new symptoms, will discuss with staff attending tomorrow regarding rescheduling appointment.    Discussed with Dr. Crockett.     Joel Rosas MD  [facility] PGY-2  01/28/2025  4:40 PM

## 2025-01-29 ENCOUNTER — TELEPHONE (OUTPATIENT)
Dept: OPHTHALMOLOGY | Facility: CLINIC | Age: 60
End: 2025-01-29
Payer: MEDICAID

## 2025-01-29 NOTE — TELEPHONE ENCOUNTER
Patient case discussed with staff attending Dr. Cohn. Advised patient to present to urgent care for visual acuity screening and report to us the results of the visual acuity. Will reschedule appointment based on Visual acuity results.     Joel Rosas MD  [facility] PGY-2  01/29/2025  7:54 AM

## 2025-01-31 ENCOUNTER — TELEPHONE (OUTPATIENT)
Dept: INTERNAL MEDICINE | Facility: CLINIC | Age: 60
End: 2025-01-31
Payer: MEDICAID

## 2025-02-10 ENCOUNTER — OFFICE VISIT (OUTPATIENT)
Dept: URGENT CARE | Facility: CLINIC | Age: 60
End: 2025-02-10
Attending: STUDENT IN AN ORGANIZED HEALTH CARE EDUCATION/TRAINING PROGRAM
Payer: MEDICAID

## 2025-02-10 ENCOUNTER — LAB VISIT (OUTPATIENT)
Dept: LAB | Facility: HOSPITAL | Age: 60
End: 2025-02-10
Attending: STUDENT IN AN ORGANIZED HEALTH CARE EDUCATION/TRAINING PROGRAM
Payer: MEDICAID

## 2025-02-10 VITALS
HEART RATE: 75 BPM | SYSTOLIC BLOOD PRESSURE: 131 MMHG | TEMPERATURE: 98 F | DIASTOLIC BLOOD PRESSURE: 75 MMHG | HEIGHT: 63 IN | WEIGHT: 246 LBS | BODY MASS INDEX: 43.59 KG/M2 | RESPIRATION RATE: 16 BRPM | OXYGEN SATURATION: 96 %

## 2025-02-10 DIAGNOSIS — E11.65 UNCONTROLLED TYPE 2 DIABETES MELLITUS WITH HYPERGLYCEMIA, WITH LONG-TERM CURRENT USE OF INSULIN: ICD-10-CM

## 2025-02-10 DIAGNOSIS — E11.65 TYPE 2 DIABETES MELLITUS WITH HYPERGLYCEMIA, UNSPECIFIED WHETHER LONG TERM INSULIN USE: ICD-10-CM

## 2025-02-10 DIAGNOSIS — H57.89 IRRITATION OF LEFT EYE: Primary | ICD-10-CM

## 2025-02-10 DIAGNOSIS — Z79.4 UNCONTROLLED TYPE 2 DIABETES MELLITUS WITH HYPERGLYCEMIA, WITH LONG-TERM CURRENT USE OF INSULIN: ICD-10-CM

## 2025-02-10 LAB
CREAT UR-MCNC: 98.8 MG/DL (ref 45–106)
EST. AVERAGE GLUCOSE BLD GHB EST-MCNC: 185.8 MG/DL
HBA1C MFR BLD: 8.1 %
MICROALBUMIN UR-MCNC: 1848.1 UG/ML
MICROALBUMIN/CREAT RATIO PNL UR: 1870.5 MG/GM CR (ref 0–30)
PROT UR STRIP-MCNC: 250.2 MG/DL

## 2025-02-10 PROCEDURE — 99215 OFFICE O/P EST HI 40 MIN: CPT | Mod: PBBFAC | Performed by: NURSE PRACTITIONER

## 2025-02-10 PROCEDURE — 84156 ASSAY OF PROTEIN URINE: CPT

## 2025-02-10 PROCEDURE — 83036 HEMOGLOBIN GLYCOSYLATED A1C: CPT

## 2025-02-10 PROCEDURE — 99212 OFFICE O/P EST SF 10 MIN: CPT | Mod: S$PBB,,, | Performed by: NURSE PRACTITIONER

## 2025-02-10 PROCEDURE — 36415 COLL VENOUS BLD VENIPUNCTURE: CPT

## 2025-02-10 PROCEDURE — 82043 UR ALBUMIN QUANTITATIVE: CPT

## 2025-02-10 RX ORDER — ROPINIROLE 0.5 MG/1
0.5 TABLET, FILM COATED ORAL 3 TIMES DAILY
COMMUNITY
Start: 2025-02-03 | End: 2025-02-13

## 2025-02-10 NOTE — TELEPHONE ENCOUNTER
DR. COLON or MS. BIRGIT AUSTIN ASKED THAT YOU OR DR. COLON GIVE HER A CALL.  SHE IS HAVING DENTAL PAIN AND REQUEST TO SPEAK WITH EITHER OF YOU.  I TRIED TO CALL HER BACK, BUT NO ANSWER.

## 2025-02-10 NOTE — PROGRESS NOTES
"Subjective:      Patient ID: Suki Anton is a 59 y.o. female.    Vitals:  height is 5' 3" (1.6 m) and weight is 111.6 kg (246 lb). Her temperature is 98.1 °F (36.7 °C). Her blood pressure is 131/75 and her pulse is 75. Her respiration is 16 and oxygen saturation is 96%.     Chief Complaint: Eye Problem (Pt c/o Lt eye swelling , crusting, headache, blurred vision   x 2.5 weeks)    HPI as stated in chief complaint.  Patient reports intermittent blurred vision in the morning upon waking requesting to her left eye.  Patient denies any pain or headache at this time.  Patient reports that she is awaiting eye examination 6/9/2025.  ROS   Objective:     Physical Exam   Constitutional: She appears well-developed.  Non-toxic appearance. She does not appear ill. No distress.   HENT:   Head: Atraumatic.   Ears:   Right Ear: Hearing normal. No no drainage or swelling. Tympanic membrane is not injected.   Left Ear: Hearing normal. Tympanic membrane is not injected.   Nose: No purulent discharge. Right sinus exhibits no maxillary sinus tenderness and no frontal sinus tenderness. Left sinus exhibits no maxillary sinus tenderness and no frontal sinus tenderness.   Mouth/Throat: Uvula is midline.   Eyes: Conjunctivae and lids are normal. No visual field deficit is present. Right eye exhibits no chemosis and no discharge. Left eye exhibits exudate. Left eye exhibits no chemosis and no discharge. Right conjunctiva is not injected. Left conjunctiva is not injected. No scleral icterus. Right eye exhibits normal extraocular motion and no nystagmus. Left eye exhibits normal extraocular motion and no nystagmus. Extraocular movement intact vision grossly intact periorbital hyperpigmentation     Comments: Dry crusting to eye    Neck: Neck supple. No neck rigidity present.   Cardiovascular: Regular rhythm.   Pulmonary/Chest: Effort normal and breath sounds normal. No respiratory distress. She has no wheezes. She has no rhonchi. She has no " rales.   Lymphadenopathy:     She has no cervical adenopathy.   Neurological: She is alert.   Skin: Skin is warm, dry and not diaphoretic.   Psychiatric: Her behavior is normal.   Nursing note and vitals reviewed.      Assessment:     1. Irritation of left eye        Plan:   ER precautions given and discussed   Discussion of eye strain and dry eye. Pt encouraged to seek and prioritize eye exam.    Irritation of left eye

## 2025-02-10 NOTE — PATIENT INSTRUCTIONS
Please follow instructions on patient education material.      Return to urgent care in 2 to 3 days if symptoms are not improving, immediately if you develop any new or worsening symptoms.       Try to blink a lot, especially when you are reading or using the computer. This helps keep your eye moist.  Avoid excess air conditioning or heating as much as you can. Also avoid sitting directly in the flow of the cold or hot air.  Use a humidifier in your bedroom and any other space where you spend a lot of time.  Avoid smoke and smoky air  Wear protective eyewear when you are outside. Glasses that cover more of your face, or have special shields on the sides, can protect your eyes from wind and dry air.    If you notice you are feeling confused, having trouble speaking or understanding others, having severe headache, severe eye pain or head pressure, off balance or falling, nauseated or vomiting, call 911 and go to the ER

## 2025-02-11 ENCOUNTER — HOSPITAL ENCOUNTER (OUTPATIENT)
Dept: WOUND CARE | Facility: HOSPITAL | Age: 60
Discharge: HOME | End: 2025-02-11
Attending: NURSE PRACTITIONER
Payer: MEDICAID

## 2025-02-11 ENCOUNTER — TELEPHONE (OUTPATIENT)
Dept: OPHTHALMOLOGY | Facility: CLINIC | Age: 60
End: 2025-02-11
Payer: MEDICAID

## 2025-02-11 ENCOUNTER — OFFICE VISIT (OUTPATIENT)
Dept: SURGERY | Facility: CLINIC | Age: 60
End: 2025-02-11
Payer: MEDICAID

## 2025-02-11 VITALS
SYSTOLIC BLOOD PRESSURE: 144 MMHG | RESPIRATION RATE: 20 BRPM | HEART RATE: 101 BPM | TEMPERATURE: 98 F | OXYGEN SATURATION: 95 % | DIASTOLIC BLOOD PRESSURE: 80 MMHG

## 2025-02-11 VITALS
SYSTOLIC BLOOD PRESSURE: 144 MMHG | WEIGHT: 244.69 LBS | TEMPERATURE: 98 F | OXYGEN SATURATION: 95 % | BODY MASS INDEX: 43.36 KG/M2 | HEIGHT: 63 IN | DIASTOLIC BLOOD PRESSURE: 80 MMHG | RESPIRATION RATE: 20 BRPM | HEART RATE: 101 BPM

## 2025-02-11 DIAGNOSIS — M14.672 CHARCOT'S JOINT OF LEFT ANKLE: ICD-10-CM

## 2025-02-11 DIAGNOSIS — L60.2 OVERGROWN TOENAILS: ICD-10-CM

## 2025-02-11 DIAGNOSIS — M86.672 OTHER CHRONIC OSTEOMYELITIS OF LEFT FOOT: ICD-10-CM

## 2025-02-11 DIAGNOSIS — M86.172 ACUTE OSTEOMYELITIS OF LEFT CALCANEUS: ICD-10-CM

## 2025-02-11 DIAGNOSIS — L97.522 DIABETIC ULCER OF LEFT FOOT ASSOCIATED WITH TYPE 2 DIABETES MELLITUS, WITH FAT LAYER EXPOSED, UNSPECIFIED PART OF FOOT: Primary | ICD-10-CM

## 2025-02-11 DIAGNOSIS — E66.01 CLASS 3 SEVERE OBESITY WITH BODY MASS INDEX (BMI) OF 40.0 TO 44.9 IN ADULT, UNSPECIFIED OBESITY TYPE, UNSPECIFIED WHETHER SERIOUS COMORBIDITY PRESENT: ICD-10-CM

## 2025-02-11 DIAGNOSIS — M14.672 CHARCOT JOINT OF LEFT FOOT: Primary | ICD-10-CM

## 2025-02-11 DIAGNOSIS — L60.3 DYSTROPHIC NAIL: ICD-10-CM

## 2025-02-11 DIAGNOSIS — E66.813 CLASS 3 SEVERE OBESITY WITH BODY MASS INDEX (BMI) OF 40.0 TO 44.9 IN ADULT, UNSPECIFIED OBESITY TYPE, UNSPECIFIED WHETHER SERIOUS COMORBIDITY PRESENT: ICD-10-CM

## 2025-02-11 DIAGNOSIS — E11.621 DIABETIC ULCER OF LEFT FOOT ASSOCIATED WITH TYPE 2 DIABETES MELLITUS, WITH FAT LAYER EXPOSED, UNSPECIFIED PART OF FOOT: Primary | ICD-10-CM

## 2025-02-11 DIAGNOSIS — E11.622 TYPE 2 DIABETES MELLITUS WITH OTHER SKIN ULCER, WITHOUT LONG-TERM CURRENT USE OF INSULIN: ICD-10-CM

## 2025-02-11 PROCEDURE — 11719 TRIM NAIL(S) ANY NUMBER: CPT | Mod: 59,,, | Performed by: NURSE PRACTITIONER

## 2025-02-11 PROCEDURE — 99214 OFFICE O/P EST MOD 30 MIN: CPT | Mod: 25,,, | Performed by: NURSE PRACTITIONER

## 2025-02-11 PROCEDURE — 11720 DEBRIDE NAIL 1-5: CPT | Mod: ,,, | Performed by: NURSE PRACTITIONER

## 2025-02-11 PROCEDURE — 27000999 HC MEDICAL RECORD PHOTO DOCUMENTATION

## 2025-02-11 PROCEDURE — 11720 DEBRIDE NAIL 1-5: CPT

## 2025-02-11 PROCEDURE — 11719 TRIM NAIL(S) ANY NUMBER: CPT

## 2025-02-11 PROCEDURE — 99215 OFFICE O/P EST HI 40 MIN: CPT | Mod: PBBFAC,25,27

## 2025-02-11 PROCEDURE — 99211 OFF/OP EST MAY X REQ PHY/QHP: CPT

## 2025-02-11 PROCEDURE — 11055 PARING/CUTG B9 HYPRKER LES 1: CPT

## 2025-02-11 RX ORDER — TRAMADOL HYDROCHLORIDE 50 MG/1
50 TABLET ORAL EVERY 6 HOURS
Qty: 20 TABLET | Refills: 0 | Status: SHIPPED | OUTPATIENT
Start: 2025-02-11 | End: 2025-02-13 | Stop reason: SDUPTHER

## 2025-02-12 PROBLEM — L60.3 DYSTROPHIC NAIL: Status: ACTIVE | Noted: 2025-02-12

## 2025-02-12 PROBLEM — L60.2 OVERGROWN TOENAILS: Status: ACTIVE | Noted: 2025-02-12

## 2025-02-12 NOTE — PROGRESS NOTES
I have reviewed the notes, assessments, and/or procedures performed by Dr Obrien, I concur with her/his documentation of Suki Anton.  Date of Service: 2/11/2025    [facility]

## 2025-02-12 NOTE — PROGRESS NOTES
[facility] General Surgery Clinic Note    HPI:     Ms. Anton is a 59-year-old female with a past medical history of T2DM (A1c of 8.1 2/2025), GERD, and HTN who presents today for 2-month follow-up of BKA evaluation for the treatment of L Charcot joint. Today, she reports that she has been taking her blood sugar three times daily at home with readings between 120s-160s. She reports severe leg pain secondary to restless legs syndrome. She says the pain feels tightening and keeps her from sleeping at night. Her PCP recently increased her gabapentin dose, though she says Norco and tramadol are the only medications that help with this pain. At this time, she still desires surgical intervention because her L ankle joint prevents her from ambulating independently. She wears a walking boot on this ankle for transfers and stability but uses a rolling desk chair at home to get around. She previously had a wound on her L lateral malleolus (started 2 years ago) that has since completely healed. She has no issue with her R ankle or any wounds to her R foot. She sees wound care regularly, and her next appointment with them is today. On ROS, she endorses L eye burning and itching that has caused visual headaches. She has been previously evaluated for this before with the diagnosis of allergic conjunctivitis. All other ROS is negative as below.    PMH:   Past Medical History:   Diagnosis Date    Diabetes mellitus     Diabetes mellitus, type 2     GERD (gastroesophageal reflux disease)     Hypertension       Meds:   Current Outpatient Medications:     alcohol swabs PadM, Apply 1 each topically 4 (four) times daily., Disp: 400 each, Rfl: 2    atorvastatin (LIPITOR) 40 MG tablet, Take 1 tablet (40 mg total) by mouth every evening., Disp: 30 tablet, Rfl: 1    blood sugar diagnostic Strp, Test strips to check CBG's, Disp: 200 each, Rfl: 1    EASY TOUCH TWIST LANCETS 33 gauge Misc, USE TO CHECK BLOOD SUAGR LEVELS TWO TIMES A DAY, Disp: 100  "each, Rfl: 1    gabapentin (NEURONTIN) 300 MG capsule, Take 3 capsules (900 mg total) by mouth 3 (three) times daily., Disp: 270 capsule, Rfl: 2    hydrALAZINE (APRESOLINE) 25 MG tablet, Take 1 tablet (25 mg total) by mouth every 8 (eight) hours., Disp: 90 tablet, Rfl: 1    insulin aspart U-100 (NOVOLOG FLEXPEN U-100 INSULIN) 100 unit/mL (3 mL) InPn pen, Inject 15 Units into the skin 3 (three) times daily with meals., Disp: 40.5 mL, Rfl: 1    insulin glargine U-100, Lantus, (LANTUS SOLOSTAR U-100 INSULIN) 100 unit/mL (3 mL) InPn pen, Inject 60 Units into the skin every evening., Disp: 54 mL, Rfl: 1    lancing device Misc, 1 each by Misc.(Non-Drug; Combo Route) route 4 (four) times daily with meals and nightly., Disp: 200 each, Rfl: 5    melatonin 10 mg Tab, Take 1 tablet (10 mg total) by mouth nightly as needed (insomnia)., Disp: 180 tablet, Rfl: 0    NIFEdipine (PROCARDIA-XL) 60 MG (OSM) 24 hr tablet, Take 1 tablet (60 mg total) by mouth once daily., Disp: , Rfl:     omeprazole (PRILOSEC) 20 MG capsule, Take 2 capsules (40 mg total) by mouth once daily., Disp: 60 capsule, Rfl: 2    rOPINIRole (REQUIP) 1 MG tablet, Take 1 tablet (1 mg total) by mouth every evening., Disp: 30 tablet, Rfl: 2    TECHLITE PEN NEEDLE 32 gauge x 5/32" Ndle, USE 1 NEEDLE DAILY, Disp: , Rfl:     traZODone (DESYREL) 50 MG tablet, Take 0.5 tablets (25 mg total) by mouth nightly as needed for Insomnia., Disp: 20 tablet, Rfl: 0    TRUE METRIX GLUCOSE METER Misc, Inject 1 each into the skin 4 (four) times daily with meals and nightly. use as directed, Disp: 1 each, Rfl: 0    doxycycline (VIBRAMYCIN) 100 MG Cap, Take 1 capsule (100 mg total) by mouth 2 (two) times daily. (Patient not taking: Reported on 2/11/2025), Disp: 60 capsule, Rfl: 1    rOPINIRole (REQUIP) 0.5 MG tablet, Take 0.5 mg by mouth 3 (three) times daily. (Patient not taking: Reported on 2/11/2025), Disp: , Rfl:     traMADoL (ULTRAM) 50 mg tablet, Take 1 tablet (50 mg total) by " mouth every 6 (six) hours., Disp: 20 tablet, Rfl: 0  Allergies:   Review of patient's allergies indicates:   Allergen Reactions    Shellfish containing products Hives and Itching     Crawfish only     Social History:   Social History     Tobacco Use    Smoking status: Never    Smokeless tobacco: Never   Substance Use Topics    Alcohol use: Never    Drug use: Never     Family History:   Family History   Problem Relation Name Age of Onset    Cataracts Mother      Cancer Father       Surgical History: History reviewed. No pertinent surgical history.  Review of Systems:  Skin: No rashes or itching.  Head: Endorses visual headache and burning eye pain.  Respiratory and Cardiac: Denies shortness of breath, chest pain, and swelling in hands/feet.  Gastrointestinal: Denies nausea, vomiting, and changes in bowel habits.  Urinary: Denies dysuria or hematuria.    Objective:    Vitals:  Vitals:    02/11/25 0929   BP: (!) 144/80   Pulse:    Resp:    Temp:         Physical Exam:  Gen: NAD  Neuro: Awake, alert, answering questions appropriately  CV: RRR  Resp: Non-labored breathing, CASEY  Abd: Soft, ND, NT  : Deferred  Ext: Moves all 4 spontaneously and purposefully, no open wounds present on bilateral feet, 2+ DP and PT bilateral lower extremities  Skin: Warm, well perfused, L lateral malleolus wound well-healed with no drainage or erythema    Pertinent Labs:  CBC (12/10/24)  WBC: 16.75 (H)  Hgb: 12.4  Hct: 36.8 (L)  Plt: 339    CMP (12/10/24)  Na: 142  K: 4.0  Cl: 104  CO2: 29  Glucose: 103    Trending HbA1c   - 10/25/24: 8.7  - 12/10/24: 7.3  - 2/10/25: 8.1    Imaging:  MRI L Ankle (10/15/24)  Impression   1. Relatively similar destructive arthropathic changes of the hind/midfoot.  2. Again, findings are somewhat nonspecific with differential that includes acute on chronic osteomyelitis, as well as component of chronic neuropathic joint.  3. No development of drainable fluid  collection.    Micro/Path/Other:  None    Assessment/Plan:  Ms. Anton is a 59-year-old female with a past medical history of T2DM (A1c of 8.1 2/2025), GERD, and HTN who presents today for 2-month follow-up of BKA evaluation for the treatment of Charcot joint. Increase in HbA1c from 7.3 to 8.1. Pt desires surgical intervention as she is currently unable to ambulate and would like to be fitted for a prosthetic as soon as possible so that she can be more mobile.  We offered her surgery today.  We discussed importance of glucose control in general as well as in the perioperative setting for wound healing, and in light of her recent increase in A1c elected to defer surgery at this time to try and get her A1c more under control.  She notes that she tried Mounjaro once but stopped because she heard that were poor side effects, she will reconsult with her primary care physician for advice on resuming Mounjaro.    - RTC in 1 month with new A1c and to discuss possible left BKA    Varsha Villalobos, MS3  [facility]-NO NADIR    I have seen and examined the patient with the medical student. I agree with the assessment and plan and have made the appropriate edits to the note above.     Umang Sweeney MD  [facility] General Surgery, PGY-1

## 2025-02-12 NOTE — PROGRESS NOTES
[facility]   Outpatient Wound Care     Subjective:   Patient ID: Suki Anton is a 59 y.o. female.    Chief Complaint: Wound Care (LEFT LATERAL ANKLE)      History of Present Illness:   59 y.o. White female presents to wound care clinic today for follow up after seeing surgery clinic.  Patient is scheduled for left BKA next month.  Presents to clinic for diabetic foot care and evaluation of left ankle wound.   Currently on Mounjauro.  Presents to clinic in wheelchair with fairly at bedside.  Reviewed all previous medical history and progress notes since last visit if 12/16/2024.  Past medical history:  Since last visit she saw Dr. Caal with Dr. Hawkins's office to discuss the bone skin with her and was aware of osteomyelitis in her left posterior calcaneus.  She was placed on Bactrim. Currently awaiting a referral to [facility] for a Dr. Weir.  Multiple options were given to her at that visit possible resection of the calcaneus, IV antibiotics, placement of absorbable antibiotic beads, and possible below-the-knee amputation. Currently under the care of  for left ankle Charcot foot joint. Currently was instructed per Dr. Neumann to be non-weight bearing to left ankle/foot.  Patient voices she walked from her car to the front of the hospital and then obtained wheelchair.  Instructed the importance this was be non-weight bearing to left foot and ankle meeting no weight for the next 6 weeks per Dr. Neumann. Under the care of Dr. Neumann.  Voices saw podiatrist today but did not take self pay patients.  Voices awaiting insurance from work due to insurance was changed in his awaiting for reinstatement.  Saw orthopedics on 7/1/24 and was informed of CT scan.  She is waiting to see podiatry no appointment scheduled at this time.  Will continue our clinic until  ulcer is healed. Wound has been present for approximately 2 months.  She is a referral from Orthopedics.  Patient is currently awaiting CT scan of left ankle, and referral to a foot ankle specialist.  Patient voices 2 months ago twisted ankle and purchased a boot from [employer] which created an ulcer to ankle area. Followed by Dick Gallagher DO for PCP last appt 11/18/24.    Today's visit 02/11/2025:  Reviewed previous progress notes since last visit of 12/16/2024.  Left ankle wound fully granulated.  Trimmed all 10 toenails using podiatry clippers, and filed with [place] board.  Left great toe dystrophic nail.  Debride dystrophic nail using Dremmel.  Rationale for debridement to decrease pressure.  Voices awaiting left BKA surgery.  Reinforced the importance of diet and medication compliance.  Will see her in 1 month after surgery clinic appointment.  Instructed to call the office with any questions, concerns, or new skin issues.  Verbalized understanding of all instructions.    12/16/2024:  Reviewed previous progress notes since last visit of 11/15/2024.  Dressing to laid ankle wound removed per nursing staff at bedside.  Left ankle ulcer red granulating wound bed with minimal serosanguineous drainage.  Instructed to continue cleansing every-other-day with Vashe, cover with foam border dressing, and secure with Tubigrip size F.  Using wheelchair for long distances and knee walker while in home.  Instructions and supplies given.  Will have her return to the clinic after seeing surgery clinic in February.  Instructed to call the office with any questions, concerns, or new skin issues.  Verbalized understanding of all instructions.    11/15/24:  Reviewed all previous progress notes since last visit of 09/25/2024.  Presents to clinic today in wheelchair alone.  Patient voices today she is ready for surgery after the holidays for amputation of left foot.  Patient states she is not going to follow up back up with [facility] or   Ross wants to have her surgery here at [facility]. Harish called surgery clinic informed of the patient's request.  Schedule the patient for an appointment for January 7 for follow up with surgery clinic.  Instructed the patient  will have her just cleansed ulcer daily with Vashe and cover with foam border dressing.  Applied Tubigrip size F.  Instructed to offload pressure as much as possible.  Will have her take oral doxycycline twice daily as a preventive due to chronic osteomyelitis and awaiting surgery.  Instructions and supplies given.  Will return to the clinic in 1 month.  Instructed to call office with any questions, concerns, or new skin issues.  Verbalized understanding of all instructions.    09/25/2024:  Reviewed all previous progress notes since last visit of 09/18/2024.  Unna boot to left lower extremity removed per nursing staff at bedside.  Left lower leg wash with Hibiclens soap and water rinse pat dry.  Left ankle red granulating wound bed with macerated jak wound moderate serosanguineous drainage.  Decrease in size since last visit.  Patient has seen Dr. Caal since last visit currently taking oral antibiotics.  Awaiting referral to [facility].  Discussion will continue Unna boots for closure of left ankle ulcer.  Reinforced the importance of offloading pressure to left lower leg as much as possible.  Reinforced the importance of following diabetic diet and taking diabetic medications as directed.  All wound care performed per nursing staff at bedside.  Left ankle ulcer cleansed with wash, applied Triad to jak wound, polymem max to woundbed, Kerramax, Drawtex to to dorsal foot, offloading foam,kerlix, and secure with Coban.  Tolerated well.  Will return to the clinic weekly for wound care.  Instructed to call the office with any questions, concerns, or any reasons having to remove Unna boot.  Verbalized understanding of all instructions.      09/18/2024:  Reviewed all previous progress notes since last  visit of 09/10/24.  Unna boot to left lower leg removed per nursing staff at bedside.  Left ankle wound red granulating wound bed with moderate serosanguineous drainage with slight maceration to jak wound.  All wound care performed per nursing staff at bedside.  Left lower leg wash with Hibiclens soap and water pat dry.  Discussion with the patient today since will see Dr. Hawkins tomorrow will hold Unna boot at this time.  Wound care performed at bedside today cleansed left ulcer with Vashe, applied Traid to jak wound, polymem max to wound bed,unna boot, 4x4, offloading foam, wrapped with Kerlix secure with tape.  Applied CAM walking boot.  Tolerated well.  Reinforced offloading pressure.  Instructed may take shower but perform wound care immediately after shower.  Instructed wound care can be performed every other day.  Will have her follow up with our clinic on Friday.  Instructed to call the office with any questions, concerns, or new skin issues.  Verbalized understanding of all instructions.    9/10/24:  Reviewed previous progress notes since last visit of 9/4/24.  Unna boot removed per nursing staff at bedside.  Left lower leg wash with Hibiclens soap and water pat dry.  Left ankle red granulating wound bed with macerated jak wound moderate serosanguineous drainage.  Discussion with the patient today wound has significantly decreased in size since last visit.  Will continue same wound care.  Wound care performed per nursing staff at bedside.  Left ankle wound cleansed with Vashe applied Triad to jak wound, polymem max to wound bed, Unna boot, double super absorbent, offloading foam, Drawtex edema to dorsal foot, kerlix, and coban.  Tolerated well.  Wearing CAM walking boot.  Instructed only apply boot when transferring.  Instructed to stay off of foot as much as possible.  Reinforced offloading in unna boot precautions.  Will have her return to the clinic on Friday for a nurse visit.  Instructed to call  the office with any questions, concerns, or new skin issues.  Verbalized understanding of all instructions.      9/4/24:  Reviewed previous progress notes last visit on 08/26/2024.  Presents to clinic in wheelchair eschar noted by radiology.  Unna boot to left lower extremity removed per nursing staff at bedside.  Left lower leg wash with Hibiclens soap and water.  Left ankle ulcer red granulating wound bed with macerated jak wound and moderate serosanguineous drainage.  Pared jak wound callus using #4 dermal curette.  Rationale for paring to decrease bioburden and increased granulation.  Tolerated well.  Left ankle cleansed with Vashe, applied Triad to jak wound, polymem max, Unna boot, double super absorbent, offloading foam, Drawtex edema to dorsal foot, Kerlix, and Coban.  Tolerated well.  Reinforced offloading and unna boot precautions.  Will return to the clinic in 1 week.  Escorted to radiology after appointment in wheelchair today for additional scans.  Instructed to call the office with any questions, concerns, or any reasons having to remove Unna boot.  Verbalized understanding of all instructions.      08/26/2024:  Reviewed all previous progress notes since last visit 08/19/2024.  Presents to clinic in wheelchair escorted by staff member.  Unna boot to left lower extremity removed per nursing staff at bedside.  Left ankle ulcer red granulating wound bed with minimal maceration to edges moderate serosanguineous drainage, and significant decrease in size since last visit.  Discussion we will continue Unna boot at this time.  Patient voices as a bone scan on Thursday this week.  Instructed to have them call wound care clinic if we need to remove Unna boot prior to appointment.  All wound care performed per nursing staff at bedside.  Left ankle ulcer cleansed with Vashe, applied Triad to wound edge, apply polymem max, unna boot, double super abssorbent, offloading foam, drawtex edema to dorsal foot, Kerlix  and Coban.  Reinforced unna boot precautions.  Reinforced offloading as much as possible.  Using wheelchair and knee walker at this time.  Will return to the clinic on Thursday for Unna boot change.  Instructed to call the office with any questions, concerns, or any reasons having to remove Unna boot.  Verbalized understanding of all instructions.    8/19/24:  Reviewed all previous progress since last visit of 08/12/2024.  Presents to clinic in wheelchair.  Left lower extremity unna boot removed per nursing staff at bedside.  Left lower extremity ankle ulcer red granulating wound bed , macerated skin edges with moderate serosanguineous drainage.  Decrease in size since last visit.  Patient voices she is staying off of foot as much as possible.  All wound care performed per nursing staff at bedside.  Left ankle ulcer cleansed with Vashe, applied Triad to jak wound, polymem to wound bed, unna boot, super absorbent dressing, offloading foam, Kerlix and Coban.  Will return to the clinic on Thursday for Unna boot change.  Instructed the importance of offloading pressure as much as possible.  Using knee walker as much as possible.  Unna boot precautions.  Instructed to call the office with any questions, concerns, or any reasons having to remove Unna boot.  Verbalized understanding of all instructions.    8/12/24:  Reviewed all previous progress notes since last visit of 08/08/2024.  Presents to clinic in wheelchair.  Wearing left lower leg CAM walking boot.  All wound care to left lower extremity removed per nursing staff at bedside.  Left ankle ulcer red granulating wound bed minimal slough with macerated jak wound requiring debridement to decrease bio burden to increase granulation.  Selective debridement performed at bedside.  See quick procedure note.  All wound care performed per nursing staff at bedside.  Left ankle ulcer cleansed with Vashe, applied Triad to jak wound, silver polymem to wound bed, unna boot,  convamax, offloading foam, Kerlix, and Coban.  Attempted to provide the patient with crutches patient unable to use crutches due to his shoulder injury.  Instructed the importance of her using knee walker at all times.  Reinforced unna boot precautions.  Will return to the clinic on Thursday for Unna boot change.  Instructed to call the office with any questions, concerns, or any reasons having to remove Unna boot.  Verbalized understanding of all instructions.    08/05/2024:  Reviewed all previous progress notes since last visit 07/29/2024.  Presents to clinic in wheelchair.  Left lower leg CAM walking boot.  Left lower extremity Unna boot removed per nursing at bedside.  Left ankle ulcer red granulating wound bed with moderate serosanguineous drainage and macerated jak wound.  Instructed the patient today the importance of offloading pressure to left foot.  All wound care performed per nursing staff at bedside.  Left lower extremity cleansed with Hibiclens soap and water pat dry left ulcer cleansed with Vashe, applied Triad to jak wound, silver polymem, Convamax, unna boot, kerlix, and coban.  Unna boot precautions given.  Will return to the clinic on Thursday after appt with Dr. Neumann.  Instructed to call the office with any questions, concerns, or any reasons having to remove Unna boot.  Verbalized understanding of all instructions.      7/29/24:  Reviewed all previous progress since last visit 07/22/2024.  Presents to clinic alone. Ambulating with CAM walking boot.  Left lower extremity Unna boot removed per nursing staff at bedside.  Left lower leg washed Hibiclens soap and water.  Wound red granulating wound bed, slight macerated jak wound with moderate serosanguineous drainage.  Discussion with the patient today will continue same wound care orders.  All wound care performed per nursing staff at bedside.  Left lower extremity ankle cleansed with Vashe, applied Triad to jak wound, silver polymem to   wound bed, cover with convamax, Unna boot, Kerlix, and Coban.  Tolerated well.  Will return to our clinic on Thursday for a nurse visit for Unna boot change.  Instructed to call the office with any questions, concerns, or any reasons removed Unna boot.  Instructed the importance to call and reschedule appointment with Dr. Neumann regarding left Charcot ankle.  Reinforced offloading pressure to areas much as possible.  Verbalized understanding of all instructions.    07/22/2024:  Reviewed all previous progress notes 07/15/2024.  Presents to the clinic with uma.  Ambulates with CAM walking boot to left lower leg.  Left lower extremity Unna boot removed per nursing staff at bedside.  Left ankle wound red granulating wound bed macerated jak wound moderate serosanguineous drainage.  Discussion with the patient today significant decrease in size since last visit will continue Unna boot.  New wound care orders for left lower extremity ankle cleansed with Vashe apply, Triad to jak wound, silver polymem to wound bed, cover with covamax, Unna boot, Kerlix, and Coban.  All wound care performed per nursing staff at bedside.  Will return to the clinic on Thursday for a nurse visit for Unna boot change.  Will follow up in wound clinic with me next Monday.  Instructed to call the office with any questions, concerns, or any reasons having to remove Unna boot.  Verbalized understanding of all instructions.      07/15/2024:  Reviewed all previous progress notes.  Left lower leg unna boot removed per nursing staff at bedside.  Left ankle wound red granulating wound bed with moderate serosanguineous drainage and macerated jak wound.  Significant decrease in size since last visit, and now wound has created 2 separate wounds.  Discussion with the patient today we will continue Unna boot to assist in decreasing edema to ankle, and increase in granulation of wounds.  All wound care performed per nursing staff at bedside.  Left  ankle wound cleansed with Vashe, applied Triad to periwound, silver polymem to wound bed, Unna boot, Drawtex wrap, Kerlix and Coban.  Currently wearing left CAM boot.  Will have her return to the clinic on Thursday for a nurse visit for Unna boot change.  Reinforced unna boot precautions.  Reinforced offloading pressure to left foot as much as possible.  Instructed to call the office with any questions, concerns, or any reasons having to remove Unna boot.  Verbalized understanding of all instructions.    07/08/2024:  Reviewed previous progress notes.  Left lower leg unna boot removed per nursing staff at bedside.  Left ankle ulcer red granulating wound bed with moderate serosanguineous drainage with macerated jak wound.  Will continue same wound care.  All wound care performed per nursing staff at bedside.  Left ankle wound cleansed with Vashe, apply Triad to periwound edge, Silver polymem to wound bed, cover with unna boot, Drawtex 4 x 4, Kerlix, and Coban. Tolerated well.  Reinforced unna boot precautions.  Will return to clinic on Thursday for a nurse visit for Unna boot change.  Will follow up with me next Monday. Instructed the importance of calling the office with any questions, concerns, or any reasons having to remove Unna boot.  Verbalized understanding of all instructions.    07/01/2024:  Reviewed previous progress notes.  Left lower leg unna boot removed per nursing staff at bedside.  Left ankle ulcer red granulating wound bed with minimal slough and moderate serosanguineous drainage.  Patient voices  will have virtual visit with orthopedic talk to discuss CT scan today.  All wound care performed per nursing staff at bedside.  Will continue Unna boot. Left ankle wound cleansed with 0.5% Dakins, apply Triad to periwound edge, Silver polymem to wound bed, cover with unna boot, Drawtex 4 x 4, Kerlix, and Coban. Tolerated well.  Reinforced unna boot precautions.  Will return to the clinic on Wednesday for a  nurse visit, and will return the following Monday to re-evaluate left ankle ulcer.  Instructed the importance of calling the office with any questions, concerns, or any reasons having to remove Unna boot.  Verbalized understanding of all instructions.    06/26/2024:  Presents to clinic alone.  Left lower leg unna boot removed per nursing staff at bedside.  Left ankle ulcer red granulating wound bed with moderate serosanguineous drainage minimal slough.  Macerated jak wound.  Discussion with the patient today will continue Unna boot.  Patient is taking all oral antibiotics as directed.  All wound care performed per nursing staff at bedside. Left ankle wound cleansed with 0.5% Dakins, apply Triad to periwound edge, Silver polymem to wound bed, cover with unna boot, Drawtex wrap, Kerlix, and Coban. Tolerated well.  Reinforced unna boot precautions.  Will return to the clinic on Monday to re-evaluate left ankle ulcer.  Instructed the importance of calling the office with any questions, concerns, or any reasons having to remove Unna boot.  Doctors excuse given.  Verbalized understanding of all instructions.      06/13/2024:  Presents to the clinic with grandchildren.  Ambulates with left foot orthopedic walking boot.  Walking boot and left ankle dressing removed per nursing staff at bedside.  Left ankle ulcer red granulating wound bed with moderate amount of slough and serosanguineous drainage with the jak wound erythema and nonpitting edema.  Discussion with the patient today will need to perform selective debridement and obtain tissue culture.  Written consent obtained.  See quick procedure note.  Following debridement red granulating wound bed with moderate serosanguineous drainage discussion with the patient today will benefit from applying Unna boot to increased granulation to area and decrease edema.  All wound care performed per nursing staff at bedside.  Left ankle wound cleansed with 0.5% Dakins, apply Silver  polymem to wound bed, cover with unna boot, Drawtex wrap, Kerlix, and Coban.  Tolerated well.  Instructed on unna boot precautions.  Will return to the clinic on Monday for a nurse visit for Unna boot change.  Will follow up with me next Thursday.       History includes:      Past Medical History:   Diagnosis Date    Diabetes mellitus     Diabetes mellitus, type 2     GERD (gastroesophageal reflux disease)     Hypertension     History reviewed. No pertinent surgical history.   Social History     Socioeconomic History    Marital status:     Number of children: 0   Occupational History    Occupation: Manager     Comment: [employer]   Tobacco Use    Smoking status: Never    Smokeless tobacco: Never   Substance and Sexual Activity    Alcohol use: Never    Drug use: Never    Sexual activity: Yes     Partners: Male     Birth control/protection: None     Social Drivers of Health     Financial Resource Strain: Medium Risk (10/24/2024)    Overall Financial Resource Strain (CARDIA)     Difficulty of Paying Living Expenses: Somewhat hard   Food Insecurity: Food Insecurity Present (10/24/2024)    Hunger Vital Sign     Worried About Running Out of Food in the Last Year: Sometimes true     Ran Out of Food in the Last Year: Never true   Transportation Needs: No Transportation Needs (10/24/2024)    TRANSPORTATION NEEDS     Transportation : No   Physical Activity: Sufficiently Active (10/24/2024)    Exercise Vital Sign     Days of Exercise per Week: 6 days     Minutes of Exercise per Session: 40 min   Stress: Patient Declined (9/30/2024)    [employer] - Occupational Stress Questionnaire     Feeling of Stress : Patient declined   Housing Stability: Unknown (10/24/2024)    Housing Stability Vital Sign     Unable to Pay for Housing in the Last Year: No     Homeless in the Last Year: No   .      Current Outpatient Medications   Medication Sig Dispense Refill    alcohol swabs PadM Apply 1 each  "topically 4 (four) times daily. 400 each 2    atorvastatin (LIPITOR) 40 MG tablet Take 1 tablet (40 mg total) by mouth every evening. 30 tablet 1    blood sugar diagnostic Strp Test strips to check CBG's 200 each 1    EASY TOUCH TWIST LANCETS 33 gauge Misc USE TO CHECK BLOOD SUAGR LEVELS TWO TIMES A  each 1    gabapentin (NEURONTIN) 300 MG capsule Take 3 capsules (900 mg total) by mouth 3 (three) times daily. 270 capsule 2    hydrALAZINE (APRESOLINE) 25 MG tablet Take 1 tablet (25 mg total) by mouth every 8 (eight) hours. 90 tablet 1    insulin aspart U-100 (NOVOLOG FLEXPEN U-100 INSULIN) 100 unit/mL (3 mL) InPn pen Inject 15 Units into the skin 3 (three) times daily with meals. 40.5 mL 1    insulin glargine U-100, Lantus, (LANTUS SOLOSTAR U-100 INSULIN) 100 unit/mL (3 mL) InPn pen Inject 60 Units into the skin every evening. 54 mL 1    lancing device Misc 1 each by Misc.(Non-Drug; Combo Route) route 4 (four) times daily with meals and nightly. 200 each 5    melatonin 10 mg Tab Take 1 tablet (10 mg total) by mouth nightly as needed (insomnia). 180 tablet 0    NIFEdipine (PROCARDIA-XL) 60 MG (OSM) 24 hr tablet Take 1 tablet (60 mg total) by mouth once daily.      omeprazole (PRILOSEC) 20 MG capsule Take 2 capsules (40 mg total) by mouth once daily. 60 capsule 2    rOPINIRole (REQUIP) 1 MG tablet Take 1 tablet (1 mg total) by mouth every evening. 30 tablet 2    TECHLITE PEN NEEDLE 32 gauge x 5/32" Ndle USE 1 NEEDLE DAILY      traZODone (DESYREL) 50 MG tablet Take 0.5 tablets (25 mg total) by mouth nightly as needed for Insomnia. 20 tablet 0    TRUE METRIX GLUCOSE METER Misc Inject 1 each into the skin 4 (four) times daily with meals and nightly. use as directed 1 each 0    doxycycline (VIBRAMYCIN) 100 MG Cap Take 1 capsule (100 mg total) by mouth 2 (two) times daily. (Patient not taking: Reported on 2/11/2025) 60 capsule 1    rOPINIRole (REQUIP) 0.5 MG tablet Take 0.5 mg by mouth 3 (three) times daily. (Patient " not taking: Reported on 2/11/2025)      traMADoL (ULTRAM) 50 mg tablet Take 1 tablet (50 mg total) by mouth every 6 (six) hours. 20 tablet 0     No current facility-administered medications for this encounter.       Review of Systems   Skin:  Positive for wound.   All other systems reviewed and are negative.         Labs Reviewed:   Chemistry:  Lab Results   Component Value Date    BUN 15.1 12/10/2024    BUN 24.4 (H) 11/11/2024    CREATININE 0.63 12/10/2024    CREATININE 0.75 11/11/2024    EGFRNORACEVR >60 12/10/2024    EGFRNORACEVR >60 11/11/2024    GLUCOSE 103 (H) 12/10/2024    PREALB 22.0 11/11/2024    AST 12 12/10/2024    AST 10 11/11/2024    ALT 15 12/10/2024    ALT 13 11/11/2024    HGBA1C 8.1 (H) 02/10/2025        Hematology:  Lab Results   Component Value Date    WBC 16.75 (H) 12/10/2024    WBC 12.55 (H) 11/11/2024    HGB 12.4 12/10/2024    HGB 11.0 (L) 11/11/2024    HCT 36.8 (L) 12/10/2024    HCT 33.0 (L) 11/11/2024     12/10/2024     11/11/2024       Inflammatory Markers:  Lab Results   Component Value Date    HSCRP 22.89 (H) 11/11/2024    HSCRP 23.79 (H) 11/04/2024    HSCRP 21.67 (H) 10/28/2024    SEDRATE 21 (H) 12/10/2024    SEDRATE 31 (H) 11/11/2024    SEDRATE 35 (H) 11/04/2024        Objective:        Physical Exam  Vitals reviewed.   Cardiovascular:      Pulses:           Dorsalis pedis pulses are detected w/ Doppler on the left side.        Posterior tibial pulses are detected w/ Doppler on the left side.   Musculoskeletal:      Left lower leg: Edema present.      Left foot: Charcot foot present.        Feet:    Feet:      Left foot:      Skin integrity: Ulcer present.      Toenail Condition: Left toenails are abnormally thick. Fungal disease present.  Skin:     General: Skin is warm.      Capillary Refill: Capillary refill takes less than 2 seconds.      Findings: Wound present.             Comments: Left ankle wound fully granulated.   Neurological:      Mental Status: She is alert.    Psychiatric:         Behavior: Behavior is cooperative.            Wound 06/13/24 0827 Pressure Injury Left lateral Ankle (Active)   06/13/24 0827 Ankle   Present on Original Admission: Y   Primary Wound Type: Pressure inj   Side: Left   Orientation: lateral   Wound Approximate Age at First Assessment (Weeks):    Wound Number:    Is this injury device related?:    Incision Type:    Closure Method:    Wound Description (Comments):    Type:    Additional Comments:    Ankle-Brachial Index:    Pulses:    Removal Indication and Assessment:    Wound Outcome:    Wound Image   02/11/25 1116   Dressing Appearance Intact;Dry;Clean 02/11/25 1116   Drainage Amount None 02/11/25 1116   Appearance Other (see comments) 02/11/25 1116   Wound Length (cm) 0 cm 02/11/25 1116   Wound Width (cm) 0 cm 02/11/25 1116   Wound Depth (cm) 0 cm 02/11/25 1116   Wound Volume (cm^3) 0 cm^3 02/11/25 1116   Wound Surface Area (cm^2) 0 cm^2 02/11/25 1116         Assessment:         ICD-10-CM ICD-9-CM   1. Diabetic ulcer of left foot associated with type 2 diabetes mellitus, with fat layer exposed, unspecified part of foot  E11.621 250.80    L97.522 707.15   2. Overgrown toenails  L60.2 703.8   3. Dystrophic nail  L60.3 703.8   4. Acute osteomyelitis of left calcaneus  M86.172 730.07   5. Charcot's joint of left ankle  M14.672 094.0     713.5   6. Type 2 diabetes mellitus with other skin ulcer, without long-term current use of insulin  E11.622 250.80   7. Class 3 severe obesity with body mass index (BMI) of 40.0 to 44.9 in adult, unspecified obesity type, unspecified whether serious comorbidity present  E66.813 278.01    E66.01 V85.41    Z68.41            Plan:   Tissue pathology and/or culture taken:  [] Yes [x] No   Sharp debridement performed:   [] Yes [x] No   Labs ordered this visit:   [] Yes [x] No   Imaging ordered this visit:   [] Yes [x] No         1. Diabetic ulcer of left foot associated with type 2 diabetes mellitus, with fat layer  exposed, unspecified part of foot     Fully granulated.   2. Overgrown toenails     Trimmed.  Instructed on proper nail care.   3. Dystrophic nail     Debride.  Instructed on proper nail care.   4. Acute osteomyelitis of left calcaneus     Received IV antibiotics.    Awaiting BKA.   5. Charcot's joint of left ankle     Under the care of surgery clinic.  Awaiting BKA.      6. Type 2 diabetes mellitus with other skin ulcer, without long-term current use of insulin     Co-factor in delayed wound healing.    Last A1c 8.1.    Reinforced the importance of diet and medication compliance.   7. Class 3 severe obesity with body mass index (BMI) of 40.0 to 44.9 in adult, unspecified obesity type, unspecified whether serious comorbidity present     Co-factor in wound development.              There are no Patient Instructions on file for this visit.    The time spent including preparing to see the patient, obtaining patient history and assessment, evaluation of the plan of care, patient/caregiver counseling and education, orders, documentation, coordination of care, and other professional medical management activities for today's encounter was 20 minute.    Time spent performing procedures during today's encounter was 20 minute.    No follow-ups on file. Teaching provided on s/s to call wound clinic for promptly.  ER precautions taught for after hours and weekends.       JANE Vieyra

## 2025-02-13 ENCOUNTER — OFFICE VISIT (OUTPATIENT)
Dept: INTERNAL MEDICINE | Facility: CLINIC | Age: 60
End: 2025-02-13
Payer: MEDICAID

## 2025-02-13 VITALS
SYSTOLIC BLOOD PRESSURE: 141 MMHG | TEMPERATURE: 98 F | WEIGHT: 256.19 LBS | RESPIRATION RATE: 18 BRPM | OXYGEN SATURATION: 96 % | HEIGHT: 62 IN | DIASTOLIC BLOOD PRESSURE: 83 MMHG | HEART RATE: 86 BPM | BODY MASS INDEX: 47.15 KG/M2

## 2025-02-13 DIAGNOSIS — E11.69 DIABETES MELLITUS TYPE 2 IN OBESE: ICD-10-CM

## 2025-02-13 DIAGNOSIS — E11.9 TYPE 2 DIABETES MELLITUS WITHOUT COMPLICATION, WITH LONG-TERM CURRENT USE OF INSULIN: ICD-10-CM

## 2025-02-13 DIAGNOSIS — I10 HYPERTENSION, UNSPECIFIED TYPE: Primary | ICD-10-CM

## 2025-02-13 DIAGNOSIS — G25.81 RESTLESS LEG SYNDROME: ICD-10-CM

## 2025-02-13 DIAGNOSIS — Z79.4 TYPE 2 DIABETES MELLITUS WITHOUT COMPLICATION, WITH LONG-TERM CURRENT USE OF INSULIN: ICD-10-CM

## 2025-02-13 DIAGNOSIS — E66.9 DIABETES MELLITUS TYPE 2 IN OBESE: ICD-10-CM

## 2025-02-13 DIAGNOSIS — Z79.4 UNCONTROLLED TYPE 2 DIABETES MELLITUS WITH HYPERGLYCEMIA, WITH LONG-TERM CURRENT USE OF INSULIN: ICD-10-CM

## 2025-02-13 DIAGNOSIS — K21.9 GASTROESOPHAGEAL REFLUX DISEASE, UNSPECIFIED WHETHER ESOPHAGITIS PRESENT: ICD-10-CM

## 2025-02-13 DIAGNOSIS — H54.7 VISUAL LOSS: ICD-10-CM

## 2025-02-13 DIAGNOSIS — F51.01 PRIMARY INSOMNIA: ICD-10-CM

## 2025-02-13 DIAGNOSIS — M86.672 OTHER CHRONIC OSTEOMYELITIS OF LEFT FOOT: ICD-10-CM

## 2025-02-13 DIAGNOSIS — E55.9 VITAMIN D DEFICIENCY: ICD-10-CM

## 2025-02-13 DIAGNOSIS — E11.65 UNCONTROLLED TYPE 2 DIABETES MELLITUS WITH HYPERGLYCEMIA, WITH LONG-TERM CURRENT USE OF INSULIN: ICD-10-CM

## 2025-02-13 PROCEDURE — 99214 OFFICE O/P EST MOD 30 MIN: CPT | Mod: PBBFAC

## 2025-02-13 RX ORDER — LISINOPRIL 10 MG/1
20 TABLET ORAL DAILY
Qty: 180 TABLET | Refills: 0 | Status: SHIPPED | OUTPATIENT
Start: 2025-02-13

## 2025-02-13 RX ORDER — INSULIN GLARGINE 100 [IU]/ML
30 INJECTION, SOLUTION SUBCUTANEOUS NIGHTLY
Qty: 27 ML | Refills: 1 | Status: SHIPPED | OUTPATIENT
Start: 2025-02-13 | End: 2025-08-12

## 2025-02-13 RX ORDER — LANCETS 33 GAUGE
EACH MISCELLANEOUS
Qty: 100 EACH | Refills: 5 | Status: CANCELLED | OUTPATIENT
Start: 2025-02-13

## 2025-02-13 RX ORDER — BLOOD-GLUCOSE,RECEIVER,CONT
EACH MISCELLANEOUS
Qty: 1 EACH | Refills: 0 | Status: SHIPPED | OUTPATIENT
Start: 2025-02-13

## 2025-02-13 RX ORDER — ROPINIROLE 1 MG/1
1 TABLET, FILM COATED ORAL NIGHTLY
Qty: 30 TABLET | Refills: 2 | Status: SHIPPED | OUTPATIENT
Start: 2025-02-13 | End: 2025-05-14

## 2025-02-13 RX ORDER — GABAPENTIN 400 MG/1
1200 CAPSULE ORAL 3 TIMES DAILY
Qty: 270 CAPSULE | Refills: 2 | Status: SHIPPED | OUTPATIENT
Start: 2025-02-13 | End: 2025-05-14

## 2025-02-13 RX ORDER — NIFEDIPINE 60 MG/1
60 TABLET, EXTENDED RELEASE ORAL DAILY
Start: 2025-02-13 | End: 2026-02-13

## 2025-02-13 RX ORDER — LANCING DEVICE
1 EACH MISCELLANEOUS
Qty: 200 EACH | Refills: 5 | Status: SHIPPED | OUTPATIENT
Start: 2025-02-13 | End: 2026-02-13

## 2025-02-13 RX ORDER — INSULIN ASPART 100 [IU]/ML
10 INJECTION, SOLUTION INTRAVENOUS; SUBCUTANEOUS
Qty: 27 ML | Refills: 1 | Status: SHIPPED | OUTPATIENT
Start: 2025-02-13 | End: 2025-08-12

## 2025-02-13 RX ORDER — BLOOD-GLUCOSE SENSOR
EACH MISCELLANEOUS
Qty: 3 EACH | Refills: 5 | Status: SHIPPED | OUTPATIENT
Start: 2025-02-13

## 2025-02-13 RX ORDER — ACETAMINOPHEN 500 MG
2000 TABLET ORAL DAILY
Qty: 30 CAPSULE | Refills: 5 | Status: SHIPPED | OUTPATIENT
Start: 2025-02-13 | End: 2025-08-12

## 2025-02-13 RX ORDER — PEN NEEDLE, DIABETIC 30 GX3/16"
1 NEEDLE, DISPOSABLE MISCELLANEOUS
Qty: 100 EACH | Refills: 6 | Status: SHIPPED | OUTPATIENT
Start: 2025-02-13

## 2025-02-13 RX ORDER — LANCETS
EACH MISCELLANEOUS
Qty: 200 EACH | Refills: 5 | Status: SHIPPED | OUTPATIENT
Start: 2025-02-13

## 2025-02-13 RX ORDER — ONDANSETRON 4 MG/1
4 TABLET, ORALLY DISINTEGRATING ORAL EVERY 6 HOURS PRN
Qty: 30 TABLET | Refills: 0 | Status: SHIPPED | OUTPATIENT
Start: 2025-02-13

## 2025-02-13 RX ORDER — TRAZODONE HYDROCHLORIDE 50 MG/1
25 TABLET ORAL NIGHTLY PRN
Qty: 20 TABLET | Refills: 0 | Status: SHIPPED | OUTPATIENT
Start: 2025-02-13 | End: 2025-03-25

## 2025-02-13 RX ORDER — TIRZEPATIDE 2.5 MG/.5ML
2.5 INJECTION, SOLUTION SUBCUTANEOUS
Qty: 2 ML | Refills: 0 | Status: SHIPPED | OUTPATIENT
Start: 2025-02-13

## 2025-02-13 RX ORDER — HYDRALAZINE HYDROCHLORIDE 50 MG/1
50 TABLET, FILM COATED ORAL EVERY 8 HOURS
Qty: 90 TABLET | Refills: 1 | Status: SHIPPED | OUTPATIENT
Start: 2025-02-13 | End: 2025-04-14

## 2025-02-13 RX ORDER — TRAMADOL HYDROCHLORIDE 50 MG/1
50 TABLET ORAL EVERY 6 HOURS
Qty: 20 TABLET | Refills: 0 | Status: SHIPPED | OUTPATIENT
Start: 2025-02-13

## 2025-02-13 RX ORDER — ATORVASTATIN CALCIUM 40 MG/1
40 TABLET, FILM COATED ORAL NIGHTLY
Qty: 30 TABLET | Refills: 1 | Status: SHIPPED | OUTPATIENT
Start: 2025-02-13

## 2025-02-13 RX ORDER — PEN NEEDLE, DIABETIC 32GX 5/32"
1 NEEDLE, DISPOSABLE MISCELLANEOUS
Qty: 100 EACH | Refills: 5 | Status: CANCELLED | OUTPATIENT
Start: 2025-02-13

## 2025-02-13 RX ORDER — OMEPRAZOLE 20 MG/1
40 CAPSULE, DELAYED RELEASE ORAL DAILY
Qty: 60 CAPSULE | Refills: 2 | Status: SHIPPED | OUTPATIENT
Start: 2025-02-13 | End: 2025-05-14

## 2025-02-13 NOTE — PROGRESS NOTES
I have reviewed and agree with the resident's findings, including all diagnostic interpretations and plans as written.    Natalie Culver MD
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
## 2025-02-19 ENCOUNTER — TELEPHONE (OUTPATIENT)
Dept: DIABETES | Facility: CLINIC | Age: 60
End: 2025-02-19
Payer: MEDICAID

## 2025-02-19 DIAGNOSIS — Z79.4 TYPE 2 DIABETES MELLITUS WITHOUT COMPLICATION, WITH LONG-TERM CURRENT USE OF INSULIN: Primary | ICD-10-CM

## 2025-02-19 DIAGNOSIS — E11.69 DIABETES MELLITUS TYPE 2 IN OBESE: ICD-10-CM

## 2025-02-19 DIAGNOSIS — E11.9 TYPE 2 DIABETES MELLITUS WITHOUT COMPLICATION, WITH LONG-TERM CURRENT USE OF INSULIN: Primary | ICD-10-CM

## 2025-02-19 DIAGNOSIS — E66.9 DIABETES MELLITUS TYPE 2 IN OBESE: ICD-10-CM

## 2025-02-20 ENCOUNTER — TELEPHONE (OUTPATIENT)
Dept: INTERNAL MEDICINE | Facility: CLINIC | Age: 60
End: 2025-02-20
Payer: MEDICAID

## 2025-02-20 DIAGNOSIS — K21.9 GASTROESOPHAGEAL REFLUX DISEASE, UNSPECIFIED WHETHER ESOPHAGITIS PRESENT: ICD-10-CM

## 2025-02-20 RX ORDER — OMEPRAZOLE 40 MG/1
40 CAPSULE, DELAYED RELEASE ORAL DAILY
Qty: 30 CAPSULE | Refills: 2 | Status: SHIPPED | OUTPATIENT
Start: 2025-02-20 | End: 2025-05-21

## 2025-02-20 NOTE — TELEPHONE ENCOUNTER
New Rx sent in for omeprazole 40 mg qd.    Please let me know if there are any additional issues.    Thank you!    Dick

## 2025-02-20 NOTE — TELEPHONE ENCOUNTER
Pt called stating the pharmacist at [facility] about Rx for Omeprazole 20mg 2xs daily is not covered by pt insurance and she will have to pay for it. Pharmacist requesting a new Rx for Omeprazole 40 1 Tab once daily instead. This dosage is covered by the pt insurance. Please advise

## 2025-02-24 ENCOUNTER — CLINICAL SUPPORT (OUTPATIENT)
Dept: DIABETES | Facility: CLINIC | Age: 60
End: 2025-02-24
Payer: MEDICAID

## 2025-02-24 DIAGNOSIS — E66.9 DIABETES MELLITUS TYPE 2 IN OBESE: ICD-10-CM

## 2025-02-24 DIAGNOSIS — Z79.4 TYPE 2 DIABETES MELLITUS WITHOUT COMPLICATION, WITH LONG-TERM CURRENT USE OF INSULIN: Primary | ICD-10-CM

## 2025-02-24 DIAGNOSIS — E11.69 DIABETES MELLITUS TYPE 2 IN OBESE: ICD-10-CM

## 2025-02-24 DIAGNOSIS — E11.9 TYPE 2 DIABETES MELLITUS WITHOUT COMPLICATION, WITH LONG-TERM CURRENT USE OF INSULIN: Primary | ICD-10-CM

## 2025-02-24 PROCEDURE — G0108 DIAB MANAGE TRN  PER INDIV: HCPCS | Mod: PBBFAC,PN | Performed by: DIETITIAN, REGISTERED

## 2025-02-24 NOTE — PROGRESS NOTES
Diabetes Care Specialist Progress Note  Author: Mariza Hanson RD  Date: 2/24/2025    Intake    Program Intake  Reason for Diabetes Program Visit:: Post Program Follow-Up  Type of Follow-Up: Other  Current diabetes risk level:: high         Continuous Glucose Monitoring  Patient has CGM: Yes  Personal CGM type:: dexcom G7 with     Lab Results   Component Value Date    HGBA1C 8.1 (H) 02/10/2025               There is no height or weight on file to calculate BMI.    Lifestyle Coping Support & Clinical    Lifestyle/Coping/Support  Psychosocial/Coping Skills Assessment Completed: : No  Deffered due to:: Time  Area of need?: Deferred         Diabetes Self-Management Skills Assessment    Medication Skills Assessment  Medication Skills Assessment Completed:: No  Deffered due to:: Time  Area of need?: Deferred    Diabetes Disease Process/Treatment Options  Diabetes Disease Process/Treatment Options: Skills Assessment Completed: No  Deferred due to:: Time  Area of need?: Deferred    Nutrition/Healthy Eating  Nutrition/Healthy Eating Skills Assessment Completed:: No  Deffered due to:: Time  Area of need?: Deferred    Physical Activity/Exercise  Patient's daily activity level:: sedentary  Physical Activity/Exercise Skills Assessment Completed: : No  Deffered due to:: Time  Area of need?: Deferred    Home Blood Glucose Monitoring  Personal CGM type:: dexcom G7 with   Home Blood Glucose Monitoring Skills Assessment Completed: : Yes  Assessment indicates:: Instruction Needed  Area of need?: Yes    Acute Complications  Have you ever had hypoglycemia (low BG 70 or less)?: no  Have you ever had hyperglycemia (high  or more)?: yes  Acute Complications Skills Assessment Completed: : No  Deffered due to:: Time  Area of need?: Deferred    Chronic Complications  Reviewed health maintenance: no (see comments)  Chronic Complications Skills Assessment Completed: : No  Deferred due to:: Time  Area of need?:  Deferred      Assessment Summary and Plan    Based on today's diabetes care assessment, the following areas of need were identified:      Identified Areas of Need      Medication/Current Diabetes Treatment: Deferred   Lifestyle Coping/Support: Deferred   Diabetes Disease Process/Treatment Options: Deferred   Nutrition/Healthy Eating: Deferred    Physical Activity/Exercise: Deferred    Home Blood Glucose Monitoring: Yes  - see care plan. Instructed to contact Dexcom customer support to replace sensor due to sensor error while in clinic.   Acute Complications: Deferred    Chronic Complications: Deferred       Today's interventions were provided through individual discussion, instruction, and written materials were provided.      Patient verbalized understanding of instruction and written materials.  Pt was able to return back demonstration of instructions today. Patient understood key points, needs reinforcement and further instruction.     Diabetes Self-Management Care Plan:    Today's Diabetes Self-Management Care Plan was developed with Suki's input. Suki has agreed to work toward the following goal(s) to improve his/her overall diabetes control.      Care Plan: Diabetes Management   Updates made since 2/25/2024 12:00 AM        Problem: Medications Resolved 2/24/2025        Goal: Patient Agrees to take Diabetes Medication(s) Glipizide 10 mg BID with breakfast and dinner and Levemir 25 units at 10am daily. Completed 2/24/2025   Start Date: 4/4/2024   Expected End Date: 5/8/2024   This Visit's Progress: On track   Priority: High   Barriers: No Barriers Identified   Note:    4/4/24 - Instructed to contact insurance company to determine out-of-pocket cost for Novolog and/or formulary prandial insulin. Will make referral to [facility] medication assistance program.       Task: Reviewed with patient all current diabetes medications and provided basic review of the purpose, dosage, frequency, side effects, and storage of  "both oral and injectable diabetes medications. Completed 4/4/2024        Task: Reviewed possible resources for acquiring cost prohibitive medication. Completed 4/4/2024        Task: Discussed guidelines for preventing, detecting and treating hypoglycemia and hyperglycemia and reviewed the importance of meal and medication timing with diabetes mediations for prevention of hypoglycemia and maximum drug benefit. Completed 4/4/2024        Problem: Blood Glucose Self-Monitoring         Goal: Pt will use Dexcom G7 for CGM    Start Date: 2/24/2025   Expected End Date: 3/31/2025   Priority: High   Barriers: No Barriers Identified   Note:    DEXCOM G7 CGM TRAINING  Dexcom G7 mobile apps downloaded to phone. Education was provided using "Quick Start Guide" and demo device per Dexcom protocol. [facility] data share set-up.    Patient will use Dexcom G7  to receive continuous glucose data.        Overview:  5 minute glucose reading updates, trending arrows, BG graph screens, reports screen,  connectivity and functions.   Menus: Trend graph, start sensor, enter BG, events, alerts, settings, replace sensor, stop sensor, and shutdown  Dexcom G7 mobile aidan/ Settings:                          * Urgent Low: 55 mg/dL                          * Urgent Low Soon: On                          * Low Alert: 70 mg/dL; Snooze off                          * High Alert: 250 mg/dL; Snooze off                           * Signal Loss: on                          * Brief Sensor Issue: on     Reviewed additional setting options.  Patient paired sensor using 4-digit code listed on sensor cap..    Reviewed where to find sensor insertion time and expiration date.   Reviewed appropriate calibration techniques.  Reviewed sensor site selection. Patient selected and prepped site using aseptic technique, inserted sensor, applied overlay tape and started session.   Reviewed sensor removal from site. Discussed times to remove sensor " per  guidelines include MRI or diatherapy.   Patient able to demonstrate without difficulty. Encouraged to review manual and/or training videos prior to starting another sensor.   Reviewed problem solving aspects of sensor transmission/variables that can disrupt RF transmission.  range 20 feet, but the first 3 hrs keep within 3 feet of transmitter.  Patient instructed on lag time of interstitial fluid from CBG and was advised to treat hypoglycemia and dose insulin based on SMBG values.  Dexcom technical support contact number given and examples of when to contact them discussed.            Follow Up Plan     Follow up pt will contact to schedule f/u when she knows her schedule.    Today's care plan and follow up schedule was discussed with patient.  Suki verbalized understanding of the care plan, goals, and agrees to follow up plan.        The patient was encouraged to communicate with his/her health care provider/physician and care team regarding his/her condition(s) and treatment.  I provided the patient with my contact information today and encouraged to contact me via phone or [facility] Patient Portal as needed.     Length of Visit   Total Time: 45 Minutes

## 2025-02-26 ENCOUNTER — TELEPHONE (OUTPATIENT)
Dept: INTERNAL MEDICINE | Facility: CLINIC | Age: 60
End: 2025-02-26
Payer: MEDICAID

## 2025-02-26 NOTE — TELEPHONE ENCOUNTER
Spoke with patient regarding concern with jardiance, states she was reading information packet and it states that she could develop kidney or bladder infection while taking medication and was wondering if she should be taking antibiotics while on jardiance. Instructed those are general or possible things that can happen while taking jardiance, if she began to experience symptoms to notify provider and will do testing to confirm infection prior to prescribing antibiotics,patient verbalized understanding and pleased.

## 2025-02-26 NOTE — TELEPHONE ENCOUNTER
TELEPHONE CALL:    PT CALLED ASKING TO SPEAK WITH DR. COLON ABOUT THE SIDE EFFECTS OF A MEDICATION AND ASKED TO SPEAK  WITH MS. BAJWA.  WHEN TIME PERMITS PLEASE GIVE PT A CALL WHEN TIME PERMITS.

## 2025-03-13 ENCOUNTER — TELEPHONE (OUTPATIENT)
Dept: INTERNAL MEDICINE | Facility: CLINIC | Age: 60
End: 2025-03-13
Payer: MEDICAID

## 2025-03-13 NOTE — TELEPHONE ENCOUNTER
Pt contacted by phone at listed number. ID by name /  . Pt states she's in  need of  dentist. Pt states she's experiencing tooth pain. Informed patient she should contact her insurance to assist with finding a dental provider. Also informed  patient she may also contact  the [facility]  clinic ; as there are dentist available there . Pt verbalize understanding of information given.

## 2025-03-25 ENCOUNTER — LAB VISIT (OUTPATIENT)
Dept: LAB | Facility: HOSPITAL | Age: 60
End: 2025-03-25
Payer: MEDICAID

## 2025-03-25 DIAGNOSIS — M14.672 CHARCOT JOINT OF LEFT FOOT: ICD-10-CM

## 2025-03-25 LAB
EST. AVERAGE GLUCOSE BLD GHB EST-MCNC: 157.1 MG/DL
HBA1C MFR BLD: 7.1 %

## 2025-03-25 PROCEDURE — 36415 COLL VENOUS BLD VENIPUNCTURE: CPT

## 2025-03-25 PROCEDURE — 83036 HEMOGLOBIN GLYCOSYLATED A1C: CPT

## 2025-03-26 ENCOUNTER — TELEPHONE (OUTPATIENT)
Dept: INTERNAL MEDICINE | Facility: CLINIC | Age: 60
End: 2025-03-26
Payer: MEDICAID

## 2025-03-28 DIAGNOSIS — Z12.11 COLON CANCER SCREENING: Primary | ICD-10-CM

## 2025-03-28 DIAGNOSIS — Z12.31 ENCOUNTER FOR SCREENING MAMMOGRAM FOR MALIGNANT NEOPLASM OF BREAST: ICD-10-CM

## 2025-03-28 NOTE — PROGRESS NOTES
Cologuard ordered.     Dick Gallagher, DO  [facility] Internal Medicine, PGY-III

## 2025-04-01 ENCOUNTER — OFFICE VISIT (OUTPATIENT)
Dept: SURGERY | Facility: CLINIC | Age: 60
End: 2025-04-01
Payer: MEDICAID

## 2025-04-01 VITALS
BODY MASS INDEX: 47.06 KG/M2 | TEMPERATURE: 98 F | OXYGEN SATURATION: 95 % | SYSTOLIC BLOOD PRESSURE: 124 MMHG | WEIGHT: 255.75 LBS | RESPIRATION RATE: 20 BRPM | HEIGHT: 62 IN | DIASTOLIC BLOOD PRESSURE: 72 MMHG | HEART RATE: 92 BPM

## 2025-04-01 DIAGNOSIS — E11.40 DIABETIC NEUROPATHY, PAINFUL: ICD-10-CM

## 2025-04-01 DIAGNOSIS — F51.01 PRIMARY INSOMNIA: ICD-10-CM

## 2025-04-01 DIAGNOSIS — M86.672 OTHER CHRONIC OSTEOMYELITIS OF LEFT FOOT: ICD-10-CM

## 2025-04-01 DIAGNOSIS — M14.672 CHARCOT JOINT OF LEFT FOOT: Primary | ICD-10-CM

## 2025-04-01 PROCEDURE — 99215 OFFICE O/P EST HI 40 MIN: CPT | Mod: PBBFAC

## 2025-04-01 NOTE — PROGRESS NOTES
I have reviewed the notes, assessments, and/or procedures performed by Dr Sierra, I concur with her/his documentation of Suki Anton.  Date of Service: 4/1/2025    [facility]

## 2025-04-01 NOTE — PROGRESS NOTES
Seen by Dr. Boland and Dr. Sierra.  Referral to Podiatrist faxed to [facility].  RTC 4/29/25. Augusto with Occupational Therapy spoke with patient at length regarding condition. Request for consultation from Gail sent to  [facility] fax [phone].

## 2025-04-01 NOTE — PROGRESS NOTES
\[facility]\"" General Surgery Clinic Note    HPI:   59 F here for chronic pain from L charcot joint of ankle. Hx of T2DM, GERD, and HTN. Being seen for evaluation for elective L BKA. Pt uses a boot to transfer and a wheelchair to navigate her home. Reports history of calcaneal osteomyelitis treated with 6 weeks IV abx, currently healed. Reports podiatry (Dr. Neumann) stated she would require a BKA when she had osteomyelitis, but she has not been seen again in months. Currently states that she can ambulate in boot she has but no for long distances and the pain is excruciating. Has been working on improving A1C and is now down to 7.1.     PMH:   Past Medical History:   Diagnosis Date    Diabetes mellitus     Diabetes mellitus, type 2     GERD (gastroesophageal reflux disease)     Hypertension       Meds: Current Medications[1]  Allergies:   Review of patient's allergies indicates:   Allergen Reactions    Shellfish containing products Hives and Itching     Crawfish only     Social History: Social History[2]  Family History:   Family History   Problem Relation Name Age of Onset    Cataracts Mother      Cancer Father       Surgical History: History reviewed. No pertinent surgical history.  Review of Systems:  Skin: No rashes or itching.  Head: Denies headache or recent trauma.  Eyes: Denies eye pain or double vision.  Neck: Denies swelling or hoarseness of voice.  Respiratory: Denies shortness of breath or chest pain  Cardiac: Denies palpitations or swelling in hands/feet.  Gastrointestinal: Denies nausea, denies vomiting.   Urinary: Denies dysuria or hematuria.  Vascular: Denies claudication or leg swelling.  Neuro: Denies motor deficits. Denies weakness.  Endocrine: Denies excessive sweating or cold intolerance.  Psych: Denies memory problems. Denies anxiety.    Objective:    Vitals:  Vitals:    04/01/25 1007   BP: 124/72   Pulse:    Resp:    Temp:         Physical Exam:  Gen: NAD  Neuro: awake, alert, answering questions  appropriately  CV: RR  Resp: non-labored breathing, CASEY  Abd: soft, ND, NT  : deffered  Ext: Limited mobilityof L ankle due to pain, minimal sensation of L foot. Healed wound on L lateral maleolus. Moves all other extremities spontaneously and purposefully  Skin: warm, well perfused    Pertinent Labs:  12/10/24: WC - 16.7     Hgb - 12.4  3/25/25: A1c - 7.1    Imaging:  N/A    Micro/Path/Other:  N/A    Assessment/Plan:  59 F here for chronic pain from L charcot joint of ankle. Hx of T2DM, GERD, and HTN. Being seen for evaluation for elective L BKA in setting of .    Pt expressed frustration with the multiple different opinions she has received from the rotating residents. Dr. De La Torre spoke to patient and family directly to ensure smoother transfer of care and clearer comunication of plan.    Charcot Joint of L ankle  Chronic pain of L ankle  - Refer to second opinion podiatry for recommendations of charcot joint treatment w/o current osteomyelitis  - Continue medical management and close blood glucose monitoring. A1C continues to improve   - PT/OT came and discussed post-operative mobility with patient and family and answered questions   - Patient would benefit from referral to [facility] pre-op for discussion / education of prosthetics  - Will plan to have patient follow up at end of April or beginning of May to discuss surgery scheduling should patient decide to proceed with left BKA     Candylexus Smith, L3  04/01/2025        PGY5 Attestation   I have seen and examined the patient with the medical student above. I agree with the above documentation and the note was edited as necessary.    Susi Sierra MD   [facility] General Surgery PGY 5         [1]   Current Outpatient Medications:     alcohol swabs PadM, Apply 1 each topically 4 (four) times daily., Disp: 400 each, Rfl: 2    atorvastatin (LIPITOR) 40 MG tablet, Take 1 tablet (40 mg total) by mouth every evening., Disp: 30 tablet, Rfl: 1    blood sugar  diagnostic Strp, To check BG 4 times daily, to use with insurance preferred meter, Disp: 200 each, Rfl: 5    blood-glucose meter,continuous (DEXCOM G7 ) Misc, To use with sensors to check blood sugars, Disp: 1 each, Rfl: 0    blood-glucose sensor (DEXCOM G7 SENSOR) Samira, Replace every 10 days. To be used with  to check blood sugars, Disp: 3 each, Rfl: 5    cholecalciferol, vitamin D3, (VITAMIN D3) 50 mcg (2,000 unit) Cap capsule, Take 1 capsule (2,000 Units total) by mouth once daily., Disp: 30 capsule, Rfl: 5    empagliflozin (JARDIANCE) 10 mg tablet, Take 1 tablet (10 mg total) by mouth once daily., Disp: 30 tablet, Rfl: 2    gabapentin (NEURONTIN) 400 MG capsule, Take 3 capsules (1,200 mg total) by mouth 3 (three) times daily., Disp: 270 capsule, Rfl: 2    hydrALAZINE (APRESOLINE) 50 MG tablet, Take 1 tablet (50 mg total) by mouth every 8 (eight) hours., Disp: 90 tablet, Rfl: 1    insulin aspart U-100 (NOVOLOG FLEXPEN U-100 INSULIN) 100 unit/mL (3 mL) InPn pen, Inject 10 Units into the skin 3 (three) times daily with meals., Disp: 27 mL, Rfl: 1    insulin glargine U-100, Lantus, (LANTUS SOLOSTAR U-100 INSULIN) 100 unit/mL (3 mL) InPn pen, Inject 30 Units into the skin every evening., Disp: 27 mL, Rfl: 1    lancets Misc, To check BG 4 times daily, to use with insurance preferred meter, Disp: 200 each, Rfl: 5    lancing device Misc, 1 each by Misc.(Non-Drug; Combo Route) route 4 (four) times daily with meals and nightly., Disp: 200 each, Rfl: 5    lisinopriL 10 MG tablet, Take 2 tablets (20 mg total) by mouth once daily., Disp: 180 tablet, Rfl: 0    NIFEdipine (PROCARDIA-XL) 60 MG (OSM) 24 hr tablet, Take 1 tablet (60 mg total) by mouth once daily., Disp: , Rfl:     omeprazole (PRILOSEC) 40 MG capsule, Take 1 capsule (40 mg total) by mouth once daily., Disp: 30 capsule, Rfl: 2    ondansetron (ZOFRAN-ODT) 4 MG TbDL, Take 1 tablet (4 mg total) by mouth every 6 (six) hours as needed (Nausea)., Disp: 30  "tablet, Rfl: 0    pen needle, diabetic 32 gauge x 5/32" Ndle, 1 each by Misc.(Non-Drug; Combo Route) route 4 (four) times daily with meals and nightly., Disp: 100 each, Rfl: 6    rOPINIRole (REQUIP) 1 MG tablet, Take 1 tablet (1 mg total) by mouth every evening., Disp: 30 tablet, Rfl: 2    tirzepatide (MOUNJARO) 2.5 mg/0.5 mL PnIj, Inject 2.5 mg into the skin every 7 days., Disp: 2 mL, Rfl: 0    traMADoL (ULTRAM) 50 mg tablet, Take 1 tablet (50 mg total) by mouth every 6 (six) hours., Disp: 20 tablet, Rfl: 0    traZODone (DESYREL) 50 MG tablet, Take 0.5 tablets (25 mg total) by mouth nightly as needed for Insomnia., Disp: 20 tablet, Rfl: 0    TRUE METRIX GLUCOSE METER Misc, Inject 1 each into the skin 4 (four) times daily with meals and nightly. use as directed, Disp: 1 each, Rfl: 0  [2]   Social History  Tobacco Use    Smoking status: Never    Smokeless tobacco: Never   Substance Use Topics    Alcohol use: Never    Drug use: Never     "

## 2025-04-02 RX ORDER — TRAMADOL HYDROCHLORIDE 50 MG/1
50 TABLET ORAL EVERY 6 HOURS
Qty: 20 TABLET | Refills: 0 | Status: SHIPPED | OUTPATIENT
Start: 2025-04-02

## 2025-04-08 DIAGNOSIS — Z79.4 TYPE 2 DIABETES MELLITUS WITHOUT COMPLICATION, WITH LONG-TERM CURRENT USE OF INSULIN: ICD-10-CM

## 2025-04-08 DIAGNOSIS — E11.9 TYPE 2 DIABETES MELLITUS WITHOUT COMPLICATION, WITH LONG-TERM CURRENT USE OF INSULIN: ICD-10-CM

## 2025-04-08 RX ORDER — TIRZEPATIDE 2.5 MG/.5ML
2.5 INJECTION, SOLUTION SUBCUTANEOUS
Qty: 2 ML | Refills: 0 | Status: SHIPPED | OUTPATIENT
Start: 2025-04-08

## 2025-04-09 ENCOUNTER — TELEPHONE (OUTPATIENT)
Dept: INTERNAL MEDICINE | Facility: CLINIC | Age: 60
End: 2025-04-09
Payer: MEDICAID

## 2025-04-09 DIAGNOSIS — E11.69 DIABETES MELLITUS TYPE 2 IN OBESE: ICD-10-CM

## 2025-04-09 DIAGNOSIS — M14.672 CHARCOT'S JOINT OF LEFT ANKLE: Primary | ICD-10-CM

## 2025-04-09 DIAGNOSIS — E66.9 DIABETES MELLITUS TYPE 2 IN OBESE: ICD-10-CM

## 2025-04-09 NOTE — TELEPHONE ENCOUNTER
MS. AYDEE,      MS. SANJEEV ASKED THAT YOU GIVE HER A CALL REGARDING HER MEDICATIONS (S).  WHEN TIME PERMITS PLEASE GIVE HER A CALL.

## 2025-04-09 NOTE — TELEPHONE ENCOUNTER
Spoke with patient, states she saw podiatrist today and was recommended to speak with her pcp for pain management, increased gabapentin 300mg three times a day, and another medicine that starts with an m(it's like ibuprofen to take with gabapentin). Please review and advise.

## 2025-04-11 RX ORDER — GABAPENTIN 400 MG/1
1200 CAPSULE ORAL 3 TIMES DAILY
Qty: 270 CAPSULE | Refills: 2 | Status: SHIPPED | OUTPATIENT
Start: 2025-04-11 | End: 2025-07-10

## 2025-04-11 RX ORDER — MELOXICAM 15 MG/1
15 TABLET ORAL DAILY
Qty: 30 TABLET | Refills: 2 | Status: SHIPPED | OUTPATIENT
Start: 2025-04-11

## 2025-04-11 NOTE — TELEPHONE ENCOUNTER
Called patient, verified . Patient denies receiving higher dose of gabapentin (1.2g TID) that was prescribed in February. Will send in new script to make sure pharmacy receives it. Continuing to work with Podiatry and [facility] for boot to offload pain. Referral placed to Dr. JeanC-laude Nguyễn, pain medicine.    Dick Gallagher DO  \[facility]\"" Internal Medicine, PGY-III

## 2025-04-11 NOTE — TELEPHONE ENCOUNTER
I had been giving her gabapentin 400 mg TID... and I am also giving her Tramadol q6h.    I can send in some meloxicam for her right now. Please let her know to stay well hydrated while taking this medication.    Thank you,    Dick

## 2025-05-07 ENCOUNTER — TELEPHONE (OUTPATIENT)
Dept: INTERNAL MEDICINE | Facility: CLINIC | Age: 60
End: 2025-05-07
Payer: MEDICAID

## 2025-05-07 DIAGNOSIS — Z79.4 TYPE 2 DIABETES MELLITUS WITHOUT COMPLICATION, WITH LONG-TERM CURRENT USE OF INSULIN: ICD-10-CM

## 2025-05-07 DIAGNOSIS — I10 HYPERTENSION, UNSPECIFIED TYPE: ICD-10-CM

## 2025-05-07 DIAGNOSIS — E11.65 UNCONTROLLED TYPE 2 DIABETES MELLITUS WITH HYPERGLYCEMIA, WITH LONG-TERM CURRENT USE OF INSULIN: ICD-10-CM

## 2025-05-07 DIAGNOSIS — F51.01 PRIMARY INSOMNIA: ICD-10-CM

## 2025-05-07 DIAGNOSIS — E11.69 DIABETES MELLITUS TYPE 2 IN OBESE: ICD-10-CM

## 2025-05-07 DIAGNOSIS — E66.9 DIABETES MELLITUS TYPE 2 IN OBESE: ICD-10-CM

## 2025-05-07 DIAGNOSIS — K21.9 GASTROESOPHAGEAL REFLUX DISEASE, UNSPECIFIED WHETHER ESOPHAGITIS PRESENT: ICD-10-CM

## 2025-05-07 DIAGNOSIS — E11.9 TYPE 2 DIABETES MELLITUS WITHOUT COMPLICATION, WITH LONG-TERM CURRENT USE OF INSULIN: ICD-10-CM

## 2025-05-07 DIAGNOSIS — G25.81 RESTLESS LEG SYNDROME: ICD-10-CM

## 2025-05-07 DIAGNOSIS — M14.672 CHARCOT'S JOINT OF LEFT ANKLE: ICD-10-CM

## 2025-05-07 DIAGNOSIS — Z79.4 UNCONTROLLED TYPE 2 DIABETES MELLITUS WITH HYPERGLYCEMIA, WITH LONG-TERM CURRENT USE OF INSULIN: ICD-10-CM

## 2025-05-07 DIAGNOSIS — M86.672 OTHER CHRONIC OSTEOMYELITIS OF LEFT FOOT: ICD-10-CM

## 2025-05-07 RX ORDER — LISINOPRIL 10 MG/1
20 TABLET ORAL DAILY
Qty: 180 TABLET | Refills: 1 | Status: SHIPPED | OUTPATIENT
Start: 2025-05-07

## 2025-05-07 RX ORDER — TIRZEPATIDE 2.5 MG/.5ML
2.5 INJECTION, SOLUTION SUBCUTANEOUS
Qty: 2 ML | Refills: 3 | Status: CANCELLED | OUTPATIENT
Start: 2025-05-07

## 2025-05-07 RX ORDER — NIFEDIPINE 60 MG/1
60 TABLET, EXTENDED RELEASE ORAL DAILY
Qty: 30 TABLET | Refills: 11 | Status: SHIPPED | OUTPATIENT
Start: 2025-05-07 | End: 2026-05-07

## 2025-05-07 RX ORDER — TIRZEPATIDE 2.5 MG/.5ML
2.5 INJECTION, SOLUTION SUBCUTANEOUS
Qty: 2 ML | Refills: 0 | Status: SHIPPED | OUTPATIENT
Start: 2025-05-07

## 2025-05-07 RX ORDER — HYDRALAZINE HYDROCHLORIDE 50 MG/1
50 TABLET, FILM COATED ORAL EVERY 8 HOURS
Qty: 90 TABLET | Refills: 1 | Status: SHIPPED | OUTPATIENT
Start: 2025-05-07 | End: 2025-07-06

## 2025-05-07 RX ORDER — TRAMADOL HYDROCHLORIDE 50 MG/1
50 TABLET ORAL EVERY 6 HOURS
Qty: 20 TABLET | Refills: 0 | Status: SHIPPED | OUTPATIENT
Start: 2025-05-07

## 2025-05-07 RX ORDER — INSULIN GLARGINE 100 [IU]/ML
30 INJECTION, SOLUTION SUBCUTANEOUS NIGHTLY
Qty: 27 ML | Refills: 3 | Status: SHIPPED | OUTPATIENT
Start: 2025-05-07 | End: 2026-05-02

## 2025-05-07 RX ORDER — TRAZODONE HYDROCHLORIDE 50 MG/1
25 TABLET ORAL NIGHTLY PRN
Qty: 20 TABLET | Refills: 2 | Status: SHIPPED | OUTPATIENT
Start: 2025-05-07 | End: 2025-09-04

## 2025-05-07 RX ORDER — MELOXICAM 15 MG/1
15 TABLET ORAL DAILY
Qty: 30 TABLET | Refills: 3 | Status: SHIPPED | OUTPATIENT
Start: 2025-05-07

## 2025-05-07 RX ORDER — ROPINIROLE 1 MG/1
1 TABLET, FILM COATED ORAL NIGHTLY
Qty: 30 TABLET | Refills: 2 | Status: SHIPPED | OUTPATIENT
Start: 2025-05-07 | End: 2025-08-05

## 2025-05-07 RX ORDER — ATORVASTATIN CALCIUM 40 MG/1
40 TABLET, FILM COATED ORAL NIGHTLY
Qty: 30 TABLET | Refills: 3 | Status: SHIPPED | OUTPATIENT
Start: 2025-05-07

## 2025-05-07 RX ORDER — INSULIN ASPART 100 [IU]/ML
10 INJECTION, SOLUTION INTRAVENOUS; SUBCUTANEOUS
Qty: 27 ML | Refills: 3 | Status: SHIPPED | OUTPATIENT
Start: 2025-05-07 | End: 2026-05-02

## 2025-05-07 RX ORDER — OMEPRAZOLE 40 MG/1
40 CAPSULE, DELAYED RELEASE ORAL DAILY
Qty: 30 CAPSULE | Refills: 3 | Status: SHIPPED | OUTPATIENT
Start: 2025-05-07 | End: 2025-09-04

## 2025-05-07 NOTE — TELEPHONE ENCOUNTER
Patient was not able to come for appointment today due to weather, requesting refills until seen by new provider in July.

## 2025-05-22 ENCOUNTER — OFFICE VISIT (OUTPATIENT)
Dept: SURGERY | Facility: CLINIC | Age: 60
End: 2025-05-22
Payer: MEDICAID

## 2025-05-22 VITALS
DIASTOLIC BLOOD PRESSURE: 70 MMHG | HEART RATE: 87 BPM | HEIGHT: 61 IN | OXYGEN SATURATION: 95 % | SYSTOLIC BLOOD PRESSURE: 124 MMHG | TEMPERATURE: 98 F | WEIGHT: 255 LBS | BODY MASS INDEX: 48.15 KG/M2

## 2025-05-22 DIAGNOSIS — M14.672 CHARCOT JOINT OF LEFT FOOT: Primary | ICD-10-CM

## 2025-05-22 PROCEDURE — 99215 OFFICE O/P EST HI 40 MIN: CPT | Mod: PBBFAC

## 2025-05-22 NOTE — PROGRESS NOTES
Order for [facility] PH: [phone] received. Faxed to [facility], still awaiting notes from the visit.   Patient seen by Dr. MEREDITH Varghese . Will return prn. Written and verbal discharge instructions given.

## 2025-05-22 NOTE — PROGRESS NOTES
[facility] GENERAL SURGERY   Clinic Note     HPI:   Suki Anton is a 59 y.o. with PMHx of T2DM and HTN presenting with L charcot joint pain and BKA evaluation,. Last seen 1 month ago and referred to podiatry and prosthetics. States her podiatrist felt the BKA would be tedious with a long recovery time and she was recently given a claw boot per prosthetics that she would like to experiment with before resulting to surgery.    Review of Systems:  10 point review of systems denied unless otherwise indicated above HPI    Allergies:   Review of patient's allergies indicates:   Allergen Reactions    Shellfish containing products Hives and Itching     Crawfish only       Meds:   Current Medications[1]    PMH:   Past Medical History:   Diagnosis Date    Diabetes mellitus     Diabetes mellitus, type 2     GERD (gastroesophageal reflux disease)     Hypertension         Social History:   Social History[2]    Family History:   Family History   Problem Relation Name Age of Onset    Cataracts Mother      Cancer Father         Surgical History:   No past surgical history on file.    Objective:  Vitals:  There were no vitals filed for this visit.     Physical Exam:  Gen: NAD  Neuro: awake, alert, answering questions appropriately  CV: RRR  Resp: non-labored breathing  Abd: soft, ND, NT  Ext: moves all 4 spontaneously and purposefully. Minimal sensation to lateral L foot. 1/5 L ankle pain movement  Skin: warm, well perfused    Pertinent Labs: None  Imaging: None  Micro/Path/Other: None    Assessment/Plan:  Suki Anton is a 59 y.o. with PMHx of T2DM and HTN presenting with L charcot joint pain and BKA evaluation. No surgical intervention at this time.    - RTC as needed    Orin Shane, MS3  [facility] General Surgery  05/22/2025 11:13 AM        RESIDENT ATTESTATION:    Agree with above documentation of history and physical exam. Changes made to body of text. In summary: 59F referred for consideration for below-knee amputation for left  Charcot joint deformity.  She was referred to Podiatry and Prosthetics for a 2nd opinion last clinic visit.  She has made progress with this, and has in her hand a new prosthetic.  She has only been able to try this prosthetic for 1 day so far as she picked it up yesterday.  There are no wounds on her feet, and she is able to put weight on the left foot with the new prosthetic.  She will return to clinic if disease progresses and her new podiatrist recommends referral to us.      Bryan Varghese MD, PGY5  [facility] General Surgery           [1]   Current Outpatient Medications:     alcohol swabs PadM, Apply 1 each topically 4 (four) times daily., Disp: 400 each, Rfl: 2    atorvastatin (LIPITOR) 40 MG tablet, Take 1 tablet (40 mg total) by mouth every evening., Disp: 30 tablet, Rfl: 3    blood sugar diagnostic Strp, To check BG 4 times daily, to use with insurance preferred meter, Disp: 200 each, Rfl: 5    blood-glucose meter,continuous (DEXCOM G7 ) Misc, To use with sensors to check blood sugars, Disp: 1 each, Rfl: 0    blood-glucose sensor (DEXCOM G7 SENSOR) Samira, Replace every 10 days. To be used with  to check blood sugars, Disp: 3 each, Rfl: 5    cholecalciferol, vitamin D3, (VITAMIN D3) 50 mcg (2,000 unit) Cap capsule, Take 1 capsule (2,000 Units total) by mouth once daily., Disp: 30 capsule, Rfl: 5    empagliflozin (JARDIANCE) 10 mg tablet, Take 1 tablet (10 mg total) by mouth once daily., Disp: 30 tablet, Rfl: 3    gabapentin (NEURONTIN) 400 MG capsule, Take 3 capsules (1,200 mg total) by mouth 3 (three) times daily., Disp: 270 capsule, Rfl: 2    hydrALAZINE (APRESOLINE) 50 MG tablet, Take 1 tablet (50 mg total) by mouth every 8 (eight) hours., Disp: 90 tablet, Rfl: 1    insulin aspart U-100 (NOVOLOG FLEXPEN U-100 INSULIN) 100 unit/mL (3 mL) InPn pen, Inject 10 Units into the skin 3 (three) times daily with meals., Disp: 27 mL, Rfl: 3    insulin glargine U-100, Lantus, (LANTUS SOLOSTAR U-100  "INSULIN) 100 unit/mL (3 mL) InPn pen, Inject 30 Units into the skin every evening., Disp: 27 mL, Rfl: 3    lancets Misc, To check BG 4 times daily, to use with insurance preferred meter, Disp: 200 each, Rfl: 5    lancing device Misc, 1 each by Misc.(Non-Drug; Combo Route) route 4 (four) times daily with meals and nightly., Disp: 200 each, Rfl: 5    lisinopriL 10 MG tablet, Take 2 tablets (20 mg total) by mouth once daily., Disp: 180 tablet, Rfl: 1    meloxicam (MOBIC) 15 MG tablet, Take 1 tablet (15 mg total) by mouth once daily., Disp: 30 tablet, Rfl: 3    NIFEdipine (PROCARDIA-XL) 60 MG (OSM) 24 hr tablet, Take 1 tablet (60 mg total) by mouth once daily., Disp: 30 tablet, Rfl: 11    omeprazole (PRILOSEC) 40 MG capsule, Take 1 capsule (40 mg total) by mouth once daily., Disp: 30 capsule, Rfl: 3    ondansetron (ZOFRAN-ODT) 4 MG TbDL, Take 1 tablet (4 mg total) by mouth every 6 (six) hours as needed (Nausea)., Disp: 30 tablet, Rfl: 0    pen needle, diabetic 32 gauge x 5/32" Ndle, 1 each by Misc.(Non-Drug; Combo Route) route 4 (four) times daily with meals and nightly., Disp: 100 each, Rfl: 6    rOPINIRole (REQUIP) 1 MG tablet, Take 1 tablet (1 mg total) by mouth every evening., Disp: 30 tablet, Rfl: 2    tirzepatide (MOUNJARO) 2.5 mg/0.5 mL PnIj, Inject 2.5 mg into the skin every 7 days., Disp: 2 mL, Rfl: 0    traMADoL (ULTRAM) 50 mg tablet, Take 1 tablet (50 mg total) by mouth every 6 (six) hours., Disp: 20 tablet, Rfl: 0    traZODone (DESYREL) 50 MG tablet, Take 0.5 tablets (25 mg total) by mouth nightly as needed for Insomnia., Disp: 20 tablet, Rfl: 2    TRUE METRIX GLUCOSE METER Misc, Inject 1 each into the skin 4 (four) times daily with meals and nightly. use as directed, Disp: 1 each, Rfl: 0  [2]   Social History  Tobacco Use    Smoking status: Never    Smokeless tobacco: Never   Substance Use Topics    Alcohol use: Never    Drug use: Never     "

## 2025-05-22 NOTE — PROGRESS NOTES
I have reviewed the notes, assessments, and/or procedures performed by Dr Varghese, I concur with her/his documentation of Suki Anton.  Date of Service: 5/22/2025    [facility]

## 2025-06-13 DIAGNOSIS — E11.9 TYPE 2 DIABETES MELLITUS WITHOUT COMPLICATION, WITH LONG-TERM CURRENT USE OF INSULIN: ICD-10-CM

## 2025-06-13 DIAGNOSIS — Z79.4 TYPE 2 DIABETES MELLITUS WITHOUT COMPLICATION, WITH LONG-TERM CURRENT USE OF INSULIN: ICD-10-CM

## 2025-06-13 RX ORDER — ONDANSETRON 4 MG/1
4 TABLET, ORALLY DISINTEGRATING ORAL EVERY 6 HOURS PRN
Qty: 30 TABLET | Refills: 0 | Status: SHIPPED | OUTPATIENT
Start: 2025-06-13

## 2025-06-23 ENCOUNTER — OFFICE VISIT (OUTPATIENT)
Dept: OPHTHALMOLOGY | Facility: CLINIC | Age: 60
End: 2025-06-23
Payer: MEDICAID

## 2025-06-23 VITALS — BODY MASS INDEX: 39.87 KG/M2 | WEIGHT: 225 LBS | HEIGHT: 63 IN

## 2025-06-23 DIAGNOSIS — E11.3413 SEVERE NONPROLIFERATIVE DIABETIC RETINOPATHY OF BOTH EYES WITH MACULAR EDEMA ASSOCIATED WITH TYPE 2 DIABETES MELLITUS: Primary | ICD-10-CM

## 2025-06-23 DIAGNOSIS — E11.69 TYPE 2 DIABETES MELLITUS WITH OTHER SPECIFIED COMPLICATION, WITH LONG-TERM CURRENT USE OF INSULIN: ICD-10-CM

## 2025-06-23 DIAGNOSIS — H25.813 COMBINED FORMS OF AGE-RELATED CATARACT, BILATERAL: ICD-10-CM

## 2025-06-23 DIAGNOSIS — Z79.4 TYPE 2 DIABETES MELLITUS WITH OTHER SPECIFIED COMPLICATION, WITH LONG-TERM CURRENT USE OF INSULIN: ICD-10-CM

## 2025-06-23 PROCEDURE — 92134 CPTRZ OPH DX IMG PST SGM RTA: CPT | Mod: PBBFAC,PN | Performed by: OPHTHALMOLOGY

## 2025-06-23 PROCEDURE — 99213 OFFICE O/P EST LOW 20 MIN: CPT | Mod: PBBFAC,PN

## 2025-06-23 RX ORDER — PHENYLEPH/TROPICAMIDE IN WATER 2.5 %-1 %
1 DROPS OPHTHALMIC (EYE) ONCE
Status: COMPLETED | OUTPATIENT
Start: 2025-06-23 | End: 2025-06-23

## 2025-06-23 RX ORDER — FAMOTIDINE 20 MG/1
20 TABLET, FILM COATED ORAL NIGHTLY PRN
COMMUNITY

## 2025-06-23 RX ORDER — ATORVASTATIN CALCIUM 20 MG/1
20 TABLET, FILM COATED ORAL NIGHTLY
COMMUNITY

## 2025-06-23 RX ORDER — INSULIN DETEMIR 100 [IU]/ML
40 INJECTION, SOLUTION SUBCUTANEOUS NIGHTLY
COMMUNITY

## 2025-06-23 RX ADMIN — Medication 1 DROP: at 10:06

## 2025-06-23 NOTE — PROGRESS NOTES
HPI     Diabetes     Additional comments: 03/25/25 HGBA1C 7.1           Comments    Here for Diabetic Eye Examination OU.   - C/O blurred vision distance and near. OTC Readers +1.25 not helping.   - C/O a few floaters OU that move in and out of vision. Some flashes of   light noted OD. Denies black curtain over vision.  C/O OU constantly tearing.           Last edited by Rosa Hernandez LPN on 6/23/2025 10:23 AM.        TESTING    Fundus photos  - 06/23/2025   OD: media clear, diffuse DBH and CWS, TOMASZ, HE  OS: media clear, fewer DBH and CWS, TOMASZ, HE    OCT mac (06/23/2025)  OD: , distorted foveal contour, CI DME  OS: , intact foveal contour, non-CI DME, atrophic      ASSESSMENT / PLAN    1. Severe NPDR with CI-DME, right eye  1. Severe NPDR with non-CI DME, left eye  - Last A1c (7.1):  03/25/2025  - Injection and laser naive  - Encouraged good BS/BP/Cholesterol control, f/u with PCP regularly  - 6/23/25: Presents with severe to very sever NPDR in both eyes and would benefit from BLAINE OU. Once dry and vision is optimized will need IVFA to evaluate spots of TOMASZ for presence of proliferative disease.    2. Combined form of age-related cataracts, both eyes  - Address after #1  - Continue to monitor    RTC procedure day for DFE, OCT mac, BLAINE OD

## 2025-06-24 PROBLEM — E11.3413 SEVERE NONPROLIFERATIVE DIABETIC RETINOPATHY OF BOTH EYES WITH MACULAR EDEMA ASSOCIATED WITH TYPE 2 DIABETES MELLITUS: Status: ACTIVE | Noted: 2025-06-24

## 2025-07-01 ENCOUNTER — OFFICE VISIT (OUTPATIENT)
Dept: INTERNAL MEDICINE | Facility: CLINIC | Age: 60
End: 2025-07-01
Payer: MEDICAID

## 2025-07-01 VITALS
TEMPERATURE: 98 F | RESPIRATION RATE: 18 BRPM | WEIGHT: 256.38 LBS | OXYGEN SATURATION: 94 % | BODY MASS INDEX: 45.43 KG/M2 | DIASTOLIC BLOOD PRESSURE: 79 MMHG | HEIGHT: 63 IN | HEART RATE: 89 BPM | SYSTOLIC BLOOD PRESSURE: 139 MMHG

## 2025-07-01 DIAGNOSIS — Z79.4 TYPE 2 DIABETES MELLITUS WITHOUT COMPLICATION, WITH LONG-TERM CURRENT USE OF INSULIN: Primary | ICD-10-CM

## 2025-07-01 DIAGNOSIS — F51.01 PRIMARY INSOMNIA: ICD-10-CM

## 2025-07-01 DIAGNOSIS — I10 HYPERTENSION, UNSPECIFIED TYPE: ICD-10-CM

## 2025-07-01 DIAGNOSIS — E11.9 TYPE 2 DIABETES MELLITUS WITHOUT COMPLICATION, WITH LONG-TERM CURRENT USE OF INSULIN: Primary | ICD-10-CM

## 2025-07-01 DIAGNOSIS — E11.65 UNCONTROLLED TYPE 2 DIABETES MELLITUS WITH HYPERGLYCEMIA, WITH LONG-TERM CURRENT USE OF INSULIN: ICD-10-CM

## 2025-07-01 DIAGNOSIS — Z12.11 SCREENING FOR COLON CANCER: ICD-10-CM

## 2025-07-01 DIAGNOSIS — M86.672 OTHER CHRONIC OSTEOMYELITIS OF LEFT FOOT: ICD-10-CM

## 2025-07-01 DIAGNOSIS — Z79.4 UNCONTROLLED TYPE 2 DIABETES MELLITUS WITH HYPERGLYCEMIA, WITH LONG-TERM CURRENT USE OF INSULIN: ICD-10-CM

## 2025-07-01 DIAGNOSIS — F32.A DEPRESSION, UNSPECIFIED DEPRESSION TYPE: ICD-10-CM

## 2025-07-01 DIAGNOSIS — H04.123 DRY EYES, BILATERAL: ICD-10-CM

## 2025-07-01 DIAGNOSIS — K21.9 GASTROESOPHAGEAL REFLUX DISEASE, UNSPECIFIED WHETHER ESOPHAGITIS PRESENT: ICD-10-CM

## 2025-07-01 DIAGNOSIS — Z12.31 ENCOUNTER FOR SCREENING MAMMOGRAM FOR MALIGNANT NEOPLASM OF BREAST: ICD-10-CM

## 2025-07-01 DIAGNOSIS — E66.9 DIABETES MELLITUS TYPE 2 IN OBESE: ICD-10-CM

## 2025-07-01 DIAGNOSIS — M14.672 CHARCOT'S JOINT OF LEFT ANKLE: ICD-10-CM

## 2025-07-01 DIAGNOSIS — E11.69 DIABETES MELLITUS TYPE 2 IN OBESE: ICD-10-CM

## 2025-07-01 LAB — HBA1C MFR BLD: 7.3 %

## 2025-07-01 PROCEDURE — 99215 OFFICE O/P EST HI 40 MIN: CPT | Mod: PBBFAC | Performed by: STUDENT IN AN ORGANIZED HEALTH CARE EDUCATION/TRAINING PROGRAM

## 2025-07-01 PROCEDURE — 83036 HEMOGLOBIN GLYCOSYLATED A1C: CPT | Mod: PBBFAC | Performed by: STUDENT IN AN ORGANIZED HEALTH CARE EDUCATION/TRAINING PROGRAM

## 2025-07-01 RX ORDER — DOXEPIN HYDROCHLORIDE 25 MG/1
25 CAPSULE ORAL NIGHTLY
Qty: 30 CAPSULE | Refills: 11 | Status: SHIPPED | OUTPATIENT
Start: 2025-07-01 | End: 2026-07-01

## 2025-07-01 RX ORDER — PEN NEEDLE, DIABETIC 30 GX3/16"
1 NEEDLE, DISPOSABLE MISCELLANEOUS
Qty: 100 EACH | Refills: 6 | Status: SHIPPED | OUTPATIENT
Start: 2025-07-01

## 2025-07-01 RX ORDER — INSULIN ASPART 100 [IU]/ML
10 INJECTION, SOLUTION INTRAVENOUS; SUBCUTANEOUS
Qty: 27 ML | Refills: 3 | Status: SHIPPED | OUTPATIENT
Start: 2025-07-01 | End: 2026-06-26

## 2025-07-01 RX ORDER — MINERAL OIL AND PETROLATUM 150; 830 MG/G; MG/G
OINTMENT OPHTHALMIC NIGHTLY
Qty: 3.5 G | Refills: 1 | Status: SHIPPED | OUTPATIENT
Start: 2025-07-01

## 2025-07-01 RX ORDER — OMEPRAZOLE 40 MG/1
40 CAPSULE, DELAYED RELEASE ORAL DAILY
Qty: 30 CAPSULE | Refills: 3 | Status: SHIPPED | OUTPATIENT
Start: 2025-07-01 | End: 2025-10-29

## 2025-07-01 RX ORDER — HYDRALAZINE HYDROCHLORIDE 50 MG/1
50 TABLET, FILM COATED ORAL EVERY 8 HOURS
Qty: 90 TABLET | Refills: 1 | Status: SHIPPED | OUTPATIENT
Start: 2025-07-01 | End: 2025-08-30

## 2025-07-01 RX ORDER — FAMOTIDINE 20 MG/1
20 TABLET, FILM COATED ORAL NIGHTLY PRN
Qty: 90 TABLET | Refills: 0 | Status: SHIPPED | OUTPATIENT
Start: 2025-07-01

## 2025-07-01 RX ORDER — ATORVASTATIN CALCIUM 20 MG/1
20 TABLET, FILM COATED ORAL NIGHTLY
Qty: 90 TABLET | Refills: 0 | Status: CANCELLED | OUTPATIENT
Start: 2025-07-01

## 2025-07-01 RX ORDER — MELOXICAM 15 MG/1
15 TABLET ORAL DAILY
Qty: 30 TABLET | Refills: 3 | Status: SHIPPED | OUTPATIENT
Start: 2025-07-01

## 2025-07-01 RX ORDER — TRAZODONE HYDROCHLORIDE 50 MG/1
50 TABLET ORAL NIGHTLY PRN
Qty: 30 TABLET | Refills: 0 | Status: CANCELLED | OUTPATIENT
Start: 2025-07-01 | End: 2025-10-29

## 2025-07-01 RX ORDER — TIRZEPATIDE 5 MG/.5ML
5 INJECTION, SOLUTION SUBCUTANEOUS
Qty: 4 PEN | Refills: 0 | Status: SHIPPED | OUTPATIENT
Start: 2025-07-01

## 2025-07-01 RX ORDER — ROPINIROLE 0.5 MG/1
0.5 TABLET, FILM COATED ORAL 3 TIMES DAILY
COMMUNITY

## 2025-07-01 RX ORDER — ATORVASTATIN CALCIUM 40 MG/1
40 TABLET, FILM COATED ORAL NIGHTLY
Qty: 30 TABLET | Refills: 3 | Status: SHIPPED | OUTPATIENT
Start: 2025-07-01

## 2025-07-01 RX ORDER — BLOOD-GLUCOSE SENSOR
EACH MISCELLANEOUS
Qty: 3 EACH | Refills: 5 | Status: SHIPPED | OUTPATIENT
Start: 2025-07-01

## 2025-07-01 RX ORDER — ONDANSETRON 4 MG/1
4 TABLET, ORALLY DISINTEGRATING ORAL EVERY 6 HOURS PRN
Qty: 30 TABLET | Refills: 0 | Status: SHIPPED | OUTPATIENT
Start: 2025-07-01

## 2025-07-01 RX ORDER — GABAPENTIN 400 MG/1
1200 CAPSULE ORAL 3 TIMES DAILY
Qty: 270 CAPSULE | Refills: 2 | Status: SHIPPED | OUTPATIENT
Start: 2025-07-01 | End: 2025-09-29

## 2025-07-01 RX ORDER — TRAMADOL HYDROCHLORIDE 50 MG/1
50 TABLET, FILM COATED ORAL EVERY 6 HOURS
Qty: 20 TABLET | Refills: 0 | Status: SHIPPED | OUTPATIENT
Start: 2025-07-01

## 2025-07-01 RX ORDER — INSULIN GLARGINE 100 [IU]/ML
35 INJECTION, SOLUTION SUBCUTANEOUS NIGHTLY
Qty: 31.5 ML | Refills: 3 | Status: SHIPPED | OUTPATIENT
Start: 2025-07-01 | End: 2026-06-26

## 2025-07-01 RX ORDER — LISINOPRIL 10 MG/1
20 TABLET ORAL DAILY
Qty: 180 TABLET | Refills: 1 | Status: SHIPPED | OUTPATIENT
Start: 2025-07-01

## 2025-07-01 NOTE — PROGRESS NOTES
I have reviewed and agree with the resident's findings, including all diagnostic interpretations and plans as written.    Patient is here for follow up/re establish care. She has a PMHx of long standing T2DM c/b neuropathy, retinopathy, nephropathy, osteomyelitis of L foot, GERD, HTN. Pt presents with symptoms of depression with PHQ9 of 19. Agree with referral to . Will also dc trazodone and replace with doxepin for tx of depression along with insomnia. For her T2DM, controlled has improved however not at goal. Agree with increasing Jardiance and Mounjaro. Pt to decrease insulin based on CGM readings. Will send referral to nephrology to est care due to degree of proteinuria. Will send in artificial tears for dry eyes and have patient follow up with ophthalmology for retinal injections. Foot exam performed per resident. Wound care referral to be sent for new wounds present on R foot. Agree with ordering a rolator as pt with neuropathy in BLE's with frequent falls. Screenings: due for colon ca screening and mammogram-will order. Will have follow up in months to follow up DM. Remainder of care as documented per resident.     Natalie Culver MD

## 2025-07-01 NOTE — PROGRESS NOTES
"[facility] Internal Medicine Clinic Note    CC:  follow-up    HISTORY:  Suki Anton is a 59 y.o. female with PMHx of T2DM, GERD, HTN who presents today for follow-up. Today, she reports the surgeon has decided to delay L foot amputation and has given pt "the claw" boot to wear to stop progression of the bony deformity. She says she likes this boot. She has been following with optho and has an appt on 7/9 for injection in her eyes. Is requesting eyes drops for bilateral dry eyes. Reports she never received her cologuard from her last visit and is requesting another one. Denied family history of colon cancer.     Today she reports she would like to increase her Mounjaro dose. She has been on the 2.5 dose for approximately 7 months. She initially lost 20lbs but gained it back. Says her blood sugars generally run 160-170 when she wakes up and around 140 before meals and around 150-160 before she goes to bed at night. Denied any episodes of readings below 70. Says she does have multiple blood sugar spikes daily. Endorses associated nausea and constipation, but says the zofran helps with nausea. She has been eating jarret and flax seeds for constipation. She also would like to get a walker. Uses a cane to ambulate currently. She has had 3 falls since her last visit. Also following with case management for disability, who told her she needs a referral to behavioral health for insomnia and depression. Has been taking Trazodone 50mg nightly, but it does not work. Has also tried melatonin, Mg2+, chamomile tea, advil PM but they have not helped. Reports she has trouble staying asleep and wakes up every 2 hours. Goes to bed nightly around 9-9:30PM. Takes 1-2 naps/day and says she can fall asleep better during the day than at night. Says her restless legs syndrome makes it worse. Also endorsing depression and says she feels "useless." Denies SI, HI. Denied recent chest pain, SOB, fever, chills, CP, SOB, dysuria, hematuria, vomiting, " diarrhea, dizziness.     Patient Care Team:  Dick Gallagher DO as PCP - General (Internal Medicine)  Ashok Villar MD as Consulting Physician (Orthopedic Surgery)    PMHx:   Problem List[1]    Surgical Hx:  History reviewed. No pertinent surgical history.    Family Hx:  Family History   Problem Relation Name Age of Onset    Cataracts Mother      Cancer Father      Cataracts Sister      Cancer Sister         Social Hx:   Alcohol: never  Drugs: never  Tobacco: never     Allergies:   Review of patient's allergies indicates:   Allergen Reactions    Shellfish containing products Hives and Itching     Crawfish only     MEDICATIONS:  Current Outpatient Medications   Medication Instructions    alcohol swabs PadM 1 each, Topical (Top), 4 times daily    atorvastatin (LIPITOR) 40 mg, Oral, Nightly    blood sugar diagnostic Strp To check BG 4 times daily, to use with insurance preferred meter    blood-glucose meter,continuous (DEXCOM G7 ) Misc To use with sensors to check blood sugars    blood-glucose sensor (DEXCOM G7 SENSOR) Samira Replace every 10 days. To be used with  to check blood sugars    doxepin (SINEQUAN) 25 mg, Oral, Nightly    empagliflozin (JARDIANCE) 10 mg, Oral, Daily    empagliflozin (JARDIANCE) 25 mg, Oral, Daily    famotidine (PEPCID) 20 mg, Oral, Nightly PRN    gabapentin (NEURONTIN) 1,200 mg, Oral, 3 times daily    hydrALAZINE (APRESOLINE) 50 mg, Oral, Every 8 hours    insulin aspart U-100 (NOVOLOG FLEXPEN U-100 INSULIN) 10 Units, Subcutaneous, 3 times daily with meals    lancets Misc To check BG 4 times daily, to use with insurance preferred meter    lancing device Misc 1 each, Misc.(Non-Drug; Combo Route), 4 times daily with meals & nightly    LANTUS SOLOSTAR U-100 INSULIN 35 Units, Subcutaneous, Nightly    lisinopriL 20 mg, Oral, Daily    meloxicam (MOBIC) 15 mg, Oral, Daily    MOUNJARO 2.5 mg, Subcutaneous, Every 7 days    MOUNJARO 5 mg, Subcutaneous, Every 7 days    omeprazole  "(PRILOSEC) 40 mg, Oral, Daily    ondansetron (ZOFRAN-ODT) 4 mg, Oral, Every 6 hours PRN    pen needle, diabetic 32 gauge x 5/32" Ndle 1 each, Misc.(Non-Drug; Combo Route), 4 times daily with meals & nightly    rOPINIRole (REQUIP) 0.5 mg, 3 times daily    traMADoL (ULTRAM) 50 mg, Oral, Every 6 hours    traZODone (DESYREL) 25 mg, Oral, Nightly PRN    TRUE METRIX GLUCOSE METER Misc 1 each, Subcutaneous, 4 times daily with meals & nightly, use as directed    white petrolatum-mineral oiL (ARTIFICIAL TEARS, JERMAN/MIN,) 83-15 % Oint Both Eyes, Nightly      PHYSICAL EXAM:    Vital Signs:  /79 (BP Location: Left arm, Patient Position: Sitting)   Pulse 89   Temp 98.4 °F (36.9 °C) (Oral)   Resp 18   Ht 5' 3" (1.6 m)   Wt 116.3 kg (256 lb 6.4 oz)   SpO2 (!) 94% Comment: room air  BMI 45.42 kg/m²     Constitutional: Appears stated age, resting comfortably, in no acute distress   HEENT: Nomocephalic, Atraumatic, conjunctiva normal, No scleral icterus, EOMI, PERRLA   Neck: Supple, no thyromegaly, no LAD   CV: RRR, no murmurs, s1/s2 heard  Resp: CTAB. No wheezes or crackles. Not in respiratory distress. On RA  GI: Soft, non-distended, non-tender   : No sharif in place   Skin: Warm, dry  Extremities: sensation grossly decreased bilateral feet (R > L) on monofilament testing; 2+ radial pulses bilaterally   -RLE - open wound present to toes 2 and 4 as pictured   -LLE - bony deformity with lateral bulging present distally; square bandage applied over lateral heel, compression stocking on   Neurologic: awake, alert and oriented x3  Psychiatric: normal mood and affect; denies SI and HI    Assessment & Plan:  T2DM, improving, uncontrolled   -A1c 7.3, 7/1/2025; decreased from 8.1 2/2025  Diabetes Maintenance  A1c (q3mo if uncontrolled, otherwise q6mo): Lab Results   Component Value Date    HGBA1C 7.1 (H) 03/25/2025       Lipid panel (annually), on statin? TG elevated to 226; LDL near goal at 65; on atorvastain 40mg daily  "   Urine Protein/creatinine (annually) 1870.5 (2/10/25)   Eye exam (annually) Following   Foot exam (at least annually) Done today, following with wound care    -Unable to tolerate Metformin due to constant diarrhea   -Current regimen: Lantus 35U QHS, Novolog 10U TIDWM, Mounjaro 2.5 weekly, Jardiance 10 daily  -Increase Mounjaro 5mg subq weekly, zofran prn nausea   -Increase Jardiance to 25mg   -Following closely with optho    Diabetic foot ulcers  -Wound care referral sent     LE pain   Difficulty ambulating   -DME request for rollator sent  -Continue mobic 15 daily   -Continue tramadol 50 q6hrs prn     Microalbuminuria, Proteinuria I/s/o T2DM   -High risk for CKD  -urine microalbuminuria 1848.1; urine protein 250.2 (2/10)  -referral to nephrology sent     Depression  Insomnia   PHQ-2: 4; PHQ-9: 19; pt amenable to both therapy and medication; has never received care from behavioral health before   -Stop trazodone   -Start doxepin 25mg nightly   -Behavioral health referral sent     Hypertension   -139/79 today   -Pt not taking nifedipine  -Continue lisinopril 10mg daily and hydralazine 50mg tid     Hyperlipidemia  -Continue atorvastatin 40mg daily     Dry eyes, bilateral   -Artificial tears     GERD  -No reported issues today  -Continue Omeprazole    Restless Leg Syndrome  -Refilled ropinirole 1 mg qhs    Health Maintenance   -Cologuard and mammogram ordered today     Immunizations:       Immunization History   Administered Date(s) Administered    Influenza - Quadrivalent 12/10/2019, 11/12/2020    Influenza - Quadrivalent - PF (6-35 months) 01/15/2018, 11/15/2018    Influenza - Quadrivalent - PF *Preferred* (6 months and older) 01/19/2023, 03/11/2024    Influenza - Trivalent - Fluarix, Flulaval, Fluzone, Afluria - PF 11/18/2024    Pneumococcal Conjugate - 20 Valent 01/19/2023    Pneumococcal Polysaccharide - 23 Valent 02/23/2017    Tdap 02/23/2017    Zoster Recombinant 11/12/2020, 06/19/2021     Screenings:  Lung Ca:  never smoker   Colon Ca: Cologuard ordered 7/1/2025  Breast Ca/Family Hx of BRCA related Ca: denied family history; mammogram ordered 7/1/2025  Cervical Ca: negative 3/31/2023    Follow up in about 3 months (around 10/1/2025). In addition to their scheduled follow up, the patient has also been instructed to follow up on as needed basis.     Future Appointments   Date Time Provider Department Center   7/9/2025 11:00 AM PROVIDERS, [facility] [place]    10/21/2025  8:10 AM RESIDENT 2, [facility] INTERNAL MEDICINE [facility] Madhu Un     Sheila Anaya MD, PGY-1  [facility] Internal Medicine  [facility]          [1]   Patient Active Problem List  Diagnosis    Diabetes mellitus    Hyperlipidemia    Obesity    Severe acute respiratory syndrome coronavirus 2 (SARS-CoV-2) vaccination not indicated    Closed fracture of proximal end of right humerus with routine healing    Left ankle joint deformity    Diabetic ulcer of left foot associated with type 2 diabetes mellitus, with fat layer exposed    Charcot's joint of left ankle    Acute osteomyelitis of left calcaneus    Osteomyelitis of left foot    Overgrown toenails    Vitamin D deficiency    Severe nonproliferative diabetic retinopathy of both eyes with macular edema associated with type 2 diabetes mellitus    Dry eyes, bilateral    Primary insomnia

## 2025-07-08 ENCOUNTER — TELEPHONE (OUTPATIENT)
Dept: OPHTHALMOLOGY | Facility: CLINIC | Age: 60
End: 2025-07-08
Payer: COMMERCIAL

## 2025-07-09 ENCOUNTER — CLINICAL SUPPORT (OUTPATIENT)
Dept: OPHTHALMOLOGY | Facility: CLINIC | Age: 60
End: 2025-07-09
Payer: MEDICAID

## 2025-07-09 VITALS — BODY MASS INDEX: 42.7 KG/M2 | HEIGHT: 63 IN | WEIGHT: 241 LBS

## 2025-07-09 DIAGNOSIS — H25.813 COMBINED FORMS OF AGE-RELATED CATARACT, BILATERAL: ICD-10-CM

## 2025-07-09 DIAGNOSIS — E11.69 TYPE 2 DIABETES MELLITUS WITH OTHER SPECIFIED COMPLICATION, WITH LONG-TERM CURRENT USE OF INSULIN: Primary | ICD-10-CM

## 2025-07-09 DIAGNOSIS — Z79.4 TYPE 2 DIABETES MELLITUS WITH OTHER SPECIFIED COMPLICATION, WITH LONG-TERM CURRENT USE OF INSULIN: Primary | ICD-10-CM

## 2025-07-09 PROCEDURE — 99212 OFFICE O/P EST SF 10 MIN: CPT | Mod: PBBFAC,PN

## 2025-07-09 RX ORDER — PHENYLEPH/TROPICAMIDE IN WATER 2.5 %-1 %
1 DROPS OPHTHALMIC (EYE) ONCE
Status: COMPLETED | OUTPATIENT
Start: 2025-07-09 | End: 2025-07-09

## 2025-07-09 RX ADMIN — Medication 1 DROP: at 10:07

## 2025-07-09 RX ADMIN — BALANCED SALT SOLUTION 15 ML: 6.4; .75; .48; .3; 3.9; 1.7 SOLUTION OPHTHALMIC at 02:07

## 2025-07-09 RX ADMIN — Medication 1.25 MG: at 02:07

## 2025-07-09 NOTE — PROGRESS NOTES
[facility]    RTC procedure day for DFE, OCT mac, BLAINE OD  Last edited by Katina Cochran on 7/9/2025 10:24 AM.            Assessment /Plan     For exam results, see Encounter Report.    Type 2 diabetes mellitus with other specified complication, with long-term current use of insulin  -     tropicamide /PHENYLephrine opthalmic solution 1 drop    Combined forms of age-related cataract, bilateral    Other orders  -     bevacizumab (Avastin) 25 mg/mL ophthalmic injection syringe 1.25 mg  -     balanced salt irrigation intra-ocular solution          TESTING    Fundus photos  - 06/23/2025   OD: media clear, diffuse DBH and CWS, TOMASZ, HE  OS: media clear, fewer DBH and CWS, TOMASZ, HE    OCT mac (06/23/2025)  OD: , distorted foveal contour, CI DME  OS: , intact foveal contour, non-CI DME, atrophic  7/9/2025  OD: poor fixation  OS: , NFC, non-CI DME, atrophic    ASSESSMENT / PLAN    1. Severe NPDR with CI-DME, OD  2. Severe NPDR with non-CI DME, OS  - Last A1c (7.1):  03/25/2025  - Injection and laser naive  - Encouraged good BS/BP/Cholesterol control, f/u with PCP regularly  - 6/23/25: Presents with severe to very sever NPDR in both eyes and would benefit from BLAINE OU. Once dry and vision is optimized will need IVFA to evaluate spots of TOMASZ for presence of proliferative disease.  - 7/9/2025: BLAINE OD today, tolerated well, RTC 4 weeks for repeat OCT MAC, DFE, and possible BLAINE OD. No BLAINE OS indicated at this time, CTM.     3. Combined form of age-related cataracts, both eyes  - Address after #1  - Continue to monitor    RTC 4 weeks procedure day for DFE, OCT mac, possible BLAINE OD            Procedure Note, Intravitreal Avastin, Right Eye  Pre-procedure diagnosis: Severe NPDR with DME  Post-procedure diagnosis: Same as pre-procedure diagnosis  Surgeon: Minh Florence MD  Attending: Suki Cohn MD   Anti-sepsis: Betadine  Anesthesia: Topical tetracaine, topical proparacaine  Blood loss: None  Complications:  None    Procedure in detail:    The procedure was explained in detail to the patient including risks, benefits, alternatives. Informed consent was obtained from the patient and signed, witnessed, and placed in the chart. A time out was performed to identify the correct patient with two identifiers and correct site.     Topical tetracaine was instilled in the eye. Topical betadine was used for antisepsis. A caliper was used to crissy 3.4 - 4.00m away from corneal limbus in the inferotemporal position.  Topical betadine was repeated. 0.05 cc Avastin  was injected in the inferotemporal position. The vision was confirmed to be CF @ 5 feet following the injection. The eye was then thoroughly irrigated with BSS. The patient tolerated procedure well.  No complications were observed. The patient was educated that mild irritation tonight was normal secondary to topical Betadine use. The patient was educated on return precautions for endophthalmitis and retinal detachment

## 2025-07-10 ENCOUNTER — TELEPHONE (OUTPATIENT)
Dept: OPHTHALMOLOGY | Facility: CLINIC | Age: 60
End: 2025-07-10
Payer: MEDICAID

## 2025-07-17 DIAGNOSIS — R80.9 PROTEINURIA, UNSPECIFIED TYPE: Primary | ICD-10-CM

## 2025-07-25 ENCOUNTER — TELEPHONE (OUTPATIENT)
Dept: INTERNAL MEDICINE | Facility: CLINIC | Age: 60
End: 2025-07-25
Payer: MEDICAID

## 2025-08-06 ENCOUNTER — CLINICAL SUPPORT (OUTPATIENT)
Dept: OPHTHALMOLOGY | Facility: CLINIC | Age: 60
End: 2025-08-06
Payer: MEDICAID

## 2025-08-06 VITALS — BODY MASS INDEX: 42.69 KG/M2 | HEIGHT: 63 IN | WEIGHT: 240.94 LBS

## 2025-08-06 DIAGNOSIS — E11.69 TYPE 2 DIABETES MELLITUS WITH OTHER SPECIFIED COMPLICATION, WITH LONG-TERM CURRENT USE OF INSULIN: Primary | ICD-10-CM

## 2025-08-06 DIAGNOSIS — E11.3413 SEVERE NONPROLIFERATIVE DIABETIC RETINOPATHY OF BOTH EYES WITH MACULAR EDEMA ASSOCIATED WITH TYPE 2 DIABETES MELLITUS: ICD-10-CM

## 2025-08-06 DIAGNOSIS — Z79.4 TYPE 2 DIABETES MELLITUS WITH OTHER SPECIFIED COMPLICATION, WITH LONG-TERM CURRENT USE OF INSULIN: Primary | ICD-10-CM

## 2025-08-06 DIAGNOSIS — R80.9 PROTEINURIA, UNSPECIFIED TYPE: Primary | ICD-10-CM

## 2025-08-06 DIAGNOSIS — H25.813 COMBINED FORMS OF AGE-RELATED CATARACT, BILATERAL: ICD-10-CM

## 2025-08-06 PROCEDURE — 99213 OFFICE O/P EST LOW 20 MIN: CPT | Mod: PBBFAC,PN

## 2025-08-06 PROCEDURE — 92134 CPTRZ OPH DX IMG PST SGM RTA: CPT | Mod: PBBFAC,PN

## 2025-08-06 PROCEDURE — 92134 CPTRZ OPH DX IMG PST SGM RTA: CPT | Mod: PBBFAC,PN | Performed by: OPHTHALMOLOGY

## 2025-08-06 NOTE — PROGRESS NOTES
HPI    4 weeks procedure day for DFE, OCT mac, possible BLAINE OD  Last edited by Suki Vazquez MA on 8/6/2025 11:01 AM.        Fundus Photos  6/23/2025   OD: media clear, diffuse DBH and CWS, TOMASZ, HE  OS: media clear, fewer DBH and CWS, TOMASZ, HE    OCT mac   6/23/2025  OD: , distorted foveal contour, CI DME  OS: , intact foveal contour, non-CI DME, atrophic  7/9/2025  OD: poor fixation  OS: 223, NFC, non-CI DME, atrophic  8/6/25  OD: 317, NFC, superior hard exudates with trace fluid  OS: 234, NFC, non-CI DME, atrophic    Assessment /Plan     For exam results, see Encounter Report.    Type 2 diabetes mellitus with other specified complication, with long-term current use of insulin    Severe nonproliferative diabetic retinopathy of both eyes with macular edema associated with type 2 diabetes mellitus    Combined forms of age-related cataract, bilateral        1. Severe NPDR with CI-DME, OD  2. Severe NPDR with non-CI DME, OS  - Last A1c (7.1):  03/25/2025  - Injection and laser naive  - Encouraged good BS/BP/Cholesterol control, f/u with PCP regularly  - 6/23/25: Presents with severe to very sever NPDR in both eyes and would benefit from BLAINE OU. Once dry and vision is optimized will need IVFA to evaluate spots of TOMASZ for presence of proliferative disease.  - 7/9/2025: BLAINE OD today, tolerated well, RTC 4 weeks for repeat OCT MAC, DFE, and possible BLAINE OD. No BLAINE OS indicated at this time, CTM.   - 8/6/25: Edema improved, vision improved, will not inject today. RTC 6 weeks for possible BLAINE either eye.    3. Combined form of age-related cataracts, both eyes  - Address after #1  - Continue to monitor    RTC 4 weeks procedure day for DFE, OCT, possible BLAINE either eye

## 2025-08-07 ENCOUNTER — TELEPHONE (OUTPATIENT)
Dept: BEHAVIORAL HEALTH | Facility: CLINIC | Age: 60
End: 2025-08-07
Payer: MEDICAID

## 2025-08-07 NOTE — TELEPHONE ENCOUNTER
Copied from CRM #[id number]. Topic: General Inquiry - Patient Advice  >> Aug 7, 2025  1:15 PM Sivan wrote:  .Who Called: Suki Anton    Caller is requesting assistance/information from provider's office.    Symptoms (please be specific): N/A  How long has patient had these symptoms:  N/A  List of preferred pharmacies on file (remove unneeded): [unfilled]  If different, enter pharmacy into here including location and phone number: N/A    Preferred Method of Contact: Phone Call    Patient's Preferred Phone Number on File: [phone]     Best Call Back Number, if different:    Additional Information: Pt called to schedule an appointment with FLAQUITO Pedroza. Pt says referral was faxed over from her pcp Dr. Sheila Early (she believes). Please advise, thank you.

## 2025-08-07 NOTE — TELEPHONE ENCOUNTER
Spoke to patient   Appt scheduled 9/9/25 @ 9:00  Pt accepted appt  Verbalized understanding  Mandy Ramirez, JAMESON  8/7/2025, 1:34 PM

## 2025-08-08 ENCOUNTER — LAB VISIT (OUTPATIENT)
Dept: LAB | Facility: HOSPITAL | Age: 60
End: 2025-08-08
Payer: MEDICAID

## 2025-08-08 DIAGNOSIS — R80.9 PROTEINURIA, UNSPECIFIED TYPE: ICD-10-CM

## 2025-08-08 LAB
ALBUMIN SERPL-MCNC: 3.6 G/DL (ref 3.5–5)
ALBUMIN/GLOB SERPL: 1 RATIO (ref 1.1–2)
ALP SERPL-CCNC: 107 UNIT/L (ref 40–150)
ALT SERPL-CCNC: 11 UNIT/L (ref 0–55)
ANION GAP SERPL CALC-SCNC: 10 MEQ/L
AST SERPL-CCNC: 10 UNIT/L (ref 11–45)
BACTERIA #/AREA URNS AUTO: ABNORMAL /HPF
BASOPHILS # BLD AUTO: 0.12 X10(3)/MCL
BASOPHILS NFR BLD AUTO: 0.7 %
BILIRUB SERPL-MCNC: 0.4 MG/DL
BILIRUB UR QL STRIP.AUTO: NEGATIVE
BUN SERPL-MCNC: 32.5 MG/DL (ref 9.8–20.1)
CALCIUM SERPL-MCNC: 9.2 MG/DL (ref 8.4–10.2)
CHLORIDE SERPL-SCNC: 109 MMOL/L (ref 98–107)
CLARITY UR: CLEAR
CO2 SERPL-SCNC: 21 MMOL/L (ref 22–29)
COLOR UR AUTO: COLORLESS
CREAT SERPL-MCNC: 0.94 MG/DL (ref 0.55–1.02)
CREAT UR-MCNC: 44.1 MG/DL (ref 45–106)
CREAT/UREA NIT SERPL: 35
EOSINOPHIL # BLD AUTO: 0.93 X10(3)/MCL (ref 0–0.9)
EOSINOPHIL NFR BLD AUTO: 5.3 %
ERYTHROCYTE [DISTWIDTH] IN BLOOD BY AUTOMATED COUNT: 13.1 % (ref 11.5–17)
GFR SERPLBLD CREATININE-BSD FMLA CKD-EPI: >60 ML/MIN/1.73/M2
GLOBULIN SER-MCNC: 3.7 GM/DL (ref 2.4–3.5)
GLUCOSE SERPL-MCNC: 148 MG/DL (ref 74–100)
GLUCOSE UR QL STRIP: ABNORMAL
HCT VFR BLD AUTO: 38.6 % (ref 37–47)
HGB BLD-MCNC: 12 G/DL (ref 12–16)
HGB UR QL STRIP: NEGATIVE
HYALINE CASTS #/AREA URNS LPF: ABNORMAL /LPF
IMM GRANULOCYTES # BLD AUTO: 0.09 X10(3)/MCL (ref 0–0.04)
IMM GRANULOCYTES NFR BLD AUTO: 0.5 %
KETONES UR QL STRIP: NEGATIVE
LEUKOCYTE ESTERASE UR QL STRIP: NEGATIVE
LYMPHOCYTES # BLD AUTO: 3.4 X10(3)/MCL (ref 0.6–4.6)
LYMPHOCYTES NFR BLD AUTO: 19.4 %
MCH RBC QN AUTO: 27.9 PG (ref 27–31)
MCHC RBC AUTO-ENTMCNC: 31.1 G/DL (ref 33–36)
MCV RBC AUTO: 89.8 FL (ref 80–94)
MONOCYTES # BLD AUTO: 0.9 X10(3)/MCL (ref 0.1–1.3)
MONOCYTES NFR BLD AUTO: 5.1 %
NEUTROPHILS # BLD AUTO: 12.1 X10(3)/MCL (ref 2.1–9.2)
NEUTROPHILS NFR BLD AUTO: 69 %
NITRITE UR QL STRIP: NEGATIVE
NRBC BLD AUTO-RTO: 0 %
PH UR STRIP: 6.5 [PH]
PLATELET # BLD AUTO: 313 X10(3)/MCL (ref 130–400)
PMV BLD AUTO: 11.4 FL (ref 7.4–10.4)
POTASSIUM SERPL-SCNC: 4.9 MMOL/L (ref 3.5–5.1)
PROT SERPL-MCNC: 7.3 GM/DL (ref 6.4–8.3)
PROT UR QL STRIP: ABNORMAL
PROT UR STRIP-MCNC: 63.1 MG/DL
RBC # BLD AUTO: 4.3 X10(6)/MCL (ref 4.2–5.4)
RBC #/AREA URNS AUTO: ABNORMAL /HPF
SODIUM SERPL-SCNC: 140 MMOL/L (ref 136–145)
SP GR UR STRIP.AUTO: 1.02 (ref 1–1.03)
SQUAMOUS #/AREA URNS LPF: ABNORMAL /HPF
URINE PROTEIN/CREATININE RATIO (OLG): 1.4
UROBILINOGEN UR STRIP-ACNC: NORMAL
WBC # BLD AUTO: 17.54 X10(3)/MCL (ref 4.5–11.5)
WBC #/AREA URNS AUTO: ABNORMAL /HPF

## 2025-08-08 PROCEDURE — 81001 URINALYSIS AUTO W/SCOPE: CPT

## 2025-08-08 PROCEDURE — 80053 COMPREHEN METABOLIC PANEL: CPT

## 2025-08-08 PROCEDURE — 85025 COMPLETE CBC W/AUTO DIFF WBC: CPT

## 2025-08-08 PROCEDURE — 36415 COLL VENOUS BLD VENIPUNCTURE: CPT

## 2025-08-08 PROCEDURE — 84156 ASSAY OF PROTEIN URINE: CPT

## 2025-08-12 DIAGNOSIS — M86.672 OTHER CHRONIC OSTEOMYELITIS OF LEFT FOOT: ICD-10-CM

## 2025-08-13 ENCOUNTER — OFFICE VISIT (OUTPATIENT)
Dept: NEPHROLOGY | Facility: CLINIC | Age: 60
End: 2025-08-13
Payer: MEDICAID

## 2025-08-13 VITALS
HEART RATE: 94 BPM | TEMPERATURE: 98 F | RESPIRATION RATE: 20 BRPM | SYSTOLIC BLOOD PRESSURE: 120 MMHG | DIASTOLIC BLOOD PRESSURE: 75 MMHG | WEIGHT: 240.94 LBS | BODY MASS INDEX: 42.69 KG/M2 | HEIGHT: 63 IN | OXYGEN SATURATION: 96 %

## 2025-08-13 DIAGNOSIS — E11.22 TYPE 2 DIABETES MELLITUS WITH STAGE 2 CHRONIC KIDNEY DISEASE, WITH LONG-TERM CURRENT USE OF INSULIN: ICD-10-CM

## 2025-08-13 DIAGNOSIS — E66.01 SEVERE OBESITY (BMI >= 40): ICD-10-CM

## 2025-08-13 DIAGNOSIS — I10 PRIMARY HYPERTENSION: ICD-10-CM

## 2025-08-13 DIAGNOSIS — N18.2 CKD STAGE G2/A3, GFR 60-89 AND ALBUMIN CREATININE RATIO >300 MG/G: Primary | ICD-10-CM

## 2025-08-13 DIAGNOSIS — N18.2 TYPE 2 DIABETES MELLITUS WITH STAGE 2 CHRONIC KIDNEY DISEASE, WITH LONG-TERM CURRENT USE OF INSULIN: ICD-10-CM

## 2025-08-13 DIAGNOSIS — Z79.4 TYPE 2 DIABETES MELLITUS WITH STAGE 2 CHRONIC KIDNEY DISEASE, WITH LONG-TERM CURRENT USE OF INSULIN: ICD-10-CM

## 2025-08-13 PROCEDURE — 3066F NEPHROPATHY DOC TX: CPT | Mod: CPTII,,,

## 2025-08-13 PROCEDURE — 99204 OFFICE O/P NEW MOD 45 MIN: CPT | Mod: S$PBB,,,

## 2025-08-13 PROCEDURE — 3008F BODY MASS INDEX DOCD: CPT | Mod: CPTII,,,

## 2025-08-13 PROCEDURE — 4010F ACE/ARB THERAPY RXD/TAKEN: CPT | Mod: CPTII,,,

## 2025-08-13 PROCEDURE — 3074F SYST BP LT 130 MM HG: CPT | Mod: CPTII,,,

## 2025-08-13 PROCEDURE — 3062F POS MACROALBUMINURIA REV: CPT | Mod: CPTII,,,

## 2025-08-13 PROCEDURE — 1159F MED LIST DOCD IN RCRD: CPT | Mod: CPTII,,,

## 2025-08-13 PROCEDURE — 3051F HG A1C>EQUAL 7.0%<8.0%: CPT | Mod: CPTII,,,

## 2025-08-13 PROCEDURE — 99215 OFFICE O/P EST HI 40 MIN: CPT | Mod: PBBFAC

## 2025-08-13 PROCEDURE — 3078F DIAST BP <80 MM HG: CPT | Mod: CPTII,,,

## 2025-08-13 RX ORDER — TRAMADOL HYDROCHLORIDE 50 MG/1
50 TABLET, FILM COATED ORAL EVERY 6 HOURS
Qty: 20 TABLET | Refills: 0 | Status: SHIPPED | OUTPATIENT
Start: 2025-08-13

## 2025-08-22 ENCOUNTER — HOSPITAL ENCOUNTER (OUTPATIENT)
Dept: RADIOLOGY | Facility: HOSPITAL | Age: 60
Discharge: HOME | End: 2025-08-22
Payer: MEDICAID

## 2025-08-22 ENCOUNTER — RESULTS FOLLOW-UP (OUTPATIENT)
Dept: NEPHROLOGY | Facility: CLINIC | Age: 60
End: 2025-08-22
Payer: MEDICAID

## 2025-08-22 DIAGNOSIS — N18.2 CKD STAGE G2/A3, GFR 60-89 AND ALBUMIN CREATININE RATIO >300 MG/G: ICD-10-CM

## 2025-08-22 PROCEDURE — 76775 US EXAM ABDO BACK WALL LIM: CPT | Mod: TC

## 2025-08-25 ENCOUNTER — TELEPHONE (OUTPATIENT)
Dept: NEPHROLOGY | Facility: CLINIC | Age: 60
End: 2025-08-25
Payer: MEDICAID

## 2025-09-05 ENCOUNTER — TELEPHONE (OUTPATIENT)
Dept: BEHAVIORAL HEALTH | Facility: CLINIC | Age: 60
End: 2025-09-05
Payer: MEDICAID